# Patient Record
Sex: FEMALE | Race: WHITE | NOT HISPANIC OR LATINO | Employment: OTHER | URBAN - METROPOLITAN AREA
[De-identification: names, ages, dates, MRNs, and addresses within clinical notes are randomized per-mention and may not be internally consistent; named-entity substitution may affect disease eponyms.]

---

## 2017-02-28 ENCOUNTER — OFFICE VISIT (OUTPATIENT)
Dept: URGENT CARE | Facility: PHYSICIAN GROUP | Age: 72
End: 2017-02-28
Payer: COMMERCIAL

## 2017-02-28 VITALS
DIASTOLIC BLOOD PRESSURE: 52 MMHG | OXYGEN SATURATION: 58 % | BODY MASS INDEX: 39.25 KG/M2 | HEIGHT: 69 IN | HEART RATE: 78 BPM | SYSTOLIC BLOOD PRESSURE: 122 MMHG | TEMPERATURE: 98.1 F | WEIGHT: 265 LBS | RESPIRATION RATE: 20 BRPM

## 2017-02-28 DIAGNOSIS — S39.012A LUMBAR STRAIN, INITIAL ENCOUNTER: ICD-10-CM

## 2017-02-28 DIAGNOSIS — R25.2 SPASM: ICD-10-CM

## 2017-02-28 PROCEDURE — 4040F PNEUMOC VAC/ADMIN/RCVD: CPT | Mod: 8P | Performed by: PHYSICIAN ASSISTANT

## 2017-02-28 PROCEDURE — 3014F SCREEN MAMMO DOC REV: CPT | Mod: 8P | Performed by: PHYSICIAN ASSISTANT

## 2017-02-28 PROCEDURE — 3017F COLORECTAL CA SCREEN DOC REV: CPT | Mod: 8P | Performed by: PHYSICIAN ASSISTANT

## 2017-02-28 PROCEDURE — G8419 CALC BMI OUT NRM PARAM NOF/U: HCPCS | Performed by: PHYSICIAN ASSISTANT

## 2017-02-28 PROCEDURE — 1036F TOBACCO NON-USER: CPT | Performed by: PHYSICIAN ASSISTANT

## 2017-02-28 PROCEDURE — G8432 DEP SCR NOT DOC, RNG: HCPCS | Performed by: PHYSICIAN ASSISTANT

## 2017-02-28 PROCEDURE — 99204 OFFICE O/P NEW MOD 45 MIN: CPT | Performed by: PHYSICIAN ASSISTANT

## 2017-02-28 PROCEDURE — 1101F PT FALLS ASSESS-DOCD LE1/YR: CPT | Mod: 8P | Performed by: PHYSICIAN ASSISTANT

## 2017-02-28 PROCEDURE — G8484 FLU IMMUNIZE NO ADMIN: HCPCS | Performed by: PHYSICIAN ASSISTANT

## 2017-02-28 RX ORDER — INSULIN GLARGINE 100 [IU]/ML
INJECTION, SOLUTION SUBCUTANEOUS NIGHTLY
COMMUNITY
End: 2018-03-21

## 2017-02-28 RX ORDER — METHOCARBAMOL 500 MG/1
1000 TABLET, FILM COATED ORAL 3 TIMES DAILY
Qty: 60 TAB | Refills: 0 | Status: SHIPPED | OUTPATIENT
Start: 2017-02-28 | End: 2018-03-21

## 2017-02-28 RX ORDER — EZETIMIBE AND SIMVASTATIN 10; 40 MG/1; MG/1
1 TABLET ORAL NIGHTLY
COMMUNITY
End: 2018-03-21

## 2017-02-28 RX ORDER — SUCRALFATE 1 G/1
1 TABLET ORAL
COMMUNITY
End: 2018-03-21

## 2017-02-28 RX ORDER — TEMAZEPAM 30 MG/1
30 CAPSULE ORAL NIGHTLY PRN
COMMUNITY
End: 2018-03-21

## 2017-02-28 ASSESSMENT — ENCOUNTER SYMPTOMS
BOWEL INCONTINENCE: 0
TINGLING: 0
COUGH: 0
PALPITATIONS: 0
FEVER: 0
HEADACHES: 0
SHORTNESS OF BREATH: 0
FALLS: 0
FLANK PAIN: 0
WHEEZING: 0
EYE DISCHARGE: 0
CHILLS: 0
BACK PAIN: 1
SENSORY CHANGE: 0
PARESIS: 0
DIARRHEA: 0
EYE REDNESS: 0
NAUSEA: 0
ABDOMINAL PAIN: 0
PERIANAL NUMBNESS: 0
VOMITING: 0
NUMBNESS: 0

## 2017-02-28 ASSESSMENT — PAIN SCALES - GENERAL: PAINLEVEL: 9=SEVERE PAIN

## 2017-02-28 NOTE — PROGRESS NOTES
Subjective:      Siri Solis is a 71 y.o. female who presents with Low Back Pain            Back Pain  This is a new problem. The current episode started in the past 7 days. The problem occurs constantly. The problem is unchanged. The pain is present in the lumbar spine. The pain does not radiate. Pertinent negatives include no abdominal pain, bladder incontinence, bowel incontinence, chest pain, dysuria, fever, headaches, numbness, paresis, perianal numbness or tingling. Treatments tried: Narcotics. The treatment provided mild relief.   Exacerbation of her chronic low back pain over the last week. She states she was cleaning and she overdid it. Now she is having bilateral lumbar tenderness including midline tenderness.      PMH:  has a past medical history of Hyperlipidemia; Ulcer; Diabetes; Hypertension; Arthritis; Indigestion; Hiatus hernia syndrome; Snoring; CATARACT; Pain; Anemia; Other specified disorder of intestines; EMPHYSEMA; Paralysis of diaphragm; and Breath shortness.  MEDS:   Current outpatient prescriptions:   •  ezetimibe-simvastatin (VYTORIN) 10-40 MG per tablet, Take 1 Tab by mouth every evening., Disp: , Rfl:   •  temazepam (RESTORIL) 30 MG capsule, Take 30 mg by mouth at bedtime as needed for Sleep., Disp: , Rfl:   •  insulin glargine (LANTUS) 100 UNIT/ML Solution, Inject  as instructed every evening., Disp: , Rfl:   •  sucralfate (CARAFATE) 1 GM Tab, Take 1 g by mouth 4 Times a Day,Before Meals and at Bedtime., Disp: , Rfl:   •  Polyethylene Glycol 3350 (MIRALAX PO), Take  by mouth., Disp: , Rfl:   •  hydrocodone-acetaminophen (NORCO) 7.5-325 MG per tablet, Take 1 Tab by mouth every four hours as needed. Severe pain, Disp: , Rfl:   •  mometasone (NASONEX) 50 MCG/ACT nasal spray, Spray 2 Sprays in nose every evening. Each nostril, Disp: , Rfl:   •  lisinopril (PRINIVIL) 10 MG TABS, Take 10 mg by mouth every evening., Disp: , Rfl:   •  Calcium Carbonate (CALCIUM 500 PO), Take  by mouth 2  Times a Day., Disp: , Rfl:   •  atenolol (TENORMIN) 100 MG TABS, Take 1 Tab by mouth every day., Disp: 30 Tab, Rfl: 0  •  tiotropium (SPIRIVA) 18 MCG CAPS, Inhale 18 mcg by mouth every evening., Disp: , Rfl:   •  pioglitazone-metformin (ACTOPLUS MET)  MG per tablet, Take 1 Tab by mouth 2 times a day, with meals., Disp: , Rfl:   •  OMEPRAZOLE 20 MG TBEC, Take 20 mg by mouth 2 Times a Day. bid, Disp: , Rfl:   •  ZOLPIDEM 10 MG TABS, Take 10 mg by mouth at bedtime as needed. daily, Disp: , Rfl:   •  non-formulary med, 25 Each. Iron 25mg with B12, Disp: , Rfl:   •  DiphenhydrAMINE HCl (BENADRYL ALLERGY PO), Take  by mouth as needed., Disp: , Rfl:   •  Loperamide HCl (IMODIUM PO), Take  by mouth as needed., Disp: , Rfl:   •  Non Formulary Request, Unknown pain pill, Disp: , Rfl:   •  spironolactone (ALDACTONE) 50 MG TABS, Take 1 Tab by mouth every day., Disp: 30 Tab, Rfl: 0  •  ezetimibe (ZETIA) 10 MG TABS, Take 10 mg by mouth every day.  , Disp: , Rfl:   •  aspirin (ASA) 325 MG TABS, Take 650 mg by mouth as needed., Disp: , Rfl:   ALLERGIES:   Allergies   Allergen Reactions   • Zoloft Diarrhea   • Metformin Hives, Rash and Itching   • Amaryl [Amaryl]    • Amaryl [Glimepiride] Shortness of Breath and Rash   • Amoxicillin Rash   • Byetta Rash   • Epinephrine Shortness of Breath     Panic attack, Can't breath   • Hctz      Stopped urinary output   • Iodine Anaphylaxis     contrast   • Lasix [Furosemide]      Leg cramps, stopped urinary output   • Latex    • Lexapro      Leg cramping   • Penicillins Rash   • Spironolactone      SURGHX:   Past Surgical History   Procedure Laterality Date   • Other orthopedic surgery       left knee replacement   • Gyn surgery       hysterectomy   • Other       cataract surgery    • Other       carpal tunnel    • Recovery  11/6/2013     Performed by [facility] at SURGERY SAME DAY [facility]     SOCHX:  reports that she quit smoking about 10 years ago. Her smoking use included  "Cigarettes. She has a 40 pack-year smoking history. She does not have any smokeless tobacco history on file. She reports that she does not drink alcohol or use illicit drugs.  FH: family history is not on file.      Review of Systems   Constitutional: Negative for fever and chills.   Eyes: Negative for discharge and redness.   Respiratory: Negative for cough, shortness of breath and wheezing.    Cardiovascular: Negative for chest pain, palpitations and leg swelling.   Gastrointestinal: Negative for nausea, vomiting, abdominal pain, diarrhea and bowel incontinence.   Genitourinary: Negative for bladder incontinence, dysuria, urgency, hematuria and flank pain.   Musculoskeletal: Positive for back pain. Negative for falls.   Neurological: Negative for tingling, sensory change, numbness and headaches.       Medications, Allergies, and current problem list reviewed today in Epic     Objective:     /52 mmHg  Pulse 78  Temp(Src) 36.7 °C (98.1 °F)  Resp 20  Ht 1.753 m (5' 9\")  Wt 120.203 kg (265 lb)  BMI 39.12 kg/m2  SpO2 58%  Breastfeeding? No     Physical Exam   Constitutional: She is oriented to person, place, and time. She appears well-developed and well-nourished. No distress.   Patient obese, wheelchair-bound, oxygen dependent   HENT:   Head: Normocephalic and atraumatic.   Right Ear: External ear normal.   Left Ear: External ear normal.   Mouth/Throat: Oropharynx is clear and moist. No oropharyngeal exudate.   Eyes: Conjunctivae are normal. Right eye exhibits no discharge. Left eye exhibits no discharge.   Neck: Normal range of motion. Neck supple.   Cardiovascular: Normal rate, regular rhythm and normal heart sounds.    Pulmonary/Chest: Effort normal and breath sounds normal. No respiratory distress. She has no wheezes.   Musculoskeletal:        Lumbar back: She exhibits decreased range of motion, tenderness, bony tenderness, pain and spasm. She exhibits no swelling.        Back:    Lymphadenopathy:    "  She has no cervical adenopathy.   Neurological: She is alert and oriented to person, place, and time.   Skin: Skin is warm and dry. She is not diaphoretic.   Psychiatric: She has a normal mood and affect. Her behavior is normal. Judgment and thought content normal.   Nursing note and vitals reviewed.              Assessment/Plan:     1. Lumbar strain, initial encounter  methocarbamol (ROBAXIN) 500 MG Tab    REFERRAL TO SPINE SURGERY   2. Spasm  methocarbamol (ROBAXIN) 500 MG Tab     Acute exacerbation of her chronic lumbar pain. She has leftover narcotics from previous injury. She will continue to take those as needed  Robaxin, do not drive, increase fluids  Referral to a spine specialist per patient's request.  OTC meds and conservative measures as discussed  Return to clinic or go to ED if symptoms worsen or persist. Indications for ED discussed at length. Patient voices understanding. Follow-up with your primary care provider in 3-5 days. Red flags discussed.    Please note that this dictation was created using voice recognition software. I have made every reasonable attempt to correct obvious errors, but I expect that there are errors of grammar and possibly content that I did not discover before finalizing the note.

## 2017-03-30 ENCOUNTER — HOSPITAL ENCOUNTER (OUTPATIENT)
Dept: RADIOLOGY | Facility: MEDICAL CENTER | Age: 72
End: 2017-03-30
Attending: NEUROLOGICAL SURGERY
Payer: COMMERCIAL

## 2017-03-30 DIAGNOSIS — M54.5 LOW BACK PAIN, UNSPECIFIED BACK PAIN LATERALITY, UNSPECIFIED CHRONICITY, WITH SCIATICA PRESENCE UNSPECIFIED: ICD-10-CM

## 2017-03-30 PROCEDURE — 72148 MRI LUMBAR SPINE W/O DYE: CPT

## 2017-03-30 PROCEDURE — 72110 X-RAY EXAM L-2 SPINE 4/>VWS: CPT

## 2017-06-20 ENCOUNTER — HOSPITAL ENCOUNTER (OUTPATIENT)
Dept: LAB | Facility: MEDICAL CENTER | Age: 72
End: 2017-06-20
Attending: FAMILY MEDICINE
Payer: COMMERCIAL

## 2017-06-20 LAB
ANION GAP SERPL CALC-SCNC: 8 MMOL/L (ref 0–11.9)
BUN SERPL-MCNC: 21 MG/DL (ref 8–22)
CALCIUM SERPL-MCNC: 9.8 MG/DL (ref 8.5–10.5)
CHLORIDE SERPL-SCNC: 101 MMOL/L (ref 96–112)
CO2 SERPL-SCNC: 33 MMOL/L (ref 20–33)
CREAT SERPL-MCNC: 0.96 MG/DL (ref 0.5–1.4)
EST. AVERAGE GLUCOSE BLD GHB EST-MCNC: 154 MG/DL
GFR SERPL CREATININE-BSD FRML MDRD: 57 ML/MIN/1.73 M 2
GLUCOSE SERPL-MCNC: 148 MG/DL (ref 65–99)
HBA1C MFR BLD: 7 % (ref 0–5.6)
POTASSIUM SERPL-SCNC: 5.4 MMOL/L (ref 3.6–5.5)
SODIUM SERPL-SCNC: 142 MMOL/L (ref 135–145)

## 2017-06-20 PROCEDURE — 80048 BASIC METABOLIC PNL TOTAL CA: CPT

## 2017-06-20 PROCEDURE — 36415 COLL VENOUS BLD VENIPUNCTURE: CPT

## 2017-06-20 PROCEDURE — 83036 HEMOGLOBIN GLYCOSYLATED A1C: CPT

## 2017-11-10 ENCOUNTER — HOSPITAL ENCOUNTER (OUTPATIENT)
Dept: LAB | Facility: MEDICAL CENTER | Age: 72
End: 2017-11-10
Attending: FAMILY MEDICINE
Payer: COMMERCIAL

## 2017-11-10 LAB
ANISOCYTOSIS BLD QL SMEAR: ABNORMAL
BASOPHILS # BLD AUTO: 0.9 % (ref 0–1.8)
BASOPHILS # BLD: 0.08 K/UL (ref 0–0.12)
BURR CELLS BLD QL SMEAR: NORMAL
EOSINOPHIL # BLD AUTO: 0.51 K/UL (ref 0–0.51)
EOSINOPHIL NFR BLD: 6.1 % (ref 0–6.9)
ERYTHROCYTE [DISTWIDTH] IN BLOOD BY AUTOMATED COUNT: 54.4 FL (ref 35.9–50)
FERRITIN SERPL-MCNC: 20 NG/ML (ref 10–291)
HCT VFR BLD AUTO: 41.4 % (ref 37–47)
HGB BLD-MCNC: 11.9 G/DL (ref 12–16)
IRON SATN MFR SERPL: 7 % (ref 15–55)
IRON SERPL-MCNC: 36 UG/DL (ref 40–170)
LYMPHOCYTES # BLD AUTO: 1.11 K/UL (ref 1–4.8)
LYMPHOCYTES NFR BLD: 13.2 % (ref 22–41)
MAGNESIUM SERPL-MCNC: 1.3 MG/DL (ref 1.5–2.5)
MANUAL DIFF BLD: NORMAL
MCH RBC QN AUTO: 25.9 PG (ref 27–33)
MCHC RBC AUTO-ENTMCNC: 28.7 G/DL (ref 33.6–35)
MCV RBC AUTO: 90.2 FL (ref 81.4–97.8)
MONOCYTES # BLD AUTO: 0.51 K/UL (ref 0–0.85)
MONOCYTES NFR BLD AUTO: 6.1 % (ref 0–13.4)
MORPHOLOGY BLD-IMP: NORMAL
NEUTROPHILS # BLD AUTO: 6.19 K/UL (ref 2–7.15)
NEUTROPHILS NFR BLD: 73.7 % (ref 44–72)
NRBC # BLD AUTO: 0.02 K/UL
NRBC BLD AUTO-RTO: 0.2 /100 WBC
OVALOCYTES BLD QL SMEAR: NORMAL
PLATELET # BLD AUTO: 251 K/UL (ref 164–446)
PLATELET BLD QL SMEAR: NORMAL
PMV BLD AUTO: 11.5 FL (ref 9–12.9)
POIKILOCYTOSIS BLD QL SMEAR: NORMAL
POLYCHROMASIA BLD QL SMEAR: NORMAL
RBC # BLD AUTO: 4.59 M/UL (ref 4.2–5.4)
RBC BLD AUTO: PRESENT
SCHISTOCYTES BLD QL SMEAR: NORMAL
T4 FREE SERPL-MCNC: 1.36 NG/DL (ref 0.53–1.43)
TIBC SERPL-MCNC: 543 UG/DL (ref 250–450)
TSH SERPL DL<=0.005 MIU/L-ACNC: 4.37 UIU/ML (ref 0.3–3.7)
WBC # BLD AUTO: 8.4 K/UL (ref 4.8–10.8)

## 2017-11-10 PROCEDURE — 83550 IRON BINDING TEST: CPT

## 2017-11-10 PROCEDURE — 84439 ASSAY OF FREE THYROXINE: CPT

## 2017-11-10 PROCEDURE — 82728 ASSAY OF FERRITIN: CPT

## 2017-11-10 PROCEDURE — 36415 COLL VENOUS BLD VENIPUNCTURE: CPT

## 2017-11-10 PROCEDURE — 83540 ASSAY OF IRON: CPT

## 2017-11-10 PROCEDURE — 85027 COMPLETE CBC AUTOMATED: CPT

## 2017-11-10 PROCEDURE — 85007 BL SMEAR W/DIFF WBC COUNT: CPT

## 2017-11-10 PROCEDURE — 84443 ASSAY THYROID STIM HORMONE: CPT

## 2017-11-10 PROCEDURE — 83735 ASSAY OF MAGNESIUM: CPT

## 2018-03-21 ENCOUNTER — HOSPITAL ENCOUNTER (INPATIENT)
Facility: MEDICAL CENTER | Age: 73
LOS: 8 days | DRG: 291 | End: 2018-03-29
Attending: EMERGENCY MEDICINE | Admitting: FAMILY MEDICINE
Payer: COMMERCIAL

## 2018-03-21 ENCOUNTER — APPOINTMENT (OUTPATIENT)
Dept: RADIOLOGY | Facility: MEDICAL CENTER | Age: 73
DRG: 291 | End: 2018-03-21
Attending: EMERGENCY MEDICINE
Payer: COMMERCIAL

## 2018-03-21 DIAGNOSIS — I27.20 PULMONARY HTN (HCC): ICD-10-CM

## 2018-03-21 DIAGNOSIS — R53.83 FATIGUE, UNSPECIFIED TYPE: ICD-10-CM

## 2018-03-21 DIAGNOSIS — J44.9 CHRONIC OBSTRUCTIVE PULMONARY DISEASE, UNSPECIFIED COPD TYPE (HCC): ICD-10-CM

## 2018-03-21 DIAGNOSIS — N30.00 ACUTE CYSTITIS WITHOUT HEMATURIA: ICD-10-CM

## 2018-03-21 DIAGNOSIS — I50.9 ACUTE ON CHRONIC CONGESTIVE HEART FAILURE, UNSPECIFIED CONGESTIVE HEART FAILURE TYPE: ICD-10-CM

## 2018-03-21 DIAGNOSIS — I50.33 ACUTE ON CHRONIC DIASTOLIC CONGESTIVE HEART FAILURE, NYHA CLASS 4 (HCC): ICD-10-CM

## 2018-03-21 LAB
ALBUMIN SERPL BCP-MCNC: 4.2 G/DL (ref 3.2–4.9)
ALBUMIN/GLOB SERPL: 1.6 G/DL
ALP SERPL-CCNC: 160 U/L (ref 30–99)
ALT SERPL-CCNC: 7 U/L (ref 2–50)
ANION GAP SERPL CALC-SCNC: 11 MMOL/L (ref 0–11.9)
ANION GAP SERPL CALC-SCNC: 9 MMOL/L (ref 0–11.9)
ANISOCYTOSIS BLD QL SMEAR: ABNORMAL
APPEARANCE UR: ABNORMAL
AST SERPL-CCNC: 17 U/L (ref 12–45)
BACTERIA #/AREA URNS HPF: ABNORMAL /HPF
BASOPHILS # BLD AUTO: 0 % (ref 0–1.8)
BASOPHILS # BLD: 0 K/UL (ref 0–0.12)
BILIRUB SERPL-MCNC: 1.3 MG/DL (ref 0.1–1.5)
BILIRUB UR QL STRIP.AUTO: ABNORMAL
BNP SERPL-MCNC: 958 PG/ML (ref 0–100)
BUN SERPL-MCNC: 76 MG/DL (ref 8–22)
BUN SERPL-MCNC: 76 MG/DL (ref 8–22)
CALCIUM SERPL-MCNC: 9.5 MG/DL (ref 8.5–10.5)
CALCIUM SERPL-MCNC: 9.9 MG/DL (ref 8.5–10.5)
CHLORIDE SERPL-SCNC: 101 MMOL/L (ref 96–112)
CHLORIDE SERPL-SCNC: 102 MMOL/L (ref 96–112)
CO2 SERPL-SCNC: 21 MMOL/L (ref 20–33)
CO2 SERPL-SCNC: 26 MMOL/L (ref 20–33)
COLOR UR: ABNORMAL
CREAT SERPL-MCNC: 2.3 MG/DL (ref 0.5–1.4)
CREAT SERPL-MCNC: 2.32 MG/DL (ref 0.5–1.4)
CULTURE IF INDICATED INDCX: YES UA CULTURE
EKG IMPRESSION: NORMAL
EOSINOPHIL # BLD AUTO: 0.14 K/UL (ref 0–0.51)
EOSINOPHIL NFR BLD: 1.7 % (ref 0–6.9)
EPI CELLS #/AREA URNS HPF: ABNORMAL /HPF
ERYTHROCYTE [DISTWIDTH] IN BLOOD BY AUTOMATED COUNT: 78.3 FL (ref 35.9–50)
ERYTHROCYTE [DISTWIDTH] IN BLOOD BY AUTOMATED COUNT: 80.9 FL (ref 35.9–50)
GLOBULIN SER CALC-MCNC: 2.6 G/DL (ref 1.9–3.5)
GLUCOSE BLD-MCNC: 123 MG/DL (ref 65–99)
GLUCOSE BLD-MCNC: 133 MG/DL (ref 65–99)
GLUCOSE BLD-MCNC: 81 MG/DL (ref 65–99)
GLUCOSE SERPL-MCNC: 114 MG/DL (ref 65–99)
GLUCOSE SERPL-MCNC: 123 MG/DL (ref 65–99)
GLUCOSE UR STRIP.AUTO-MCNC: NEGATIVE MG/DL
HCT VFR BLD AUTO: 43.2 % (ref 37–47)
HCT VFR BLD AUTO: 45.6 % (ref 37–47)
HGB BLD-MCNC: 13 G/DL (ref 12–16)
HGB BLD-MCNC: 13.1 G/DL (ref 12–16)
HYALINE CASTS #/AREA URNS LPF: ABNORMAL /LPF
KETONES UR STRIP.AUTO-MCNC: ABNORMAL MG/DL
LEUKOCYTE ESTERASE UR QL STRIP.AUTO: ABNORMAL
LG PLATELETS BLD QL SMEAR: NORMAL
LYMPHOCYTES # BLD AUTO: 0.73 K/UL (ref 1–4.8)
LYMPHOCYTES NFR BLD: 8.8 % (ref 22–41)
MACROCYTES BLD QL SMEAR: ABNORMAL
MANUAL DIFF BLD: NORMAL
MCH RBC QN AUTO: 28.4 PG (ref 27–33)
MCH RBC QN AUTO: 29.3 PG (ref 27–33)
MCHC RBC AUTO-ENTMCNC: 28.7 G/DL (ref 33.6–35)
MCHC RBC AUTO-ENTMCNC: 30.1 G/DL (ref 33.6–35)
MCV RBC AUTO: 97.3 FL (ref 81.4–97.8)
MCV RBC AUTO: 98.9 FL (ref 81.4–97.8)
MICRO URNS: ABNORMAL
MONOCYTES # BLD AUTO: 0.73 K/UL (ref 0–0.85)
MONOCYTES NFR BLD AUTO: 8.8 % (ref 0–13.4)
MORPHOLOGY BLD-IMP: NORMAL
NEUTROPHILS # BLD AUTO: 6.7 K/UL (ref 2–7.15)
NEUTROPHILS NFR BLD: 80.7 % (ref 44–72)
NITRITE UR QL STRIP.AUTO: NEGATIVE
NRBC # BLD AUTO: 0 K/UL
NRBC BLD-RTO: 0 /100 WBC
OVALOCYTES BLD QL SMEAR: NORMAL
PH UR STRIP.AUTO: 5 [PH]
PLATELET # BLD AUTO: 189 K/UL (ref 164–446)
PLATELET # BLD AUTO: 219 K/UL (ref 164–446)
PLATELET BLD QL SMEAR: NORMAL
PMV BLD AUTO: 10.8 FL (ref 9–12.9)
PMV BLD AUTO: 11.2 FL (ref 9–12.9)
POIKILOCYTOSIS BLD QL SMEAR: NORMAL
POLYCHROMASIA BLD QL SMEAR: NORMAL
POTASSIUM SERPL-SCNC: 7.2 MMOL/L (ref 3.6–5.5)
POTASSIUM SERPL-SCNC: 7.6 MMOL/L (ref 3.6–5.5)
PROT SERPL-MCNC: 6.8 G/DL (ref 6–8.2)
PROT UR QL STRIP: >=1000 MG/DL
RBC # BLD AUTO: 4.44 M/UL (ref 4.2–5.4)
RBC # BLD AUTO: 4.61 M/UL (ref 4.2–5.4)
RBC # URNS HPF: ABNORMAL /HPF
RBC BLD AUTO: PRESENT
RBC UR QL AUTO: ABNORMAL
SODIUM SERPL-SCNC: 134 MMOL/L (ref 135–145)
SODIUM SERPL-SCNC: 136 MMOL/L (ref 135–145)
SP GR UR STRIP.AUTO: 1.03
UROBILINOGEN UR STRIP.AUTO-MCNC: 1 MG/DL
WBC # BLD AUTO: 8 K/UL (ref 4.8–10.8)
WBC # BLD AUTO: 8.3 K/UL (ref 4.8–10.8)
WBC #/AREA URNS HPF: ABNORMAL /HPF

## 2018-03-21 PROCEDURE — 87077 CULTURE AEROBIC IDENTIFY: CPT

## 2018-03-21 PROCEDURE — 700111 HCHG RX REV CODE 636 W/ 250 OVERRIDE (IP): Performed by: FAMILY MEDICINE

## 2018-03-21 PROCEDURE — 80053 COMPREHEN METABOLIC PANEL: CPT

## 2018-03-21 PROCEDURE — A9270 NON-COVERED ITEM OR SERVICE: HCPCS | Performed by: EMERGENCY MEDICINE

## 2018-03-21 PROCEDURE — 700102 HCHG RX REV CODE 250 W/ 637 OVERRIDE(OP): Performed by: EMERGENCY MEDICINE

## 2018-03-21 PROCEDURE — 80048 BASIC METABOLIC PNL TOTAL CA: CPT

## 2018-03-21 PROCEDURE — 93010 ELECTROCARDIOGRAM REPORT: CPT | Performed by: INTERNAL MEDICINE

## 2018-03-21 PROCEDURE — 87086 URINE CULTURE/COLONY COUNT: CPT

## 2018-03-21 PROCEDURE — 700111 HCHG RX REV CODE 636 W/ 250 OVERRIDE (IP): Performed by: EMERGENCY MEDICINE

## 2018-03-21 PROCEDURE — 700101 HCHG RX REV CODE 250: Performed by: FAMILY MEDICINE

## 2018-03-21 PROCEDURE — 770020 HCHG ROOM/CARE - TELE (206)

## 2018-03-21 PROCEDURE — 36415 COLL VENOUS BLD VENIPUNCTURE: CPT

## 2018-03-21 PROCEDURE — 85007 BL SMEAR W/DIFF WBC COUNT: CPT

## 2018-03-21 PROCEDURE — 82962 GLUCOSE BLOOD TEST: CPT | Mod: 91

## 2018-03-21 PROCEDURE — 85027 COMPLETE CBC AUTOMATED: CPT | Mod: 91

## 2018-03-21 PROCEDURE — 93926 LOWER EXTREMITY STUDY: CPT

## 2018-03-21 PROCEDURE — 83880 ASSAY OF NATRIURETIC PEPTIDE: CPT

## 2018-03-21 PROCEDURE — 71045 X-RAY EXAM CHEST 1 VIEW: CPT

## 2018-03-21 PROCEDURE — 87040 BLOOD CULTURE FOR BACTERIA: CPT | Mod: 91

## 2018-03-21 PROCEDURE — 94760 N-INVAS EAR/PLS OXIMETRY 1: CPT

## 2018-03-21 PROCEDURE — 96374 THER/PROPH/DIAG INJ IV PUSH: CPT

## 2018-03-21 PROCEDURE — 700105 HCHG RX REV CODE 258: Performed by: FAMILY MEDICINE

## 2018-03-21 PROCEDURE — 99285 EMERGENCY DEPT VISIT HI MDM: CPT

## 2018-03-21 PROCEDURE — 81001 URINALYSIS AUTO W/SCOPE: CPT

## 2018-03-21 PROCEDURE — 700102 HCHG RX REV CODE 250 W/ 637 OVERRIDE(OP): Performed by: FAMILY MEDICINE

## 2018-03-21 PROCEDURE — 93005 ELECTROCARDIOGRAM TRACING: CPT | Performed by: FAMILY MEDICINE

## 2018-03-21 PROCEDURE — 93005 ELECTROCARDIOGRAM TRACING: CPT | Performed by: EMERGENCY MEDICINE

## 2018-03-21 PROCEDURE — 87186 SC STD MICRODIL/AGAR DIL: CPT

## 2018-03-21 RX ORDER — DEXTROSE MONOHYDRATE 25 G/50ML
25 INJECTION, SOLUTION INTRAVENOUS ONCE
Status: COMPLETED | OUTPATIENT
Start: 2018-03-21 | End: 2018-03-21

## 2018-03-21 RX ORDER — EZETIMIBE AND SIMVASTATIN 10; 40 MG/1; MG/1
1 TABLET ORAL NIGHTLY
COMMUNITY
End: 2021-10-27 | Stop reason: SDUPTHER

## 2018-03-21 RX ORDER — INSULIN GLARGINE 100 [IU]/ML
15 INJECTION, SOLUTION SUBCUTANEOUS NIGHTLY
Status: DISCONTINUED | OUTPATIENT
Start: 2018-03-21 | End: 2018-03-29 | Stop reason: HOSPADM

## 2018-03-21 RX ORDER — AMOXICILLIN 250 MG
2 CAPSULE ORAL 2 TIMES DAILY
Status: DISCONTINUED | OUTPATIENT
Start: 2018-03-21 | End: 2018-03-29 | Stop reason: HOSPADM

## 2018-03-21 RX ORDER — DEXTROSE MONOHYDRATE 25 G/50ML
25 INJECTION, SOLUTION INTRAVENOUS
Status: DISCONTINUED | OUTPATIENT
Start: 2018-03-21 | End: 2018-03-29 | Stop reason: HOSPADM

## 2018-03-21 RX ORDER — EZETIMIBE AND SIMVASTATIN 10; 40 MG/1; MG/1
1 TABLET ORAL NIGHTLY
Status: DISCONTINUED | OUTPATIENT
Start: 2018-03-21 | End: 2018-03-21

## 2018-03-21 RX ORDER — AZITHROMYCIN 500 MG/1
500 INJECTION, POWDER, LYOPHILIZED, FOR SOLUTION INTRAVENOUS ONCE
Status: DISCONTINUED | OUTPATIENT
Start: 2018-03-21 | End: 2018-03-21

## 2018-03-21 RX ORDER — OMEPRAZOLE 20 MG/1
20 CAPSULE, DELAYED RELEASE ORAL EVERY EVENING
COMMUNITY
End: 2021-11-08 | Stop reason: SDUPTHER

## 2018-03-21 RX ORDER — CEFTRIAXONE 2 G/1
2 INJECTION, POWDER, FOR SOLUTION INTRAMUSCULAR; INTRAVENOUS ONCE
Status: COMPLETED | OUTPATIENT
Start: 2018-03-21 | End: 2018-03-21

## 2018-03-21 RX ORDER — LISINOPRIL 10 MG/1
10 TABLET ORAL DAILY
COMMUNITY
End: 2021-11-08 | Stop reason: SDUPTHER

## 2018-03-21 RX ORDER — LISINOPRIL 10 MG/1
10 TABLET ORAL EVERY EVENING
Status: DISCONTINUED | OUTPATIENT
Start: 2018-03-22 | End: 2018-03-21

## 2018-03-21 RX ORDER — PIOGLITAZONE HCL AND METFORMIN HCL 850; 15 MG/1; MG/1
1 TABLET ORAL 2 TIMES DAILY WITH MEALS
Status: ON HOLD | COMMUNITY
End: 2018-03-29

## 2018-03-21 RX ORDER — HYDROCODONE BITARTRATE AND ACETAMINOPHEN 7.5; 325 MG/1; MG/1
1 TABLET ORAL EVERY 4 HOURS PRN
COMMUNITY
End: 2021-10-14

## 2018-03-21 RX ORDER — TEMAZEPAM 30 MG/1
30 CAPSULE ORAL NIGHTLY PRN
COMMUNITY
End: 2021-10-14 | Stop reason: SDUPTHER

## 2018-03-21 RX ORDER — DIPHENHYDRAMINE HCL 25 MG
25 TABLET ORAL NIGHTLY PRN
Status: DISCONTINUED | OUTPATIENT
Start: 2018-03-21 | End: 2018-03-29 | Stop reason: HOSPADM

## 2018-03-21 RX ORDER — CEFTRIAXONE 1 G/1
1000 INJECTION, POWDER, FOR SOLUTION INTRAMUSCULAR; INTRAVENOUS EVERY 12 HOURS
Status: DISCONTINUED | OUTPATIENT
Start: 2018-03-21 | End: 2018-03-21

## 2018-03-21 RX ORDER — EZETIMIBE 10 MG/1
10 TABLET ORAL
Status: DISCONTINUED | OUTPATIENT
Start: 2018-03-21 | End: 2018-03-29 | Stop reason: HOSPADM

## 2018-03-21 RX ORDER — TIOTROPIUM BROMIDE 18 UG/1
1 CAPSULE ORAL; RESPIRATORY (INHALATION) EVERY EVENING
Status: DISCONTINUED | OUTPATIENT
Start: 2018-03-21 | End: 2018-03-29 | Stop reason: HOSPADM

## 2018-03-21 RX ORDER — BISACODYL 10 MG
10 SUPPOSITORY, RECTAL RECTAL
Status: DISCONTINUED | OUTPATIENT
Start: 2018-03-21 | End: 2018-03-29 | Stop reason: HOSPADM

## 2018-03-21 RX ORDER — HYDROCODONE BITARTRATE AND ACETAMINOPHEN 7.5; 325 MG/1; MG/1
1 TABLET ORAL EVERY 4 HOURS PRN
Status: DISCONTINUED | OUTPATIENT
Start: 2018-03-21 | End: 2018-03-29 | Stop reason: HOSPADM

## 2018-03-21 RX ORDER — SPIRONOLACTONE 25 MG/1
50 TABLET ORAL DAILY
Status: DISCONTINUED | OUTPATIENT
Start: 2018-03-21 | End: 2018-03-21

## 2018-03-21 RX ORDER — OMEPRAZOLE 20 MG/1
20 CAPSULE, DELAYED RELEASE ORAL 2 TIMES DAILY
Status: DISCONTINUED | OUTPATIENT
Start: 2018-03-21 | End: 2018-03-29 | Stop reason: HOSPADM

## 2018-03-21 RX ORDER — SIMVASTATIN 40 MG
40 TABLET ORAL EVERY EVENING
Status: DISCONTINUED | OUTPATIENT
Start: 2018-03-21 | End: 2018-03-29 | Stop reason: HOSPADM

## 2018-03-21 RX ORDER — INSULIN GLARGINE 100 [IU]/ML
32 INJECTION, SOLUTION SUBCUTANEOUS NIGHTLY
Status: ON HOLD | COMMUNITY
End: 2018-03-29

## 2018-03-21 RX ORDER — TIOTROPIUM BROMIDE 18 UG/1
18 CAPSULE ORAL; RESPIRATORY (INHALATION) EVERY EVENING
COMMUNITY
End: 2022-10-31 | Stop reason: SDUPTHER

## 2018-03-21 RX ORDER — MOMETASONE FUROATE 50 UG/1
2 SPRAY, METERED NASAL EVERY EVENING
Status: DISCONTINUED | OUTPATIENT
Start: 2018-03-21 | End: 2018-03-21

## 2018-03-21 RX ORDER — ATENOLOL 100 MG/1
100 TABLET ORAL EVERY MORNING
Status: ON HOLD | COMMUNITY
End: 2018-03-29

## 2018-03-21 RX ORDER — AZITHROMYCIN 250 MG/1
500 TABLET, FILM COATED ORAL ONCE
Status: COMPLETED | OUTPATIENT
Start: 2018-03-21 | End: 2018-03-21

## 2018-03-21 RX ORDER — MAGNESIUM OXIDE 400 MG/1
400 TABLET ORAL DAILY
COMMUNITY
End: 2021-11-11

## 2018-03-21 RX ORDER — FLUTICASONE PROPIONATE 50 MCG
2 SPRAY, SUSPENSION (ML) NASAL DAILY
Status: DISCONTINUED | OUTPATIENT
Start: 2018-03-21 | End: 2018-03-29 | Stop reason: HOSPADM

## 2018-03-21 RX ORDER — SUCRALFATE 1 G/1
1 TABLET ORAL
Status: DISCONTINUED | OUTPATIENT
Start: 2018-03-21 | End: 2018-03-29 | Stop reason: HOSPADM

## 2018-03-21 RX ORDER — IBUPROFEN 200 MG
500 CAPSULE ORAL 2 TIMES DAILY
Status: DISCONTINUED | OUTPATIENT
Start: 2018-03-21 | End: 2018-03-29 | Stop reason: HOSPADM

## 2018-03-21 RX ORDER — POLYETHYLENE GLYCOL 3350 17 G/17G
1 POWDER, FOR SOLUTION ORAL
Status: DISCONTINUED | OUTPATIENT
Start: 2018-03-21 | End: 2018-03-29 | Stop reason: HOSPADM

## 2018-03-21 RX ADMIN — INSULIN HUMAN 10 UNITS: 100 INJECTION, SOLUTION PARENTERAL at 22:00

## 2018-03-21 RX ADMIN — CALCIUM GLUCONATE 1000 MG: 94 INJECTION, SOLUTION INTRAVENOUS at 22:18

## 2018-03-21 RX ADMIN — DIPHENHYDRAMINE HCL 25 MG: 25 TABLET ORAL at 20:28

## 2018-03-21 RX ADMIN — TIOTROPIUM BROMIDE 1 CAPSULE: 18 CAPSULE ORAL; RESPIRATORY (INHALATION) at 20:27

## 2018-03-21 RX ADMIN — SIMVASTATIN 40 MG: 40 TABLET, FILM COATED ORAL at 20:28

## 2018-03-21 RX ADMIN — DEXTROSE MONOHYDRATE 50 ML: 25 INJECTION, SOLUTION INTRAVENOUS at 21:59

## 2018-03-21 RX ADMIN — ENOXAPARIN SODIUM 40 MG: 100 INJECTION SUBCUTANEOUS at 20:29

## 2018-03-21 RX ADMIN — AZITHROMYCIN 500 MG: 250 TABLET, FILM COATED ORAL at 13:46

## 2018-03-21 RX ADMIN — OMEPRAZOLE 20 MG: 20 CAPSULE, DELAYED RELEASE ORAL at 20:28

## 2018-03-21 RX ADMIN — SUCRALFATE 1 G: 1 TABLET ORAL at 20:29

## 2018-03-21 RX ADMIN — EZETIMIBE 10 MG: 10 TABLET ORAL at 20:28

## 2018-03-21 RX ADMIN — CEFTRIAXONE SODIUM 2 G: 2 INJECTION, POWDER, FOR SOLUTION INTRAMUSCULAR; INTRAVENOUS at 13:39

## 2018-03-21 ASSESSMENT — PATIENT HEALTH QUESTIONNAIRE - PHQ9
2. FEELING DOWN, DEPRESSED, IRRITABLE, OR HOPELESS: NOT AT ALL
1. LITTLE INTEREST OR PLEASURE IN DOING THINGS: NOT AT ALL
SUM OF ALL RESPONSES TO PHQ9 QUESTIONS 1 AND 2: 0

## 2018-03-21 ASSESSMENT — LIFESTYLE VARIABLES
EVER_SMOKED: YES
EVER_SMOKED: YES
DO YOU DRINK ALCOHOL: NO

## 2018-03-21 ASSESSMENT — PAIN SCALES - GENERAL: PAINLEVEL_OUTOF10: 5

## 2018-03-21 ASSESSMENT — COPD QUESTIONNAIRES
COPD SCREENING SCORE: 6
DO YOU EVER COUGH UP ANY MUCUS OR PHLEGM?: NO/ONLY WITH OCCASIONAL COLDS OR INFECTIONS
HAVE YOU SMOKED AT LEAST 100 CIGARETTES IN YOUR ENTIRE LIFE: YES
DURING THE PAST 4 WEEKS HOW MUCH DID YOU FEEL SHORT OF BREATH: SOME OF THE TIME

## 2018-03-21 NOTE — ED NOTES
Pt helped onto bedpan. Unable to urinate. Minicath performed and urine sent to lab.   Pt medicated per orders. Pt and family aware of POC. Awaiting results. Call light within reach.

## 2018-03-21 NOTE — ED PROVIDER NOTES
ED Provider Note    Scribed for Kisha Cobb M.D. by Lilliam Sam. 3/21/2018, 1:01 PM.    Primary care provider: Lake Ochoa M.D.  Means of arrival: Ambulance  History obtained from: Patient  History limited by: None    CHIEF COMPLAINT  Chief Complaint   Patient presents with   • Shortness of Breath       HPI  Siri Solis is a 72 y.o. Female with a history of diabetes and COPD who presents to the Emergency Department by ambulance for shortness of breath with an onset of 2 days. Per , the patient has chronic mild weakness/fatigue. However, she has acutely worsened and has not been able to get out of bed for the past week. She reports worsening shortness of breath at rest over the past two days along with a cough productive of green sputum. She uses 4L oxygen at home at baseline but has had minimal improvement in shortness of breath. She has also had some confusion and disorientation. The patient notes an episode of urinary incontinence 2 days ago but denies dysuria or hematuria. She has chronic leg swelling bilaterally. She denies any fever, chills, headaches or chest pain.       REVIEW OF SYSTEMS  Pertinent positives include weakness, fatigue, productive cough, green sputum production, shortness of breath, confusion, disorientation, chronic leg swelling and urinary incontinence. Pertinent negatives include no fever, headaches, chest pain, dysuria or hematuria.  All other systems reviewed and negative. See HPI for further details. C.      PAST MEDICAL HISTORY  Patient has a past medical history of Anemia; Arthritis; Breath shortness; CATARACT; Diabetes; EMPHYSEMA; Hiatus hernia syndrome; Hyperlipidemia; Hypertension; Indigestion; Other specified disorder of intestines; Pain; Paralysis of diaphragm; Snoring; and Ulcer (CMS-HCC).      SURGICAL HISTORY  Patient has a past surgical history that includes other orthopedic surgery; gyn surgery; other; other; and recovery (11/6/2013).      SOCIAL  "HISTORY  Social History   Substance Use Topics   • Smoking status: Former Smoker     Packs/day: 2.00     Years: 20.00     Types: Cigarettes     Quit date: 1/1/2007   • Alcohol use No      History   Drug Use No     FAMILY HISTORY  History reviewed. No pertinent family history.     CURRENT MEDICATIONS  Home Medications    **Home medications have not yet been reviewed for this encounter**       ALLERGIES  Allergies   Allergen Reactions   • Zoloft Diarrhea   • Metformin Hives, Rash and Itching   • Amaryl [Amaryl]    • Amaryl [Glimepiride] Shortness of Breath and Rash   • Amoxicillin Rash   • Byetta Rash   • Epinephrine Shortness of Breath     Panic attack, Can't breath   • Hctz      Stopped urinary output   • Iodine Anaphylaxis     contrast   • Lasix [Furosemide]      Leg cramps, stopped urinary output   • Latex    • Lexapro      Leg cramping   • Penicillins Rash   • Spironolactone      PHYSICAL EXAM  VITAL SIGNS: /60   Pulse 62   Temp 36.3 °C (97.3 °F)   Resp (!) 22   Ht 1.753 m (5' 9\")   Wt (!) 167.6 kg (369 lb 7.9 oz)   SpO2 89%   BMI 54.56 kg/m²     Constitutional: Alert in mild distress.  HENT: No signs of trauma, Bilateral external ears normal, Nose normal.   Eyes: Pupils are equal and reactive, Conjunctiva normal, Non-icteric.   Neck: Normal range of motion, No tenderness, Supple, No stridor.   Cardiovascular: Regular rate and rhythm, no murmurs.   Thorax & Lungs: Tachypnic, Decreased breath sounds with crackles bilaterally, No wheezing, No chest tenderness.   Abdomen: Tenderness to palpation to the RLQ, no rebound or guarding. Bowel sounds normal, Soft, No masses, No peritoneal signs.  Skin: Warm, Dry, No erythema, No rash.   Back: No bony tenderness, No CVA tenderness.   Musculoskeletal:  3+ Edema to bilateral lower extremities up to the mid thigh. No major deformities noted. Good range of motion in all major joints.  Neurologic: Alert, moving all extremities without difficulty, no focal " "deficits.      LABS  Labs Reviewed   BTYPE NATRIURETIC PEPTIDE - Abnormal; Notable for the following:        Result Value    B Natriuretic Peptide 958 (*)     All other components within normal limits   CBC WITH DIFFERENTIAL - Abnormal; Notable for the following:     MCV 98.9 (*)     MCHC 28.7 (*)     RDW 80.9 (*)     Neutrophils-Polys 80.70 (*)     Lymphocytes 8.80 (*)     Lymphs (Absolute) 0.73 (*)     All other components within normal limits   URINALYSIS,CULTURE IF INDICATED - Abnormal; Notable for the following:     Character Turbid (*)     Ketones Trace (*)     Protein >=1000 (*)     Bilirubin Moderate (*)     Leukocyte Esterase Moderate (*)     Occult Blood Moderate (*)     All other components within normal limits   URINE MICROSCOPIC (W/UA) - Abnormal; Notable for the following:     RBC 2-5 (*)     Bacteria Many (*)     Hyaline Cast 11-20 (*)     All other components within normal limits   BLOOD CULTURE    Narrative:     Per Hospital Policy: Only change Specimen Src: to \"Line\" if  specified by physician order.   BLOOD CULTURE    Narrative:     Per Hospital Policy: Only change Specimen Src: to \"Line\" if  specified by physician order.   DIFFERENTIAL MANUAL   PERIPHERAL SMEAR REVIEW   PLATELET ESTIMATE   MORPHOLOGY   URINE CULTURE(NEW)   COMP METABOLIC PANEL   COMP METABOLIC PANEL   All labs reviewed by me.      RADIOLOGY  DX-CHEST-LIMITED (1 VIEW)   Final Result      1.  Bibasilar atelectasis/consolidation.      2.  Cardiomegaly.      3.  Chronic elevation of the right hemidiaphragm.      ECHOCARDIOGRAM COMP W/O CONT    (Results Pending)   The radiologist's interpretation of all radiological studies have been reviewed by me.      EKG  12 Lead EKG interpreted by me to show:  Normal sinus rhythm  Rate 62  Axis: Normal  Intervals: Normal  Flipped T waves in V2 and V3  No ST elevation  My impression of this EKG: Does not indicate ischemia or arrythmia at this time.  Normal EKG compared to prior dated " 12/2012      COURSE & MEDICAL DECISION MAKING  Pertinent Labs & Imaging studies reviewed. (See chart for details)    Differential Diagnoses include but are not limited to: COPD exacerbation, pneumonia, UTI.     12:50 PM Obtained and reviewed patient's electronic medical record which indicates a history of COPD and diabetes.    1:00 PM Patient seen and examined at bedside. Patient presents for shortness of breath.  Exam indicates crackles to her lungs suggestive of a possible consolidation, and RLQ abdominal tenderness without peritoneal signs.      Initial orders in the Emergency Department included XR chest and laboratory testing: blood cultures, UA, microscopic urine, urine culture, estimated GFR, differential manual, peripheral smear review, platelet estimate, morphology, BNP, CBC with differential and CMP.  Initial treatment in the Emergency Department included 500 mg of Azithromycin IV and 2 g of Rocephin IV.  Patient verbalized their understanding and agreement to this plan.    1:55 PM On repeat evaluation, patient is still complaining of shortness of breath and fatigue. ED testing reveals BNP>900, normal WBC, and a chest x-ray showing bilateral basilar consolidations vs atelectasis.     2:02 PM Paged [facility].    2:10 PM Spoke with [facility] regarding the patient's presenting symptoms and diagnostic results. Patient will be admitted to the hospital for failure to thrive and possible CHF vs pneumonia.       Decision Making:  This is a 72 y.o. year old female who presents with new onset of weakness/fatigue along with shortness of breath and productive cough. On presentation to the ER she has bilateral pitting edema, crackles on lung exam, and her lab studies show BNP>900 and an EKG with poor R wave progression. She is afebrile with a normal WBC, altough her chest x-ray shows bilateral consolidation/atelectasis. This presentation is concerning for acute exacerbation of CHF, and pneumonia or  COPD exacerbation are less likely. Blood cultures were obtained she was started on antibiotics for this possibility. The patient also has bacteruria and pyuria on urinalysis. Patient does have slightly elevated BNP she may have some element of CHF as well. She has been covered with antibiotics. She is hemodynamically stable at this time on her baseline oxygen. [facility] has agreed to admit the patient for further management.       DISPOSITION  Patient will be admitted to [facility] in guarded condition.      DIAGNOSIS  1. Fatigue, unspecified type    2. Acute on chronic congestive heart failure, unspecified congestive heart failure type (CMS-HCC)           The note accurately reflects work and decisions made by me.  Kisha Cobb  3/21/2018  4:26 PM     ILilliam (Scribe), am scribing for, and in the presence of, Kisha Cobb M.D.    Electronically signed by: Lilliam Sam (Scribe), 3/21/2018    IKisha M.D. personally performed the services described in this documentation, as scribed by Lilliam Sam in my presence, and it is both accurate and complete.      This dictation has been created using voice recognition software and/or scribes. The accuracy of the dictation is limited by the abilities of the software and the expertise of the scribes. I expect there may be some errors of grammar and possibly content. I made every attempt to manually correct the errors within my dictation. However, errors related to voice recognition software and/or scribes may still exist and should be interpreted within the appropriate context.

## 2018-03-21 NOTE — NON-PROVIDER
HISTORY AND PHYSICAL   [facility]     PATIENT ID:  NAME:  Siri Solis  MRN:               [id number]  YOB: 1945    Date of Admission: 3/21/2018     Attending: Dr. Zamorano  Senior Resident: Dr. Deon Darden  Alphonse Resident: Armando Yap M.D.    Primary Care Physician:  Dr. Sousa    CC:  Progressive fatigue and shortness of breath    HPI: Siri Solis is a 72 y.o. female with  History of COPD (on 4L O2 at baseline), insulin-dependent T2DM, HTN, and chronic low back pain presenting with a one month history of progressive fatigue/weakness, and shortness of breath. History was taken from the patient and her , who was at bedside. Over the last month, Mrs. Solis has been having progressive fatigue, weakness, and has been mostly bed-bound. Her  has been helping her to the bathroom, but was unable to get her out of bed this morning because of her profound weakness, so he called an ambulance. Over the last month, she has had three presyncopal episodes in which she got out of bed and collapsed because she felt physically weak and dizzy. She denies any chest pain, palpitations, or head trauma during those episodes.  The patient reports worsening peripheral edema, progressive orthopnea, PND, and shortness of breath at rest over the last few weeks. She has not increased her home O2. She complains of increased cough/sputum production, urinary incontinence/retention, loss of appetite/poor PO intake in last month. She denies fevers, chills, chest pain, diarrhea, constipation, hematuria, melena, and hematemesis. She also complains of pain in her left lower extremity when walking around her house. She says rest and hanging the leg over the side of the bed sometimes helps her pain. Her left leg is persistently colder than the right.       ERCourse:  Patient received one dose of rocephin and azithromycin. Blood cultures drawn x2. Urine cultures  "drawn.     REVIEW OF SYSTEMS:   Ten systems reviewed and were negative except as noted in the HPI.                PAST MEDICAL HISTORY:  Past Medical History:   Diagnosis Date   • Anemia    • Arthritis    • Breath shortness     O2 24/7 for paralysis of diaphram right   • CATARACT    • Diabetes    • EMPHYSEMA     mild   • Hiatus hernia syndrome    • Hyperlipidemia    • Hypertension    • Indigestion    • Other specified disorder of intestines     occas diarrhea   • Pain    • Paralysis of diaphragm     right side   • Snoring    • Ulcer (CMS-HCC)     \"bleeding stomach ulcer\"       PAST SURGICAL HISTORY:  Past Surgical History:   Procedure Laterality Date   • RECOVERY  2013    Performed by [facility] at SURGERY SAME DAY [facility] ORS   • GYN SURGERY      hysterectomy   • OTHER      cataract surgery    • OTHER      carpal tunnel    • OTHER ORTHOPEDIC SURGERY      left knee replacement       FAMILY HISTORY:  No family history on file.   Mother  of breast cancer.   Father also  of malignancy.       SOCIAL HISTORY:   Pt lives in [place] with her .   Smoking 20 pack year smoking history, quit 10 years ago.   Etoh use: denies.  Drug use: denies.    DIET:   No orders of the defined types were placed in this encounter.  Diabetic Diet    ALLERGIES:  Allergies   Allergen Reactions   • Zoloft Diarrhea   • Metformin Hives, Rash and Itching   • Amaryl [Amaryl]    • Amaryl [Glimepiride] Shortness of Breath and Rash   • Amoxicillin Rash   • Byetta Rash   • Epinephrine Shortness of Breath     Panic attack, Can't breath   • Hctz      Stopped urinary output   • Iodine Anaphylaxis     contrast   • Lasix [Furosemide]      Leg cramps, stopped urinary output   • Latex    • Lexapro      Leg cramping   • Penicillins Rash   • Spironolactone        OUTPATIENT MEDICATIONS:  No current facility-administered medications for this encounter.     Current Outpatient Prescriptions:   •  ezetimibe-simvastatin (VYTORIN) 10-40 MG " per tablet, Take 1 Tab by mouth every evening., Disp: , Rfl:   •  temazepam (RESTORIL) 30 MG capsule, Take 30 mg by mouth at bedtime as needed for Sleep., Disp: , Rfl:   •  insulin glargine (LANTUS) 100 UNIT/ML Solution, Inject  as instructed every evening., Disp: , Rfl:   •  sucralfate (CARAFATE) 1 GM Tab, Take 1 g by mouth 4 Times a Day,Before Meals and at Bedtime., Disp: , Rfl:   •  Polyethylene Glycol 3350 (MIRALAX PO), Take  by mouth., Disp: , Rfl:   •  methocarbamol (ROBAXIN) 500 MG Tab, Take 2 Tabs by mouth 3 times a day., Disp: 60 Tab, Rfl: 0  •  hydrocodone-acetaminophen (NORCO) 7.5-325 MG per tablet, Take 1 Tab by mouth every four hours as needed. Severe pain, Disp: , Rfl:   •  non-formulary med, 25 Each. Iron 25mg with B12, Disp: , Rfl:   •  mometasone (NASONEX) 50 MCG/ACT nasal spray, Spray 2 Sprays in nose every evening. Each nostril, Disp: , Rfl:   •  lisinopril (PRINIVIL) 10 MG TABS, Take 10 mg by mouth every evening., Disp: , Rfl:   •  DiphenhydrAMINE HCl (BENADRYL ALLERGY PO), Take  by mouth as needed., Disp: , Rfl:   •  Loperamide HCl (IMODIUM PO), Take  by mouth as needed., Disp: , Rfl:   •  Calcium Carbonate (CALCIUM 500 PO), Take  by mouth 2 Times a Day., Disp: , Rfl:   •  Non Formulary Request, Unknown pain pill, Disp: , Rfl:   •  spironolactone (ALDACTONE) 50 MG TABS, Take 1 Tab by mouth every day., Disp: 30 Tab, Rfl: 0  •  atenolol (TENORMIN) 100 MG TABS, Take 1 Tab by mouth every day., Disp: 30 Tab, Rfl: 0  •  ezetimibe (ZETIA) 10 MG TABS, Take 10 mg by mouth every day.  , Disp: , Rfl:   •  tiotropium (SPIRIVA) 18 MCG CAPS, Inhale 18 mcg by mouth every evening., Disp: , Rfl:   •  aspirin (ASA) 325 MG TABS, Take 650 mg by mouth as needed., Disp: , Rfl:   •  pioglitazone-metformin (ACTOPLUS MET)  MG per tablet, Take 1 Tab by mouth 2 times a day, with meals., Disp: , Rfl:   •  OMEPRAZOLE 20 MG TBEC, Take 20 mg by mouth 2 Times a Day. bid, Disp: , Rfl:   •  ZOLPIDEM 10 MG TABS, Take 10 mg  by mouth at bedtime as needed. daily, Disp: , Rfl:     PHYSICAL EXAM:  Vitals:    18 1200 18 1230 18 1300 18 1331   BP:       Pulse: (!) 59 62 61 65   Resp: (!) 22      Temp:       SpO2: 93% 89% 93% 90%   Weight:       Height:       , Temp (24hrs), Av.3 °C (97.3 °F), Min:36.3 °C (97.3 °F), Max:36.3 °C (97.3 °F)  , Pulse Oximetry: 90 %, O2 (LPM): 4, O2 Delivery: None (Room Air)    General: Pt resting in NAD, cooperative, visibly short of breath with conversation.   Skin:  Pink, dry. Cold extremities.  No rashes, Actinic keratoses on chest.   HEENT: NC/AT. PERRL. EOMI. No nasal discharge. Oropharynx nonerythematous without exudate/plaques  Neck:  Supple without lymphadenopathy or rigidity. No JVD. No carotid bruits.  Lungs:  Symmetrical.  Poor air movement, worse on right side. Fine crackles at bases.   Cardiovascular:  S1/S2, no S3. Bradycardic with crescendo 2/6 systolic murmur heard best on left sternal border.   Abdomen:  Abdomen is soft NT/ND. +BS. No masses noted.  Genitourinary:  Deferred.     Extremities:  Strength 4/5 in bilateral lower extremities. 5/5 in all upper extremity muscle groups. Full range of motion. No gross deformities noted. 2+ pulses in right dorsalis pedis and L/R radial arteries. Nonpalpable pulse in left dorsalis pedis.    Spine:  Straight without vertebral anomalies.  CNS: Cranial nerves II-XII grossly intact.        LAB TESTS:   Recent Labs      18   1210   WBC  8.3   RBC  4.61   HEMOGLOBIN  13.1   HEMATOCRIT  45.6   MCV  98.9*   MCH  28.4   RDW  80.9*   PLATELETCT  219   MPV  10.8   NEUTSPOLYS  80.70*   LYMPHOCYTES  8.80*   MONOCYTES  8.80   EOSINOPHILS  1.70   BASOPHILS  0.00   RBCMORPHOLO  Present     Recent Labs      18   1210   BNPBTYPENAT  958*     No results for input(s): SODIUM, POTASSIUM, CHLORIDE, CO2, BUN, CREATININE, CALCIUM, MAGNESIUM, PHOSPHORUS, ALBUMIN in the last 72 hours.    CULTURES:   Results     Procedure Component Value Units  "Date/Time    URINE CULTURE(NEW) [[id number]] Collected:  03/21/18 1237    Order Status:  Completed Updated:  03/21/18 1416    URINALYSIS,CULTURE IF INDICATED [[id number]]  (Abnormal) Collected:  03/21/18 1337    Order Status:  Completed Specimen:  Urine Updated:  03/21/18 1356     Color DK Yellow     Character Turbid (A)     Specific Gravity 1.029     Ph 5.0     Glucose Negative mg/dL      Ketones Trace (A) mg/dL      Protein >=1000 (A) mg/dL      Bilirubin Moderate (A)     Urobilinogen, Urine 1.0     Nitrite Negative     Leukocyte Esterase Moderate (A)     Occult Blood Moderate (A)     Micro Urine Req Microscopic     Culture Indicated Yes UA Culture     BLOOD CULTURE [[id number]] Collected:  03/21/18 1335    Order Status:  Completed Specimen:  Blood from Peripheral Updated:  03/21/18 1344    Narrative:       Per Hospital Policy: Only change Specimen Src: to \"Line\" if  specified by physician order.    BLOOD CULTURE [[id number]] Collected:  03/21/18 1246    Order Status:  Completed Specimen:  Blood from Peripheral Updated:  03/21/18 1328    Narrative:       Per Hospital Policy: Only change Specimen Src: to \"Line\" if  specified by physician order.    BLOOD CULTURE x2 [[id number]] Collected:  03/21/18 1314    Order Status:  Canceled Specimen:  Other from Peripheral     BLOOD CULTURE x2 [[id number]] Collected:  03/21/18 1309    Order Status:  Canceled Specimen:  Other from Peripheral           IMAGES:  Chest X ray- 1 view.   3/21/2018 12:23 PM    HISTORY/REASON FOR EXAM:  Weakness and cough.    TECHNIQUE/EXAM DESCRIPTION AND NUMBER OF VIEWS:  Single AP view of the chest.    COMPARISON: 11/6/2013    FINDINGS:  There is chronic elevation the right hemidiaphragm. There is bibasilar atelectasis/consolidation.  The heart is enlarged.  There is no pleural effusion.       Impression       1.  Bibasilar atelectasis/consolidation.    2.  Cardiomegaly.    3.  Chronic elevation of the right hemidiaphragm.         CONSULTS: " none      ASSESSMENT/PLAN: 72 y.o. Female with history of COPD, HTN, insulin dependent DM, chronic lower back pain presenting with one month of progressive shortness of breath, fatigue concerning for acute heart failure exacerbation, and found to have a UTI.     1. Acute decompensated heart failure:   - Possible causes include ischemic heart disease, pioglitazone use. Hypertension is unlikely cause as her blood pressure is well controlled. Hypothyroidism is another possible cause, though subclinical by labs on 11/10/17.   -   - physical exam findings suggestive of fluid overload: peripheral edema, B/L lung crackles.   - Denies chest pain, palpitations  - EKG: sinus rhythm, bradycardia 59-63 bpm, right axis deviation, poor R wave progression, first degree AV block, no ST elevations, no signs of past MI.    - CXR shows cardiomegaly.  - Order echo.   - Start ethacrynic acid 50 mg BID (ptient has a rash with lasix)  - hold atenolol, continue home lisinopril and atenolol  - Admit to tele    2. Urinary Tract Infection:  - complains of urinary retention/incontinence/urgency  - UA positive for LE, RBCs, bacteria, bilirubin, and protein.   - S/p one dose rocephin in the ED; will continue rocephin for two more days.     3. Left lower extremity claudication:   - no pulse in left dorsalis pedis.   - Will order ABIs.     3. Fatigue: likely due to acutely decompensated heart failure vs. Hypothyroid vs. Symptomatic bradycardia  - TSH 4.3 on 11/10/17, no other symptoms of constipation, skin changes, etc.     4. COPD:   - Sputum and cough are not significantly changed from baseline   - 4 L oxygen at baseline  - CXR positive for bilateral atelectasis vs. Consolidation. No pleural effusion. Chronic elevation of right diaphragm and cardiomegaly are evident.   - continue Spiriva  - s/p rocephin and azithromycin in ED     5. Insulin-dependent Type II Diabetes Mellitus:  - on 30 units lantus, pioglitazone, and metformin at home.    - proteinuria; no evidence of peripheral neuropathy.   - start 15 units lantus QHS with 1-6 units insulin regular four times per day before meals/nightly.     6. Hypertension:   - blood pressure well controlled on admission.   - continue home spironolactone and lisinopril.     7. Chronic low back pain  - continue home Norco          #FEN/GI  - diabetic diet    #Dispo  - will be admitted inpatient    #Core Measures   VTE PPx: lovenox  Abx: Rocephin start 3/21, one dose azithromycin 3/31  GI PPx: none  Diet: diabetic diet  Code Status: DNR

## 2018-03-21 NOTE — ED NOTES
Med Rec complete per PT at bedside and Medication list (returned)  Allergies Reviewed  No ABX in the last 30 days

## 2018-03-21 NOTE — ED TRIAGE NOTES
"Chief Complaint   Patient presents with   • Malaise     x 1 week. This AM unable to get out of bed.   • Flu Like Symptoms     c/o cough with thick green mucus, fever and chills.      /60   Pulse 62   Temp 36.3 °C (97.3 °F)   Resp (!) 24   Ht 1.753 m (5' 9\")   Wt (!) 167.6 kg (369 lb 7.9 oz)   SpO2 98%   BMI 54.56 kg/m²   In gown, on monitor, chart up for ERP.   "

## 2018-03-22 LAB
ANION GAP SERPL CALC-SCNC: 10 MMOL/L (ref 0–11.9)
ANION GAP SERPL CALC-SCNC: 11 MMOL/L (ref 0–11.9)
ANION GAP SERPL CALC-SCNC: 9 MMOL/L (ref 0–11.9)
BASE EXCESS BLDA CALC-SCNC: -2 MMOL/L (ref -4–3)
BODY TEMPERATURE: ABNORMAL CENTIGRADE
BUN SERPL-MCNC: 73 MG/DL (ref 8–22)
BUN SERPL-MCNC: 75 MG/DL (ref 8–22)
BUN SERPL-MCNC: 76 MG/DL (ref 8–22)
CALCIUM SERPL-MCNC: 9.6 MG/DL (ref 8.5–10.5)
CALCIUM SERPL-MCNC: 9.8 MG/DL (ref 8.5–10.5)
CALCIUM SERPL-MCNC: 9.9 MG/DL (ref 8.5–10.5)
CHLORIDE SERPL-SCNC: 101 MMOL/L (ref 96–112)
CHLORIDE SERPL-SCNC: 101 MMOL/L (ref 96–112)
CHLORIDE SERPL-SCNC: 102 MMOL/L (ref 96–112)
CO2 SERPL-SCNC: 24 MMOL/L (ref 20–33)
CO2 SERPL-SCNC: 24 MMOL/L (ref 20–33)
CO2 SERPL-SCNC: 25 MMOL/L (ref 20–33)
CREAT SERPL-MCNC: 2.18 MG/DL (ref 0.5–1.4)
CREAT SERPL-MCNC: 2.22 MG/DL (ref 0.5–1.4)
CREAT SERPL-MCNC: 2.45 MG/DL (ref 0.5–1.4)
CREAT UR-MCNC: 35.5 MG/DL
EKG IMPRESSION: NORMAL
GLUCOSE BLD-MCNC: 102 MG/DL (ref 65–99)
GLUCOSE BLD-MCNC: 106 MG/DL (ref 65–99)
GLUCOSE BLD-MCNC: 114 MG/DL (ref 65–99)
GLUCOSE BLD-MCNC: 125 MG/DL (ref 65–99)
GLUCOSE BLD-MCNC: 148 MG/DL (ref 65–99)
GLUCOSE BLD-MCNC: 84 MG/DL (ref 65–99)
GLUCOSE SERPL-MCNC: 121 MG/DL (ref 65–99)
GLUCOSE SERPL-MCNC: 135 MG/DL (ref 65–99)
GLUCOSE SERPL-MCNC: 136 MG/DL (ref 65–99)
HCO3 BLDA-SCNC: 24 MMOL/L (ref 17–25)
LV EJECT FRACT  99904: 60
LV EJECT FRACT MOD 2C 99903: 61.95
LV EJECT FRACT MOD 4C 99902: 63.98
LV EJECT FRACT MOD BP 99901: 64.6
MICROALBUMIN UR-MCNC: 6.1 MG/DL
MICROALBUMIN/CREAT UR: 172 MG/G (ref 0–30)
PCO2 BLDA: 45.7 MMHG (ref 26–37)
PH BLDA: 7.34 [PH] (ref 7.4–7.5)
PO2 BLDA: 74.9 MMHG (ref 64–87)
POTASSIUM SERPL-SCNC: 6.3 MMOL/L (ref 3.6–5.5)
POTASSIUM SERPL-SCNC: 6.3 MMOL/L (ref 3.6–5.5)
POTASSIUM SERPL-SCNC: 6.8 MMOL/L (ref 3.6–5.5)
POTASSIUM SERPL-SCNC: 6.8 MMOL/L (ref 3.6–5.5)
POTASSIUM SERPL-SCNC: 7.1 MMOL/L (ref 3.6–5.5)
SAO2 % BLDA: 93.6 % (ref 93–99)
SODIUM SERPL-SCNC: 135 MMOL/L (ref 135–145)
SODIUM SERPL-SCNC: 136 MMOL/L (ref 135–145)
SODIUM SERPL-SCNC: 136 MMOL/L (ref 135–145)

## 2018-03-22 PROCEDURE — 93306 TTE W/DOPPLER COMPLETE: CPT

## 2018-03-22 PROCEDURE — 82803 BLOOD GASES ANY COMBINATION: CPT

## 2018-03-22 PROCEDURE — A9270 NON-COVERED ITEM OR SERVICE: HCPCS | Performed by: INTERNAL MEDICINE

## 2018-03-22 PROCEDURE — 700111 HCHG RX REV CODE 636 W/ 250 OVERRIDE (IP): Performed by: INTERNAL MEDICINE

## 2018-03-22 PROCEDURE — 82962 GLUCOSE BLOOD TEST: CPT | Mod: 91

## 2018-03-22 PROCEDURE — 82043 UR ALBUMIN QUANTITATIVE: CPT

## 2018-03-22 PROCEDURE — 700102 HCHG RX REV CODE 250 W/ 637 OVERRIDE(OP): Performed by: FAMILY MEDICINE

## 2018-03-22 PROCEDURE — 700102 HCHG RX REV CODE 250 W/ 637 OVERRIDE(OP): Performed by: EMERGENCY MEDICINE

## 2018-03-22 PROCEDURE — 700102 HCHG RX REV CODE 250 W/ 637 OVERRIDE(OP): Performed by: INTERNAL MEDICINE

## 2018-03-22 PROCEDURE — A6250 SKIN SEAL PROTECT MOISTURIZR: HCPCS | Performed by: INTERNAL MEDICINE

## 2018-03-22 PROCEDURE — 93306 TTE W/DOPPLER COMPLETE: CPT | Mod: 26 | Performed by: INTERNAL MEDICINE

## 2018-03-22 PROCEDURE — 80048 BASIC METABOLIC PNL TOTAL CA: CPT

## 2018-03-22 PROCEDURE — 51798 US URINE CAPACITY MEASURE: CPT

## 2018-03-22 PROCEDURE — 700111 HCHG RX REV CODE 636 W/ 250 OVERRIDE (IP): Performed by: FAMILY MEDICINE

## 2018-03-22 PROCEDURE — 700101 HCHG RX REV CODE 250: Performed by: FAMILY MEDICINE

## 2018-03-22 PROCEDURE — 84132 ASSAY OF SERUM POTASSIUM: CPT

## 2018-03-22 PROCEDURE — 82570 ASSAY OF URINE CREATININE: CPT

## 2018-03-22 PROCEDURE — 770022 HCHG ROOM/CARE - ICU (200)

## 2018-03-22 PROCEDURE — A9270 NON-COVERED ITEM OR SERVICE: HCPCS | Performed by: EMERGENCY MEDICINE

## 2018-03-22 PROCEDURE — 700111 HCHG RX REV CODE 636 W/ 250 OVERRIDE (IP): Performed by: EMERGENCY MEDICINE

## 2018-03-22 PROCEDURE — 700101 HCHG RX REV CODE 250: Performed by: EMERGENCY MEDICINE

## 2018-03-22 RX ORDER — SODIUM POLYSTYRENE SULFONATE 15 G/60ML
15 SUSPENSION ORAL; RECTAL PRN
Status: DISCONTINUED | OUTPATIENT
Start: 2018-03-22 | End: 2018-03-22

## 2018-03-22 RX ORDER — CALCIUM CHLORIDE 100 MG/ML
1 INJECTION INTRAVENOUS; INTRAVENTRICULAR ONCE
Status: COMPLETED | OUTPATIENT
Start: 2018-03-22 | End: 2018-03-22

## 2018-03-22 RX ORDER — SODIUM POLYSTYRENE SULFONATE 15 G/60ML
15 SUSPENSION ORAL; RECTAL ONCE
Status: DISPENSED | OUTPATIENT
Start: 2018-03-22 | End: 2018-03-23

## 2018-03-22 RX ORDER — DEXTROSE MONOHYDRATE 25 G/50ML
25 INJECTION, SOLUTION INTRAVENOUS ONCE
Status: COMPLETED | OUTPATIENT
Start: 2018-03-22 | End: 2018-03-22

## 2018-03-22 RX ORDER — FUROSEMIDE 10 MG/ML
40 INJECTION INTRAMUSCULAR; INTRAVENOUS ONCE
Status: COMPLETED | OUTPATIENT
Start: 2018-03-22 | End: 2018-03-22

## 2018-03-22 RX ORDER — ETHACRYNATE SODIUM 50 MG/50ML
50 INJECTION, POWDER, FOR SOLUTION INTRAVENOUS ONCE
Status: DISCONTINUED | OUTPATIENT
Start: 2018-03-22 | End: 2018-03-22

## 2018-03-22 RX ORDER — SODIUM POLYSTYRENE SULFONATE 15 G/60ML
15 SUSPENSION ORAL; RECTAL ONCE
Status: COMPLETED | OUTPATIENT
Start: 2018-03-22 | End: 2018-03-22

## 2018-03-22 RX ORDER — SODIUM POLYSTYRENE SULFONATE 15 G/60ML
15 SUSPENSION ORAL; RECTAL EVERY 6 HOURS PRN
Status: DISCONTINUED | OUTPATIENT
Start: 2018-03-22 | End: 2018-03-24

## 2018-03-22 RX ORDER — NYSTATIN 100000 [USP'U]/G
POWDER TOPICAL 3 TIMES DAILY
Status: DISCONTINUED | OUTPATIENT
Start: 2018-03-22 | End: 2018-03-29 | Stop reason: HOSPADM

## 2018-03-22 RX ORDER — SODIUM POLYSTYRENE SULFONATE 15 G/60ML
15 SUSPENSION ORAL; RECTAL EVERY 6 HOURS
Status: DISCONTINUED | OUTPATIENT
Start: 2018-03-22 | End: 2018-03-22

## 2018-03-22 RX ADMIN — CEFTRIAXONE SODIUM 1 G: 10 INJECTION, POWDER, FOR SOLUTION INTRAVENOUS at 08:05

## 2018-03-22 RX ADMIN — FUROSEMIDE 40 MG: 10 INJECTION, SOLUTION INTRAMUSCULAR; INTRAVENOUS at 17:41

## 2018-03-22 RX ADMIN — Medication 500 MG: at 08:08

## 2018-03-22 RX ADMIN — NYSTATIN: 100000 POWDER TOPICAL at 17:40

## 2018-03-22 RX ADMIN — FUROSEMIDE 40 MG: 10 INJECTION, SOLUTION INTRAMUSCULAR; INTRAVENOUS at 12:01

## 2018-03-22 RX ADMIN — FUROSEMIDE 40 MG: 10 INJECTION, SOLUTION INTRAMUSCULAR; INTRAVENOUS at 04:58

## 2018-03-22 RX ADMIN — ENOXAPARIN SODIUM 40 MG: 100 INJECTION SUBCUTANEOUS at 08:04

## 2018-03-22 RX ADMIN — SUCRALFATE 1 G: 1 TABLET ORAL at 06:06

## 2018-03-22 RX ADMIN — INSULIN HUMAN 10 UNITS: 100 INJECTION, SOLUTION PARENTERAL at 01:40

## 2018-03-22 RX ADMIN — HYDROCODONE BITARTRATE AND ACETAMINOPHEN 1 TABLET: 7.5; 325 TABLET ORAL at 23:52

## 2018-03-22 RX ADMIN — NYSTATIN: 100000 POWDER TOPICAL at 21:46

## 2018-03-22 RX ADMIN — SODIUM POLYSTYRENE SULFONATE 15 G: 15 SUSPENSION ORAL; RECTAL at 12:56

## 2018-03-22 RX ADMIN — HYDROCODONE BITARTRATE AND ACETAMINOPHEN 1 TABLET: 7.5; 325 TABLET ORAL at 08:05

## 2018-03-22 RX ADMIN — CALCIUM CHLORIDE 1 G: 100 INJECTION INTRAVENOUS; INTRAVENTRICULAR at 12:04

## 2018-03-22 RX ADMIN — INSULIN HUMAN 10 UNITS: 100 INJECTION, SOLUTION PARENTERAL at 12:08

## 2018-03-22 RX ADMIN — SODIUM POLYSTYRENE SULFONATE 15 G: 15 SUSPENSION ORAL; RECTAL at 06:07

## 2018-03-22 RX ADMIN — SIMVASTATIN 40 MG: 40 TABLET, FILM COATED ORAL at 21:42

## 2018-03-22 RX ADMIN — SUCRALFATE 1 G: 1 TABLET ORAL at 21:50

## 2018-03-22 RX ADMIN — DEXTROSE MONOHYDRATE 50 ML: 25 INJECTION, SOLUTION INTRAVENOUS at 01:42

## 2018-03-22 RX ADMIN — TIOTROPIUM BROMIDE 1 CAPSULE: 18 CAPSULE ORAL; RESPIRATORY (INHALATION) at 21:42

## 2018-03-22 RX ADMIN — OMEPRAZOLE 20 MG: 20 CAPSULE, DELAYED RELEASE ORAL at 21:42

## 2018-03-22 RX ADMIN — EZETIMIBE 10 MG: 10 TABLET ORAL at 21:43

## 2018-03-22 RX ADMIN — SUCRALFATE 1 G: 1 TABLET ORAL at 17:41

## 2018-03-22 RX ADMIN — INSULIN GLARGINE 15 UNITS: 100 INJECTION, SOLUTION SUBCUTANEOUS at 21:43

## 2018-03-22 RX ADMIN — SUCRALFATE 1 G: 1 TABLET ORAL at 11:41

## 2018-03-22 RX ADMIN — DEXTROSE MONOHYDRATE 50 ML: 25 INJECTION, SOLUTION INTRAVENOUS at 12:11

## 2018-03-22 RX ADMIN — Medication 500 MG: at 21:42

## 2018-03-22 RX ADMIN — OMEPRAZOLE 20 MG: 20 CAPSULE, DELAYED RELEASE ORAL at 08:04

## 2018-03-22 ASSESSMENT — PAIN SCALES - GENERAL
PAINLEVEL_OUTOF10: 7
PAINLEVEL_OUTOF10: 4
PAINLEVEL_OUTOF10: 0
PAINLEVEL_OUTOF10: 5
PAINLEVEL_OUTOF10: 4

## 2018-03-22 NOTE — CARE PLAN
Problem: Pain Management  Goal: Pain level will decrease to patient's comfort goal  Outcome: PROGRESSING AS EXPECTED  Pt has C/O pain of 7/10 in lower back. Pt medicated per MAR. Pt offered heat packs, frequent turning, and distraction.     Problem: Skin Integrity  Goal: Risk for impaired skin integrity will decrease  Outcome: PROGRESSING AS EXPECTED  Pt assessed for risk factors for impaired skin integrity. Pt repositioned. Q2 turns in place. Educated and encouraged pt to try and turn self.

## 2018-03-22 NOTE — PROGRESS NOTES
Lab called with critical result of potassium 7.6 and BUN 76 at  2100. Critical lab result read back to lab.   Dr. Park and Brijesh notified of critical lab result at 2125.  Critical lab result read back by Dr. Gustafson.

## 2018-03-22 NOTE — PROGRESS NOTES
Patient transferred from T7- to S125 via wheelchair accompanied by ACLS RN.  Patient alert and oriented.  On 10L mask, sats 96%.  RN notified Dr. Vazquez of patient's arrival to ICU.  Patient transported with 1 duffle bag of personal belongings.

## 2018-03-22 NOTE — PROGRESS NOTES
[facility] FAMILY MEDICINE PROGRESS NOTE     Attending: Radha Meza    Resident: Chalino Darden MD    PATIENT: Siri Solis; [id number]; 1945    ID: 72 y.o. female admitted for new onset CHF, generalized debility, found to have severe hyperkalemia.    SUBJECTIVE: Lab results from ED available overnight indicating severe hyperkalemia with level of 7.6.  Calcium, insulin ordered.  Pharmacy advising against ethacrynic acid due to cost.  Advised that it was unlikely that patient had true allergy.  Investigated by provider and patient indicated that she did not have rash or swelling but that it made her 'unable to pee.'  This is contrary to my history yesterday.  Given lasix without apparent allergic reaction.  Apparent issue giving kayexalate with ceftriaxone per pharmacy.  Discussed case with Dr. Napier who advised giving kayexalate, following up potassium once passing stool. Patient with increasing dyspnea on interview, requiring progressively more oxygen and on 10L at time of interview.  Complaining of lower abdominal pain consistent with prior tenderness.  No angioedema.      OBJECTIVE:     Vitals:    03/22/18 1430 03/22/18 1500 03/22/18 1530 03/22/18 1600   BP:       Pulse: 73 77 81 60   Resp: 20 (!) 25 (!) 24 (!) 24   Temp:    37.1 °C (98.8 °F)   SpO2: 93% 93% 95% 92%   Weight:       Height:           Intake/Output Summary (Last 24 hours) at 03/22/18 1626  Last data filed at 03/21/18 2041   Gross per 24 hour   Intake              300 ml   Output                0 ml   Net              300 ml       PE:  General: No acute distress, afebrile, resting comfortably, conversational dyspnea with desats to mid 80s while talking  HEENT: NC/AT. EOMI. MMM, neck supple without adenopathy or mass  Cardiovascular: RRR, NMGR, cap refill brisk.  Respiratory: CTAB, very poor inspiratory effort, tachypneic when convering, no retractions, on 10L  Abdomen: soft, obese, ND, suprapubic TTP, no masses  EXT:  REY, 2+ bilateral symmetric  edema stable on interval, no erythema/lesion   Neuro: Alert and oriented, CN III-XI grossly intact, REY, nonfocal    LABS:  Recent Labs      03/21/18   1210  03/21/18   2156   WBC  8.3  8.0   RBC  4.61  4.44   HEMOGLOBIN  13.1  13.0   HEMATOCRIT  45.6  43.2   MCV  98.9*  97.3   MCH  28.4  29.3   RDW  80.9*  78.3*   PLATELETCT  219  189   MPV  10.8  11.2   NEUTSPOLYS  80.70*   --    LYMPHOCYTES  8.80*   --    MONOCYTES  8.80   --    EOSINOPHILS  1.70   --    BASOPHILS  0.00   --    RBCMORPHOLO  Present   --      Recent Labs      03/21/18 1954 03/22/18   0022  03/22/18   0549  03/22/18   1124  03/22/18   1550   SODIUM  136   < >  136  135  136   --    POTASSIUM  7.6*   < >  7.1*  6.8*  6.8*  6.3*   CHLORIDE  101   < >  102  101  101   --    CO2  26   < >  24  25  24   --    BUN  76*   < >  76*  75*  73*   --    CREATININE  2.32*   < >  2.22*  2.45*  2.18*   --    CALCIUM  9.9   < >  9.6  9.8  9.9   --    ALBUMIN  4.2   --    --    --    --    --     < > = values in this interval not displayed.     Estimated GFR/CRCL = Estimated Creatinine Clearance: 32.5 mL/min (by C-G formula based on SCr of 2.18 mg/dL (H)).  Recent Labs      03/22/18   0022   03/22/18   0257  03/22/18   0549  03/22/18   0559  03/22/18   1124  03/22/18   1145   GLUCOSE  121*   --    --   136*   --   135*   --    POCGLUCOSE   --    < >  148*   --   125*   --   114*    < > = values in this interval not displayed.     Recent Labs      03/21/18 1954   ASTSGOT  17   ALTSGPT  7   TBILIRUBIN  1.3   ALKPHOSPHAT  160*   GLOBULIN  2.6     Recent Labs      03/21/18   1210   BNPBTYPENAT  958*     Recent Labs      03/22/18   1201   OHKKE25H  7.34*   [id number]  45.7*   VSFKW088C  74.9   [id number]  93.6   ARTHCO3  24   ARTBE  -2     No results for input(s): INR, APTT, FIBRINOGEN in the last 72 hours.    Invalid input(s): DIMER    MICROBIOLOGY:   [facility]    IMAGING:   Dx-chest-limited (1 View)    Result Date: 3/21/2018  3/21/2018 12:23 PM 1Megan Staley  "atelectasis/consolidation. 2.  Cardiomegaly. 3.  Chronic elevation of the right hemidiaphragm.    Echocardiogram Comp W/o Cont    Result Date: 3/22/2018  LV EF:  60    % FINDINGS Left Ventricle Normal left ventricular chamber size. Normal left ventricular wall thickness. Grossly normal left ventricular systolic function. Left ventricular ejection fraction is visually estimated to be 60%. Flattened septum in diastole (\"D\"-shaped left ventricle) consistent with RV volume overload. A reliable estimation of diastolic function cannot be made due to conflicting data. Right Ventricle Severely dilated right ventricle. Normal right ventricular systolic function. Right Atrium Severely enlarged right atrium. Left Atrium Mildly dilated left atrium. Left atrial volume index is 40  mL/sq m. Mitral Valve Mitral annular calcification. No mitral stenosis. Trace mitral regurgitation. Aortic Valve Aortic sclerosis without stenosis.  No aortic insufficiency. Tricuspid Valve Structurally normal tricuspid valve. Severe tricuspid regurgitation. Right ventricular systolic pressure is estimated to be 85 mmHg. Pulmonic Valve The pulmonic valve is not well visualized. No pulmonic stenosis. Mild pulmonic insufficiency. Pulmonic artery diastolic pressure is 19  mmHg. Pericardium Normal pericardium without effusion. Aorta Normal aortic root for body surface area. Lyle Ghosh M.D. (Electronically Signed) Final Date:     22 March 2018                 12:52    Le Art Duplx/imag (specify In Comments Left, Right Or Bilateral)    Result Date: 3/21/2018    FINDINGS  Left.  Triphasic flow throughout the common femoral, profunda femoral, superficial  femoral, and popliteal arteries with no evidence of significant stenosis.  Three vessel runoff to the ankle with biphasic flow.  The proximal peroneal artery is not well visualized.  Alexander Ernst  (Electronically Signed)  Final Date:      21 March 2018                   20:33      MEDS:  Current " Facility-Administered Medications   Medication Last Dose   • sodium polystyrene (KAYEXALATE) 15 GM/60ML suspension 15 g Stopped at 03/22/18 0630   • nystatin (MYCOSTATIN) powder     • furosemide (LASIX) injection 40 mg     • senna-docusate (PERICOLACE or SENOKOT S) 8.6-50 MG per tablet 2 Tab Stopped at 03/22/18 0900    And   • polyethylene glycol/lytes (MIRALAX) PACKET 1 Packet      And   • magnesium hydroxide (MILK OF MAGNESIA) suspension 30 mL      And   • bisacodyl (DULCOLAX) suppository 10 mg     • Respiratory Care per Protocol     • enoxaparin (LOVENOX) inj 40 mg 40 mg at 03/22/18 0804   • insulin regular (HUMULIN R) injection 1-6 Units Stopped at 03/21/18 2100    And   • glucose 4 g chewable tablet 16 g      And   • dextrose 50% (D50W) injection 25 mL Stopped at 03/22/18 0134   • calcium carbonate (OS-ROSE MARY 500) tablet 500 mg 500 mg at 03/22/18 0808   • insulin glargine (LANTUS) injection 15 Units     • omeprazole (PRILOSEC) capsule 20 mg 20 mg at 03/22/18 0804   • sucralfate (CARAFATE) tablet 1 g 1 g at 03/22/18 1141   • HYDROcodone-acetaminophen (NORCO) 7.5-325 MG per tablet 1 Tab 1 Tab at 03/22/18 0805   • tiotropium (SPIRIVA) 18 MCG inhalation capsule 1 Cap 1 Cap at 03/21/18 2027   • fluticasone (FLONASE) nasal spray 100 mcg Stopped at 03/22/18 0900   • cefTRIAXone (ROCEPHIN) syringe 1 g 1 g at 03/22/18 0805   • ezetimibe (ZETIA) tablet 10 mg 10 mg at 03/21/18 2028    And   • simvastatin (ZOCOR) tablet 40 mg 40 mg at 03/21/18 2028   • diphenhydrAMINE (BENADRYL) tablet/capsule 25 mg 25 mg at 03/21/18 2028       ASSESSMENT/PLAN:  73 yo female with a history of COPD (on 4L), DM II, HTN, opioid dependent chronic pain, benzo dependent insomnia presenting with new onset CHF,  generalized debility, UTI, decreased LLE pulses, and severe hyperkalemia.     # Hyperkalemia- Discovered overnight.  Dr. Napier consulted this am and is managing.  Patient responding appropriately to kayexalate and lasix.  -Continue kayexalate  and lasix per ICU/nephro    # FLAVIA- Baseline cr less than 1.  Currently 2.1.  Dr. Napier consulting.    -Diuresis per nephro and intensive care  -Continue to monitor     # CHF-  BNP of 958 with consistent clinical findings on admission.  Not initially with oxygen demand beyond baseline. Patient on pioglitazone.   Patient with some lower BPs and pulse, on chronic BB therapy with atenolol.  This did not improve with holding atenolol.  Echo indicating preserved ef, severely dilated right hear with pulmonary pressure in 80s.  -Will continue diuresis with lasix per intensivist  -Hold home atenolol  -Continue home lisinopril     # UTI- Afebrile, no cva tenderness.  Uncomplicated.  Based on exam/sx and ua.  Cx currently with GNRs.  -Ceftriaxone  -Fu culture     # Leg pain- US findings not compatible with claudication.  Pain likely associated with edema.    # Debility- Likely 2/2 CHF, UTI.  TSH with 4.3 11/17.  H/H wnl.  -PT/OT    # Acute on chronic lung disease- Hx of COPD but also appears to have significant restrictive disease given patient's minimal inspiratory effort.  Decompensating throughout the day requiring progressively more oxygen to 10 L.    -Transfer to ICU given minimal respiratory reserve.  Intensivist does not yet want to start Bipap.  -Continue diuresis  -Continue home inhalers  -O2 as needed  -S/p rocephin/azithro in ED     # DM II- Apparently well controlled, A1c 7.0 6/17.  Adequate control without lows on current regimen.  -[id number] glargine HS which is half home dose and ISS  -Holding metformin for FLAVIA  -Hold pioglitazone     # HTN-  BP down to 90s systolic.  -Holding home meds  -Dialyzing    # Chronic pain  -Continue norco     # Insomnia  -Will hold benzos at this time  -Benadryl prn    #FENppx  - diabetic diet   - Hyperkalemia  - po fluids  - lovenox     Dispo: ICU  Code: DNR

## 2018-03-22 NOTE — PROGRESS NOTES
Hong from Lab called with critical result of potassium 7.1 and BUN 76 at 0115. Critical lab result read back to Hong.   Dr. Coley notified of critical lab result.  Critical lab result read back by Dr. Coley.  New order for 10 units of insulin IV and 50 ml of D50 . Will continue to monitor.

## 2018-03-22 NOTE — PROGRESS NOTES
Bedside report with XAVIER Willis . Awaiting report from ED RN   Patient is A+OX4 , 5 L NC O2 sat 87-93 %, SR-ST on tele. Plan of care discussed. Patient on IV abx, and awaiting lab draws for possible diuresis. Questions answered, needs met. Call bell and personal belongings within reach. Bed alarm in place. Will continue to monitor.

## 2018-03-22 NOTE — PROGRESS NOTES
RN called down to ED ([facility], room Red 10) to obtain report. Per ED, ED RN is busy and will call RN back for report.

## 2018-03-22 NOTE — HEART FAILURE PROGRAM
Cardiovascular Nurse Navigator () Progress Note:      Chief Complaint: Weakness    Please note that patient has a possible diagnosis of HF. Education should be deferred until this diagnosis is confirmed and an MD has discussed it with the patient.    BNP: 958   Current echo: pending completion  Prior echo in 2012: no EF given, RVSP was 53-73  Diuresis: one dose IV furosemide  Prior cardiology encounter: not since 2012  Prior HF diagnosis: no    Because HF is a new diagnosis for this patient a cardiology consult is indicated.    Thank you, please call with questions.

## 2018-03-22 NOTE — DISCHARGE PLANNING
Transitional Care Navigator:    Chart reviewed for post acute needs. Pt with LACE score of 64, multiple co-morbidities, and limited mobility (5 feet x2).  Pt is an appropriate candidate for home health support.  Please consider an order for HH referral.

## 2018-03-22 NOTE — PROGRESS NOTES
Patient received 40 mg of lasix IV x1 as ordered per Dr. Coley. No obvious reaction to lasix. Blood pressure stable 115/60. Patient still has o2 sats in 87-90s. Dr Coley aware. No new orders. Will continue to monitor.

## 2018-03-22 NOTE — PROGRESS NOTES
Bedside report completed, assumed pt care w/ RN Azra. Pt lying in bed comfortably. A/O x 4, pain 7/10, medicated per MAR. Safety precautions in place. Call light and personal belongings within reach. Educated patient on calling for assistance when needed. All pt needs are met at this time.

## 2018-03-22 NOTE — PROGRESS NOTES
Critical potasium of 7.6 and BUN of 76. Dr. Park and Dr. Coley informed. Dr. Coley ordered STAT EKG and 10 units of IV humulin and 50 ml of dextrose 50% and 1000 mg of calcium gluconate. Insulin and dextrose administered and calcium gluconate infusing. EKG showed SR . BMP ordered in 4 hours per Dr. Coley . Will recheck blood glucose shortly. Will continue to monitor.

## 2018-03-22 NOTE — DIETARY
"Nutrition services:   Day 1 of admit.  Siri Solis is a 72 y.o. female with admitting DX of congestive heart failure (CMS-HCC) and urinary tract infection (UTI). Poor PO per admit screen.    Pt was unable to answer questions this morning during bedside visit. No weight changes per family member report. Pt tolerates small meals better and hasn't been hungry for a week or two according to family member report. Added snacks per family member's suggestion.     Assessment:  Height: 175.3 cm (5' 9\")  Weight: 124.5 kg (274 lb 7.6 oz)  Body mass index is 40.53 kg/m².  Weight loss counseling not appropriate in acute care setting.  Diet/Intake: Diabetic - No PO documented yet in chart    Evaluation:   1. Hx of DM II; glucose levels of 136 on 3/22/18; pt on humulin, dextrose 50% and lantus.  2. Potassium levels of 6.8 on 3/22/18; pt on sodium polystyrene.   3. Pt on rocephin and zithromax.   4. Last BM on 3/21/18; pt on miralax, milk of magnesia and dulcolax PRN.     Recommendations/Plan:  1. Encourage intake of meals and snacks.   2. Document intake of all meals and snacks  as % taken in ADL's to provide interdisciplinary communication across all shifts.   3. Monitor weight.  4. Recommend prebiotic/probiotic therapy after antibiotic is discontinued.    5. Nutrition rep will continue to see patient for ongoing meal and snack preferences.   6. Referral to outpatient nutrition services for weight management after D/C    RD will monitor.           "

## 2018-03-22 NOTE — PROGRESS NOTES
Updated Dr. Napier on patient's serum KCL 6.3 (down from 6.8).  Plan to trend serum K q 6 hours.  Administer Kayexalate PRN K greater than 6.3.

## 2018-03-22 NOTE — PROGRESS NOTES
MD in room with pt now. MD recommending BIPAP with transfer to ICU related to increasing respiratory distress.

## 2018-03-22 NOTE — PROGRESS NOTES
2 RN skin check with XAVIER Soria   Patient has generalized swelling and scattered ecchymosis. Red abdominal fold, will obtain order for nystatin. Red blanchable bottom. Redness and swelling on lower extremities and cracked and dry heels. No open areas or other skin issues noted.

## 2018-03-22 NOTE — PROGRESS NOTES
Patient appears to be having a hard time breathing. Patient now on 6-7 L NC and O2 sats 86-90. Slightly tachypnic, resp 20-24. Per Dr. Coley give 1x dose of 50 mg IV ethacrynic acid. Also patient has only urinated only once. Dr. Coley notified. Per Dr. Coley bladder scan and straight cath if >350 ml. Will continue to monitor.

## 2018-03-22 NOTE — CARE PLAN
Problem: Nutritional:  Goal: Achieve adequate nutritional intake  Patient will consume 50% of meals   Outcome: NOT MET  PO < 25%

## 2018-03-22 NOTE — PROGRESS NOTES
10 units of humulin and 50 ml of d50 given per dr. Coley's order. Order to recheck BMP in place. Patient resting comfortably. O2 sat 93-95%. SR on tele no ectopy. Will continue to monitor.

## 2018-03-22 NOTE — PROGRESS NOTES
Patient stood, transferred to commode.  +void, +BM.  Dr. Napier at bedside.  Plan to redraw potassium and notify MD of result.

## 2018-03-22 NOTE — PROGRESS NOTES
Hong from lab called with a critical potassium of 7.2. Dr. Coley aware. Previous potassium 7.6. D50 and insulin given at 2200 and calcium carbonate infusing . Will continue to monitor.

## 2018-03-22 NOTE — PROGRESS NOTES
Lab called with critical potassium of 6.8. Previous potassium 7.1. Dr. Darden aware. Kayexalate ordered.

## 2018-03-22 NOTE — PROGRESS NOTES
2 RN skin check.  Red, yeasty, moist panus.  Photo taken, nystatin and inter dry ordered.  Bruising to L. Posterior shoulder.

## 2018-03-22 NOTE — H&P
"[facility] MEDICINE HISTORY AND PHYSICAL     PATIENT ID:  NAME:  Siri Solis  MRN:               [id number]  YOB: 1945    Date of Admission: 3/21/2018     Attending: Tayler Isabel MD    Resident: Chalino Darden MD    Primary Care Physician:  Lake Ochoa MD    CC:  Weakness    HPI:   73 yo female with a history of COPD (on 4L), DM II, HTN, opioid dependent chronic pain, benzo dependent insomnia who presents with a 1 month history of progressive weakness and shortness of breath.  Her ADLs have become progressively more difficult, and today the patient was unable to arise from bed.  She endorses orthopnea, PND, and increasing symmetric LE edema over this timeframe.  She has not increased her oxygen supplementation at home. She admits to near syncope and dizziness, but denies any focal weakness/numbness, syncope.  She does have approximately 2 weeks of progressive cough and yellow sputum production.  She denies any associated fevers.  She denies chest pain, but does admit to some thoracic back pain worse with activity.  There are no night sweats or weight loss.      She is complaining of urinary retention today.  She denies any abdominal pain, urinary frequency, or dysuria.  Again, she denies fevers.    She endorses pain in her LLE particularly in her calf when she ambulates.  This has been present for approximately 1 month.  The pain is mild, dull in nature and improves with rest and \"hanging it over the side of the bed.\"    ROS otherwise postive for 2 weeks of watery brown diarrhea over the past 2 weeks.  Patient denies any antibiotic exposure, recent travel, or sick contacts.      REVIEW OF SYSTEMS:   Ten systems reviewed and were negative except as noted in the HPI.                PAST MEDICAL HISTORY:  Past Medical History:   Diagnosis Date   • Anemia    • Arthritis    • Breath shortness     O2 24/7 for paralysis of diaphram right   • CATARACT    • Diabetes    • EMPHYSEMA     mild " "  • Hiatus hernia syndrome    • Hyperlipidemia    • Hypertension    • Indigestion    • Other specified disorder of intestines     occas diarrhea   • Pain    • Paralysis of diaphragm     right side   • Snoring    • Ulcer (CMS-HCC)     \"bleeding stomach ulcer\"       PAST SURGICAL HISTORY:  Past Surgical History:   Procedure Laterality Date   • RECOVERY  11/6/2013    Performed by [facility] at SURGERY SAME DAY [facility] ORS   • GYN SURGERY      hysterectomy   • OTHER      cataract surgery    • OTHER      carpal tunnel    • OTHER ORTHOPEDIC SURGERY      left knee replacement       FAMILY HISTORY:  Mother- Breast cancer  Father- Unspecified malignancy    SOCIAL HISTORY:   Pt lives in [place] with husbnd.  Smoking 40 p/y, quit 10 years ago  Etoh use denies  Drug use denies    DIET:   Orders Placed This Encounter   Procedures   • Diet Order     Standing Status:   Standing     Number of Occurrences:   1     Order Specific Question:   Diet:     Answer:   Diabetic [3]       ALLERGIES:  Allergies   Allergen Reactions   • Zoloft Diarrhea   • Metformin Hives, Rash and Itching   • Amaryl [Amaryl]    • Amaryl [Glimepiride] Shortness of Breath and Rash   • Amoxicillin Rash   • Byetta Rash   • Epinephrine Shortness of Breath     Panic attack, Can't breath   • Hctz      Stopped urinary output   • Iodine Anaphylaxis     contrast   • Lasix [Furosemide]      Leg cramps, stopped urinary output   • Latex    • Lexapro      Leg cramping   • Penicillins Rash   • Spironolactone        OUTPATIENT MEDICATIONS:    Current Facility-Administered Medications:   •  senna-docusate (PERICOLACE or SENOKOT S) 8.6-50 MG per tablet 2 Tab, 2 Tab, Oral, BID **AND** polyethylene glycol/lytes (MIRALAX) PACKET 1 Packet, 1 Packet, Oral, QDAY PRN **AND** magnesium hydroxide (MILK OF MAGNESIA) suspension 30 mL, 30 mL, Oral, QDAY PRN **AND** bisacodyl (DULCOLAX) suppository 10 mg, 10 mg, Rectal, QDAY PRN, Chalino Darden M.D.  •  Respiratory Care per " Protocol, , Nebulization, Continuous RT, Chalino Darden M.D.  •  enoxaparin (LOVENOX) inj 40 mg, 40 mg, Subcutaneous, DAILY, Chalino Darden M.D.  •  insulin regular (HUMULIN R) injection 1-6 Units, 1-6 Units, Subcutaneous, 4X/DAY ACHS **AND** Accu-Chek ACHS, , , Q AC AND BEDTIME(S) **AND** NOTIFY MD and PharmD, , , Once **AND** glucose 4 g chewable tablet 16 g, 16 g, Oral, Q15 MIN PRN **AND** dextrose 50% (D50W) injection 25 mL, 25 mL, Intravenous, Q15 MIN PRN, Chalino Darden M.D.  •  calcium carbonate (OS-ROSE MARY 500) tablet 500 mg, 500 mg, Oral, BID, Chalino Darden M.D.  •  insulin glargine (LANTUS) injection 15 Units, 15 Units, Subcutaneous, Nightly, Chalino Darden M.D.  •  [START ON 3/22/2018] lisinopril (PRINIVIL) 10 MG tablet 10 mg, 10 mg, Oral, Q EVENING, Chalino Darden M.D.  •  omeprazole (PRILOSEC) capsule 20 mg, 20 mg, Oral, BID, Chalino Darden M.D.  •  sucralfate (CARAFATE) tablet 1 g, 1 g, Oral, 4X/DAY ACHS, Chalino Darden M.D.  •  HYDROcodone-acetaminophen (NORCO) 7.5-325 MG per tablet 1 Tab, 1 Tab, Oral, Q4HRS PRN, Chalino Darden M.D.  •  tiotropium (SPIRIVA) 18 MCG inhalation capsule 1 Cap, 1 Cap, Inhalation, Q EVENING, Chalino Darden M.D.  •  fluticasone (FLONASE) nasal spray 100 mcg, 2 Spray, Nasal, DAILY, Kisha Cobb M.D.  •  [START ON 3/22/2018] cefTRIAXone (ROCEPHIN) syringe 1 g, 1 g, Intravenous, Q24HRS, Chalino Darden M.D.  •  ezetimibe (ZETIA) tablet 10 mg, 10 mg, Oral, QHS **AND** simvastatin (ZOCOR) tablet 40 mg, 40 mg, Oral, Q EVENING, Chalino Darden M.D.    Current Outpatient Prescriptions:   •  pioglitazone-metformin (ACTOPLUS MET)  MG per tablet, Take 1 Tab by mouth 2 times a day, with meals., Disp: , Rfl:   •  ezetimibe-simvastatin (VYTORIN) 10-40 MG per tablet, Take 1 Tab by mouth every evening., Disp: , Rfl:   •  atenolol (TENORMIN) 100 MG Tab, Take 100 mg by mouth every morning., Disp: , Rfl:   •  omeprazole (PRILOSEC) 20 MG delayed-release capsule, Take  20 mg by mouth every evening., Disp: , Rfl:   •  temazepam (RESTORIL) 30 MG capsule, Take 30 mg by mouth at bedtime as needed for Sleep., Disp: , Rfl:   •  tiotropium (SPIRIVA HANDIHALER) 18 MCG Cap, Inhale 18 mcg by mouth every evening., Disp: , Rfl:   •  lisinopril (PRINIVIL) 10 MG Tab, Take 10 mg by mouth every day., Disp: , Rfl:   •  HYDROcodone-acetaminophen (NORCO) 7.5-325 MG per tablet, Take 1 Tab by mouth every four hours as needed for Severe Pain., Disp: , Rfl:   •  insulin glargine (LANTUS) 100 UNIT/ML Solution, Inject 32 Units as instructed every evening., Disp: , Rfl:   •  magnesium oxide (MAG-OX) 400 MG Tab, Take 400 mg by mouth every day., Disp: , Rfl:   •  Calcium Carb-Cholecalciferol (CALCIUM 1000 + D) 1000-800 MG-UNIT Tab, Take 1 Tab by mouth every evening., Disp: , Rfl:     PHYSICAL EXAM:  Vitals:    18 1532 18 1601 18 1700 18 1706   BP:       Pulse: 76 (!) 58 60 62   Resp:       Temp:       SpO2: 91% 89% (!) 78% (!) 82%   Weight:       Height:       , Temp (24hrs), Av.3 °C (97.3 °F), Min:36.3 °C (97.3 °F), Max:36.3 °C (97.3 °F)  , Pulse Oximetry: (!) 82 %, O2 (LPM): 4, O2 Delivery: Nasal Cannula    General: Pt resting in NAD, cooperative, conversational dyspnea but able to talk in full sentences   Skin:  Pink, dry. Cool LEs L worse then R.  Venous stasis changes.  No rashes, Actinic keratoses on chest.   HEENT: NC/AT. PERRL. EOMI. No nasal discharge. Oropharynx nonerythematous without exudate/plaques   Neck:  Supple without lymphadenopathy or rigidity. No JVD. No carotid bruits.   Lungs:  Symmetrical.  Poor air movement, worse on right side. Fine crackles at bases.   Cardiovascular:  RRR, 2/6 systolic murmur heard best at L sternal border.  Abdomen:  Abdomen is soft ND. No masses noted. Tenderness to deep palpation most prominent suprapubic, no guarding or rebound. No CVA tenderness.  Extremities:  Strength 4/5 in bilateral lower extremities. 5/5 in all upper extremity  "muscle groups. Full range of motion. No gross deformities noted. 2+ pulses in right dorsalis pedis and L/R radial arteries. Nonpalpable pulse in left dorsalis pedis which is obscured by edema.  Cap refill brisk bilateral LEs.   2+ bilateral pitting LE edema.  CNS: Cranial nerves III-XII grossly intact.   REY, nonfocal.         LAB TESTS:   Recent Labs      03/21/18   1210   WBC  8.3   RBC  4.61   HEMOGLOBIN  13.1   HEMATOCRIT  45.6   MCV  98.9*   MCH  28.4   RDW  80.9*   PLATELETCT  219   MPV  10.8   NEUTSPOLYS  80.70*   LYMPHOCYTES  8.80*   MONOCYTES  8.80   EOSINOPHILS  1.70   BASOPHILS  0.00   RBCMORPHOLO  Present     Recent Labs      03/21/18   1210   BNPBTYPENAT  958*     No results for input(s): SODIUM, POTASSIUM, CHLORIDE, CO2, BUN, CREATININE, CALCIUM, MAGNESIUM, PHOSPHORUS, ALBUMIN in the last 72 hours.    CULTURES:   Results     Procedure Component Value Units Date/Time    URINE CULTURE(NEW) [[id number]] Collected:  03/21/18 1237    Order Status:  Completed Updated:  03/21/18 1416    URINALYSIS,CULTURE IF INDICATED [[id number]]  (Abnormal) Collected:  03/21/18 1337    Order Status:  Completed Specimen:  Urine Updated:  03/21/18 1356     Color DK Yellow     Character Turbid (A)     Specific Gravity 1.029     Ph 5.0     Glucose Negative mg/dL      Ketones Trace (A) mg/dL      Protein >=1000 (A) mg/dL      Bilirubin Moderate (A)     Urobilinogen, Urine 1.0     Nitrite Negative     Leukocyte Esterase Moderate (A)     Occult Blood Moderate (A)     Micro Urine Req Microscopic     Culture Indicated Yes UA Culture     BLOOD CULTURE [[id number]] Collected:  03/21/18 1335    Order Status:  Completed Specimen:  Blood from Peripheral Updated:  03/21/18 1344    Narrative:       Per Hospital Policy: Only change Specimen Src: to \"Line\" if  specified by physician order.    BLOOD CULTURE [[id number]] Collected:  03/21/18 1246    Order Status:  Completed Specimen:  Blood from Peripheral Updated:  03/21/18 1328    Narrative:    " "   Per Hospital Policy: Only change Specimen Src: to \"Line\" if  specified by physician order.    BLOOD CULTURE x2 [[id number]] Collected:  03/21/18 1314    Order Status:  Canceled Specimen:  Other from Peripheral     BLOOD CULTURE x2 [[id number]] Collected:  03/21/18 1309    Order Status:  Canceled Specimen:  Other from Peripheral           IMAGES:  Dx-chest-limited (1 View)    Result Date: 3/21/2018  3/21/2018 12:23 PM    1.  Bibasilar atelectasis/consolidation. 2.  Cardiomegaly. 3.  Chronic elevation of the right hemidiaphragm.    Le Art Duplx/imag (specify In Comments Left, Right Or Bilateral)    Result Date: 3/21/2018  Lower Extremity  Arterial Duplex Report  Vascular Laboratory  CONCLUSIONS   [place]  FINDINGS  Left.  Triphasic flow throughout the common femoral, profunda femoral, superficial  femoral, and popliteal arteries with no evidence of significant stenosis.  Three vessel runoff to the ankle with biphasic flow.  The proximal peroneal artery is not well visualized.      CONSULTS:   None    ASSESSMENT/PLAN: 71 yo female with a history of COPD (on 4L), DM II, HTN, opioid dependent chronic pain, benzo dependent insomnia presenting with new onset CHF, generalized debility, UTI, and decreased LLE pulses.      # CHF- Based on clinical findings and BNP of 958.  Mild exacerbation, not requiring oxygen beyond baseline.  Patient with some lower BPs and pulse, on chronic BB therapy with atenolol.  Patient on pioglitazone.  Suspicious for ischemic etiology given sx of claudication.  -CMP ordered but not drawn.  Awaiting prior to diuresis.  -Will plan to start ethacrynic acid with initial dose of 50mg due to apparent allergy to lasix  -Echocardiogram  -Hold home atenolol  -Continue home lisinopril    # UTI- Afebrile, no cva tenderness.  Uncomplicated.  Based on exam/sx and ua.  -Ceftriaxone  -Fu culture    # Claudication- Initial study as above does not indicate significant disease.  Likely that pulses were obscured by " edema.    # Debility- Likely 2/2 CHF, UTI.  TSH with 4.3 11/17.  H/H wnl.  -PT/OT    # COPD- Despite increased sputum, unlikely to be significant contributor to patient's sx as O2 stable.  CXR of poor quality.  Patient with persistent elevation of L hemidiaphragm which may represent phrenic nerve paralysis.  This is chronic.  Given minimal inspiration on cxr there is likely a significant component of restrictive disease.  -continue home inhalers  -O2 as needed  -S/p rocephin/azithro in ED    # DM II- Apparently well controlled, A1c 7.0 6/17.  Glucose 148 in ED.  -Will start on 15U glargine HS which is half home dose and ISS  -Holding metformin until BMP results  -Hold pioglitazone    # HTN-  BP down to 90s systolic.  -Will hold atenol, continue lisinopril as above    # Chronic pain  -Continue norco    # Insomnia  -Will hold benzos at this time  -Benadryl prn    #FENppx  - diabetic diet   - lytes pending  - po fluids  - lovenox    Dispo: Inpatient tele  Code: DNR

## 2018-03-22 NOTE — PROGRESS NOTES
Received pt from ED via [facility]. Slid board was used to transfer pt to bed. Telemetry monitor placed on pt. NSR. Pt AAOx4, denies pain at this time. Pt very SOB with any exertion. POC discussed with pt and verbalizes no questions. Pt denies any additional needs at this time. Bed in lowest position, pt educated on fall risk and verbalized understanding, call light within reach, will continue to monitor.

## 2018-03-23 ENCOUNTER — APPOINTMENT (OUTPATIENT)
Dept: RADIOLOGY | Facility: MEDICAL CENTER | Age: 73
DRG: 291 | End: 2018-03-23
Attending: EMERGENCY MEDICINE
Payer: COMMERCIAL

## 2018-03-23 ENCOUNTER — APPOINTMENT (OUTPATIENT)
Dept: RADIOLOGY | Facility: MEDICAL CENTER | Age: 73
DRG: 291 | End: 2018-03-23
Attending: INTERNAL MEDICINE
Payer: COMMERCIAL

## 2018-03-23 PROBLEM — E87.5 HYPERKALEMIA: Status: ACTIVE | Noted: 2018-03-23

## 2018-03-23 PROBLEM — N28.9 RENAL INSUFFICIENCY: Status: ACTIVE | Noted: 2018-03-23

## 2018-03-23 LAB
ANION GAP SERPL CALC-SCNC: 5 MMOL/L (ref 0–11.9)
BACTERIA UR CULT: ABNORMAL
BACTERIA UR CULT: ABNORMAL
BASOPHILS # BLD AUTO: 0.3 % (ref 0–1.8)
BASOPHILS # BLD: 0.02 K/UL (ref 0–0.12)
BUN SERPL-MCNC: 72 MG/DL (ref 8–22)
CALCIUM SERPL-MCNC: 9.8 MG/DL (ref 8.5–10.5)
CHLORIDE SERPL-SCNC: 103 MMOL/L (ref 96–112)
CK SERPL-CCNC: 33 U/L (ref 0–154)
CO2 SERPL-SCNC: 29 MMOL/L (ref 20–33)
CREAT SERPL-MCNC: 2.32 MG/DL (ref 0.5–1.4)
EKG IMPRESSION: NORMAL
EOSINOPHIL # BLD AUTO: 0.15 K/UL (ref 0–0.51)
EOSINOPHIL NFR BLD: 2.2 % (ref 0–6.9)
ERYTHROCYTE [DISTWIDTH] IN BLOOD BY AUTOMATED COUNT: 76.7 FL (ref 35.9–50)
GLUCOSE BLD-MCNC: 109 MG/DL (ref 65–99)
GLUCOSE BLD-MCNC: 113 MG/DL (ref 65–99)
GLUCOSE BLD-MCNC: 126 MG/DL (ref 65–99)
GLUCOSE BLD-MCNC: 130 MG/DL (ref 65–99)
GLUCOSE SERPL-MCNC: 113 MG/DL (ref 65–99)
HCT VFR BLD AUTO: 39.8 % (ref 37–47)
HGB BLD-MCNC: 11.9 G/DL (ref 12–16)
IMM GRANULOCYTES # BLD AUTO: 0.04 K/UL (ref 0–0.11)
IMM GRANULOCYTES NFR BLD AUTO: 0.6 % (ref 0–0.9)
LYMPHOCYTES # BLD AUTO: 0.57 K/UL (ref 1–4.8)
LYMPHOCYTES NFR BLD: 8.5 % (ref 22–41)
MCH RBC QN AUTO: 28.3 PG (ref 27–33)
MCHC RBC AUTO-ENTMCNC: 29.9 G/DL (ref 33.6–35)
MCV RBC AUTO: 94.8 FL (ref 81.4–97.8)
MONOCYTES # BLD AUTO: 0.99 K/UL (ref 0–0.85)
MONOCYTES NFR BLD AUTO: 14.8 % (ref 0–13.4)
MORPHOLOGY BLD-IMP: NORMAL
NEUTROPHILS # BLD AUTO: 4.91 K/UL (ref 2–7.15)
NEUTROPHILS NFR BLD: 73.6 % (ref 44–72)
NRBC # BLD AUTO: 0 K/UL
NRBC BLD-RTO: 0 /100 WBC
PLATELET # BLD AUTO: 153 K/UL (ref 164–446)
PMV BLD AUTO: 9.9 FL (ref 9–12.9)
POTASSIUM SERPL-SCNC: 4.8 MMOL/L (ref 3.6–5.5)
POTASSIUM SERPL-SCNC: 5.2 MMOL/L (ref 3.6–5.5)
POTASSIUM SERPL-SCNC: 5.4 MMOL/L (ref 3.6–5.5)
POTASSIUM SERPL-SCNC: 6 MMOL/L (ref 3.6–5.5)
POTASSIUM SERPL-SCNC: 6.6 MMOL/L (ref 3.6–5.5)
RBC # BLD AUTO: 4.2 M/UL (ref 4.2–5.4)
SIGNIFICANT IND 70042: ABNORMAL
SITE SITE: ABNORMAL
SODIUM SERPL-SCNC: 137 MMOL/L (ref 135–145)
SOURCE SOURCE: ABNORMAL
TROPONIN I SERPL-MCNC: 0.27 NG/ML (ref 0–0.04)
TROPONIN I SERPL-MCNC: 0.35 NG/ML (ref 0–0.04)
WBC # BLD AUTO: 6.7 K/UL (ref 4.8–10.8)

## 2018-03-23 PROCEDURE — G8978 MOBILITY CURRENT STATUS: HCPCS | Mod: CK

## 2018-03-23 PROCEDURE — 700102 HCHG RX REV CODE 250 W/ 637 OVERRIDE(OP): Performed by: EMERGENCY MEDICINE

## 2018-03-23 PROCEDURE — 82962 GLUCOSE BLOOD TEST: CPT

## 2018-03-23 PROCEDURE — 93970 EXTREMITY STUDY: CPT | Mod: 26 | Performed by: SURGERY

## 2018-03-23 PROCEDURE — G8979 MOBILITY GOAL STATUS: HCPCS | Mod: CI

## 2018-03-23 PROCEDURE — 93005 ELECTROCARDIOGRAM TRACING: CPT | Performed by: FAMILY MEDICINE

## 2018-03-23 PROCEDURE — 94667 MNPJ CHEST WALL 1ST: CPT

## 2018-03-23 PROCEDURE — 93970 EXTREMITY STUDY: CPT

## 2018-03-23 PROCEDURE — G8987 SELF CARE CURRENT STATUS: HCPCS | Mod: CL

## 2018-03-23 PROCEDURE — 85025 COMPLETE CBC W/AUTO DIFF WBC: CPT

## 2018-03-23 PROCEDURE — 700111 HCHG RX REV CODE 636 W/ 250 OVERRIDE (IP): Performed by: EMERGENCY MEDICINE

## 2018-03-23 PROCEDURE — 700102 HCHG RX REV CODE 250 W/ 637 OVERRIDE(OP): Performed by: INTERNAL MEDICINE

## 2018-03-23 PROCEDURE — A9270 NON-COVERED ITEM OR SERVICE: HCPCS | Performed by: EMERGENCY MEDICINE

## 2018-03-23 PROCEDURE — 97162 PT EVAL MOD COMPLEX 30 MIN: CPT

## 2018-03-23 PROCEDURE — 94640 AIRWAY INHALATION TREATMENT: CPT

## 2018-03-23 PROCEDURE — A9270 NON-COVERED ITEM OR SERVICE: HCPCS | Performed by: INTERNAL MEDICINE

## 2018-03-23 PROCEDURE — 84484 ASSAY OF TROPONIN QUANT: CPT | Mod: 91

## 2018-03-23 PROCEDURE — 700111 HCHG RX REV CODE 636 W/ 250 OVERRIDE (IP): Performed by: INTERNAL MEDICINE

## 2018-03-23 PROCEDURE — 94668 MNPJ CHEST WALL SBSQ: CPT

## 2018-03-23 PROCEDURE — 71045 X-RAY EXAM CHEST 1 VIEW: CPT

## 2018-03-23 PROCEDURE — 84132 ASSAY OF SERUM POTASSIUM: CPT | Mod: 91

## 2018-03-23 PROCEDURE — 82550 ASSAY OF CK (CPK): CPT

## 2018-03-23 PROCEDURE — 770022 HCHG ROOM/CARE - ICU (200)

## 2018-03-23 PROCEDURE — 80048 BASIC METABOLIC PNL TOTAL CA: CPT

## 2018-03-23 PROCEDURE — G8988 SELF CARE GOAL STATUS: HCPCS | Mod: CJ

## 2018-03-23 PROCEDURE — 97165 OT EVAL LOW COMPLEX 30 MIN: CPT

## 2018-03-23 PROCEDURE — 93010 ELECTROCARDIOGRAM REPORT: CPT | Performed by: INTERNAL MEDICINE

## 2018-03-23 RX ORDER — FUROSEMIDE 10 MG/ML
40 INJECTION INTRAMUSCULAR; INTRAVENOUS 3 TIMES DAILY
Status: COMPLETED | OUTPATIENT
Start: 2018-03-23 | End: 2018-03-24

## 2018-03-23 RX ORDER — FUROSEMIDE 10 MG/ML
40 INJECTION INTRAMUSCULAR; INTRAVENOUS ONCE
Status: COMPLETED | OUTPATIENT
Start: 2018-03-23 | End: 2018-03-23

## 2018-03-23 RX ADMIN — FLUTICASONE PROPIONATE 100 MCG: 50 SPRAY, METERED NASAL at 08:32

## 2018-03-23 RX ADMIN — CEFTRIAXONE SODIUM 1 G: 10 INJECTION, POWDER, FOR SOLUTION INTRAVENOUS at 09:56

## 2018-03-23 RX ADMIN — OMEPRAZOLE 20 MG: 20 CAPSULE, DELAYED RELEASE ORAL at 21:47

## 2018-03-23 RX ADMIN — SUCRALFATE 1 G: 1 TABLET ORAL at 11:09

## 2018-03-23 RX ADMIN — BUMETANIDE 0.5 MG: 0.25 INJECTION, SOLUTION INTRAMUSCULAR; INTRAVENOUS at 15:52

## 2018-03-23 RX ADMIN — TIOTROPIUM BROMIDE 1 CAPSULE: 18 CAPSULE ORAL; RESPIRATORY (INHALATION) at 21:49

## 2018-03-23 RX ADMIN — HYDROCODONE BITARTRATE AND ACETAMINOPHEN 1 TABLET: 7.5; 325 TABLET ORAL at 04:11

## 2018-03-23 RX ADMIN — SUCRALFATE 1 G: 1 TABLET ORAL at 06:22

## 2018-03-23 RX ADMIN — SODIUM POLYSTYRENE SULFONATE 15 G: 15 SUSPENSION ORAL; RECTAL at 11:06

## 2018-03-23 RX ADMIN — OMEPRAZOLE 20 MG: 20 CAPSULE, DELAYED RELEASE ORAL at 08:32

## 2018-03-23 RX ADMIN — FUROSEMIDE 40 MG: 10 INJECTION, SOLUTION INTRAMUSCULAR; INTRAVENOUS at 21:55

## 2018-03-23 RX ADMIN — Medication 500 MG: at 21:47

## 2018-03-23 RX ADMIN — NYSTATIN: 100000 POWDER TOPICAL at 14:21

## 2018-03-23 RX ADMIN — FUROSEMIDE 40 MG: 10 INJECTION, SOLUTION INTRAMUSCULAR; INTRAVENOUS at 14:21

## 2018-03-23 RX ADMIN — EZETIMIBE 10 MG: 10 TABLET ORAL at 21:47

## 2018-03-23 RX ADMIN — INSULIN GLARGINE 15 UNITS: 100 INJECTION, SOLUTION SUBCUTANEOUS at 21:52

## 2018-03-23 RX ADMIN — HYDROCODONE BITARTRATE AND ACETAMINOPHEN 1 TABLET: 7.5; 325 TABLET ORAL at 14:24

## 2018-03-23 RX ADMIN — ENOXAPARIN SODIUM 40 MG: 100 INJECTION SUBCUTANEOUS at 08:32

## 2018-03-23 RX ADMIN — Medication 500 MG: at 08:32

## 2018-03-23 RX ADMIN — NYSTATIN: 100000 POWDER TOPICAL at 21:48

## 2018-03-23 RX ADMIN — SUCRALFATE 1 G: 1 TABLET ORAL at 17:50

## 2018-03-23 RX ADMIN — FUROSEMIDE 40 MG: 10 INJECTION, SOLUTION INTRAMUSCULAR; INTRAVENOUS at 08:32

## 2018-03-23 RX ADMIN — NYSTATIN: 100000 POWDER TOPICAL at 08:32

## 2018-03-23 RX ADMIN — SIMVASTATIN 40 MG: 40 TABLET, FILM COATED ORAL at 21:47

## 2018-03-23 RX ADMIN — SUCRALFATE 1 G: 1 TABLET ORAL at 21:48

## 2018-03-23 ASSESSMENT — COGNITIVE AND FUNCTIONAL STATUS - GENERAL
MOVING TO AND FROM BED TO CHAIR: UNABLE
SUGGESTED CMS G CODE MODIFIER MOBILITY: CL
WALKING IN HOSPITAL ROOM: A LOT
TOILETING: A LOT
DAILY ACTIVITIY SCORE: 17
TURNING FROM BACK TO SIDE WHILE IN FLAT BAD: UNABLE
MOBILITY SCORE: 10
SUGGESTED CMS G CODE MODIFIER MOBILITY: CL
DRESSING REGULAR UPPER BODY CLOTHING: A LITTLE
HELP NEEDED FOR BATHING: A LOT
CLIMB 3 TO 5 STEPS WITH RAILING: A LOT
WALKING IN HOSPITAL ROOM: A LOT
TURNING FROM BACK TO SIDE WHILE IN FLAT BAD: UNABLE
MOVING FROM LYING ON BACK TO SITTING ON SIDE OF FLAT BED: UNABLE
CLIMB 3 TO 5 STEPS WITH RAILING: A LOT
MOVING TO AND FROM BED TO CHAIR: UNABLE
STANDING UP FROM CHAIR USING ARMS: A LITTLE
SUGGESTED CMS G CODE MODIFIER DAILY ACTIVITY: CK
TURNING FROM BACK TO SIDE WHILE IN FLAT BAD: UNABLE
MOVING FROM LYING ON BACK TO SITTING ON SIDE OF FLAT BED: UNABLE
MOVING FROM LYING ON BACK TO SITTING ON SIDE OF FLAT BED: UNABLE
SUGGESTED CMS G CODE MODIFIER MOBILITY: CL
STANDING UP FROM CHAIR USING ARMS: A LITTLE
MOBILITY SCORE: 10
MOBILITY SCORE: 10
WALKING IN HOSPITAL ROOM: A LOT
STANDING UP FROM CHAIR USING ARMS: A LITTLE
CLIMB 3 TO 5 STEPS WITH RAILING: A LOT
MOVING TO AND FROM BED TO CHAIR: UNABLE
DRESSING REGULAR LOWER BODY CLOTHING: A LOT

## 2018-03-23 ASSESSMENT — ENCOUNTER SYMPTOMS
VOMITING: 0
COUGH: 1
FEVER: 0
SHORTNESS OF BREATH: 1
ORTHOPNEA: 1
ABDOMINAL PAIN: 0
WHEEZING: 0
BACK PAIN: 1
FLANK PAIN: 0
MYALGIAS: 0
PALPITATIONS: 0
HEMOPTYSIS: 0
CHILLS: 0
NAUSEA: 0
EYES NEGATIVE: 1

## 2018-03-23 ASSESSMENT — ACTIVITIES OF DAILY LIVING (ADL): TOILETING: INDEPENDENT

## 2018-03-23 ASSESSMENT — GAIT ASSESSMENTS
DISTANCE (FEET): 3
DEVIATION: SHUFFLED GAIT;INCREASED BASE OF SUPPORT
ASSISTIVE DEVICE: HAND HELD ASSIST
GAIT LEVEL OF ASSIST: MINIMAL ASSIST

## 2018-03-23 ASSESSMENT — PAIN SCALES - GENERAL
PAINLEVEL_OUTOF10: 0
PAINLEVEL_OUTOF10: 0
PAINLEVEL_OUTOF10: 7
PAINLEVEL_OUTOF10: 10
PAINLEVEL_OUTOF10: 0
PAINLEVEL_OUTOF10: 9
PAINLEVEL_OUTOF10: 0

## 2018-03-23 NOTE — PROGRESS NOTES
Hospital Medicine Progress Note, Adult, Complex               Author: Lorraine Quyen Date & Time created: 3/23/2018  3:03 PM     Interval History:  71 y/o female with CKD III admitted with SOB, edema, found to be in FLAVIA, hyperkalemic  Doing better, less SOB  C/o fatigue  Creat at 2.3  Hyperkalemia corrected to 5.2  Review of Systems:  Review of Systems   Constitutional: Positive for malaise/fatigue. Negative for chills and fever.   HENT: Negative.    Eyes: Negative.    Respiratory: Positive for cough and shortness of breath. Negative for hemoptysis and wheezing.    Cardiovascular: Positive for orthopnea and leg swelling. Negative for chest pain and palpitations.   Gastrointestinal: Negative for abdominal pain, nausea and vomiting.   Genitourinary: Negative for dysuria, flank pain, frequency, hematuria and urgency.   Musculoskeletal: Positive for back pain. Negative for joint pain and myalgias.   Skin: Negative.    All other systems reviewed and are negative.      Physical Exam:  Physical Exam   Constitutional: She is oriented to person, place, and time. She appears well-developed and well-nourished. No distress.   HENT:   Head: Normocephalic and atraumatic.   Nose: Nose normal.   Mouth/Throat: Oropharynx is clear and moist.   Eyes: Conjunctivae and EOM are normal. Pupils are equal, round, and reactive to light.   Neck: Normal range of motion. Neck supple. No thyromegaly present.   Cardiovascular: Normal rate and regular rhythm.  Exam reveals no gallop and no friction rub.    Pulmonary/Chest: Effort normal. No respiratory distress. She has no wheezes. She has rales.   Abdominal: Soft. Bowel sounds are normal. She exhibits no distension. There is no tenderness.   Musculoskeletal: She exhibits edema.   Lymphadenopathy:     She has no cervical adenopathy.   Neurological: She is alert and oriented to person, place, and time. No cranial nerve deficit.   Skin: Skin is warm. No rash noted. No erythema.   Nursing note and  vitals reviewed.      Labs:  Recent Labs      18   1201   [id number]  7.34*   [id number]  45.7*   [id number]  74.9   UVDB2VWM  93.6   ARTHCO3  24   ARTBE  -2     Recent Labs      18   1210  18   1230   CPKTOTAL   --   33   TROPONINI   --   0.27*   BNPBTYPENAT  958*   --      Recent Labs      18   0549  18   1124   18   0340  18   1010  18   1230   SODIUM  135  136   --   137   --    --    POTASSIUM  6.8*  6.8*   < >  6.0*  6.6*  5.2   CHLORIDE  101  101   --   103   --    --    CO2  25  24   --   29   --    --    BUN  75*  73*   --   72*   --    --    CREATININE  2.45*  2.18*   --   2.32*   --    --    CALCIUM  9.8  9.9   --   9.8   --    --     < > = values in this interval not displayed.     Recent Labs      18   0549  184  18   0340   ALTSGPT  7   --    --    --    --    ASTSGOT  17   --    --    --    --    ALKPHOSPHAT  160*   --    --    --    --    TBILIRUBIN  1.3   --    --    --    --    GLUCOSE  123*   < >  136*  135*  113*    < > = values in this interval not displayed.     Recent Labs      18   0340   RBC  4.61  4.44  4.20   HEMOGLOBIN  13.1  13.0  11.9*   HEMATOCRIT  45.6  43.2  39.8   PLATELETCT  219  189  153*     Recent Labs      18   0340   WBC  8.3   --   8.0  6.7   NEUTSPOLYS  80.70*   --    --   73.60*   LYMPHOCYTES  8.80*   --    --   8.50*   MONOCYTES  8.80   --    --   14.80*   EOSINOPHILS  1.70   --    --   2.20   BASOPHILS  0.00   --    --   0.30   ASTSGOT   --   17   --    --    ALTSGPT   --   7   --    --    ALKPHOSPHAT   --   160*   --    --    TBILIRUBIN   --   1.3   --    --            Hemodynamics:  Temp (24hrs), Av.1 °C (98.8 °F), Min:37.1 °C (98.8 °F), Max:37.1 °C (98.8 °F)  Temperature: 37.1 °C (98.8 °F), Monitored Temp: 37.2 °C (99 °F)  Pulse  Av.9  Min: 58  Max: 94Heart Rate (Monitored):  90  NIBP: 127/61     Respiratory:    Respiration: (!) 28, Pulse Oximetry: 92 %, O2 Daily Delivery Respiratory : Highflow Nasal Cannula     PEP/CPT Method: Positive Airway Pressure Device, Work Of Breathing / Effort: Mild  RUL Breath Sounds: Diminished, RML Breath Sounds: Diminished, RLL Breath Sounds: Diminished, ALEA Breath Sounds: Diminished, LLL Breath Sounds: Diminished  Fluids:    Intake/Output Summary (Last 24 hours) at 03/23/18 1503  Last data filed at 03/23/18 1200   Gross per 24 hour   Intake             1150 ml   Output             3300 ml   Net            -2150 ml       GI/Nutrition:  Orders Placed This Encounter   Procedures   • Diet Order     Standing Status:   Standing     Number of Occurrences:   1     Order Specific Question:   Diet:     Answer:   Diabetic [3]     Order Specific Question:   Electrolyte modifications:     Answer:   Low Potassium [3]     Medical Decision Making, by Problem:  Active Hospital Problems    Diagnosis   • COPD (chronic obstructive pulmonary disease) (CMS-[facility]) [J44.9]   • Hyperkalemia [E87.5]   • Renal insufficiency [N28.9]   • Pulmonary HTN [I27.20]       Quality-Core Measures   Reviewed items::  Labs reviewed, Medications reviewed and Radiology images reviewed    Assessment and plan  1.FLAVIA/CKD III -to monitor  2.HTN: BP well controlled  3.Electrolytes: hyperkalemia corrected  4.Anemia: Hb WNL  5.Volume:lasix prn    Recs; daily BMP, low k diet             No need for emergent dialysis

## 2018-03-23 NOTE — CONSULTS
Cardiology Consult Note:    Keron To  Date & Time note created:    3/23/2018   2:52 PM     Referring MD:  Dr. Darden    Patient ID:   Name:             Siri Solis   YOB: 1945  Age:                 72 y.o.  female   MRN:               [id number]                                                             Chief Complaint / Reason for consult:  Elevated troponin, Pulmonary HTN.    History of Present Illness:    This is a 72 years old woman with prior history of advanced COPD, pulmonary hypertension, presented to the hospital because of not feeling well. Patient has been treated for hypoxemia. She also has chronic pain issue for which she is dependent on opioids. Cardiology's consult is for further management of her pulmonary hypertension. She is currently in the ICU setting for respiratory monitoring and support.    She also has been having intermittent chest pain. She is unable to recall the nature of her chest pain. Localizes anterior chest. No radiation. Lasting for seconds to minutes at the time. No specific precipitating or worsening factors.    I have reviewed patient's ECG, which shows normal sinus rhythm, normal TX, QT intervals. No evidence of acute coronary syndrome.    Renal function is also diminished with creatinine at 2.3.    Review of Systems:      Constitutional: Denies fevers, Denies weight changes  Eyes: Denies changes in vision, no eye pain  Ears/Nose/Throat/Mouth: Denies nasal congestion or sore throat   Cardiovascular: no chest pain, no palpitations   Respiratory: yes shortness of breath , Denies cough  Gastrointestinal/Hepatic: Denies abdominal pain, nausea, vomiting, diarrhea, constipation or GI bleeding   Genitourinary: Denies dysuria or frequency  Musculoskeletal/Rheum: Denies  joint pain and swelling   Skin: Denies rash  Neurological: some memory loss.  Psychiatric: denies mood disorder   Endocrine: Azra thyroid problems  Heme/Oncology/Lymph Nodes: Denies  "enlarged lymph nodes, denies brusing or known bleeding disorder  All other systems were reviewed and are negative (AMA/CMS criteria)                Past Medical History:   Past Medical History:   Diagnosis Date   • Anemia    • Arthritis    • Back pain    • Breath shortness     O2 24/7 for paralysis of diaphram right   • CATARACT    • Congestive heart failure (CMS-HCC)    • COPD (chronic obstructive pulmonary disease) (CMS-HCC)    • Diabetes    • EMPHYSEMA     mild   • Fall    • Hiatus hernia syndrome    • Hyperkalemia 3/23/2018   • Hyperlipidemia    • Hypertension    • Indigestion    • Other specified disorder of intestines     occas diarrhea   • Pain    • Paralysis of diaphragm     right side   • Renal insufficiency 3/23/2018   • Snoring    • Ulcer (CMS-HCC)     \"bleeding stomach ulcer\"     Active Hospital Problems    Diagnosis   • COPD (chronic obstructive pulmonary disease) (CMS-HCC) [J44.9]     Priority: High   • Hyperkalemia [E87.5]   • Renal insufficiency [N28.9]   • Pulmonary HTN [I27.20]       Past Surgical History:  Past Surgical History:   Procedure Laterality Date   • RECOVERY  11/6/2013    Performed by [facility] at SURGERY SAME DAY [facility] ORS   • GYN SURGERY      hysterectomy   • OTHER      cataract surgery    • OTHER      carpal tunnel    • OTHER ORTHOPEDIC SURGERY      left knee replacement       Hospital Medications:    Current Facility-Administered Medications:   •  Respiratory Care per Protocol, , Nebulization, Continuous RT, Mary Brewer M.D.  •  bumetanide (BUMEX) injection 0.5 mg, 0.5 mg, Intravenous, Once, Keron Nunez M.D.  •  furosemide (LASIX) injection 40 mg, 40 mg, Intravenous, TID, Keron Nunez M.D., 40 mg at 03/23/18 1421  •  nystatin (MYCOSTATIN) powder, , Topical, TID, Mary Brewer M.D.  •  sodium polystyrene (KAYEXALATE) 15 GM/60ML suspension 15 g, 15 g, Oral, Q6HRS PRN, Lorraine Napier M.D., 15 g at 03/23/18 1106  •  senna-docusate (PERICOLACE or SENOKOT " S) 8.6-50 MG per tablet 2 Tab, 2 Tab, Oral, BID, Stopped at 03/22/18 0900 **AND** polyethylene glycol/lytes (MIRALAX) PACKET 1 Packet, 1 Packet, Oral, QDAY PRN **AND** magnesium hydroxide (MILK OF MAGNESIA) suspension 30 mL, 30 mL, Oral, QDAY PRN **AND** bisacodyl (DULCOLAX) suppository 10 mg, 10 mg, Rectal, QDAY PRN, Chalino Darden M.D.  •  enoxaparin (LOVENOX) inj 40 mg, 40 mg, Subcutaneous, DAILY, Chalino Darden M.D., 40 mg at 03/23/18 0832  •  insulin regular (HUMULIN R) injection 1-6 Units, 1-6 Units, Subcutaneous, 4X/DAY ACHS, Stopped at 03/21/18 2100 **AND** Accu-Chek ACHS, , , Q AC AND BEDTIME(S) **AND** NOTIFY MD and PharmD, , , Once **AND** glucose 4 g chewable tablet 16 g, 16 g, Oral, Q15 MIN PRN **AND** dextrose 50% (D50W) injection 25 mL, 25 mL, Intravenous, Q15 MIN PRN, Chalino Darden M.D., Stopped at 03/22/18 0134  •  calcium carbonate (OS-ROSE MARY 500) tablet 500 mg, 500 mg, Oral, BID, Chalino Darden M.D., 500 mg at 03/23/18 0832  •  insulin glargine (LANTUS) injection 15 Units, 15 Units, Subcutaneous, Nightly, Chalino Darden M.D., 15 Units at 03/22/18 2143  •  omeprazole (PRILOSEC) capsule 20 mg, 20 mg, Oral, BID, Chalino Darden M.D., 20 mg at 03/23/18 0832  •  sucralfate (CARAFATE) tablet 1 g, 1 g, Oral, 4X/DAY ACHS, Chalino Darden M.D., 1 g at 03/23/18 1109  •  HYDROcodone-acetaminophen (NORCO) 7.5-325 MG per tablet 1 Tab, 1 Tab, Oral, Q4HRS PRN, Chalino Darden M.D., 1 Tab at 03/23/18 1424  •  tiotropium (SPIRIVA) 18 MCG inhalation capsule 1 Cap, 1 Cap, Inhalation, Q EVENING, Chalino Darden M.D., 1 Cap at 03/22/18 2142  •  fluticasone (FLONASE) nasal spray 100 mcg, 2 Spray, Nasal, DAILY, Kisha Cobb M.D., 100 mcg at 03/23/18 0832  •  cefTRIAXone (ROCEPHIN) syringe 1 g, 1 g, Intravenous, Q24HRS, Chalino Darden M.D., 1 g at 03/23/18 0956  •  ezetimibe (ZETIA) tablet 10 mg, 10 mg, Oral, QHS, 10 mg at 03/22/18 2143 **AND** simvastatin (ZOCOR) tablet 40 mg, 40 mg, Oral, Q EVENING,  Chalino Darden M.D., 40 mg at 03/22/18 2142  •  diphenhydrAMINE (BENADRYL) tablet/capsule 25 mg, 25 mg, Oral, HS PRN, Chalino Darden M.D., 25 mg at 03/21/18 2028    Current Outpatient Medications:  Prescriptions Prior to Admission   Medication Sig Dispense Refill Last Dose   • pioglitazone-metformin (ACTOPLUS MET)  MG per tablet Take 1 Tab by mouth 2 times a day, with meals.   3/20/2018 at 1800   • ezetimibe-simvastatin (VYTORIN) 10-40 MG per tablet Take 1 Tab by mouth every evening.   3/20/2018 at 1800   • atenolol (TENORMIN) 100 MG Tab Take 100 mg by mouth every morning.   3/20/2018 at 0900   • omeprazole (PRILOSEC) 20 MG delayed-release capsule Take 20 mg by mouth every evening.   3/20/2018 at 1800   • temazepam (RESTORIL) 30 MG capsule Take 30 mg by mouth at bedtime as needed for Sleep.   3/20/2018 at 2200   • tiotropium (SPIRIVA HANDIHALER) 18 MCG Cap Inhale 18 mcg by mouth every evening.   3/20/2018 at 2200   • lisinopril (PRINIVIL) 10 MG Tab Take 10 mg by mouth every day.   3/20/2018 at 1800   • HYDROcodone-acetaminophen (NORCO) 7.5-325 MG per tablet Take 1 Tab by mouth every four hours as needed for Severe Pain.   3/20/2018 at 2200   • insulin glargine (LANTUS) 100 UNIT/ML Solution Inject 32 Units as instructed every evening.   3/20/2018 at 2200   • magnesium oxide (MAG-OX) 400 MG Tab Take 400 mg by mouth every day.   3/20/2018 at 1800   • Calcium Carb-Cholecalciferol (CALCIUM 1000 + D) 1000-800 MG-UNIT Tab Take 1 Tab by mouth every evening.   3/20/2018 at 1800       Medication Allergy:  Allergies   Allergen Reactions   • Zoloft Diarrhea   • Metformin Hives, Rash and Itching   • Amaryl [Amaryl]    • Amaryl [Glimepiride] Shortness of Breath and Rash   • Amoxicillin Rash   • Byetta Rash   • Epinephrine Shortness of Breath     Panic attack, Can't breath   • Hctz      Stopped urinary output   • Iodine Anaphylaxis     contrast   • Lasix [Furosemide]      Leg cramps, stopped urinary output   • Latex    •  "Lexapro      Leg cramping   • Penicillins Rash   • Spironolactone        Family History:  No family history on file.    Social History:  Social History     Social History   • Marital status:      Spouse name: N/A   • Number of children: N/A   • Years of education: N/A     Occupational History   • Not on file.     Social History Main Topics   • Smoking status: Former Smoker     Packs/day: 2.00     Years: 20.00     Types: Cigarettes     Quit date: 1/1/2007   • Smokeless tobacco: Not on file   • Alcohol use No   • Drug use: No   • Sexual activity: Not on file     Other Topics Concern   • Not on file     Social History Narrative   • No narrative on file         Physical Exam:  Vitals/ General Appearance:   Weight/BMI: Body mass index is 41.59 kg/m².  Blood pressure 143/69, pulse 89, temperature 37.1 °C (98.8 °F), resp. rate (!) 28, height 1.753 m (5' 9.02\"), weight (!) 127.8 kg (281 lb 12 oz), SpO2 92 %, not currently breastfeeding.  Vitals:    03/23/18 1100 03/23/18 1200 03/23/18 1300 03/23/18 1400   BP:       Pulse: 85 87 92 89   Resp: 16 (!) 37 (!) 37 (!) 28   Temp:       SpO2: 91% 95% 92%    Weight:       Height:         Oxygen Therapy:  Pulse Oximetry: 92 %, O2 (LPM): 30, O2 Delivery: Mask    Constitutional:   + acute distress, pink puffer.  HENMT:  Normocephalic, Atraumatic, Oropharynx moist mucous membranes, No oral exudates, Nose normal.  No thyromegaly.  Eyes:  EOMI, Conjunctiva normal, No discharge.  Neck:  Normal range of motion, No cervical tenderness,  no JVD.  Cardiovascular:  Normal heart rate, Normal rhythm, No murmurs, No rubs, No gallops.   Extremitites with edema.  Lungs:  Distant breath sounds due to copd  Abdomen: Bowel sounds normal, Soft, No tenderness, No guarding, No rebound, No masses, No hepatosplenomegaly.  Skin: Warm, Dry, No erythema, No rash, no induration.  Neurologic: awake and communicative.  Psychiatric: awake and communicative.      MDM (Data Review):     Records reviewed and " summarized in current documentation    Lab Data Review:  Recent Results (from the past 24 hour(s))   POTASSIUM SERUM (K)    Collection Time: 03/22/18  3:50 PM   Result Value Ref Range    Potassium 6.3 (H) 3.6 - 5.5 mmol/L   ACCU-CHEK GLUCOSE    Collection Time: 03/22/18  5:40 PM   Result Value Ref Range    Glucose - Accu-Ck 84 65 - 99 mg/dL   MICROALBUMIN CREAT RATIO URINE    Collection Time: 03/22/18  5:50 PM   Result Value Ref Range    Creatinine, Urine 35.50 mg/dL    Microalbumin, Urine Random 6.1 mg/dL    Micro Alb Creat Ratio 172 (H) 0 - 30 mg/g   ACCU-CHEK GLUCOSE    Collection Time: 03/22/18  8:37 PM   Result Value Ref Range    Glucose - Accu-Ck 106 (H) 65 - 99 mg/dL   POTASSIUM SERUM (K)    Collection Time: 03/22/18 10:05 PM   Result Value Ref Range    Potassium 6.3 (H) 3.6 - 5.5 mmol/L   BASIC METABOLIC PANEL    Collection Time: 03/23/18  3:40 AM   Result Value Ref Range    Sodium 137 135 - 145 mmol/L    Potassium 6.0 (H) 3.6 - 5.5 mmol/L    Chloride 103 96 - 112 mmol/L    Co2 29 20 - 33 mmol/L    Glucose 113 (H) 65 - 99 mg/dL    Bun 72 (HH) 8 - 22 mg/dL    Creatinine 2.32 (H) 0.50 - 1.40 mg/dL    Calcium 9.8 8.5 - 10.5 mg/dL    Anion Gap 5.0 0.0 - 11.9   CBC WITH DIFFERENTIAL    Collection Time: 03/23/18  3:40 AM   Result Value Ref Range    WBC 6.7 4.8 - 10.8 K/uL    RBC 4.20 4.20 - 5.40 M/uL    Hemoglobin 11.9 (L) 12.0 - 16.0 g/dL    Hematocrit 39.8 37.0 - 47.0 %    MCV 94.8 81.4 - 97.8 fL    MCH 28.3 27.0 - 33.0 pg    MCHC 29.9 (L) 33.6 - 35.0 g/dL    RDW 76.7 (H) 35.9 - 50.0 fL    Platelet Count 153 (L) 164 - 446 K/uL    MPV 9.9 9.0 - 12.9 fL    Neutrophils-Polys 73.60 (H) 44.00 - 72.00 %    Lymphocytes 8.50 (L) 22.00 - 41.00 %    Monocytes 14.80 (H) 0.00 - 13.40 %    Eosinophils 2.20 0.00 - 6.90 %    Basophils 0.30 0.00 - 1.80 %    Immature Granulocytes 0.60 0.00 - 0.90 %    Nucleated RBC 0.00 /100 WBC    Lymphs (Absolute) 0.57 (L) 1.00 - 4.80 K/uL    Monos (Absolute) 0.99 (H) 0.00 - 0.85 K/uL    Eos  (Absolute) 0.15 0.00 - 0.51 K/uL    Baso (Absolute) 0.02 0.00 - 0.12 K/uL    Immature Granulocytes (abs) 0.04 0.00 - 0.11 K/uL    NRBC (Absolute) 0.00 K/uL    Neutrophils (Absolute) 4.91 2.00 - 7.15 K/uL   PERIPHERAL SMEAR REVIEW    Collection Time: 18  3:40 AM   Result Value Ref Range    Peripheral Smear Review see below    ESTIMATED GFR    Collection Time: 18  3:40 AM   Result Value Ref Range    GFR If African American 25 (A) >60 mL/min/1.73 m 2    GFR If Non African American 21 (A) >60 mL/min/1.73 m 2   ACCU-CHEK GLUCOSE    Collection Time: 18  6:10 AM   Result Value Ref Range    Glucose - Accu-Ck 109 (H) 65 - 99 mg/dL   POTASSIUM SERUM (K)    Collection Time: 18 10:10 AM   Result Value Ref Range    Potassium 6.6 (HH) 3.6 - 5.5 mmol/L   EKG    Collection Time: 18 10:29 AM   Result Value Ref Range    Report       [facility]    Test Date:  2018  Pt Name:    PEG XIONG                Department: 19  MRN:        [id number]                      Room:       S125  Gender:     Female                       Technician: [facility]  :        1945                   Requested By:SHALA HOWARD  Order #:    [id number]                    Reading MD:    Measurements  Intervals                                Axis  Rate:       85                           P:          65  IL:         196                          QRS:        116  QRSD:       90                           T:          47  QT:         360  QTc:        428    Interpretive Statements  SINUS RHYTHM  PROBABLE RIGHT VENTRICULAR HYPERTROPHY  Compared to ECG 2018 21:26:22  No significant changes     POTASSIUM SERUM (K)    Collection Time: 18 12:30 PM   Result Value Ref Range    Potassium 5.2 3.6 - 5.5 mmol/L   CREATINE KINASE    Collection Time: 18 12:30 PM   Result Value Ref Range    CPK Total 33 0 - 154 U/L   TROPONIN    Collection Time: 18 12:30 PM   Result Value Ref Range    Troponin I 0.27 (H) 0.00 -  0.04 ng/mL   ACCU-CHEK GLUCOSE    Collection Time: 03/23/18 12:30 PM   Result Value Ref Range    Glucose - Accu-Ck 113 (H) 65 - 99 mg/dL       Imaging/Procedures Review:    Chest Xray:  Reviewed    EKG:   As in HPI.     MDM (Assessment and Plan):     Active Hospital Problems    Diagnosis   • COPD (chronic obstructive pulmonary disease) (CMS-HCC) [J44.9]     Priority: High   • Hyperkalemia [E87.5]   • Renal insufficiency [N28.9]   • Pulmonary HTN [I27.20]       At this time, I suspect that her pulmonary hypertension is likely secondary to advanced COPD.  Patient's clinical condition is likely to be advanced and end-stage.  She has multi-organs injuries at this point.  Overall, her prognosis is poor in one year mortality rate is relatively high.  At this time, we will aggressively diurese.  I understand that her renal function is not great. We will continue to monitor. She might be headed towards dialysis.  We will initiate trial of Lasix IV 40 mg 3 times a day for 3 doses.  In the meantime, optimize pulmonary support and COPD treatment. Likely CO2 retainer.  I am not sure that she understands the gravity of her condition.  At some point, end-of-life issue should be discussed with patient.  She would be a great candidate for hospice care.    I spent 35 minutes of critical care time during the consultation and treatment of this patient's complex and critically ill medical issues without overlapping with other physician's care.    Thank you for referring this patient to our cardiology service.  We will follow patient with you.    Keron Nunez MD.  [facility].

## 2018-03-23 NOTE — PROGRESS NOTES
Osmin from Lab called with critical result of K 6.6 at 10:50. Critical lab result read back to Osmin.   Dr. Napier notified of critical lab result at 1105.  Critical lab result read back by Dr. Napier. Orders received to get CPK and continue to give PRN Kayexalate per MAR.

## 2018-03-23 NOTE — CONSULTS
DATE OF SERVICE:  03/22/2018    REQUESTING PHYSICIAN:  Radha Meza MD    REASON FOR CONSULTATION:  To evaluate patient with hyperkalemia and worsening   creatinine level.    HISTORY OF PRESENT ILLNESS:  The patient is a 72-year-old female with chronic   kidney disease stage III, baseline creatinine level of 1.1 to 1.5, also   history of diabetes mellitus type 2 and hypertension, who presented to the   emergency room with complaints of worsening shortness of breath.  Upon   evaluation, she was found to have worsening serum creatinine level up to 2.3   as well as hyperkalemia of 7.6.  Outpatient medications were ACE inhibitor,lasix,potassium and  spironolactone.  Currently, patient is complaining of shortness of breath,   fatigue, not feeling well, denies any headache or dizziness.  No fever or chills.                                                                   Positive for cough.  No hemoptysis.   No chest pain.  Positive for pedal edema, no difficulties to urinate.  No   dysuria, no hematuria, no flank pain, no increased frequency or urgency to   urinate.  With hyperkalemia treatment, already improved her potassium to 6.3.    Brain natriuretic peptide revealed result of 958.  Echocardiogram showed   normal ejection fraction of 60%, also revealed severely dilated right   ventricle and large right atrium.    REVIEW OF SYSTEMS:  GENERAL:  Positive for fatigue.  No weight loss.  No fever or chills.  HEENT:  No headache, no vision changes, no muscle congestion, no sore throat.    No difficulties to swallow.  NECK:  No pain, no stiffness.  RESPIRATORY:  Positive for shortness of breath or cough.  No hemoptysis.  CARDIOVASCULAR:  Positive for dyspnea on exertion and pedal edema.  No chest   pain, no palpitations.  GASTROINTESTINAL:  No abdominal pain, nausea or vomiting, diarrhea or   constipation.  GENITOURINARY:  No difficulties to urinate.  No hematuria, no dysuria, no   increased frequency or urgency.  No  hesitancy, no flank pain.  MUSCULOSKELETAL:  Positive for chronic joint pain, back pain.  SKIN:  No myalgias.  NEUROLOGICAL:  No unsteady gait.  No paresthesias.  No focal weakness.  No   headaches.    PAST MEDICAL HISTORY:  Chronic obstructive pulmonary disease, emphysema,   hyperlipidemia, hypertension, under nutrition, diabetes mellitus type 2,   gastric ulcer.    PAST SURGICAL HISTORY:  Orthopedic surgery and GYN surgery.    SOCIAL HISTORY:  The patient used to smoke tobacco 2 packs per day, quit in   2007.  No alcohol or drug use.    FAMILY HISTORY:  No history of kidney disease.    ALLERGIES:  ALLERGIC TO ZOLOFT, METFORMIN, AMARYL, AMOXICILLIN, BYETTA,   EPINEPHRINE, LASIX, LEXAPRO, PENICILLIN.    OUTPATIENT MEDICATIONS:  Reviewed in the chart.    PHYSICAL EXAMINATION:  VITAL SIGNS:  Blood pressure 117/59, heart rate is 64, temperature 37.1   Celsius.  GENERAL APPEARANCE:  Well-developed, well-nourished female in no acute   distress.  HEENT:  Normocephalic, atraumatic.  Pupils equal, round, reactive to light.    Extraocular movements intact.  Nares patent.  Oropharynx intact without   erythema or exudate.  NECK:  Supple, no lymphadenopathy, no thyromegaly appreciated.  LUNGS:  Decreased breath sounds at bases with basilar crackles, no wheezes.  HEART:  Regular rate.  No rub or gallop.  ABDOMEN:  Soft.  Mild tenderness to palpation in mid abdomen.  No rebound   tenderness or guarding.  Bowel sounds normal.  No palpable masses.  EXTREMITIES:  A 2+ pedal edema bilaterally.  No cyanosis.  NEUROLOGIC:  Alert, oriented x3, no focal deficits.  Cranial nerves II-XII   grossly intact.    LABORATORY RESULTS:  Hemoglobin level 13.0.  Sodium 136, potassium 6.3, BUN 73   and creatinine 2.18.    Urinalysis, large protein, moderate leukocyte esterase, packed white blood   count, erythrocytes up to 5.    Renal ultrasound is pending.    ASSESSMENT AND PLAN:  The patient is a 72-year-old female with acute kidney   injury on  chronic kidney disease stage III, severe hyperkalemia, urinary tract   infection, chronic obstructive pulmonary disease exacerbation.  1.  Acute kidney injury.  Oral creatinine level already improving.  Continue   to monitor closely.  Avoid nephrotoxic agents.  2.  Electrolytes, hyperkalemia, improving to treatment.  Continue to monitor   potassium levels every 6 hours.  Administer Kayexalate if her potassium is   worsening.  3.  If volume is overloaded, continue Lasix.  4.  Hypertension.  Blood pressure remains well controlled.  5.  Urinary tract infection.  To culture urine.  Follow up with antibiotics   according to antibiogram.    RECOMMENDATIONS:  1.  Continue current treatment.  Close monitoring of potassium, to provide   low-potassium diet, no need for emergent dialysis; however, if there is no   improvement, may consider renal replacement therapy to correct potassium.  2.  Low potassium diet.  3.  Daily monitoring of basic metabolic panel.  4.  To complete microalbumin-creatinine ratio.  5.  Complete renal ultrasound.  6.  We will follow up patient closely.    Thank you for the consult.       ____________________________________     MD DEISI VILLAGRAN / LUCIANA    DD:  03/22/2018 17:25:04  DT:  03/22/2018 20:43:27    D#:  [id number]  Job#:  [id number]

## 2018-03-23 NOTE — CONSULTS
DATE OF SERVICE:  03/22/2018    PULMONARY AND CRITICAL CARE CONSULTATION NOTE    REFERRING PHYSICIAN:  Chalino Darden MD    REASON FOR CONSULTATION:  Acute hypoxic respiratory failure requiring higher   oxygen needs and ICU transfer and critical care therapies.    HISTORY OF PRESENT ILLNESS:  The patient is a 72-year-old female with a past   medical history significant for COPD on 4 liters of oxygen chronically, type 2   diabetes, hypertension as well as chronic pain syndrome, who presented to the   emergency department on the March 21st with progressive weakness and   worsening shortness of breath.  According to her caregiver and significant   other, the patient has essentially been bedbound due to her continued   worsening generalized weakness along with dyspnea with any exertion.  The   patient describes both difficulty with breathing both at rest and with any   activity such as sitting up in bed and using the bathroom.  She notes that she   has had worsening bilateral lower extremity swelling with both pain and   redness.  She also describes paroxysmal nocturnal dyspnea along with   orthopnea.  The patient is now essentially sitting up in bed to sleep.  She   continues on her home supplementation of oxygen at 4 liters per minute via   nasal cannula.  According to her significant other, the patient has been using   4 liters of oxygen for multiple years, and he has noted over the last 6-8   months that her oxygen levels at rest and with any exertion have been in the   low 70s to mid 80s.  He has not really addressed this with any of her   physicians.  Other symptoms that brought her in is feeling faint and dizzy,   but denies any focal weakness, numbness, or tingling to any of her   extremities.  She has noticed progressive cough with yellow sputum.  She has   no fevers or chills.  She also describes some chest discomfort with deep   inspiration.  She has had some thoracic back pain that worsens with any    activity.  She denies any recent weight loss or night sweats; however, she   does endorse that she has been gaining weight.    REVIEW OF SYSTEMS:  Patient also complains of 2 weeks of watery brown diarrhea   with no recent sick contact exposures.  She has had some urinary retention,   but denies any urinary frequency or dysuria.  No hematuria as well.    Otherwise, review of systems is negative per CMS and AMA criteria.    PAST MEDICAL HISTORY:  Significant for:  1.  Iron-deficiency anemia.  2.  Congestive heart failure.  3.  COPD and emphysema.  4.  Right paralysis of the diaphragm.  5.  Hiatal hernia syndrome.  6.  Dyslipidemia.  7.  Hypertension.  8.  Moderate-to-severe pulmonary hypertension.    The patient specifically denies history of DVT or PE as well as cancer.    PAST SURGICAL HISTORY:  She has undergone a left knee replacement, total   hysterectomy, cataract surgery, carpal tunnel surgery.    FAMILY HISTORY:  Denies any early-onset coronary artery disease or diabetes.    SOCIAL HISTORY:  The patient is a former smoker.  She states she quit smoking   in January 2007.  She smokes approximately 2 packs a day for about 20 years.     Denies any alcohol, IV drug use, or marijuana use.  She is living in [place]   with her significant other.    OUTPATIENT MEDICATIONS:  Atenolol 100 mg daily, calcium carbonate with   cholecalciferol 1000/800 mg and units 1 tablet daily, Vytorin 10/40 mg daily,   Norco 7.5/325 mg 1 tablet every 4 hours, Lantus 32 units in the evening,   lisinopril 10 mg daily, magnesium oxide 400 mg daily, Prilosec 20 mg daily,   pioglitazone/metformin 15/850 mg 1 tablet twice daily, Restoril 30 mg at   bedtime for sleep, and Spiriva 1 puff daily.    ALLERGIES:  ZOLOFT, METFORMIN, AMARYL, GLIMEPIRIDE, AMOXICILLIN, BYETTA,   EPINEPHRINE, HYDROCHLOROTHIAZIDE, IODINE, LASIX, LATEX, LEXAPRO, PENICILLIN,   AND SPIRONOLACTONE.    PHYSICAL EXAMINATION:  VITAL SIGNS:  Blood pressures range from 110s to 130s  over 50s to 60s, heart   rate in the 60s-80s, T-max of 98.8, respirations of 20-25, oxygenation of   93%-95% on 15 L OxyMask.  GENERAL:  The patient looks chronically ill, however, does not appear to be   toxic nor in any respiratory distress during this interview.  She is awake,   alert, answering all appropriately in full sentences.  She does appear older   than stated age and she is morbidly obese.  HEENT:  She is PERRL, EOMI.  Conjunctivae are pink.  Sclerae are anicteric.    Oropharynx has clear posterior pharynx with poor dentition and moist mucous   membranes.  NECK:  Supple, no adenopathy or thyromegaly.  No obvious JVD to be   appreciated.  CARDIOVASCULAR:  Regular rate and rhythm without murmur, rub, or gallop.  LUNGS:  Have crackles to the bases, slightly blunted to the right base,   otherwise clear without wheezes.  ABDOMEN:  Obese.  She has active bowel sounds.  Soft, nontender, nondistended.    No masses.  EXTREMITIES:  Moving her upper and lower extremities with full range of   motion.  Pulses are 2+ dorsal pedal as well as 2+ radial that are equal   bilaterally.  She has erythema and increased warmth to bilateral lower   extremities with a scaling skin.  She has 1-2+ pitting pedal edema.  Good   capillary refill.  SKIN:  Warm and dry as noted along her bilateral lower extremities.  She has   increased erythema and warmth and scaling of the skin.  She does not have any   obvious rashes or mottling.  NEUROLOGIC:  She is awake, alert, and oriented x4.  She has clear speech.  She   is answering all questions appropriately.  She is calm and cooperative.    Cranial nerves II-XII are grossly intact.  Motor and sensory is grossly   intact.  Proprioception is intact.  DTRs are grossly intact.    LABORATORY DATA:  White count of 8.0, hemoglobin of 13.0, hematocrit of 43.2,   platelets of 189.  Sodium of 136, potassium of 6.8, chloride of 101, serum   bicarb of 24, glucose of 135, BUN of 73, creatinine of 2.18,  calcium of 9.9.    ABG on 10 L OxyMask shows a pH of 7.34, pCO2 of 45.7, and a pO2 of 74.9.    Her chest x-ray shows volume overload consistent with acute pulmonary edema   with a right hemidiaphragm that continues to be elevated.    Urinalysis from 21st shows moderate amount of leukocyte esterase as well as   occult blood as well as bilirubin, protein, and ketones.  Culture is still in   process at this time.    Echocardiogram reveals left ventricular ejection fraction estimated at 60%.    Flattened septum in diastole consistent with RV volume overload.  Severely   dilated right ventricle.  Severely enlarged right atrium.  Mildly dilated left   atrium.  Right ventricular systolic pressure is estimated to be 85 mmHg.    Severe tricuspid regurgitation.  Calcified aortic valve leaflets.  Aortic   sclerosis without stenosis.  Trace mitral regurgitation.    IMPRESSION:  1.  Acute hypoxic respiratory failure.  2.  Severe pulmonary hypertension with signs and symptoms of cor pulmonale.  3.  Hyperkalemia.  4.  Acute kidney injury.  5.  Acute pulmonary edema.  6.  Mild hyperglycemia.  7.  History of oxygen-dependent chronic obstructive pulmonary disease.  8.  History of type 2 diabetes.  9.  History of hypertension.  10.  History of dyslipidemia.    PLAN:  This patient is critically ill at this time with hypoxic respiratory   failure, hyperkalemia, and severe pulmonary hypertension.  The patient has   some risk factors for pulmonary embolus given the fact of her sedentary   lifestyle over the last month, which should be evaluated with ultrasound of   the lower extremities to determine if there is DVT.  It would be nice to get a   CT angiogram to rule out pulmonary embolus; however, unfortunately due to the   patient's acute kidney injury, this cannot be done.  We will continue to   diurese the patient in the meantime as well as provide oxygen therapy.  I do   not feel that she needs BiPAP therapy at this time based on her  current   arterial blood gas.  Nephrology has been consulted due to her refractory   hyperkalemia and has been receiving Kayexalate with good results.  We will   continue to diurese with Lasix after placing a Jovel catheter and  monitor I's   and O's.  For now, we will continue to follow the patient very closely.  She   is a DNR/DNI, and we will continue all critical care management at this point.    The patient's case was discussed with the family medicine resident as well   as the respiratory therapist, pharmacist, and ICU nursing staff.    Thank you for allowing the pulmonary critical care team to participate in this   patient's care.  We will continue to follow along.    Critical care time is approximately 47 minutes in direct critical care as well   as extensive data review.  There was no overlap with other physicians, and   this excludes any procedure.       ____________________________________     MD HOLDEN DAVALOS / LUCIANA    DD:  03/22/2018 16:34:50  DT:  03/22/2018 20:10:29    D#:  [id number]  Job#:  [id number]

## 2018-03-23 NOTE — PROGRESS NOTES
Pulmonary Critical Care Progress Note        Date of Service:  3/23/2018  Chief Complaint: worsening generalized weakness    History of Present Illness: 72 y.o. female with the past medical history of O2 dependent COPD, type-II DM, HTN and chronic pain syndrome was admitted for acute on chronic hypoxic respiratory failure after having worsening generalized fatigue/weakness and shortness of breath for the past month.  She also had associated leg swelling with redness and productive cough.  Her O2 requirements continued to worsen after admission and was transferred to the ICU for this and now on HFO2 therapy for comfort reasons.  Her ECHO showed acute on chronic severe pulmonary hypertension.    ROS:  Respiratory: positive cough, negative hemoptysis, positive shortness of breath, positive sputum production and negative wheezing, Cardiac: positive chest pain, negative palpitations, positive orthopnea, negative claudication, positive leg swelling and positive PND, GI: negative nausea, negative vomiting, negative abdominal pain, negative diarrhea, negative constipation and negative blood in stool.  All other systems negative.    Interval Events:  24 hour interval history reviewed    - no issues overnight   - sheets placed   - K+ trending to 6.0 from 6.8   - SR 60-70s   - SBP 90-120s   - no fluids   - afebrile   - UOP of 1500cc with sheets   - O2 15 lpm oxymask    PFSH:  No change.    Respiratory:     Pulse Oximetry: 93 %    Exam: breathing comfortably on 15 lpm oxymask, crackles to the bases, diminished to the right base, clear otherwise  ImagingAvailable data reviewed   Recent Labs      03/22/18   1201   JKDZU87H  7.34*   KHZCHW510O  45.7*   [id number]  74.9   CJST8CWB  93.6   ARTHCO3  24   ARTBE  -2       HemoDynamics:  Pulse: 68, Heart Rate (Monitored): 64  Blood Pressure : 143/69, NIBP: (!) 94/54       Exam: RRR, no murmur, good cap refill, 2+ dpp=, 1-2 pedal edema  Imaging: Available data reviewed   ECHO  "3/22:  CONCLUSIONS  Left ventricular ejection fraction is visually estimated to be 60%.  Flattened septum in diastole (\"D\"-shaped left ventricle) consistent   with RV volume overload.  Severely dilated right ventricle.  Severely enlarged right atrium.  Mildly dilated left atrium.  Right ventricular systolic pressure is estimated to be 85 mmHg.  Severe tricuspid regurgitation.  Calcified aortic valve leaflets.  Aortic sclerosis without stenosis.  Trace mitral regurgitation.  Recent Labs      03/21/18   1210   BNPBTYPENAT  958*       Neuro:  GCS         Exam: no focal deficits noted mental status intact oriented for age x3 Motor and sensory exam grossly intact  Imaging: Available data reviewed    Fluids:  Intake/Output       03/21/18 0700 - 03/22/18 0659 03/22/18 0700 - 03/23/18 0659 03/23/18 0700 - 03/24/18 0659      [id number] 6330-6186 Total 7778-0565 5171-6629 Total 4628-7648 5133-3254 Total       Intake    P.O.  --  300 300  290  -- 290  --  -- --    P.O. -- 300 300 290 -- 290 -- -- --    Total Intake -- 300 300 290 -- 290 -- -- --       Output    Urine  --  -- --  850  1500 2350  --  -- --    Number of Times Incontinent of Urine -- 1 x 1 x 1 x -- 1 x -- -- --    Indwelling Cathether -- -- -- 350 1500 1850 -- -- --    Void (ml) -- -- -- 500 -- 500 -- -- --    Stool/Urine  --  -- --  --  100 100  --  -- --    Measurable Stool (ml) -- -- -- -- 100 100 -- -- --    Stool  --  -- --  --  -- --  --  -- --    Number of Times Stooled -- 2 x 2 x -- 1 x 1 x -- -- --    Total Output -- -- -- 850 1600 2450 -- -- --       Net I/O     -- 300 300 -560 -1600 -2160 -- -- --        Weight: (!) 127.8 kg (281 lb 12 oz)  Recent Labs      03/22/18   0549  03/22/18   1124  03/22/18   1550  03/22/18   2205  03/23/18   0340   SODIUM  135  136   --    --   137   POTASSIUM  6.8*  6.8*  6.3*  6.3*  6.0*   CHLORIDE  101  101   --    --   103   CO2  25  24   --    --   29   BUN  75*  73*   --    --   72*   CREATININE  2.45*  2.18*   --    --   " 2.32*   CALCIUM  9.8  9.9   --    --   9.8       GI/Nutrition:  Exam: abdomen is soft and non-tender, normal active bowel sounds, without masses or organomegaly  Imaging: Available data reviewed  taking PO  Liver Function  Recent Labs      18   0549  18   1124  18   0340   ALTSGPT  7   --    --    --    --    ASTSGOT  17   --    --    --    --    ALKPHOSPHAT  160*   --    --    --    --    TBILIRUBIN  1.3   --    --    --    --    GLUCOSE  123*   < >  136*  135*  113*    < > = values in this interval not displayed.       Heme:  Recent Labs      18   0340   RBC  4.61  4.44  4.20   HEMOGLOBIN  13.1  13.0  11.9*   HEMATOCRIT  45.6  43.2  39.8   PLATELETCT  219  189  153*       Infectious Disease:  Monitored Temp  Av °C (98.6 °F)  Min: 36.6 °C (97.88 °F)  Max: 37.2 °C (98.96 °F)  Temp  Av.9 °C (98.5 °F)  Min: 36.7 °C (98 °F)  Max: 37.1 °C (98.8 °F)  Micro: antibiotics reviewed and cultures reviewed  Recent Labs      18   0340   WBC  8.3   --   8.0  6.7   NEUTSPOLYS  80.70*   --    --   73.60*   LYMPHOCYTES  8.80*   --    --   8.50*   MONOCYTES  8.80   --    --   14.80*   EOSINOPHILS  1.70   --    --   2.20   BASOPHILS  0.00   --    --   0.30   ASTSGOT   --   17   --    --    ALTSGPT   --   7   --    --    ALKPHOSPHAT   --   160*   --    --    TBILIRUBIN   --   1.3   --    --      Current Facility-Administered Medications   Medication Dose Frequency Provider Last Rate Last Dose   • nystatin (MYCOSTATIN) powder   TID Mary Brewer M.D.       • sodium polystyrene (KAYEXALATE) 15 GM/60ML suspension 15 g  15 g Q6HRS PRN Lorraine Napier M.D.       • senna-docusate (PERICOLACE or SENOKOT S) 8.6-50 MG per tablet 2 Tab  2 Tab BID Chalino Darden M.D.   Stopped at 18 0900    And   • polyethylene glycol/lytes (MIRALAX) PACKET 1 Packet  1 Packet QDAY PRN Chalino Darden M.D.         And   • magnesium hydroxide (MILK OF MAGNESIA) suspension 30 mL  30 mL QDAY PRN Chalino Darden M.D.        And   • bisacodyl (DULCOLAX) suppository 10 mg  10 mg QDAY PRN Chalino Darden M.D.       • Respiratory Care per Protocol   Continuous RT Chalino Darden M.D.       • enoxaparin (LOVENOX) inj 40 mg  40 mg DAILY Chalino Darden M.D.   40 mg at 03/22/18 0804   • insulin regular (HUMULIN R) injection 1-6 Units  1-6 Units 4X/DAY PHILOMENA Darden M.D.   Stopped at 03/21/18 2100    And   • glucose 4 g chewable tablet 16 g  16 g Q15 MIN PRN Chalino Darden M.D.        And   • dextrose 50% (D50W) injection 25 mL  25 mL Q15 MIN PRFRED Darden M.D.   Stopped at 03/22/18 0134   • calcium carbonate (OS-ROSE MARY 500) tablet 500 mg  500 mg BID Chalino Darden M.D.   500 mg at 03/22/18 2142   • insulin glargine (LANTUS) injection 15 Units  15 Units Nightly Chalino Darden M.D.   15 Units at 03/22/18 2143   • omeprazole (PRILOSEC) capsule 20 mg  20 mg BID Chalino Darden M.D.   20 mg at 03/22/18 2142   • sucralfate (CARAFATE) tablet 1 g  1 g 4X/DAY PHILOMENA Darden M.D.   1 g at 03/23/18 0622   • HYDROcodone-acetaminophen (NORCO) 7.5-325 MG per tablet 1 Tab  1 Tab Q4HRS PRN Chalino Darden M.D.   1 Tab at 03/23/18 0411   • tiotropium (SPIRIVA) 18 MCG inhalation capsule 1 Cap  1 Cap Q EVENING Chalino Darden M.D.   1 Cap at 03/22/18 2142   • fluticasone (FLONASE) nasal spray 100 mcg  2 Spray DAILY Kisha Cobb M.D.   Stopped at 03/22/18 0900   • cefTRIAXone (ROCEPHIN) syringe 1 g  1 g Q24HRS Chalino Darden M.D.   1 g at 03/22/18 0805   • ezetimibe (ZETIA) tablet 10 mg  10 mg QHS Chalino Darden M.D.   10 mg at 03/22/18 2143    And   • simvastatin (ZOCOR) tablet 40 mg  40 mg Q EVENING Chalino Darden M.D.   40 mg at 03/22/18 2142   • diphenhydrAMINE (BENADRYL) tablet/capsule 25 mg  25 mg HS PRN Chalino Darden M.D.   25 mg at 03/21/18 2028     Last reviewed on 3/21/2018  8:39 PM by Shira Emery,  R.N.    Quality  Measures:  EKG reviewed, Medications reviewed, Labs reviewed and Radiology images reviewed  Jovel catheter: Critically Ill - Requiring Accurate Measurement of Urinary Output      DVT Prophylaxis: Enoxaparin (Lovenox)  DVT prophylaxis - mechanical: SCDs  Ulcer prophylaxis: Yes  Antibiotics: Treating active infection/contamination beyond 24 hours perioperative coverage        Problems/Plan:    Acute on chronic hypoxic respiratory failure.   - 15 lpm oxymask-->HFO2 therapy    - RT/O2 protocols   - start IS/PEP therapy   - cont forced diuresis  Acute on Chronic Severe pulmonary hypertension     - noted ECHO from 2012 with elevated RVSP at 57-73   - new ECHO with RVSP of 85   - cont O2 therapy   - needs work up:  HIV, PFTs, sleep study   - suspect due to primary lung disease and chronic hypoxia   - cont diuresis and O2 therapy   - may need right heart cath, but not likely   - DVT study negative and doubt PE (no hx of dvt/pe, still CTEPH could always be entertained)  Hyperkalemia.   - improving with medical therapies  Acute kidney injury.   - worsened with diuresis   - nephrology following  Acute pulmonary edema.   - improving with force diuresis   - monitor   - s/p one dose bumex  Mild hyperglycemia.   - ISS/lantus  Acute tracheobronchitis   - no obvious infiltrate   - cont C3  History of oxygen-dependent chronic obstructive pulmonary disease.   - spiriva  History of type 2 diabetes.   - ISS/lantus  History of hypertension.   - holding home meds  History of dyslipidemia.   - cont zetia/zocor  History of GERD   - ppi/carafate  Prophylaxis   - lovenox, scds    Pt continues to have unstable pulmonary status due to severe pulmonary hypertension and requiring HFO2 for support.  She remains at high risk of clinical deterioration requiring BiPAP therapy, worsening vital organ dysfunction, and death without the above critical care interventions.    Discussed patient condition and risk of morbidity and/or  mortality with Family, RN, RT, Therapies, Pharmacy, Dietary, , Charge nurse / hot rounds, Patient and cardiology, nephrology and [facility].    The patient remains critically ill.  Critical care time = 31 minutes in directly providing and coordinating critical care and extensive data review.  No time overlap and excludes procedures.

## 2018-03-23 NOTE — PROGRESS NOTES
10:15: Pt complaining of  heavy CP, non-radioing after getting back from commode. RN placed STAT EKG and notified MD Darden.     10:20: EKG arrived. EKG appears unchanged from previous pt EKG.     1045: Pt states CP has resolved.    11:30: MD Darden stated he would place orders for Troponins and communicate with MD Brewer.     Continuing with plan of care.

## 2018-03-23 NOTE — CARE PLAN
Problem: Hyperinflation:  Goal: Prevent or improve atelectasis  Outcome: PROGRESSING AS EXPECTED  Respiratory Therapy Update    Interdisciplinary Plan of Care-Goals (Indications)  Obstructive Ventilatory Defect or Pulmonary Disease without Obvious Obstruction: History / Diagnosis (03/21/18 2223)       #PEP/CPT (Manual) Initial: Initial (03/23/18 1049)     Heated Hi Flow Nasal Cannula (HHFNC): Yes (03/23/18 1645)  FiO2 (HHFNC): 50 (03/23/18 1645)  Flowrate (HHFNC): 40 (03/23/18 1645)    IS: 1000    O2 (FiO2): 50 (03/23/18 1645)  O2 (LPM): 40 (03/23/18 1645)  O2 Daily Delivery Respiratory : Highflow Nasal Cannula (03/23/18 1645)    Breath Sounds  RUL Breath Sounds: Diminished (03/23/18 1645)  RML Breath Sounds: Diminished (03/23/18 1645)  RLL Breath Sounds: Diminished (03/23/18 1645)  ALEA Breath Sounds: Diminished (03/23/18 1645)  LLL Breath Sounds: Diminished (03/23/18 1645)    Events/Summary/Plan: Patient placed on HHFNC and PEP started per Dr Brewer (03/23/18 1049)

## 2018-03-23 NOTE — CARE PLAN
Problem: Respiratory:  Goal: Respiratory status will improve    Intervention: Administer and titrate oxygen therapy  Oxy mask 10-15 Liters.  Shortness of breath noted with exertion.       Problem: Hemodynamic Status  Goal: Vital Signs and Fluid Balance Management    Intervention: Monitor I & O, Manage IV fluids and IV infusions  K trending down, currently 6.3.  Kayexelate as needed for K>6.3.  Nephrology following.  Lasix administered.  Jovel placed for accurate I&Os

## 2018-03-23 NOTE — THERAPY
"Physical Therapy Evaluation completed.   Bed Mobility:  Supine to Sit: Moderate Assist (x2)  Transfers: Sit to Stand: Minimal Assist  Gait: Level Of Assist: Minimal Assist with HHA       Plan of Care: Will benefit from Physical Therapy 3 times per week  Discharge Recommendations: Equipment: Will Continue to Assess for Equipment Needs.     Pt presents with ease of fatigue and is rather self-limiting. She was able to transfer to [facility] with min A with a shuffled gait pattern and wide base of support. Pt would not attempt pericare on herself despite maximal encouragement for independence. She reports she is able to perform at home but when given hygiene cloths, pt would not attempt to clean herself. Upon return to supine from sitting, pt requested assist for B LE onto bed. She was able to initiate task, but once PT provided manual cues, pt ceased initiation leaving PT with full weight of B LE. She requires maximal encouragement and reinforcement for independence. Sp02 remained >96% throughout eval. While here, she will benefit from continued acute skilled PT services to progress mobility pending her participation. DC plan when be based on her progression, although suspect pt can perform more mobility with less assistance than she demonstrated today.     See \"Rehab Therapy-Acute\" Patient Summary Report for complete documentation.     "

## 2018-03-23 NOTE — CARE PLAN
Problem: Communication  Goal: The ability to communicate needs accurately and effectively will improve  Outcome: PROGRESSING AS EXPECTED  Patient demonstrates how to call out appropriately. Call light within reach.     Problem: Safety  Goal: Will remain free from falls  Outcome: PROGRESSING AS EXPECTED  Patient calls out appropriately, call light within reach.

## 2018-03-23 NOTE — PROGRESS NOTES
[facility] [place]     Attending: Radha Meza    Resident: Chalino Darden MD    PATIENT: Siri Solis; [id number]; 1945    ID: 72 y.o. female admitted for new onset CHF, generalized debility, found to have severe hyperkalemia.    SUBJECTIVE: No acute events overnight.  Patient on increased oxygen requirement of 15L by mask.  SOB subjectively worse.  Cough minimal and not worsening.  No chest pain.  UOP reportedly adequate in response to diuresis.  I/o as below.  No vomiting, ab pain.  Loose stools as expected following kayexalate.       OBJECTIVE:     Vitals:    03/23/18 0700 03/23/18 0800 03/23/18 0900 03/23/18 1005   BP:       Pulse: 65 82 70 87   Resp: (!) 21 (!) 43 (!) 37 (!) 49   Temp:       SpO2: 95% 94% 92% (!) 86%   Weight:       Height:             Intake/Output Summary (Last 24 hours) at 03/23/18 1014  Last data filed at 03/23/18 0600   Gross per 24 hour   Intake              290 ml   Output             2450 ml   Net            -2160 ml         PE:  General: No acute distress, afebrile, resting comfortably, conversational dyspnea without desaturations on 15L by mask  HEENT: NC/AT. EOMI. MMM, neck supple without adenopathy or mass  Cardiovascular: RRR, NMGR, cap refill brisk.  Respiratory: CTAB, inspiratory effort greatly improved, no tachypnea or retractions, on 15L  Abdomen: soft, obese, ND, suprapubic TTP, no masses  EXT:  REY, 2+ bilateral symmetric edema stable on interval, no erythema/lesion   Neuro: Alert and oriented, CN III-XI grossly intact, REY, nonfocal    LABS:  Recent Labs      03/21/18   1210  03/21/18   2156  03/23/18   0340   WBC  8.3  8.0  6.7   RBC  4.61  4.44  4.20   HEMOGLOBIN  13.1  13.0  11.9*   HEMATOCRIT  45.6  43.2  39.8   MCV  98.9*  97.3  94.8   MCH  28.4  29.3  28.3   RDW  80.9*  78.3*  76.7*   PLATELETCT  219  189  153*   MPV  10.8  11.2  9.9   NEUTSPOLYS  80.70*   --   73.60*   LYMPHOCYTES  8.80*   --   8.50*   MONOCYTES  8.80   --   14.80*    EOSINOPHILS  1.70   --   2.20   BASOPHILS  0.00   --   0.30   RBCMORPHOLO  Present   --    --      Recent Labs      03/21/18   1954   03/22/18   0549  03/22/18   1124  03/22/18   1550  03/22/18   2205  03/23/18   0340   SODIUM  136   < >  135  136   --    --   137   POTASSIUM  7.6*   < >  6.8*  6.8*  6.3*  6.3*  6.0*   CHLORIDE  101   < >  101  101   --    --   103   CO2  26   < >  25  24   --    --   29   BUN  76*   < >  75*  73*   --    --   72*   CREATININE  2.32*   < >  2.45*  2.18*   --    --   2.32*   CALCIUM  9.9   < >  9.8  9.9   --    --   9.8   ALBUMIN  4.2   --    --    --    --    --    --     < > = values in this interval not displayed.     Estimated GFR/CRCL = Estimated Creatinine Clearance: 30.5 mL/min (by C-G formula based on SCr of 2.32 mg/dL (H)).  Recent Labs      03/22/18   0549   03/22/18   1124   03/22/18   1740  03/22/18   2037  03/23/18   0340  03/23/18   0610   GLUCOSE  136*   --   135*   --    --    --   113*   --    POCGLUCOSE   --    < >   --    < >  84  106*   --   109*    < > = values in this interval not displayed.     Recent Labs      03/21/18 1954   ASTSGOT  17   ALTSGPT  7   TBILIRUBIN  1.3   ALKPHOSPHAT  160*   GLOBULIN  2.6     Recent Labs      03/21/18   1210   BNPBTYPENAT  958*     Recent Labs      03/22/18   1201   ZGIVW24G  7.34*   [id number]  45.7*   CGXBS360D  74.9   FILZ5RNO  93.6   ARTHCO3  24   ARTBE  -2     No results for input(s): INR, APTT, FIBRINOGEN in the last 72 hours.    Invalid input(s): DIMER    MICROBIOLOGY:   Ucx- GNRs    IMAGING:   Dx-chest-limited (1 View)    Result Date: 3/21/2018  3/21/2018 12:23 PM 1.  Bibasilar atelectasis/consolidation. 2.  Cardiomegaly. 3.  Chronic elevation of the right hemidiaphragm.    Echocardiogram Comp W/o Cont    Result Date: 3/22/2018  LV EF:  60    % FINDINGS Left Ventricle Normal left ventricular chamber size. Normal left ventricular wall thickness. Grossly normal left ventricular systolic function. Left ventricular  "ejection fraction is visually estimated to be 60%. Flattened septum in diastole (\"D\"-shaped left ventricle) consistent with RV volume overload. A reliable estimation of diastolic function cannot be made due to conflicting data. Right Ventricle Severely dilated right ventricle. Normal right ventricular systolic function. Right Atrium Severely enlarged right atrium. Left Atrium Mildly dilated left atrium. Left atrial volume index is 40  mL/sq m. Mitral Valve Mitral annular calcification. No mitral stenosis. Trace mitral regurgitation. Aortic Valve Aortic sclerosis without stenosis.  No aortic insufficiency. Tricuspid Valve Structurally normal tricuspid valve. Severe tricuspid regurgitation. Right ventricular systolic pressure is estimated to be 85 mmHg. Pulmonic Valve The pulmonic valve is not well visualized. No pulmonic stenosis. Mild pulmonic insufficiency. Pulmonic artery diastolic pressure is 19  mmHg. Pericardium Normal pericardium without effusion. Aorta Normal aortic root for body surface area. Lyle Ghosh M.D. (Electronically Signed) Final Date:     22 March 2018                 12:52    Le Art Duplx/imag (specify In Comments Left, Right Or Bilateral)    Result Date: 3/21/2018    FINDINGS  Left.  Triphasic flow throughout the common femoral, profunda femoral, superficial  femoral, and popliteal arteries with no evidence of significant stenosis.  Three vessel runoff to the ankle with biphasic flow.  The proximal peroneal artery is not well visualized.  Alexander Ernst  (Electronically Signed)  Final Date:      21 March 2018                   20:33      MEDS:  Current Facility-Administered Medications   Medication Last Dose   • Respiratory Care per Protocol     • nystatin (MYCOSTATIN) powder     • sodium polystyrene (KAYEXALATE) 15 GM/60ML suspension 15 g     • senna-docusate (PERICOLACE or SENOKOT S) 8.6-50 MG per tablet 2 Tab Stopped at 03/22/18 0900    And   • polyethylene glycol/lytes (MIRALAX) PACKET 1 " Packet      And   • magnesium hydroxide (MILK OF MAGNESIA) suspension 30 mL      And   • bisacodyl (DULCOLAX) suppository 10 mg     • enoxaparin (LOVENOX) inj 40 mg 40 mg at 03/23/18 0832   • insulin regular (HUMULIN R) injection 1-6 Units Stopped at 03/21/18 2100    And   • glucose 4 g chewable tablet 16 g      And   • dextrose 50% (D50W) injection 25 mL Stopped at 03/22/18 0134   • calcium carbonate (OS-ROSE MARY 500) tablet 500 mg 500 mg at 03/23/18 0832   • insulin glargine (LANTUS) injection 15 Units 15 Units at 03/22/18 2143   • omeprazole (PRILOSEC) capsule 20 mg 20 mg at 03/23/18 0832   • sucralfate (CARAFATE) tablet 1 g 1 g at 03/23/18 0622   • HYDROcodone-acetaminophen (NORCO) 7.5-325 MG per tablet 1 Tab 1 Tab at 03/23/18 0411   • tiotropium (SPIRIVA) 18 MCG inhalation capsule 1 Cap 1 Cap at 03/22/18 2142   • fluticasone (FLONASE) nasal spray 100 mcg 100 mcg at 03/23/18 0832   • cefTRIAXone (ROCEPHIN) syringe 1 g 1 g at 03/23/18 0956   • ezetimibe (ZETIA) tablet 10 mg 10 mg at 03/22/18 2143    And   • simvastatin (ZOCOR) tablet 40 mg 40 mg at 03/22/18 2142   • diphenhydrAMINE (BENADRYL) tablet/capsule 25 mg 25 mg at 03/21/18 2028       ASSESSMENT/PLAN:  71 yo female with a history of COPD (on 4L), DM II, HTN, opioid dependent chronic pain, benzo dependent insomnia presenting with new onset CHF,  generalized debility, UTI, decreased LLE pulses, and severe hyperkalemia.    Neuro/psych:    Alert and oriented.    # Insomnia  -Will hold benzos at this time  -Benadryl prn    Cardiovascular:    # CHF-  BNP of 958 with consistent clinical findings on admission.  Not initially with oxygen demand beyond baseline. Patient on pioglitazone.   Patient with some lower BPs and pulse, on chronic BB therapy with atenolol.  This did not improve with holding atenolol.  Echo indicating preserved ef, severely dilated right hear with pulmonary pressure in 80s.  -Will continue diuresis with lasix per intensivist  -Hold home  atenolol  -Holding home lisinopril as hyperkalemic  -Risk stratifying for PE with leg duplex: results indicate that the study was insufficient in its ability to visualize veins.    # 1st degree av block- Resolved with tx of hyperkalemia.    # HTN-  BP down to 90s systolic.  -Holding home meds  -Dialyzing      Pulm:    # Acute on chronic lung disease- CHF as above.  Hx of COPD.  Inspiratory effort greatly improved this am.  Continues with progressively increasing oxygen demand, currently on 15L.   -Continue diuresis  -Continue home inhalers  -O2 as needed  -S/p rocephin/azithro in ED    GI:    # Loose stools- As expected with kayexalate.      Renal:     # Hyperkalemia-  Patient responding appropriately to kayexalate and lasix.  6 on most recent lab.  Kayexalate scheduled.  -Continue kayexalate and lasix per ICU/nephro    # FLAVIA- Baseline cr less than 1.  Currently 2.32, stable on interval.  Dr. Napier consulting.    -Diuresis per nephro and intensive care  -Continue to monitor      ID:    Received C3 and azithro in ED.  No abx since.  SIRS 2/4 for breathing and HR.  Afebrile.  SIRS likely not inflammatory given comorbidities, but UTI is present.    # UTI- Afebrile, no cva tenderness.  Uncomplicated.  Based on exam/sx and ua.  Cx currently with e coli pending sensitivity.  -Ceftriaxone day 3  -Fu culture    Heme: No concerns      Endocrine:    # DM II- Apparently well controlled, A1c 7.0 6/17.  Adequate control without lows on current regimen.  -Continue 15U glargine HS which is half home dose and ISS  -Holding metformin for FLAVIA  -Hold pioglitazone      MS:     # Leg pain- Chronic.  US findings not compatible with claudication.  Pain likely associated with edema.    # Debility- Likely 2/2 CHF, UTI.  TSH with 4.3 11/17.  H/H wnl.  -PT/OT    # Chronic pain  -Continue norco      FENppx:  - diabetic diet   - Hyperkalemia as above  - po fluids  - lovenox     Dispo: ICU  Code: DNR

## 2018-03-23 NOTE — CARE PLAN
Problem: Mobility  Goal: Risk for activity intolerance will decrease    Intervention: Assess and monitor signs of activity intolerance  Rn encouraging pt to perform ambulation BID. Pt only able to tolerate getting up to commode and back to bed, moderate dyspnea with excursion.        Problem: Respiratory:  Goal: Respiratory status will improve    Intervention: Assess and monitor pulmonary status  Pt currently on high flow O2, RT increased to 40 L at 50 %. IS at bedside, education provided for IS use, coughing and deep breathing excerises. IS at 1000, RN encouraging pt to use 10/hr.

## 2018-03-23 NOTE — THERAPY
"Occupational Therapy Evaluation completed.   Functional Status:  Mod A with ADLs and txfs  Plan of Care: Will benefit from Occupational Therapy 3 times per week  Discharge Recommendations:  Equipment: Will Continue to Assess for Equipment Needs. Post-acute therapy Discharge to a transitional care facility for continued therapy services.    See \"Rehab Therapy-Acute\" Patient Summary Report for complete documentation.    "

## 2018-03-24 ENCOUNTER — APPOINTMENT (OUTPATIENT)
Dept: RADIOLOGY | Facility: MEDICAL CENTER | Age: 73
DRG: 291 | End: 2018-03-24
Attending: INTERNAL MEDICINE
Payer: COMMERCIAL

## 2018-03-24 ENCOUNTER — APPOINTMENT (OUTPATIENT)
Dept: RADIOLOGY | Facility: MEDICAL CENTER | Age: 73
DRG: 291 | End: 2018-03-24
Attending: EMERGENCY MEDICINE
Payer: COMMERCIAL

## 2018-03-24 LAB
ALBUMIN SERPL BCP-MCNC: 3.8 G/DL (ref 3.2–4.9)
ALBUMIN/GLOB SERPL: 1.7 G/DL
ALP SERPL-CCNC: 117 U/L (ref 30–99)
ALT SERPL-CCNC: 6 U/L (ref 2–50)
ANION GAP SERPL CALC-SCNC: 8 MMOL/L (ref 0–11.9)
AST SERPL-CCNC: 13 U/L (ref 12–45)
BASOPHILS # BLD AUTO: 0.2 % (ref 0–1.8)
BASOPHILS # BLD: 0.01 K/UL (ref 0–0.12)
BILIRUB SERPL-MCNC: 1.2 MG/DL (ref 0.1–1.5)
BUN SERPL-MCNC: 58 MG/DL (ref 8–22)
CALCIUM SERPL-MCNC: 9.5 MG/DL (ref 8.5–10.5)
CHLORIDE SERPL-SCNC: 101 MMOL/L (ref 96–112)
CO2 SERPL-SCNC: 30 MMOL/L (ref 20–33)
CREAT SERPL-MCNC: 1.89 MG/DL (ref 0.5–1.4)
EOSINOPHIL # BLD AUTO: 0.22 K/UL (ref 0–0.51)
EOSINOPHIL NFR BLD: 3.4 % (ref 0–6.9)
ERYTHROCYTE [DISTWIDTH] IN BLOOD BY AUTOMATED COUNT: 73.9 FL (ref 35.9–50)
GLOBULIN SER CALC-MCNC: 2.2 G/DL (ref 1.9–3.5)
GLUCOSE BLD-MCNC: 128 MG/DL (ref 65–99)
GLUCOSE BLD-MCNC: 148 MG/DL (ref 65–99)
GLUCOSE BLD-MCNC: 165 MG/DL (ref 65–99)
GLUCOSE BLD-MCNC: 97 MG/DL (ref 65–99)
GLUCOSE SERPL-MCNC: 129 MG/DL (ref 65–99)
HCT VFR BLD AUTO: 38.2 % (ref 37–47)
HGB BLD-MCNC: 11.8 G/DL (ref 12–16)
IMM GRANULOCYTES # BLD AUTO: 0.02 K/UL (ref 0–0.11)
IMM GRANULOCYTES NFR BLD AUTO: 0.3 % (ref 0–0.9)
LYMPHOCYTES # BLD AUTO: 0.49 K/UL (ref 1–4.8)
LYMPHOCYTES NFR BLD: 7.6 % (ref 22–41)
MCH RBC QN AUTO: 28.9 PG (ref 27–33)
MCHC RBC AUTO-ENTMCNC: 30.9 G/DL (ref 33.6–35)
MCV RBC AUTO: 93.6 FL (ref 81.4–97.8)
MONOCYTES # BLD AUTO: 0.89 K/UL (ref 0–0.85)
MONOCYTES NFR BLD AUTO: 13.8 % (ref 0–13.4)
MORPHOLOGY BLD-IMP: NORMAL
NEUTROPHILS # BLD AUTO: 4.84 K/UL (ref 2–7.15)
NEUTROPHILS NFR BLD: 74.7 % (ref 44–72)
NRBC # BLD AUTO: 0 K/UL
NRBC BLD-RTO: 0 /100 WBC
PLATELET # BLD AUTO: 142 K/UL (ref 164–446)
PMV BLD AUTO: 10.7 FL (ref 9–12.9)
POTASSIUM SERPL-SCNC: 4.5 MMOL/L (ref 3.6–5.5)
PROT SERPL-MCNC: 6 G/DL (ref 6–8.2)
RBC # BLD AUTO: 4.08 M/UL (ref 4.2–5.4)
SODIUM SERPL-SCNC: 139 MMOL/L (ref 135–145)
WBC # BLD AUTO: 6.5 K/UL (ref 4.8–10.8)

## 2018-03-24 PROCEDURE — 71045 X-RAY EXAM CHEST 1 VIEW: CPT

## 2018-03-24 PROCEDURE — 80053 COMPREHEN METABOLIC PANEL: CPT

## 2018-03-24 PROCEDURE — 76775 US EXAM ABDO BACK WALL LIM: CPT

## 2018-03-24 PROCEDURE — 94668 MNPJ CHEST WALL SBSQ: CPT

## 2018-03-24 PROCEDURE — 85025 COMPLETE CBC W/AUTO DIFF WBC: CPT

## 2018-03-24 PROCEDURE — 82962 GLUCOSE BLOOD TEST: CPT | Mod: 91

## 2018-03-24 PROCEDURE — 94002 VENT MGMT INPAT INIT DAY: CPT

## 2018-03-24 PROCEDURE — 700111 HCHG RX REV CODE 636 W/ 250 OVERRIDE (IP): Performed by: INTERNAL MEDICINE

## 2018-03-24 PROCEDURE — A9270 NON-COVERED ITEM OR SERVICE: HCPCS | Performed by: EMERGENCY MEDICINE

## 2018-03-24 PROCEDURE — 700102 HCHG RX REV CODE 250 W/ 637 OVERRIDE(OP): Performed by: EMERGENCY MEDICINE

## 2018-03-24 PROCEDURE — 770022 HCHG ROOM/CARE - ICU (200)

## 2018-03-24 PROCEDURE — 94640 AIRWAY INHALATION TREATMENT: CPT

## 2018-03-24 PROCEDURE — 700111 HCHG RX REV CODE 636 W/ 250 OVERRIDE (IP): Performed by: EMERGENCY MEDICINE

## 2018-03-24 RX ORDER — FUROSEMIDE 10 MG/ML
40 INJECTION INTRAMUSCULAR; INTRAVENOUS 3 TIMES DAILY
Status: COMPLETED | OUTPATIENT
Start: 2018-03-24 | End: 2018-03-25

## 2018-03-24 RX ADMIN — SUCRALFATE 1 G: 1 TABLET ORAL at 05:45

## 2018-03-24 RX ADMIN — SIMVASTATIN 40 MG: 40 TABLET, FILM COATED ORAL at 21:27

## 2018-03-24 RX ADMIN — HYDROCODONE BITARTRATE AND ACETAMINOPHEN 1 TABLET: 7.5; 325 TABLET ORAL at 21:43

## 2018-03-24 RX ADMIN — FUROSEMIDE 40 MG: 10 INJECTION, SOLUTION INTRAMUSCULAR; INTRAVENOUS at 21:27

## 2018-03-24 RX ADMIN — NYSTATIN: 100000 POWDER TOPICAL at 08:31

## 2018-03-24 RX ADMIN — SUCRALFATE 1 G: 1 TABLET ORAL at 21:27

## 2018-03-24 RX ADMIN — Medication 500 MG: at 21:27

## 2018-03-24 RX ADMIN — ENOXAPARIN SODIUM 40 MG: 100 INJECTION SUBCUTANEOUS at 08:31

## 2018-03-24 RX ADMIN — INSULIN GLARGINE 15 UNITS: 100 INJECTION, SOLUTION SUBCUTANEOUS at 21:27

## 2018-03-24 RX ADMIN — CEFTRIAXONE SODIUM 1 G: 10 INJECTION, POWDER, FOR SOLUTION INTRAVENOUS at 08:31

## 2018-03-24 RX ADMIN — TIOTROPIUM BROMIDE 1 CAPSULE: 18 CAPSULE ORAL; RESPIRATORY (INHALATION) at 21:29

## 2018-03-24 RX ADMIN — EZETIMIBE 10 MG: 10 TABLET ORAL at 21:27

## 2018-03-24 RX ADMIN — NYSTATIN: 100000 POWDER TOPICAL at 14:20

## 2018-03-24 RX ADMIN — SUCRALFATE 1 G: 1 TABLET ORAL at 12:11

## 2018-03-24 RX ADMIN — SUCRALFATE 1 G: 1 TABLET ORAL at 17:48

## 2018-03-24 RX ADMIN — FUROSEMIDE 40 MG: 10 INJECTION, SOLUTION INTRAMUSCULAR; INTRAVENOUS at 14:20

## 2018-03-24 RX ADMIN — NYSTATIN: 100000 POWDER TOPICAL at 21:27

## 2018-03-24 RX ADMIN — OMEPRAZOLE 20 MG: 20 CAPSULE, DELAYED RELEASE ORAL at 08:32

## 2018-03-24 RX ADMIN — FUROSEMIDE 40 MG: 10 INJECTION, SOLUTION INTRAMUSCULAR; INTRAVENOUS at 08:32

## 2018-03-24 RX ADMIN — INSULIN HUMAN 1 UNITS: 100 INJECTION, SOLUTION PARENTERAL at 12:12

## 2018-03-24 RX ADMIN — OMEPRAZOLE 20 MG: 20 CAPSULE, DELAYED RELEASE ORAL at 21:27

## 2018-03-24 RX ADMIN — Medication 500 MG: at 08:31

## 2018-03-24 ASSESSMENT — PAIN SCALES - GENERAL
PAINLEVEL_OUTOF10: 0
PAINLEVEL_OUTOF10: 4
PAINLEVEL_OUTOF10: 0
PAINLEVEL_OUTOF10: 6
PAINLEVEL_OUTOF10: 0

## 2018-03-24 ASSESSMENT — ENCOUNTER SYMPTOMS
MYALGIAS: 0
HEMOPTYSIS: 0
FALLS: 0
PALPITATIONS: 0
MEMORY LOSS: 0
COUGH: 1
EYES NEGATIVE: 1
ORTHOPNEA: 1
DIZZINESS: 0
HEADACHES: 0
WHEEZING: 0
DOUBLE VISION: 0
SHORTNESS OF BREATH: 1
SENSORY CHANGE: 0
BACK PAIN: 1
BRUISES/BLEEDS EASILY: 0
DEPRESSION: 0
CHILLS: 0
COUGH: 0
NAUSEA: 0
FLANK PAIN: 0
BLURRED VISION: 0
FEVER: 0
VOMITING: 0
DIAPHORESIS: 0
ABDOMINAL PAIN: 0

## 2018-03-24 ASSESSMENT — PATIENT HEALTH QUESTIONNAIRE - PHQ9
1. LITTLE INTEREST OR PLEASURE IN DOING THINGS: NOT AT ALL
2. FEELING DOWN, DEPRESSED, IRRITABLE, OR HOPELESS: NOT AT ALL
SUM OF ALL RESPONSES TO PHQ9 QUESTIONS 1 AND 2: 0
1. LITTLE INTEREST OR PLEASURE IN DOING THINGS: NOT AT ALL
2. FEELING DOWN, DEPRESSED, IRRITABLE, OR HOPELESS: NOT AT ALL
SUM OF ALL RESPONSES TO PHQ9 QUESTIONS 1 AND 2: 0

## 2018-03-24 ASSESSMENT — LIFESTYLE VARIABLES: DO YOU DRINK ALCOHOL: NO

## 2018-03-24 ASSESSMENT — PULMONARY FUNCTION TESTS: EPAP_CMH2O: 5

## 2018-03-24 NOTE — PROGRESS NOTES
Hospital Medicine Progress Note, Adult, Complex               Author: Lorraine Quyen Date & Time created: 3/24/2018  1:16 PM     Interval History:  73 y/o female with CKD III admitted with SOB, edema, found to be in FLAVIA, hyperkalemic  Doing better, less SOB  C/o fatigue  Creat at 2.3  Hyperkalemia corrected to 4.5  Review of Systems:  Review of Systems   Constitutional: Positive for malaise/fatigue. Negative for chills and fever.   HENT: Negative.    Eyes: Negative.    Respiratory: Positive for cough and shortness of breath. Negative for hemoptysis and wheezing.    Cardiovascular: Positive for orthopnea and leg swelling. Negative for chest pain and palpitations.   Gastrointestinal: Negative for abdominal pain, nausea and vomiting.   Genitourinary: Negative for dysuria, flank pain, frequency, hematuria and urgency.   Musculoskeletal: Positive for back pain. Negative for joint pain and myalgias.   Skin: Negative.    All other systems reviewed and are negative.      Physical Exam:  Physical Exam   Constitutional: She is oriented to person, place, and time. She appears well-developed and well-nourished. No distress.   HENT:   Head: Normocephalic and atraumatic.   Nose: Nose normal.   Mouth/Throat: Oropharynx is clear and moist.   Eyes: Conjunctivae and EOM are normal. Pupils are equal, round, and reactive to light.   Neck: Normal range of motion. Neck supple. No thyromegaly present.   Cardiovascular: Normal rate and regular rhythm.  Exam reveals no gallop and no friction rub.    Pulmonary/Chest: Effort normal. No respiratory distress. She has no wheezes. She has rales.   Abdominal: Soft. Bowel sounds are normal. She exhibits no distension. There is no tenderness.   Musculoskeletal: She exhibits edema.   Lymphadenopathy:     She has no cervical adenopathy.   Neurological: She is alert and oriented to person, place, and time. No cranial nerve deficit.   Skin: Skin is warm. No rash noted. No erythema.   Nursing note and  vitals reviewed.      Labs:  Recent Labs      18   1201   [id number]  7.34*   [id number]  45.7*   [id number]  74.9   [id number]  93.6   ARTHCO3  24   ARTBE  -2     Recent Labs      18   1230  18   1605   CPKTOTAL  33   --    TROPONINI  0.27*  0.35*     Recent Labs      18   1124   18   03418   16018   0403   SODIUM  136   --   137   --    --    --   139   POTASSIUM  6.8*   < >  6.0*   < >  5.4  4.8  4.5   CHLORIDE  101   --   103   --    --    --   101   CO2  24   --   29   --    --    --   30   BUN  73*   --   72*   --    --    --   58*   CREATININE  2.18*   --   2.32*   --    --    --   1.89*   CALCIUM  9.9   --   9.8   --    --    --   9.5    < > = values in this interval not displayed.     Recent Labs      18   0403   ALTSGPT  7   --    --    --   6   ASTSGOT  17   --    --    --   13   ALKPHOSPHAT  160*   --    --    --   117*   TBILIRUBIN  1.3   --    --    --   1.2   GLUCOSE  123*   < >  135*  113*  129*    < > = values in this interval not displayed.     Recent Labs      18   0403   RBC  4.44  4.20  4.08*   HEMOGLOBIN  13.0  11.9*  11.8*   HEMATOCRIT  43.2  39.8  38.2   PLATELETCT  189  153*  142*     Recent Labs      18   0403   WBC   --   8.0  6.7  6.5   NEUTSPOLYS   --    --   73.60*  74.70*   LYMPHOCYTES   --    --   8.50*  7.60*   MONOCYTES   --    --   14.80*  13.80*   EOSINOPHILS   --    --   2.20  3.40   BASOPHILS   --    --   0.30  0.20   ASTSGOT  17   --    --   13   ALTSGPT  7   --    --   6   ALKPHOSPHAT  160*   --    --   117*   TBILIRUBIN  1.3   --    --   1.2           Hemodynamics:  No data recorded.  Monitored Temp: 37.3 °C (99.1 °F)  Pulse  Av.1  Min: 58  Max: 100Heart Rate (Monitored): 100  NIBP: 108/74     Respiratory:    Respiration: (!) 78, Pulse Oximetry: 93 %, O2  Daily Delivery Respiratory : Highflow Nasal Cannula     PEP/CPT Method: Positive Airway Pressure Device, Work Of Breathing / Effort: Moderate  RUL Breath Sounds: Clear, RML Breath Sounds: Clear, RLL Breath Sounds: Diminished, ALEA Breath Sounds: Clear, LLL Breath Sounds: Diminished  Fluids:    Intake/Output Summary (Last 24 hours) at 03/24/18 1316  Last data filed at 03/24/18 1200   Gross per 24 hour   Intake             1860 ml   Output             5015 ml   Net            -3155 ml       GI/Nutrition:  Orders Placed This Encounter   Procedures   • Diet Order     Standing Status:   Standing     Number of Occurrences:   1     Order Specific Question:   Diet:     Answer:   Diabetic [3]     Order Specific Question:   Electrolyte modifications:     Answer:   Low Potassium [3]     Medical Decision Making, by Problem:  Active Hospital Problems    Diagnosis   • COPD (chronic obstructive pulmonary disease) (CMS-HCC) [J44.9]   • Hyperkalemia [E87.5]   • Renal insufficiency [N28.9]   • Pulmonary HTN [I27.20]       Quality-Core Measures   Reviewed items::  Labs reviewed, Medications reviewed and Radiology images reviewed    Assessment and plan  1.FLAVIA/CKD III -improving  -to monitor  2.HTN: BP well controlled  3.Electrolytes: hyperkalemia corrected  4.Anemia: Hb WNL  5.Volume:on lasix -good UOP    Recs; daily BMP,              Continue current treatment

## 2018-03-24 NOTE — PROGRESS NOTES
Cardiology Progress Note               Author: Keron To Date & Time created: 3/24/2018  1:48 PM     Interval History:  No acute events overnight.  No telemetry events.    Chief Complaint:  Pulmonary HTN.  Dyspnea.    Negative 2.9 liters over the last 24 hours.  Negative 5.2 liters during hospital stay.      Review of Systems   Constitutional: Negative for diaphoresis and fever.   HENT: Negative for nosebleeds.    Eyes: Negative for blurred vision and double vision.   Respiratory: Positive for shortness of breath. Negative for cough.    Cardiovascular: Negative for chest pain and palpitations.   Gastrointestinal: Negative for abdominal pain.   Genitourinary: Negative for dysuria and frequency.   Musculoskeletal: Negative for falls and myalgias.   Skin: Negative for rash.   Neurological: Negative for dizziness, sensory change and headaches.   Endo/Heme/Allergies: Does not bruise/bleed easily.   Psychiatric/Behavioral: Negative for depression and memory loss.       Physical Exam   Constitutional: She is oriented to person, place, and time. No distress.   HENT:   Head: Normocephalic and atraumatic.   Eyes: EOM are normal.   Neck: No JVD present.   Cardiovascular: Normal rate, regular rhythm, normal heart sounds and intact distal pulses.  Exam reveals no gallop and no friction rub.    No murmur heard.  Pulmonary/Chest: No respiratory distress.   Distant breath sounds due to copd     Abdominal: She exhibits no distension. There is no tenderness. There is no rebound and no guarding.   Musculoskeletal: She exhibits edema. She exhibits no tenderness.   Lymphadenopathy:     She has no cervical adenopathy.   Neurological: She is alert and oriented to person, place, and time.   Skin: Skin is dry.   Psychiatric: She has a normal mood and affect.   Nursing note and vitals reviewed.      Hemodynamics:  No data recorded.  Monitored Temp: 37.3 °C (99.1 °F)  Pulse  Av.1  Min: 58  Max: 100Heart Rate (Monitored):  100  NIBP: 108/74     Respiratory:    Respiration: (!) 78, Pulse Oximetry: 93 %, O2 Daily Delivery Respiratory : Highflow Nasal Cannula     PEP/CPT Method: Positive Airway Pressure Device, Work Of Breathing / Effort: Moderate  RUL Breath Sounds: Clear, RML Breath Sounds: Clear, RLL Breath Sounds: Diminished, ALEA Breath Sounds: Clear, LLL Breath Sounds: Diminished  Fluids:  Date 03/24/18 0700 - 03/25/18 0659   Shift 7592-8758 4718-7830 2607-5603 24 Hour Total   I  N  T  A  K  E   P.O. 600   600    Shift Total 600   600   O  U  T  P  U  T   Urine 1115   1115    Shift Total 1115   1115   Weight (kg) 127.8 127.8 127.8 127.8         GI/Nutrition:  Orders Placed This Encounter   Procedures   • Diet Order     Standing Status:   Standing     Number of Occurrences:   1     Order Specific Question:   Diet:     Answer:   Diabetic [3]     Order Specific Question:   Electrolyte modifications:     Answer:   Low Potassium [3]     Lab Results:  Recent Labs      03/21/18   2156  03/23/18   0340  03/24/18   0403   WBC  8.0  6.7  6.5   RBC  4.44  4.20  4.08*   HEMOGLOBIN  13.0  11.9*  11.8*   HEMATOCRIT  43.2  39.8  38.2   MCV  97.3  94.8  93.6   MCH  29.3  28.3  28.9   MCHC  30.1*  29.9*  30.9*   RDW  78.3*  76.7*  73.9*   PLATELETCT  189  153*  142*   MPV  11.2  9.9  10.7     Recent Labs      03/22/18   1124   03/23/18   0340   03/23/18   1605  03/23/18   2238  03/24/18   0403   SODIUM  136   --   137   --    --    --   139   POTASSIUM  6.8*   < >  6.0*   < >  5.4  4.8  4.5   CHLORIDE  101   --   103   --    --    --   101   CO2  24   --   29   --    --    --   30   GLUCOSE  135*   --   113*   --    --    --   129*   BUN  73*   --   72*   --    --    --   58*   CREATININE  2.18*   --   2.32*   --    --    --   1.89*   CALCIUM  9.9   --   9.8   --    --    --   9.5    < > = values in this interval not displayed.             Recent Labs      03/23/18   1230  03/23/18   1605   CPKTOTAL  33   --    TROPONINI  0.27*  0.35*              Medical Decision Making, by Problem:  Active Hospital Problems    Diagnosis   • COPD (chronic obstructive pulmonary disease) (CMS-HCC) [J44.9]   • Hyperkalemia [E87.5]   • Renal insufficiency [N28.9]   • Pulmonary HTN [I27.20]       Plan:    Continue Diuresis 40 mg IV TID.  Creatinine improved.  Optimize electrolytes to keep Mg above 2.5 and Potassium above 4.5    Thank you for referring this patient to our cardiology service.  We will follow patient with you.    Keron Nunez MD.  [facility].      Quality-Core Measures

## 2018-03-24 NOTE — CARE PLAN
Problem: Hyperinflation:  Goal: Prevent or improve atelectasis    Intervention: Instruct incentive spirometry usage  Pep and IS  HHFNC

## 2018-03-24 NOTE — CARE PLAN
Problem: Safety  Goal: Will remain free from injury  Outcome: PROGRESSING AS EXPECTED      Problem: Infection  Goal: Will remain free from infection  Outcome: PROGRESSING AS EXPECTED      Problem: Bowel/Gastric:  Goal: Normal bowel function is maintained or improved  Outcome: PROGRESSING AS EXPECTED

## 2018-03-24 NOTE — PROGRESS NOTES
Pulmonary Critical Care Progress Note        Date of Service:  3/24/2018  Chief Complaint: worsening generalized weakness    History of Present Illness: 72 y.o. female with the past medical history of O2 dependent COPD, type-II DM, HTN and chronic pain syndrome was admitted for acute on chronic hypoxic respiratory failure after having worsening generalized fatigue/weakness and shortness of breath for the past month.  She also had associated leg swelling with redness and productive cough.  Her O2 requirements continued to worsen after admission and was transferred to the ICU for this and now on HFO2 therapy for comfort reasons.  Her ECHO showed acute on chronic severe pulmonary hypertension.    ROS:  Respiratory: positive cough, negative hemoptysis, positive shortness of breath, positive sputum production and negative wheezing, Cardiac: positive chest pain, negative palpitations, positive orthopnea, negative claudication, positive leg swelling and positive PND, GI: negative nausea, negative vomiting, negative abdominal pain, negative diarrhea, negative constipation and negative blood in stool.  No change to ROS today    Interval Events:  24 hour interval history reviewed    - no issues overnight   - a/ox4   - lethargic this morning   - SR 70-80s   - -130s   - Tmax 100   - UOP of 2500cc overnight with sheets   - edge of bed   - K at 4.6 this morning   - HFO2 at 50 lpm and 50%   - CXR(reviewed): right elevated hemidiaphragm, increased opacities to left base   - BUN/creat improving   - day #4 of ceftriaxone       Yesterday's Events:   - no issues overnight   - sheets placed   - K+ trending to 6.0 from 6.8   - SR 60-70s   - SBP 90-120s   - no fluids   - afebrile   - UOP of 1500cc with sheets   - O2 15 lpm oxymask    PFSH:  No change.  Gen: seems depressed with flat affect, clear speech, no respiratory distress, older than stated age, chronically ill in appearance  Skin: warm/dry, erythema to pannus    Respiratory:    "  Pulse Oximetry: 92 %    Exam: diminished right base, faint crackles left base, breathing comfortably on high flow  ImagingAvailable data reviewed   Recent Labs      03/22/18   1201   [id number]  7.34*   [id number]  45.7*   [id number]  74.9   [id number]  93.6   ARTHCO3  24   ARTBE  -2       HemoDynamics:  Pulse: 82, Heart Rate (Monitored): 78  NIBP: 122/58       Exam: RRR, no murmur, good cap refill, 2+ dpp=, 1+ pedal edema, decreased warmth/erythema to lower extremities  Imaging: Available data reviewed   ECHO 3/22:  CONCLUSIONS  Left ventricular ejection fraction is visually estimated to be 60%.  Flattened septum in diastole (\"D\"-shaped left ventricle) consistent   with RV volume overload.  Severely dilated right ventricle.  Severely enlarged right atrium.  Mildly dilated left atrium.  Right ventricular systolic pressure is estimated to be 85 mmHg.  Severe tricuspid regurgitation.  Calcified aortic valve leaflets.  Aortic sclerosis without stenosis.  Trace mitral regurgitation.  Recent Labs      03/21/18   1210  03/23/18   1230  03/23/18   1605   CPKTOTAL   --   33   --    TROPONINI   --   0.27*  0.35*   BNPBTYPENAT  958*   --    --        Neuro:  GCS Total Aleida Coma Score: 15       Exam: flat affect, clear speech, answering appropriately, symmetrical facial expressions, no focal deficits, weak motor strength, sensation intact  Imaging: Available data reviewed    Fluids:  Intake/Output       03/22/18 0700 - 03/23/18 0659 03/23/18 0700 - 03/24/18 0659 03/24/18 0700 - 03/25/18 0659      2921-4819 7594-7922 Total 8354-5715 3714-3338 Total 3938-3509 2285-0832 Total       Intake    P.O.  290  -- 290  1780  100 1880  --  -- --    P.O. 290 -- 290 1763 049 3148 -- -- --    Total Intake 290 -- 290 7330 371 9605 -- -- --       Output    Urine  850  1500 2350  2500  600 3100  --  -- --    Number of Times Incontinent of Urine 1 x -- 1 x -- -- -- -- -- --    Indwelling Cathether 350 1500 1850 2500 600 3100 -- -- --    Void (ml) " 500 -- 500 -- -- -- -- -- --    Stool/Urine  --  100 100  --  -- --  --  -- --    Measurable Stool (ml) -- 100 100 -- -- -- -- -- --    Stool  --  -- --  --  -- --  --  -- --    Number of Times Stooled -- 1 x 1 x -- 2 x 2 x -- -- --    Total Output 850 1600 2450 2500 600 3100 -- -- --       Net I/O     -560 -1600 -2160 -720 -500 -1220 -- -- --          Recent Labs      18   1124   18   1605  188  18   0403   SODIUM  136   --   137   --    --    --   139   POTASSIUM  6.8*   < >  6.0*   < >  5.4  4.8  4.5   CHLORIDE  101   --   103   --    --    --   101   CO2  24   --   29   --    --    --   30   BUN  73*   --   72*   --    --    --   58*   CREATININE  2.18*   --   2.32*   --    --    --   1.89*   CALCIUM  9.9   --   9.8   --    --    --   9.5    < > = values in this interval not displayed.       GI/Nutrition:  Exam: (+)bs, soft, obese, NT/ND, no masses  Imaging: Available data reviewed  taking PO  Liver Function  Recent Labs      18   0403   ALTSGPT  7   --    --    --   6   ASTSGOT  17   --    --    --   13   ALKPHOSPHAT  160*   --    --    --   117*   TBILIRUBIN  1.3   --    --    --   1.2   GLUCOSE  123*   < >  135*  113*  129*    < > = values in this interval not displayed.       Heme:  Recent Labs      18   0403   RBC  4.44  4.20  4.08*   HEMOGLOBIN  13.0  11.9*  11.8*   HEMATOCRIT  43.2  39.8  38.2   PLATELETCT  189  153*  142*       Infectious Disease:  Monitored Temp  Av.1 °C (98.8 °F)  Min: 36.5 °C (97.7 °F)  Max: 37.3 °C (99.1 °F)  Micro: antibiotics reviewed and cultures reviewed  Recent Labs      18   1210  18   1954  186  18   0340  18   0403   WBC  8.3   --   8.0  6.7  6.5   NEUTSPOLYS  80.70*   --    --   73.60*  74.70*   LYMPHOCYTES  8.80*   --    --   8.50*  7.60*   MONOCYTES  8.80   --    --   14.80*  13.80*    EOSINOPHILS  1.70   --    --   2.20  3.40   BASOPHILS  0.00   --    --   0.30  0.20   ASTSGOT   --   17   --    --   13   ALTSGPT   --   7   --    --   6   ALKPHOSPHAT   --   160*   --    --   117*   TBILIRUBIN   --   1.3   --    --   1.2     Current Facility-Administered Medications   Medication Dose Frequency Provider Last Rate Last Dose   • Respiratory Care per Protocol   Continuous RT Mary Brewer M.D.       • furosemide (LASIX) injection 40 mg  40 mg TID Keron Nunez M.D.   40 mg at 03/23/18 2155   • nystatin (MYCOSTATIN) powder   TID Mary Brewer M.D.       • sodium polystyrene (KAYEXALATE) 15 GM/60ML suspension 15 g  15 g Q6HRS PRN Lorraine Napier M.D.   15 g at 03/23/18 1106   • senna-docusate (PERICOLACE or SENOKOT S) 8.6-50 MG per tablet 2 Tab  2 Tab BID Chalino Darden M.D.   Stopped at 03/22/18 0900    And   • polyethylene glycol/lytes (MIRALAX) PACKET 1 Packet  1 Packet QDAY PRN Chalino Darden M.D.        And   • magnesium hydroxide (MILK OF MAGNESIA) suspension 30 mL  30 mL QDAY PRN Chalino Darden M.D.        And   • bisacodyl (DULCOLAX) suppository 10 mg  10 mg QDAY PRN Chalino Darden M.D.       • enoxaparin (LOVENOX) inj 40 mg  40 mg DAILY Chalino Darden M.D.   40 mg at 03/23/18 0832   • insulin regular (HUMULIN R) injection 1-6 Units  1-6 Units 4X/DAY ACHS Chalino Darden M.D.   Stopped at 03/21/18 2100    And   • glucose 4 g chewable tablet 16 g  16 g Q15 MIN PRN Chalino Darden M.D.        And   • dextrose 50% (D50W) injection 25 mL  25 mL Q15 MIN PRN Chalino Darden M.D.   Stopped at 03/22/18 0134   • calcium carbonate (OS-ROSE MARY 500) tablet 500 mg  500 mg BID Chalino Darden M.D.   500 mg at 03/23/18 2147   • insulin glargine (LANTUS) injection 15 Units  15 Units Nightly Chalino Darden M.D.   15 Units at 03/23/18 2152   • omeprazole (PRILOSEC) capsule 20 mg  20 mg BID Chalino Darden M.D.   20 mg at 03/23/18 2147   • sucralfate (CARAFATE) tablet 1 g  1 g 4X/DAY [facility]  Chalino Darden M.D.   1 g at 03/23/18 2148   • HYDROcodone-acetaminophen (NORCO) 7.5-325 MG per tablet 1 Tab  1 Tab Q4HRS PRN Chalino Darden M.D.   1 Tab at 03/23/18 1424   • tiotropium (SPIRIVA) 18 MCG inhalation capsule 1 Cap  1 Cap Q EVENING Chalino Darden M.D.   1 Cap at 03/23/18 2149   • fluticasone (FLONASE) nasal spray 100 mcg  2 Spray DAILY Kisha Cobb M.D.   100 mcg at 03/23/18 0832   • cefTRIAXone (ROCEPHIN) syringe 1 g  1 g Q24HRS Chalino Darden M.D.   1 g at 03/23/18 0956   • ezetimibe (ZETIA) tablet 10 mg  10 mg QHS Chalino Darden M.D.   10 mg at 03/23/18 2147    And   • simvastatin (ZOCOR) tablet 40 mg  40 mg Q EVENING Chalino Darden M.D.   40 mg at 03/23/18 2147   • diphenhydrAMINE (BENADRYL) tablet/capsule 25 mg  25 mg HS PRN Chalino Darden M.D.   25 mg at 03/21/18 2028     Last reviewed on 3/21/2018  8:39 PM by Shira Emery R.N.    Quality  Measures:  EKG reviewed, Medications reviewed, Labs reviewed and Radiology images reviewed  Jovel catheter: Critically Ill - Requiring Accurate Measurement of Urinary Output      DVT Prophylaxis: Enoxaparin (Lovenox)  DVT prophylaxis - mechanical: SCDs  Ulcer prophylaxis: Yes  Antibiotics: Treating active infection/contamination beyond 24 hours perioperative coverage        Problems/Plan:    Acute on chronic hypoxic respiratory failure.   - now on HFNC at 50 lpm at 50% despite aggressive diuresis   - may benefit from BiPAP if pt amenable   - RT/O2 protocols   - start IS/PEP therapy   - cont forced diuresis  Acute on Chronic Severe pulmonary hypertension     - noted ECHO from 2012 with elevated RVSP at 57-73   - new ECHO with RVSP of 85   - cont O2 therapy   - needs work up:  HIV, PFTs, sleep study   - suspect due to primary lung disease and chronic hypoxia   - cont aggressive diuresis and O2 therapy   - may need right heart cath, but not likely   - DVT study negative and doubt PE (no hx of dvt/pe, still CTEPH could always be  entertained)  Hyperkalemia.   - improving with medical therapies  Acute kidney injury.   - improving todya   - nephrology following  Acute pulmonary edema.   - improving with force diuresis   - monitor   - s/p one dose bumex  Mild hyperglycemia.   - ISS/lantus  Acute tracheobronchitis   - no obvious infiltrate   - cont C3  History of oxygen-dependent chronic obstructive pulmonary disease.   - spiriva  History of type 2 diabetes.   - ISS/lantus  History of hypertension.   - holding home meds  History of dyslipidemia.   - cont zetia/zocor  History of GERD   - ppi/carafate  Prophylaxis   - lovenox, scds    Pt continues to have unstable pulmonary status despite aggressive diuresis and now on higher HF settings of FiO2.  Will attempt BiPAP for ventilation, but may not tolerate well.  At this point, will continue full therapy; however, very few options to treat and will discuss this with patient and family.  Would get Palliative involved to help with goal of care.  The patient remains at high risk of clinical deterioration, worsening vital organ dysfunction, and death without the above critical care interventions.    Discussed patient condition and risk of morbidity and/or mortality with Family, RN, RT, Therapies, Pharmacy, Dietary, , Charge nurse / hot rounds, Patient and cardiology, nephrology and [facility].    Critical care time = 32 minutes

## 2018-03-24 NOTE — CARE PLAN
Problem: Safety  Goal: Will remain free from injury  Outcome: PROGRESSING AS EXPECTED  Pt remains free of falls and injuries. Universal fall precautions in place. Will continue to monitor.

## 2018-03-24 NOTE — PROGRESS NOTES
[facility] [place] [facility] MEDICINE PROGRESS NOTE     Attending: Radha Meza M.D.  Senior Resident: Deon Darden M.D.  Alphonse Resident: Armando Yap M.D.  PATIENT: Siri Solis; [id number]; 1945    ID: 72 y.o. female admitted for new diagnosis of R sided heart failure, generalized debility, found to have severe hyperkalemia.    SUBJECTIVE: Patient was transferred to ICU ~36 hours ago for increasing O2 demand and hyperkalemia. Patient more drowsy this AM but awakes appropriate and has no questions now, no family at bedside today. Cardiology met with patient yesterday.  OBJECTIVE:     Vitals:    03/24/18 0200 03/24/18 0358 03/24/18 0400 03/24/18 0600   BP:       Pulse: 84 75 82 76   Resp: (!) 23 20 18 (!) 31   Temp:       SpO2: 91% 94% 92% 92%   Weight:       Height:           Intake/Output Summary (Last 24 hours) at 03/24/18 0601  Last data filed at 03/24/18 0600   Gross per 24 hour   Intake             2120 ml   Output             5050 ml   Net            -2930 ml       PE:  General: No acute distress, afebrile, resting comfortably, conversational dyspnea without desaturations on 50L high flow NC FIO2 of 50%  HEENT: NC/AT. EOMI. MMM, neck supple without adenopathy or mass  Cardiovascular: RRR, NMGR, cap refill brisk.  Respiratory: CTAB, inspiratory effort greatly improved, no tachypnea or retractions, on 50L High flow NC FIO2 50%  Abdomen: soft, obese, ND, suprapubic TTP, no masses  EXT:  REY, 2+ bilateral symmetric edema stable on interval, no erythema/lesion   Neuro: Alert and oriented, CN III-XI grossly intact, REY, nonfocal    LABS:  Recent Labs      03/21/18   1210  03/21/18   2156  03/23/18   0340  03/24/18   0403   WBC  8.3  8.0  6.7  6.5   RBC  4.61  4.44  4.20  4.08*   HEMOGLOBIN  13.1  13.0  11.9*  11.8*   HEMATOCRIT  45.6  43.2  39.8  38.2   MCV  98.9*  97.3  94.8  93.6   MCH  28.4  29.3  28.3  28.9   RDW  80.9*  78.3*  76.7*  73.9*   PLATELETCT  219  189  153*  142*   MPV  10.8  11.2  9.9  10.7    NEUTSPOLYS  80.70*   --   73.60*  74.70*   LYMPHOCYTES  8.80*   --   8.50*  7.60*   MONOCYTES  8.80   --   14.80*  13.80*   EOSINOPHILS  1.70   --   2.20  3.40   BASOPHILS  0.00   --   0.30  0.20   RBCMORPHOLO  Present   --    --    --      Recent Labs      03/21/18 1954 03/22/18   1124 03/23/18   0340   03/23/18   1605  03/23/18   2238  03/24/18   0403   SODIUM  136   < >  136   --   137   --    --    --   139   POTASSIUM  7.6*   < >  6.8*   < >  6.0*   < >  5.4  4.8  4.5   CHLORIDE  101   < >  101   --   103   --    --    --   101   CO2  26   < >  24   --   29   --    --    --   30   BUN  76*   < >  73*   --   72*   --    --    --   58*   CREATININE  2.32*   < >  2.18*   --   2.32*   --    --    --   1.89*   CALCIUM  9.9   < >  9.9   --   9.8   --    --    --   9.5   ALBUMIN  4.2   --    --    --    --    --    --    --   3.8    < > = values in this interval not displayed.     Estimated GFR/CRCL = Estimated Creatinine Clearance: 37.4 mL/min (by C-G formula based on SCr of 1.89 mg/dL (H)).  Recent Labs      03/22/18 1124 03/23/18   0340 03/23/18   1230  03/23/18   1759  03/23/18   2144  03/24/18   0403   GLUCOSE  135*   --   113*   --    --    --    --   129*   POCGLUCOSE   --    < >   --    < >  113*  130*  126*   --     < > = values in this interval not displayed.     Recent Labs      03/21/18 1954 03/24/18   0403   ASTSGOT  17  13   ALTSGPT  7  6   TBILIRUBIN  1.3  1.2   ALKPHOSPHAT  160*  117*   GLOBULIN  2.6  2.2     Recent Labs      03/21/18   1210  03/23/18   1230  03/23/18   1605   CPKTOTAL   --   33   --    TROPONINI   --   0.27*  0.35*   BNPBTYPENAT  958*   --    --      Recent Labs      03/22/18   1201   [id number]  7.34*   [id number]  45.7*   [id number]  74.9   RASD6BVX  93.6   ARTHCO3  24   ARTBE  -2     No results for input(s): INR, APTT, FIBRINOGEN in the last 72 hours.    Invalid input(s): DIMER    MICROBIOLOGY:   Results for PEG XIONG (MRN [id number]) as of 3/24/2018 08:23    Ref. Range 3/21/2018 12:37 3/21/2018 12:46 3/21/2018 13:35   Significant Indicator Unknown POS (POS) NEG NEG   Site Unknown  PERIPHERAL PERIPHERAL   Source Unknown UR BLD BLD       IMAGING:   CXR: 3/24  FINDINGS:   The heart is enlarged. Combined interstitial and airspace opacities persist in the perihilar and basilar lung fields bilaterally, suggesting pulmonary edema, and there is right basilar atelectasis.   Impression       Stable chest appearance compared with 3/23.         MEDS:  Current Facility-Administered Medications   Medication Last Dose   • Respiratory Care per Protocol     • furosemide (LASIX) injection 40 mg 40 mg at 03/23/18 2155   • nystatin (MYCOSTATIN) powder     • sodium polystyrene (KAYEXALATE) 15 GM/60ML suspension 15 g 15 g at 03/23/18 1106   • senna-docusate (PERICOLACE or SENOKOT S) 8.6-50 MG per tablet 2 Tab Stopped at 03/22/18 0900    And   • polyethylene glycol/lytes (MIRALAX) PACKET 1 Packet      And   • magnesium hydroxide (MILK OF MAGNESIA) suspension 30 mL      And   • bisacodyl (DULCOLAX) suppository 10 mg     • enoxaparin (LOVENOX) inj 40 mg 40 mg at 03/23/18 0832   • insulin regular (HUMULIN R) injection 1-6 Units Stopped at 03/21/18 2100    And   • glucose 4 g chewable tablet 16 g      And   • dextrose 50% (D50W) injection 25 mL Stopped at 03/22/18 0134   • calcium carbonate (OS-ROSE MARY 500) tablet 500 mg 500 mg at 03/23/18 2147   • insulin glargine (LANTUS) injection 15 Units 15 Units at 03/23/18 2152   • omeprazole (PRILOSEC) capsule 20 mg 20 mg at 03/23/18 2147   • sucralfate (CARAFATE) tablet 1 g 1 g at 03/24/18 0545   • HYDROcodone-acetaminophen (NORCO) 7.5-325 MG per tablet 1 Tab 1 Tab at 03/23/18 1424   • tiotropium (SPIRIVA) 18 MCG inhalation capsule 1 Cap 1 Cap at 03/23/18 2149   • fluticasone (FLONASE) nasal spray 100 mcg 100 mcg at 03/23/18 0832   • cefTRIAXone (ROCEPHIN) syringe 1 g 1 g at 03/23/18 0956   • ezetimibe (ZETIA) tablet 10 mg 10 mg at 03/23/18 2147    And   •  simvastatin (ZOCOR) tablet 40 mg 40 mg at 03/23/18 2147   • diphenhydrAMINE (BENADRYL) tablet/capsule 25 mg 25 mg at 03/21/18 2028       PROBLEM LIST:  No problems updated.    ASSESSMENT/PLAN: 73 yo female with a history of COPD (on 4L at home sating 70's-80s), DM II, HTN, opioid dependent chronic pain, benzo dependent insomnia presenting with newly diagnosed Right sided Heart failure,  generalized debility, UTI, decreased LLE pulses, and severe hyperkalemia.     Neuro/psych:  Alert and oriented.   # Insomnia  -Continue holding benzos at this time  -Benadryl prn     Cardiovascular:  # CHF-  BNP of 958 with consistent clinical findings on admission.  Not initially with oxygen demand beyond baseline. Patient on pioglitazone.   Patient with some lower BPs and pulse, on chronic BB therapy with atenolol.  This did not improve with holding atenolol.  Echo indicating preserved ef, severely dilated right with RVSP in the  80s.  -Trop yesterday 0.27-->0.35 likely 2/2 heart failure   -Will continue diuresis with lasix per intensivist/cardiologist (Lasix 40 TID)  -Hold home atenolol  -Holding home lisinopril as hyperkalemic  -Per cardio and intensivist, patient's right sided HF is likely long standing and 2/2 to combination of chronic lung disease and chronic hypoxia. The patient is likely end stage, and based on her current clinical picture palliative care/hospice would be appropriate given limited treatment options. Patient currently DNR/DNI, will discuss palliative care/hospice consult with patient and family today.   # 1st degree av block- Resolved with tx of hyperkalemia.  # HTN-  BP down to 90s systolic.  -Holding home meds     Pulm:     # Acute on chronic lung disease- Heart failure as above.  Hx of COPD.   Currently on high flow NC at 50L/min FIO2 of 50%    -Continue diuresis  -Continue home inhalers  -O2 as needed, currently on high flow nasal canula, consider BIPAP   -Continue Rocephin  4/7     GI:  #Stable        Renal:     # Hyperkalemia-  Patient responding appropriately to kayexalate and lasix. 4.5 on 3/24.  -Kayexalate dc per ICU  # FLAVIA- Baseline cr less than 1.  Currently 1.89<--2.32 (3/23)  -Diuresis per nephro and intensive care  -Continue to monitor  -Change K+(labs) to QD       ID:     Received C3 and azithro in ED.    SIRS 2/4 for breathing and HR.  Afebrile.  SIRS likely not inflammatory given comorbidities, but UTI is present.  # UTI- Afebrile, no cva tenderness.  Uncomplicated.  Based on exam/sx and ua.  Cx currently with e coli pending sensitivity.  -Ceftriaxone day 4/7  -Fu culture     Heme: Stable, low clinical suspicion for PE given negative DVT study      Endocrine:  # DM II- Apparently well controlled, A1c 7.0 6/17.  Adequate control without lows on current regimen.  -Continue 15U glargine HS which is half home dose and ISS  -Holding metformin for FLAVIA  -Hold pioglitazone     MS:  # Leg pain- Chronic.  US findings not compatible with claudication.  Pain likely associated with edema.  # Debility- Likely 2/2 CHF, UTI.  TSH with 4.3 11/17.  H/H wnl.  -PT/OT   # Chronic pain  -Continue norco      FENppx:  - diabetic diet   - po fluids  - lovenox     Dispo: ICU  Code: DNR/DNI       Armando Yap M.D.   PGY-1   [facility]   [phone]

## 2018-03-24 NOTE — FLOWSHEET NOTE
This note also relates to the following rows which could not be included:  Respiration - Cannot attach notes to unvalidated device data  Pulse - Cannot attach notes to unvalidated device data  Heart Rate (Monitored) - Cannot attach notes to unvalidated device data  Pulse Oximetry - Cannot attach notes to unvalidated device data       03/24/18 0645   Events/Summary/Plan   Events/Summary/Plan HHFNC/IS   Education   Education Yes - Pt. / Family has been Instructed in use of Respiratory Equipment   PEP/CPT Group   PEP/CPT/Airway Clearance Therapy Yes   #PEP/CPT (Manual) Subsequent Subsequent   PEP/CPT Method Positive Airway Pressure Device   Pressure 15   Incentive Spirometry Group   Incentive Spirometry Instruction Yes   Breathing Exercises Yes   Incentive Spirometer Volume 1250 mL   Heated Hi Flow Nasal Cannula   Heated Hi Flow Nasal Cannula (HHFNC) Yes   FiO2 (HHFNC) 50   Flowrate (HHFNC) 50  (was at 80% when I went in)   Humidifier Temperature (FNC) 32   Date of Last Circuit Change 03/23/18   Circuit Change Next Due Date (Q 7 Days) 03/30/18   Respiratory WDL   Respiratory (WDL) X   Chest Exam   Work Of Breathing / Effort Moderate   Breath Sounds   Pre/Post Intervention Pre Intervention Assessment   RUL Breath Sounds Clear   RML Breath Sounds Clear   RLL Breath Sounds Diminished   ALEA Breath Sounds Clear   LLL Breath Sounds Diminished   Secretions   Sputum Amount Unable to Evaluate   Oximetry   Continuous Oximetry Yes   Oxygen   O2 (LPM) 50   O2 (FiO2) 50   O2 Daily Delivery Respiratory  Highflow Nasal Cannula

## 2018-03-24 NOTE — CARE PLAN
Problem: Venous Thromboembolism (VTW)/Deep Vein Thrombosis (DVT) Prevention:  Goal: Patient will participate in Venous Thrombosis (VTE)/Deep Vein Thrombosis (DVT)Prevention Measures  Outcome: PROGRESSING AS EXPECTED   03/23/18 2000   Mechanical/VTE Prophylaxis   Mechanical Prophylaxis  SCDs, Sequential Compression Device   SCDs, Sequential Compression Device On   OTHER   Risk Assessment Score 5   VTE RISK Very High   Pharmacologic Prophylaxis Used LMWH: Enoxaparin(Lovenox)

## 2018-03-25 ENCOUNTER — APPOINTMENT (OUTPATIENT)
Dept: RADIOLOGY | Facility: MEDICAL CENTER | Age: 73
DRG: 291 | End: 2018-03-25
Attending: INTERNAL MEDICINE
Payer: COMMERCIAL

## 2018-03-25 LAB
ANION GAP SERPL CALC-SCNC: 10 MMOL/L (ref 0–11.9)
BASOPHILS # BLD AUTO: 0.2 % (ref 0–1.8)
BASOPHILS # BLD: 0.01 K/UL (ref 0–0.12)
BUN SERPL-MCNC: 44 MG/DL (ref 8–22)
CALCIUM SERPL-MCNC: 8.8 MG/DL (ref 8.5–10.5)
CHLORIDE SERPL-SCNC: 95 MMOL/L (ref 96–112)
CO2 SERPL-SCNC: 35 MMOL/L (ref 20–33)
CREAT SERPL-MCNC: 1.3 MG/DL (ref 0.5–1.4)
EOSINOPHIL # BLD AUTO: 0.15 K/UL (ref 0–0.51)
EOSINOPHIL NFR BLD: 2.3 % (ref 0–6.9)
ERYTHROCYTE [DISTWIDTH] IN BLOOD BY AUTOMATED COUNT: 69.1 FL (ref 35.9–50)
GLUCOSE BLD-MCNC: 106 MG/DL (ref 65–99)
GLUCOSE BLD-MCNC: 113 MG/DL (ref 65–99)
GLUCOSE BLD-MCNC: 119 MG/DL (ref 65–99)
GLUCOSE BLD-MCNC: 98 MG/DL (ref 65–99)
GLUCOSE SERPL-MCNC: 133 MG/DL (ref 65–99)
HCT VFR BLD AUTO: 37.5 % (ref 37–47)
HGB BLD-MCNC: 12 G/DL (ref 12–16)
IMM GRANULOCYTES # BLD AUTO: 0.03 K/UL (ref 0–0.11)
IMM GRANULOCYTES NFR BLD AUTO: 0.5 % (ref 0–0.9)
LYMPHOCYTES # BLD AUTO: 0.5 K/UL (ref 1–4.8)
LYMPHOCYTES NFR BLD: 7.6 % (ref 22–41)
MCH RBC QN AUTO: 29.3 PG (ref 27–33)
MCHC RBC AUTO-ENTMCNC: 32 G/DL (ref 33.6–35)
MCV RBC AUTO: 91.7 FL (ref 81.4–97.8)
MONOCYTES # BLD AUTO: 0.8 K/UL (ref 0–0.85)
MONOCYTES NFR BLD AUTO: 12.2 % (ref 0–13.4)
MORPHOLOGY BLD-IMP: NORMAL
NEUTROPHILS # BLD AUTO: 5.08 K/UL (ref 2–7.15)
NEUTROPHILS NFR BLD: 77.2 % (ref 44–72)
NRBC # BLD AUTO: 0 K/UL
NRBC BLD-RTO: 0 /100 WBC
PLATELET # BLD AUTO: 135 K/UL (ref 164–446)
PMV BLD AUTO: 11.1 FL (ref 9–12.9)
POTASSIUM SERPL-SCNC: 3.6 MMOL/L (ref 3.6–5.5)
RBC # BLD AUTO: 4.09 M/UL (ref 4.2–5.4)
SODIUM SERPL-SCNC: 140 MMOL/L (ref 135–145)
WBC # BLD AUTO: 6.6 K/UL (ref 4.8–10.8)

## 2018-03-25 PROCEDURE — 700111 HCHG RX REV CODE 636 W/ 250 OVERRIDE (IP): Performed by: INTERNAL MEDICINE

## 2018-03-25 PROCEDURE — 51798 US URINE CAPACITY MEASURE: CPT

## 2018-03-25 PROCEDURE — 770020 HCHG ROOM/CARE - TELE (206)

## 2018-03-25 PROCEDURE — 94640 AIRWAY INHALATION TREATMENT: CPT

## 2018-03-25 PROCEDURE — A9270 NON-COVERED ITEM OR SERVICE: HCPCS | Performed by: STUDENT IN AN ORGANIZED HEALTH CARE EDUCATION/TRAINING PROGRAM

## 2018-03-25 PROCEDURE — 94668 MNPJ CHEST WALL SBSQ: CPT

## 2018-03-25 PROCEDURE — 90670 PCV13 VACCINE IM: CPT | Performed by: INTERNAL MEDICINE

## 2018-03-25 PROCEDURE — A6250 SKIN SEAL PROTECT MOISTURIZR: HCPCS | Performed by: FAMILY MEDICINE

## 2018-03-25 PROCEDURE — 85025 COMPLETE CBC W/AUTO DIFF WBC: CPT

## 2018-03-25 PROCEDURE — 90471 IMMUNIZATION ADMIN: CPT

## 2018-03-25 PROCEDURE — 700102 HCHG RX REV CODE 250 W/ 637 OVERRIDE(OP): Performed by: STUDENT IN AN ORGANIZED HEALTH CARE EDUCATION/TRAINING PROGRAM

## 2018-03-25 PROCEDURE — 71045 X-RAY EXAM CHEST 1 VIEW: CPT

## 2018-03-25 PROCEDURE — 82962 GLUCOSE BLOOD TEST: CPT | Mod: 91

## 2018-03-25 PROCEDURE — A9270 NON-COVERED ITEM OR SERVICE: HCPCS | Performed by: EMERGENCY MEDICINE

## 2018-03-25 PROCEDURE — 700102 HCHG RX REV CODE 250 W/ 637 OVERRIDE(OP): Performed by: EMERGENCY MEDICINE

## 2018-03-25 PROCEDURE — 700111 HCHG RX REV CODE 636 W/ 250 OVERRIDE (IP): Performed by: EMERGENCY MEDICINE

## 2018-03-25 PROCEDURE — 80048 BASIC METABOLIC PNL TOTAL CA: CPT

## 2018-03-25 RX ORDER — FUROSEMIDE 10 MG/ML
40 INJECTION INTRAMUSCULAR; INTRAVENOUS 3 TIMES DAILY
Status: COMPLETED | OUTPATIENT
Start: 2018-03-25 | End: 2018-03-25

## 2018-03-25 RX ORDER — ACETAMINOPHEN 500 MG
500 TABLET ORAL EVERY 6 HOURS PRN
Status: DISCONTINUED | OUTPATIENT
Start: 2018-03-25 | End: 2018-03-29 | Stop reason: HOSPADM

## 2018-03-25 RX ORDER — FUROSEMIDE 10 MG/ML
40 INJECTION INTRAMUSCULAR; INTRAVENOUS 3 TIMES DAILY
Status: DISCONTINUED | OUTPATIENT
Start: 2018-03-25 | End: 2018-03-25

## 2018-03-25 RX ORDER — POTASSIUM CHLORIDE 20 MEQ/1
40 TABLET, EXTENDED RELEASE ORAL ONCE
Status: COMPLETED | OUTPATIENT
Start: 2018-03-25 | End: 2018-03-25

## 2018-03-25 RX ADMIN — SUCRALFATE 1 G: 1 TABLET ORAL at 05:51

## 2018-03-25 RX ADMIN — OMEPRAZOLE 20 MG: 20 CAPSULE, DELAYED RELEASE ORAL at 21:43

## 2018-03-25 RX ADMIN — NYSTATIN: 100000 POWDER TOPICAL at 15:08

## 2018-03-25 RX ADMIN — ACETAMINOPHEN 500 MG: 500 TABLET ORAL at 15:07

## 2018-03-25 RX ADMIN — SUCRALFATE 1 G: 1 TABLET ORAL at 17:36

## 2018-03-25 RX ADMIN — CEFTRIAXONE SODIUM 1 G: 10 INJECTION, POWDER, FOR SOLUTION INTRAVENOUS at 08:36

## 2018-03-25 RX ADMIN — ENOXAPARIN SODIUM 40 MG: 100 INJECTION SUBCUTANEOUS at 08:37

## 2018-03-25 RX ADMIN — HYDROCODONE BITARTRATE AND ACETAMINOPHEN 1 TABLET: 7.5; 325 TABLET ORAL at 03:30

## 2018-03-25 RX ADMIN — INSULIN GLARGINE 15 UNITS: 100 INJECTION, SOLUTION SUBCUTANEOUS at 21:47

## 2018-03-25 RX ADMIN — Medication 500 MG: at 08:36

## 2018-03-25 RX ADMIN — PNEUMOCOCCAL 13-VALENT CONJUGATE VACCINE 0.5 ML: 2.2; 2.2; 2.2; 2.2; 2.2; 4.4; 2.2; 2.2; 2.2; 2.2; 2.2; 2.2; 2.2 INJECTION, SUSPENSION INTRAMUSCULAR at 05:50

## 2018-03-25 RX ADMIN — OMEPRAZOLE 20 MG: 20 CAPSULE, DELAYED RELEASE ORAL at 08:37

## 2018-03-25 RX ADMIN — EZETIMIBE 10 MG: 10 TABLET ORAL at 21:43

## 2018-03-25 RX ADMIN — SUCRALFATE 1 G: 1 TABLET ORAL at 21:43

## 2018-03-25 RX ADMIN — POTASSIUM CHLORIDE 40 MEQ: 1500 TABLET, EXTENDED RELEASE ORAL at 08:36

## 2018-03-25 RX ADMIN — TIOTROPIUM BROMIDE 1 CAPSULE: 18 CAPSULE ORAL; RESPIRATORY (INHALATION) at 21:44

## 2018-03-25 RX ADMIN — FUROSEMIDE 40 MG: 10 INJECTION, SOLUTION INTRAMUSCULAR; INTRAVENOUS at 08:37

## 2018-03-25 RX ADMIN — SUCRALFATE 1 G: 1 TABLET ORAL at 12:16

## 2018-03-25 RX ADMIN — SIMVASTATIN 40 MG: 40 TABLET, FILM COATED ORAL at 21:43

## 2018-03-25 RX ADMIN — NYSTATIN: 100000 POWDER TOPICAL at 08:36

## 2018-03-25 RX ADMIN — NYSTATIN: 100000 POWDER TOPICAL at 21:43

## 2018-03-25 RX ADMIN — FUROSEMIDE 40 MG: 10 INJECTION, SOLUTION INTRAMUSCULAR; INTRAVENOUS at 17:36

## 2018-03-25 RX ADMIN — FUROSEMIDE 40 MG: 10 INJECTION, SOLUTION INTRAMUSCULAR; INTRAVENOUS at 12:17

## 2018-03-25 RX ADMIN — Medication 500 MG: at 21:49

## 2018-03-25 ASSESSMENT — COGNITIVE AND FUNCTIONAL STATUS - GENERAL
DAILY ACTIVITIY SCORE: 17
MOVING TO AND FROM BED TO CHAIR: UNABLE
SUGGESTED CMS G CODE MODIFIER DAILY ACTIVITY: CK
DRESSING REGULAR LOWER BODY CLOTHING: A LOT
TURNING FROM BACK TO SIDE WHILE IN FLAT BAD: UNABLE
HELP NEEDED FOR BATHING: A LOT
TOILETING: A LOT
WALKING IN HOSPITAL ROOM: A LOT
STANDING UP FROM CHAIR USING ARMS: A LITTLE
SUGGESTED CMS G CODE MODIFIER MOBILITY: CL
MOVING FROM LYING ON BACK TO SITTING ON SIDE OF FLAT BED: UNABLE
DRESSING REGULAR UPPER BODY CLOTHING: A LITTLE
MOBILITY SCORE: 10
CLIMB 3 TO 5 STEPS WITH RAILING: A LOT

## 2018-03-25 ASSESSMENT — ENCOUNTER SYMPTOMS
DIZZINESS: 0
BLURRED VISION: 0
FLANK PAIN: 0
BACK PAIN: 0
DIAPHORESIS: 0
WHEEZING: 0
ORTHOPNEA: 1
NAUSEA: 0
ABDOMINAL PAIN: 0
SENSORY CHANGE: 0
MYALGIAS: 0
EYES NEGATIVE: 1
SHORTNESS OF BREATH: 1
COUGH: 0
HEADACHES: 0
VOMITING: 0
PALPITATIONS: 0
DEPRESSION: 0
MEMORY LOSS: 0
BRUISES/BLEEDS EASILY: 0
FEVER: 0
CHILLS: 0
HEMOPTYSIS: 0
DOUBLE VISION: 0
FALLS: 0

## 2018-03-25 ASSESSMENT — PAIN SCALES - GENERAL
PAINLEVEL_OUTOF10: 4
PAINLEVEL_OUTOF10: 0
PAINLEVEL_OUTOF10: 2
PAINLEVEL_OUTOF10: 0
PAINLEVEL_OUTOF10: 0
PAINLEVEL_OUTOF10: 8
PAINLEVEL_OUTOF10: 0
PAINLEVEL_OUTOF10: 3
PAINLEVEL_OUTOF10: 2
PAINLEVEL_OUTOF10: 0
PAINLEVEL_OUTOF10: 6

## 2018-03-25 ASSESSMENT — PATIENT HEALTH QUESTIONNAIRE - PHQ9
SUM OF ALL RESPONSES TO PHQ9 QUESTIONS 1 AND 2: 0
2. FEELING DOWN, DEPRESSED, IRRITABLE, OR HOPELESS: NOT AT ALL
1. LITTLE INTEREST OR PLEASURE IN DOING THINGS: NOT AT ALL

## 2018-03-25 ASSESSMENT — LIFESTYLE VARIABLES: DO YOU DRINK ALCOHOL: NO

## 2018-03-25 NOTE — PROGRESS NOTES
Pulmonary Critical Care Progress Note        Date of Service:  3/25/2018  Chief Complaint: worsening generalized weakness    History of Present Illness: 72 y.o. female with the past medical history of O2 dependent COPD, type-II DM, HTN and chronic pain syndrome was admitted for acute on chronic hypoxic respiratory failure after having worsening generalized fatigue/weakness and shortness of breath for the past month.  She also had associated leg swelling with redness and productive cough.  Her O2 requirements continued to worsen after admission and was transferred to the ICU for this and now on HFO2 therapy for comfort reasons.  Her ECHO showed acute on chronic severe pulmonary hypertension.    ROS:  Feeling better today with less dyspnea and fatigue.  She is feeling less swollen to her legs.  No leg pain.  No nausea, vomiting, or diarrhea.  No chest pain or abdominal pain.  Still having dyspnea with exertion, but feels it is better.    Interval Events:  24 hour interval history reviewed    - upset because it was noisy and didn't sleepy   - no issues overnight   - SR 80-100s   - -130s   - Tmax 99.5   - BCx1 (+) for GPR   - a/ox4 and brighter   - UOP of 2700cc overnight and > 3300 yesterday's shift with sheets   - Potassium 3.6   - getting up to commode and edge of bed   - HF 50 lpm at 40%   - IS 1400cc   - CXR is still pending this morning    Yesterday's Events:   - no issues overnight   - a/ox4   - lethargic this morning   - SR 70-80s   - -130s   - Tmax 100   - UOP of 2500cc overnight with sheets   - edge of bed   - K at 4.6 this morning   - HFO2 at 50 lpm and 50%   - CXR(reviewed): right elevated hemidiaphragm, increased opacities to left base   - BUN/creat improving   - day #4 of ceftriaxone     PFSH:  No change.  Gen: bright/cheery, alert, pleasant/cooperative, speaking in full sentences, no distress, does not appear acutely ill  Skin: warm/dry, erythema to pannus  Ext: FROM to rue, rle, lue, lle, no  "joint effusions/erythema, improved bilateral lower extremity erythema/edema, no popliteal adenopathy  Neck: supple with FROM, no adenopathy    Respiratory:     Pulse Oximetry: 94 %    Exam: diminished at the bases, no crackles, clear throughout  ImagingAvailable data reviewed   Recent Labs      03/22/18   1201   DGVLF18J  7.34*   [id number]  45.7*   IZDXG150H  74.9   UOFU4CKY  93.6   ARTHCO3  24   ARTBE  -2       HemoDynamics:  Pulse: 83, Heart Rate (Monitored): 83  NIBP: 131/59       Exam: RRR, no murmur, trace pedal edema, 2+ dpp=, decreased erythema noted, good cap refill  Imaging: Available data reviewed   ECHO 3/22:  CONCLUSIONS  Left ventricular ejection fraction is visually estimated to be 60%.  Flattened septum in diastole (\"D\"-shaped left ventricle) consistent   with RV volume overload.  Severely dilated right ventricle.  Severely enlarged right atrium.  Mildly dilated left atrium.  Right ventricular systolic pressure is estimated to be 85 mmHg.  Severe tricuspid regurgitation.  Calcified aortic valve leaflets.  Aortic sclerosis without stenosis.  Trace mitral regurgitation.  Recent Labs      03/23/18   1230  03/23/18   1605   CPKTOTAL  33   --    TROPONINI  0.27*  0.35*       Neuro:  GCS Total Aleida Coma Score: 15       Exam:clear speech, answering all appropriately, a/ox4, symmetrical facial expressions, motor/sensory intact, DTR's intact  Imaging: Available data reviewed    Fluids:  Intake/Output       03/23/18 0700 - 03/24/18 0659 03/24/18 0700 - 03/25/18 0659 03/25/18 0700 - 03/26/18 0659      3492-6505 5099-4965 Total 6619-2405 8069-5162 Total 0082-3887 0634-0481 Total       Intake    P.O.  1780  340 2120  1080  360 1440  --  -- --    P.O. 9566 672 5135 8330 196 2946 -- -- --    Total Intake 1477 698 5955 8339 781 0766 -- -- --       Output    Urine  2500  2550 5050  3515  2525 6040  --  -- --    Indwelling Cathether 2500 2550 5050 3515 2525 6040 -- -- --    Stool  --  -- --  --  -- --  --  -- --    " Number of Times Stooled -- 2 x 2 x 4 x 1 x 5 x -- -- --    Total Output 2500 6980 8907 0524 0564 1876 -- -- --       Net I/O     -512 -0230 -0393 -2338 -9053 -4408 -- -- --        Weight: 115.7 kg (255 lb 1.2 oz)  Recent Labs      18   SODIUM  137   --    --   139  140   POTASSIUM  6.0*   < >  4.8  4.5  3.6   CHLORIDE  103   --    --   101  95*   CO2  29   --    --   30  35*   BUN  72*   --    --   58*  44*   CREATININE  2.32*   --    --   1.89*  1.30   CALCIUM  9.8   --    --   9.5  8.8    < > = values in this interval not displayed.       GI/Nutrition:  Exam: (+)bs, soft, obese, NT/ND, no masses (no changes)  Imaging: Available data reviewed  taking PO  Liver Function  Recent Labs      18   ALTSGPT   --   6   --    ASTSGOT   --   13   --    ALKPHOSPHAT   --   117*   --    TBILIRUBIN   --   1.2   --    GLUCOSE  113*  129*  133*       Heme:  Recent Labs      18   RBC  4.20  4.08*  4.09*   HEMOGLOBIN  11.9*  11.8*  12.0   HEMATOCRIT  39.8  38.2  37.5   PLATELETCT  153*  142*  135*       Infectious Disease:  Monitored Temp  Av.1 °C (98.8 °F)  Min: 36.1 °C (97 °F)  Max: 37.5 °C (99.5 °F)  Micro: antibiotics reviewed and cultures reviewed  Recent Labs      18   WBC  6.7  6.5  6.6   NEUTSPOLYS  73.60*  74.70*  77.20*   LYMPHOCYTES  8.50*  7.60*  7.60*   MONOCYTES  14.80*  13.80*  12.20   EOSINOPHILS  2.20  3.40  2.30   BASOPHILS  0.30  0.20  0.20   ASTSGOT   --   13   --    ALTSGPT   --   6   --    ALKPHOSPHAT   --   117*   --    TBILIRUBIN   --   1.2   --      Current Facility-Administered Medications   Medication Dose Frequency Provider Last Rate Last Dose   • furosemide (LASIX) injection 40 mg  40 mg TID Keron Nunez M.D.   40 mg at 18   • pneumococcal 13-Amairani Conj Vacc (PREVNAR 13) syringe 0.5  mL  0.5 mL Once PRN Mary Brewer M.D.       • Respiratory Care per Protocol   Continuous RT Mary Brewer M.D.       • nystatin (MYCOSTATIN) powder   TID Mary Brewer M.D.       • senna-docusate (PERICOLACE or SENOKOT S) 8.6-50 MG per tablet 2 Tab  2 Tab BID Chalino Darden M.D.   Stopped at 03/22/18 0900    And   • polyethylene glycol/lytes (MIRALAX) PACKET 1 Packet  1 Packet QDAY PRN Chalino Darden M.D.        And   • magnesium hydroxide (MILK OF MAGNESIA) suspension 30 mL  30 mL QDAY PRN Chalino Dardne M.D.        And   • bisacodyl (DULCOLAX) suppository 10 mg  10 mg QDAY PRN Chalino Darden M.D.       • enoxaparin (LOVENOX) inj 40 mg  40 mg DAILY Chalino Darden M.D.   40 mg at 03/24/18 0831   • insulin regular (HUMULIN R) injection 1-6 Units  1-6 Units 4X/DAY PHILOMENA Darden M.D.   Stopped at 03/24/18 1700    And   • glucose 4 g chewable tablet 16 g  16 g Q15 MIN PRN Chalino Darden M.D.        And   • dextrose 50% (D50W) injection 25 mL  25 mL Q15 MIN PRN Chalino Darden M.D.   Stopped at 03/22/18 0134   • calcium carbonate (OS-ROSE MARY 500) tablet 500 mg  500 mg BID Chalino Darden M.D.   500 mg at 03/24/18 2127   • insulin glargine (LANTUS) injection 15 Units  15 Units Nightly Chalino Darden M.D.   15 Units at 03/24/18 2127   • omeprazole (PRILOSEC) capsule 20 mg  20 mg BID Chalino Darden M.D.   20 mg at 03/24/18 2127   • sucralfate (CARAFATE) tablet 1 g  1 g 4X/DAY PHILOMENA Darden M.D.   1 g at 03/24/18 2127   • HYDROcodone-acetaminophen (NORCO) 7.5-325 MG per tablet 1 Tab  1 Tab Q4HRS PRN Chalino Darden M.D.   1 Tab at 03/25/18 0330   • tiotropium (SPIRIVA) 18 MCG inhalation capsule 1 Cap  1 Cap Q EVENING Chalino Darden M.D.   1 Cap at 03/24/18 2129   • fluticasone (FLONASE) nasal spray 100 mcg  2 Spray DAILY Kisha Cobb M.D.   100 mcg at 03/23/18 0832   • cefTRIAXone (ROCEPHIN) syringe 1 g  1 g Q24HRS Mary Brewer M.D.   1 g at 03/24/18 0831   • ezetimibe  (ZETIA) tablet 10 mg  10 mg QHS Chalino Darden M.D.   10 mg at 03/24/18 2127    And   • simvastatin (ZOCOR) tablet 40 mg  40 mg Q EVENING Chalino Darden M.D.   40 mg at 03/24/18 2127   • diphenhydrAMINE (BENADRYL) tablet/capsule 25 mg  25 mg HS PRN Chalino Darden M.D.   25 mg at 03/21/18 2028     Last reviewed on 3/21/2018  8:39 PM by Shira Emery R.N.    Quality  Measures:  EKG reviewed, Medications reviewed, Labs reviewed and Radiology images reviewed  Jovel catheter: Critically Ill - Requiring Accurate Measurement of Urinary Output      DVT Prophylaxis: Enoxaparin (Lovenox)  DVT prophylaxis - mechanical: SCDs  Ulcer prophylaxis: Yes  Antibiotics: Treating active infection/contamination beyond 24 hours perioperative coverage        Problems/Plan:    Acute on chronic hypoxic respiratory failure.   - now on HFNC at 50 lpm at 40% despite aggressive diuresis   - refusing BiPAP   - RT/O2 protocols   - cont IS/PEP therapy   - cont forced diuresis  Acute on Chronic Severe pulmonary hypertension     - noted ECHO from 2012 with elevated RVSP at 57-73   - new ECHO with RVSP of 85   - cont O2 therapy   - needs work up:  HIV, PFTs, sleep study   - suspect due to primary lung disease and chronic hypoxia   - cont aggressive diuresis and O2 therapy   - may need right heart cath, but not likely   - DVT study negative and doubt PE (no hx of dvt/pe, still CTEPH could always be entertained)  Hyperkalemia.   - improving with medical therapies  Acute kidney injury.   - cont to improve   - nephrology following  Acute pulmonary edema.   - improving with force diuresis   - monitor   - s/p one dose bumex  Mild hyperglycemia.   - ISS/lantus  Acute tracheobronchitis   - no obvious infiltrate   - cont C3  History of oxygen-dependent chronic obstructive pulmonary disease.   - spiriva   - cont O2 therapy  History of type 2 diabetes.   - ISS/lantus  History of hypertension.   - holding home meds  History of dyslipidemia.   - cont  zetia/zocor  History of GERD   - ppi/carafate  Prophylaxis   - lovenox, scds    Pt diuresing well and trending O2 down finally.  She is stable from an ICU perspective and can transfer to tele.  The pulmonary team will continue to follow.  Discussed patient condition and risk of morbidity and/or mortality with Family, RN, RT, Therapies, Pharmacy, Dietary, , Charge nurse / hot rounds, Patient and cardiology, nephrology and [facility].    [id number]

## 2018-03-25 NOTE — CARE PLAN
Problem: Infection  Goal: Will remain free from infection  Standard precautions in place with proper hand hygiene. CAUTI prevention in place. Monitoring trends in MEWS, labs and VS.     Problem: Bowel/Gastric:  Goal: Normal bowel function is maintained or improved  Patient tolerating current diet per RD and MD order. Monitoring strict I&O and AC HS blood sugars.     Problem: Fluid Volume:  Goal: Will maintain balanced intake and output  Monitoring strict I&O and daily weights. Assessing patient's edema status. Lasix given per MAR.     Problem: Respiratory:  Goal: Respiratory status will improve  Collaboration in place with RT. Patient tolerating high flow nasal cannula at 50% and 55 liters. Continuous pulse ox in place. Education provided regarding patient's breathing treatments.

## 2018-03-25 NOTE — PROGRESS NOTES
[facility] margaritae returned call. Pt up to bedside commode and voided successfully, bladder scan cancelled.

## 2018-03-25 NOTE — CARE PLAN
Problem: Infection  Goal: Will remain free from infection  Outcome: PROGRESSING AS EXPECTED      Problem: Bowel/Gastric:  Goal: Normal bowel function is maintained or improved  Outcome: PROGRESSING AS EXPECTED      Problem: Pain Management  Goal: Pain level will decrease to patient's comfort goal  Outcome: PROGRESSING AS EXPECTED

## 2018-03-25 NOTE — PROGRESS NOTES
Pt bladder scan post immediate void was 225. [facility] updated. Stated to order and place sheets catheter. Also updated [facility] family medicine that pt had gone into A Fib for about 2 minutes and converted herself out of it. No additional orders.

## 2018-03-25 NOTE — CARE PLAN
Problem: Communication  Goal: The ability to communicate needs accurately and effectively will improve  Outcome: PROGRESSING AS EXPECTED  Pt educated to voice concerns and ask questions. Pt verbalizes understanding. Pt uses call light appropriately. Pt has no further questions at this time.     Problem: Safety  Goal: Will remain free from falls  Outcome: PROGRESSING AS EXPECTED  Pt up x1 to commode. Pt calls for assistance appropriately. Bed alarm in place. Call light within reach. Pt remains free from falls at this time.

## 2018-03-25 NOTE — PROGRESS NOTES
Pt up to floor at 1000 from SICU. Jovel catheter was removed prior to transfer. Pt is stating she needs to void. Put pt on bedpan and pt was unable to void. Pt c/o severe urgency and pain. Called [facility] to update. Ordered bladder scan per active order.

## 2018-03-25 NOTE — PROGRESS NOTES
Chris from Lab called with critical result of a positive blood culture, gram + rods growing on an anaerobic bottle at 0400. Critical lab result read back to Chris.   Verified coverage of antibiotics with Pharmacist, per Pharmacist patient is not covered by this new result.   Dr. Randall notified of critical lab result at 0440.  Critical lab result read back by Dr. Randall.  No new telephone orders received at this time. MD to place orders.

## 2018-03-25 NOTE — PROGRESS NOTES
Assumed care at 1000, report received from SICU RN. Pt is AOx4 with no signs of distress. Pt is SR 90 on the monitor. Initial assessment completed, orders reviewed, call light within reach, bed alarm in use, and hourly rounding in place. POC addressed with patient, no additional questions at this time.

## 2018-03-25 NOTE — PROGRESS NOTES
Cardiology Progress Note               Author: Keron To Date & Time created: 3/25/2018  10:56 AM     Interval History:  No acute events overnight.  No telemetry events.    Chief Complaint:  Pulmonary HTN.  Dyspnea.    Negative 4.6 liters over the last 24 hours.  Negative 10.2 liters during hospital stay.      Review of Systems   Constitutional: Negative for diaphoresis and fever.   HENT: Negative for nosebleeds.    Eyes: Negative for blurred vision and double vision.   Respiratory: Positive for shortness of breath. Negative for cough.    Cardiovascular: Negative for chest pain and palpitations.   Gastrointestinal: Negative for abdominal pain.   Genitourinary: Negative for dysuria and frequency.   Musculoskeletal: Negative for falls and myalgias.   Skin: Negative for rash.   Neurological: Negative for dizziness, sensory change and headaches.   Endo/Heme/Allergies: Does not bruise/bleed easily.   Psychiatric/Behavioral: Negative for depression and memory loss.       Physical Exam   Constitutional: She is oriented to person, place, and time. No distress.   HENT:   Head: Normocephalic and atraumatic.   Eyes: EOM are normal.   Neck: No JVD present.   Cardiovascular: Normal rate, regular rhythm, normal heart sounds and intact distal pulses.  Exam reveals no gallop and no friction rub.    No murmur heard.  Pulmonary/Chest: No respiratory distress.   Distant breath sounds due to copd     Abdominal: She exhibits no distension. There is no tenderness. There is no rebound and no guarding.   Musculoskeletal: She exhibits edema. She exhibits no tenderness.   Lymphadenopathy:     She has no cervical adenopathy.   Neurological: She is alert and oriented to person, place, and time.   Skin: Skin is dry.   Psychiatric: She has a normal mood and affect.   Nursing note and vitals reviewed.      Hemodynamics:  No data recorded.  Monitored Temp: 37.1 °C (98.8 °F)  Pulse  Av.7  Min: 58  Max: 100Heart Rate (Monitored):  74  NIBP: 130/64     Respiratory:    Respiration: (!) 29, Pulse Oximetry: 98 %, O2 Daily Delivery Respiratory : Highflow Nasal Cannula     PEP/CPT Method: Positive Airway Pressure Device, Work Of Breathing / Effort: Moderate  RUL Breath Sounds: Clear, RML Breath Sounds: Diminished, RLL Breath Sounds: Diminished, ALEA Breath Sounds: Clear, LLL Breath Sounds: Diminished  Fluids:  Date 03/24/18 0700 - 03/25/18 0659   Shift 0458-6226 0646-5418 3909-2517 24 Hour Total   I  N  T  A  K  E   P.O. 600   600    Shift Total 600   600   O  U  T  P  U  T   Urine 1115   1115    Shift Total 1115   1115   Weight (kg) 127.8 127.8 127.8 127.8       Weight: 115.7 kg (255 lb 1.2 oz)  GI/Nutrition:  Orders Placed This Encounter   Procedures   • Diet Order     Standing Status:   Standing     Number of Occurrences:   1     Order Specific Question:   Diet:     Answer:   Diabetic [3]     Lab Results:  Recent Labs      03/23/18   0340 03/24/18   0403  03/25/18   0345   WBC  6.7  6.5  6.6   RBC  4.20  4.08*  4.09*   HEMOGLOBIN  11.9*  11.8*  12.0   HEMATOCRIT  39.8  38.2  37.5   MCV  94.8  93.6  91.7   MCH  28.3  28.9  29.3   MCHC  29.9*  30.9*  32.0*   RDW  76.7*  73.9*  69.1*   PLATELETCT  153*  142*  135*   MPV  9.9  10.7  11.1     Recent Labs      03/23/18   0340   03/23/18 2238 03/24/18   0403  03/25/18   0345   SODIUM  137   --    --   139  140   POTASSIUM  6.0*   < >  4.8  4.5  3.6   CHLORIDE  103   --    --   101  95*   CO2  29   --    --   30  35*   GLUCOSE  113*   --    --   129*  133*   BUN  72*   --    --   58*  44*   CREATININE  2.32*   --    --   1.89*  1.30   CALCIUM  9.8   --    --   9.5  8.8    < > = values in this interval not displayed.             Recent Labs      03/23/18   1230  03/23/18   1605   CPKTOTAL  33   --    TROPONINI  0.27*  0.35*             Medical Decision Making, by Problem:  Active Hospital Problems    Diagnosis   • COPD (chronic obstructive pulmonary disease) (CMS-[facility]) [J44.9]   • Hyperkalemia [E87.5]    • Renal insufficiency [N28.9]   • Pulmonary HTN [I27.20]       Plan:    Continue Diuresis 40 mg IV TID for one more day. Would consider converting to Torsemide 40 mg po bid thereafter.  Creatinine improved and almost normalized.  Optimize electrolytes to keep Mg above 2.5 and Potassium above 4.5    Will sign off at this time, please call us with further questions.  Patient will be followed in the outpatient cardiology clinic for further cardiac care.    Thank you for referring this patient to our cardiology service.    Keron Nunez MD.  [facility].        Quality-Core Measures

## 2018-03-25 NOTE — CARE PLAN
Problem: Hyperinflation:  Goal: Prevent or improve atelectasis  Outcome: PROGRESSING AS EXPECTED    Intervention: Instruct incentive spirometry usage  60% of pred is 1410, best IS value this shift was 1400.  Intervention: Perform hyperinflation therapy as indicated by assessment    PEP QID      HHFNC 55lpm/ 50%

## 2018-03-25 NOTE — PROGRESS NOTES
[facility]     Attending: Radha Meza M.D.  Senior Resident: Deon Darden M.D.  Alphonse Resident: Armando Yap M.D.  PATIENT: Siri Solis; [id number]; 1945    ID: 72 y.o. female admitted for new diagnosis of R sided heart failure likely 2/2 cor pulmonale in the setting of COPD, generalized debility.    SUBJECTIVE: No acute events overnight, patient states she slept very poorly as it is loud in the ICU and the high flow NC makes it difficult to sleep as well. She is very tired this AM but is otherwise feeling better.     OBJECTIVE:     Vitals:    03/25/18 0324 03/25/18 0330 03/25/18 0400 03/25/18 0500   BP:       Pulse: 80 77 74 83   Resp: 17 (!) 22 (!) 24 (!) 30   Temp:       SpO2: 95% 96% 94% 94%   Weight:   115.7 kg (255 lb 1.2 oz)    Height:           Intake/Output Summary (Last 24 hours) at 03/25/18 0611  Last data filed at 03/25/18 0600   Gross per 24 hour   Intake             1560 ml   Output             6215 ml   Net            -4655 ml       PE:  General: No acute distress, resting comfortably in bed. Interval improvement in overall appearance.   HEENT: NC/AT. PERRLA. EOMI. MMM  Cardiovascular: RRR with no M/R/G.  Respiratory: Symmetrical chest. Reduced crackles on exam today slight at BL bases  Abdomen: soft, NT/ND, no masses, +BS   EXT:  REY, 5/5 strength, No LE edema   Neuro: non focal with no numbness, tingling or changes in sensation    LABS:  Recent Labs      03/23/18   0340  03/24/18   0403  03/25/18   0345   WBC  6.7  6.5  6.6   RBC  4.20  4.08*  4.09*   HEMOGLOBIN  11.9*  11.8*  12.0   HEMATOCRIT  39.8  38.2  37.5   MCV  94.8  93.6  91.7   MCH  28.3  28.9  29.3   RDW  76.7*  73.9*  69.1*   PLATELETCT  153*  142*  135*   MPV  9.9  10.7  11.1   NEUTSPOLYS  73.60*  74.70*  77.20*   LYMPHOCYTES  8.50*  7.60*  7.60*   MONOCYTES  14.80*  13.80*  12.20   EOSINOPHILS  2.20  3.40  2.30   BASOPHILS  0.30  0.20  0.20     Recent Labs      03/23/18 0340   03/23/18   2239   03/24/18   0403  03/25/18   0345   SODIUM  137   --    --   139  140   POTASSIUM  6.0*   < >  4.8  4.5  3.6   CHLORIDE  103   --    --   101  95*   CO2  29   --    --   30  35*   BUN  72*   --    --   58*  44*   CREATININE  2.32*   --    --   1.89*  1.30   CALCIUM  9.8   --    --   9.5  8.8   ALBUMIN   --    --    --   3.8   --     < > = values in this interval not displayed.     Estimated GFR/CRCL = Estimated Creatinine Clearance: 51.5 mL/min (by C-G formula based on SCr of 1.3 mg/dL).  Recent Labs      03/23/18   0340   03/24/18   0403   03/24/18   1208  03/24/18   1706  03/24/18 2123 03/25/18   0345   GLUCOSE  113*   --   129*   --    --    --    --   133*   POCGLUCOSE   --    < >   --    < >  165*  128*  148*   --     < > = values in this interval not displayed.     Recent Labs      03/24/18   0403   ASTSGOT  13   ALTSGPT  6   TBILIRUBIN  1.2   ALKPHOSPHAT  117*   GLOBULIN  2.2     Recent Labs      03/23/18   1230  03/23/18   1605   CPKTOTAL  33   --    TROPONINI  0.27*  0.35*     Recent Labs      03/22/18   1201   NQFQH19K  7.34*   [id number]  45.7*   [id number]  74.9   [id number]  93.6   ARTHCO3  24   ARTBE  -2     No results for input(s): INR, APTT, FIBRINOGEN in the last 72 hours.    Invalid input(s): DIMER    MICROBIOLOGY: 3/21 1/2 Blood culture: Aneorobic GN bacilli (likely containment)     IMAGING:   No new CXR    MEDS:  Current Facility-Administered Medications   Medication Last Dose   • furosemide (LASIX) injection 40 mg 40 mg at 03/24/18 2127   • Respiratory Care per Protocol     • nystatin (MYCOSTATIN) powder     • senna-docusate (PERICOLACE or SENOKOT S) 8.6-50 MG per tablet 2 Tab Stopped at 03/22/18 0900    And   • polyethylene glycol/lytes (MIRALAX) PACKET 1 Packet      And   • magnesium hydroxide (MILK OF MAGNESIA) suspension 30 mL      And   • bisacodyl (DULCOLAX) suppository 10 mg     • enoxaparin (LOVENOX) inj 40 mg 40 mg at 03/24/18 0831   • insulin regular (HUMULIN R) injection 1-6 Units  Stopped at 03/24/18 1700    And   • glucose 4 g chewable tablet 16 g      And   • dextrose 50% (D50W) injection 25 mL Stopped at 03/22/18 0134   • calcium carbonate (OS-ROSE MARY 500) tablet 500 mg 500 mg at 03/24/18 2127   • insulin glargine (LANTUS) injection 15 Units 15 Units at 03/24/18 2127   • omeprazole (PRILOSEC) capsule 20 mg 20 mg at 03/24/18 2127   • sucralfate (CARAFATE) tablet 1 g 1 g at 03/25/18 0551   • HYDROcodone-acetaminophen (NORCO) 7.5-325 MG per tablet 1 Tab 1 Tab at 03/25/18 0330   • tiotropium (SPIRIVA) 18 MCG inhalation capsule 1 Cap 1 Cap at 03/24/18 2129   • fluticasone (FLONASE) nasal spray 100 mcg 100 mcg at 03/23/18 0832   • cefTRIAXone (ROCEPHIN) syringe 1 g 1 g at 03/24/18 0831   • ezetimibe (ZETIA) tablet 10 mg 10 mg at 03/24/18 2127    And   • simvastatin (ZOCOR) tablet 40 mg 40 mg at 03/24/18 2127   • diphenhydrAMINE (BENADRYL) tablet/capsule 25 mg 25 mg at 03/21/18 2028       PROBLEM LIST:  No problems updated.    ASSESSMENT/PLAN: 71 yo female with a history of COPD (on 4L at home sating 70's-80s), DM II, HTN, opioid dependent chronic pain, benzo dependent insomnia presenting with newly diagnosed Right sided Heart failure,  generalized debility, UTI, decreased LLE pulses, and severe hyperkalemia. Patient transferred to ICU for increasing O2 demand and severe hyperkalemia. Now improved dramatically with reducing O2 demand.      Neuro/psych:  Patient may be approaching end stage lung disease complicated by Cor pulmonale. While the patient was aware of her disease process she was not aware of her prognosis. After discussion with ICU team and reviewing recs from cardiology family medicine team began discussion regarding patients prognosis 3/24. Will continue discussion today and consult palliative care/hospice when/if appropriate.     # Insomnia  -Continue holding benzos at this time  -Benadryl prn  interval:   -Patient reports she is tired and is not sleeping well 2/2 high flow and being in  ICU  -Will add Tylenol for HA  -Will try different O2 delivery system to help with sleep per RT as high flow was disruptive    Cardiovascular:  #Fluid Overload (improved)  # Acute on CHF   #Cor Pulmonale likely 2/2 advanced COPD   -Elevated BNP at admission with s/s consistent with HF. Echo showing elevated RVSP (80's)  -Baseline O2 at presentation then increasing O2 demand after admission likely 2/2 to above   -Patient on pioglitazone at home however stopped at admission.     -Trop 3/23 0.27-->0.35 likely 2/2 heart failure   -Patient with some lower BPs and pulse at admission, was on chronic BB therapy with atenolol held at admisison.   -Cardiology consulted: Pulmonary HTN likely 2/2 to advanced COPD. Condition likely advanced and end stage. Poor prognosis, high 1 year mortality. Optimize COPD management and continue diuresis.    # HTN-  BP down to 90s systolic after admission.  -Holding home meds  # 1st degree av block- Resolved with tx of hyperkalemia.  Interval:  -Began goals of care discussion as above. Will continue to manage the patient's fluid status and will diuresis as indicated, apreciate pulm/cardio/nephro recs  -Patient has had net ~4L last 24hrs and is now close to euvolemic   -Lasix 40 TID (last dose this AM) --> will continue to evaluate fluid status   -Continue to hold home atenolol  -Consider restarting lisinopril as patient K WNL      Pulm:  # Acute on chronic lung disease- Heart failure as above.  Hx of COPD.   Currently on high flow NC at 50L/min FIO2 of 40%    -Continue home inhalers  -O2 as needed, currently on high flow nasal canula, Patient did not tolerate BIPAP  -Continue Rocephin 5/7  -RT to try different O2 delivery system      GI:  #Stable     Renal:  # Hyperkalemia(resolved)-  Patient responding appropriately to kayexalate and lasix. 4.5 on 3/24.  -Kayexalate dc per ICU  -Will repleat K+ this AM PO per cardio to keep K= >4.5  # FLAVIA- Baseline cr less than 1.  Currently 1.3<--2.32  (3/23)  -Continue to monitor     ID:  Received C3 and azithro in ED.    SIRS 2/4 for breathing and HR.  Afebrile.  SIRS likely not inflammatory given comorbidities, but UTI is present.  # UTI- Afebrile, no cva tenderness.  Uncomplicated.  Based on exam/sx and ua.  Cx currently with e coli pending sensitivity.  -Ceftriaxone day 5/7  -Blood Cx 1/2 for G+ Rods, likely a contaminant, will not change coverage as patient is afebrile and clinically improving      Heme: Stable, low clinical suspicion for PE given negative DVT study      Endocrine:  # DM II- Apparently well controlled, A1c 7.0 6/17.  Adequate control without lows on current regimen.  -Continue 15U glargine HS which is half home dose and ISS  -Holding metformin for FLAVIA  -Hold pioglitazone     MS:  # Leg pain- Chronic.  US findings not compatible with claudication.  Pain likely associated with edema.  # Debility- Likely 2/2 CHF, UTI.  TSH with 4.3 11/17.  H/H wnl.  -PT/OT   # Chronic pain  -Continue norco    #Dispo  -Downgrade to telemetry     #Core Measures   VTE PPx:Lovenox   Abx:Rocephin 5/7  Lines/Tubes:PIV/Sheets (will dc sheets prior to downgrade)  Fluids:None   Diet: Diabetic   Code Status: DNR/DNI    Armando Yap M.D.   PGY-1   [facility]   [phone]

## 2018-03-25 NOTE — PROGRESS NOTES
SOCIAL ROUNDS:  This is my private patient.  Looks like she is feeling better overall and in decent spirits.  Was told may have only a year to live by Cardiology and she is accepting of that.  Spouse has been by twice daily to visit, and daughter from [place], [place] has bee up to see her.  Will be available to follow her for outpatient needs on discharge.   ---JOHN Ochoa MD

## 2018-03-25 NOTE — PROGRESS NOTES
2 RN Skin check done with XAVIER Mcintyre    Patient right ear very red and a non-blanching spot noticed. Left ear also red and slow to rex. Ear pads placed on high flow bilaterally. Wound consult placed. Patient breasts and Panus moist, interdry and nystatin powder ordered. Bruising to trunk. Patient feet laura, dry, flaky, and trace edema noted. Heels red but blanching. Scattered scabs and scratches on bilateral lower extremities. Left fifth digit nail is falling off. Secured with bandaid. Bruising to back of thighs. Scratches and blanchable redness to sacrum.

## 2018-03-25 NOTE — PROGRESS NOTES
Hospital Medicine Progress Note, Adult, Complex               Author: Lorraine Quyen Date & Time created: 3/25/2018  1:22 PM     Interval History:  73 y/o female with CKD III admitted with SOB, edema, found to be in FLAVIA, hyperkalemic  Doing better, less SOB  C/o fatigue  Creat improved to 1.3  Hyperkalemia corrected   Transferred to [facility] from ICU  Review of Systems:  Review of Systems   Constitutional: Positive for malaise/fatigue. Negative for chills and fever.   HENT: Negative.    Eyes: Negative.    Respiratory: Positive for shortness of breath. Negative for cough, hemoptysis and wheezing.    Cardiovascular: Positive for orthopnea and leg swelling. Negative for chest pain and palpitations.   Gastrointestinal: Negative for abdominal pain, nausea and vomiting.   Genitourinary: Negative for dysuria, flank pain, frequency, hematuria and urgency.   Musculoskeletal: Negative for back pain, joint pain and myalgias.   Skin: Negative.    All other systems reviewed and are negative.      Physical Exam:  Physical Exam   Constitutional: She is oriented to person, place, and time. She appears well-developed and well-nourished. No distress.   HENT:   Head: Normocephalic and atraumatic.   Nose: Nose normal.   Mouth/Throat: Oropharynx is clear and moist.   Eyes: Conjunctivae and EOM are normal. Pupils are equal, round, and reactive to light.   Neck: Normal range of motion. Neck supple. No thyromegaly present.   Cardiovascular: Normal rate and regular rhythm.  Exam reveals no gallop and no friction rub.    Pulmonary/Chest: Effort normal. No respiratory distress. She has no wheezes. She has rales.   Abdominal: Soft. Bowel sounds are normal. She exhibits no distension. There is no tenderness.   Musculoskeletal: She exhibits edema.   Lymphadenopathy:     She has no cervical adenopathy.   Neurological: She is alert and oriented to person, place, and time. No cranial nerve deficit.   Skin: Skin is warm. No rash noted. No erythema.    Nursing note and vitals reviewed.      Labs:        Invalid input(s): [id number]  Recent Labs      18   1230  18   1605   CPKTOTAL  33   --    TROPONINI  0.27*  0.35*     Recent Labs      18   03418   2238  18   0403  03/25/18   034   SODIUM  137   --    --   139  140   POTASSIUM  6.0*   < >  4.8  4.5  3.6   CHLORIDE  103   --    --   101  95*   CO2  29   --    --   30  35*   BUN  72*   --    --   58*  44*   CREATININE  2.32*   --    --   1.89*  1.30   CALCIUM  9.8   --    --   9.5  8.8    < > = values in this interval not displayed.     Recent Labs      18   0403  03/25/18   034   ALTSGPT   --   6   --    ASTSGOT   --   13   --    ALKPHOSPHAT   --   117*   --    TBILIRUBIN   --   1.2   --    GLUCOSE  113*  129*  133*     Recent Labs      18   0403  03/25/18   034   RBC  4.20  4.08*  4.09*   HEMOGLOBIN  11.9*  11.8*  12.0   HEMATOCRIT  39.8  38.2  37.5   PLATELETCT  153*  142*  135*     Recent Labs      18   0403  03/25/18   034   WBC  6.7  6.5  6.6   NEUTSPOLYS  73.60*  74.70*  77.20*   LYMPHOCYTES  8.50*  7.60*  7.60*   MONOCYTES  14.80*  13.80*  12.20   EOSINOPHILS  2.20  3.40  2.30   BASOPHILS  0.30  0.20  0.20   ASTSGOT   --   13   --    ALTSGPT   --   6   --    ALKPHOSPHAT   --   117*   --    TBILIRUBIN   --   1.2   --            Hemodynamics:  Temp (24hrs), Av.7 °C (98.1 °F), Min:36.7 °C (98.1 °F), Max:36.7 °C (98.1 °F)  Temperature: 36.7 °C (98.1 °F), Monitored Temp: 37.2 °C (99 °F)  Pulse  Av.2  Min: 58  Max: 100Heart Rate (Monitored): 84  Blood Pressure : 159/102, NIBP: 134/67     Respiratory:    Respiration: 18, Pulse Oximetry: 93 %, O2 Daily Delivery Respiratory : Highflow Nasal Cannula     PEP/CPT Method: Positive Airway Pressure Device (20), Work Of Breathing / Effort: Moderate  RUL Breath Sounds: Clear, RML Breath Sounds: Diminished, RLL Breath Sounds: Diminished, ALEA Breath  Sounds: Clear, LLL Breath Sounds: Diminished  Fluids:    Intake/Output Summary (Last 24 hours) at 03/25/18 1322  Last data filed at 03/25/18 0945   Gross per 24 hour   Intake             1080 ml   Output             5450 ml   Net            -4370 ml     Weight: 115.7 kg (255 lb 1.2 oz)  GI/Nutrition:  Orders Placed This Encounter   Procedures   • Diet Order     Standing Status:   Standing     Number of Occurrences:   1     Order Specific Question:   Diet:     Answer:   Diabetic [3]     Medical Decision Making, by Problem:  Active Hospital Problems    Diagnosis   • COPD (chronic obstructive pulmonary disease) (CMS-[facility]) [J44.9]   • Hyperkalemia [E87.5]   • Renal insufficiency [N28.9]   • Pulmonary HTN [I27.20]       Quality-Core Measures   Reviewed items::  Labs reviewed, Medications reviewed and Radiology images reviewed    Assessment and plan  1.FLAVIA/CKD III -improving  -to monitor  2.HTN: BP well controlled  3.Electrolytes: hyperkalemia corrected  4.Anemia: Hb WNL  5.Volume:on lasix -good UOP    Recs; daily BMP,              Continue current treatment

## 2018-03-26 ENCOUNTER — APPOINTMENT (OUTPATIENT)
Dept: RADIOLOGY | Facility: MEDICAL CENTER | Age: 73
DRG: 291 | End: 2018-03-26
Attending: INTERNAL MEDICINE
Payer: COMMERCIAL

## 2018-03-26 LAB
ANION GAP SERPL CALC-SCNC: 11 MMOL/L (ref 0–11.9)
BACTERIA BLD CULT: ABNORMAL
BACTERIA BLD CULT: ABNORMAL
BACTERIA BLD CULT: NORMAL
BUN SERPL-MCNC: 28 MG/DL (ref 8–22)
CALCIUM SERPL-MCNC: 9 MG/DL (ref 8.5–10.5)
CHLORIDE SERPL-SCNC: 91 MMOL/L (ref 96–112)
CO2 SERPL-SCNC: 36 MMOL/L (ref 20–33)
CREAT SERPL-MCNC: 0.87 MG/DL (ref 0.5–1.4)
GLUCOSE BLD-MCNC: 106 MG/DL (ref 65–99)
GLUCOSE BLD-MCNC: 114 MG/DL (ref 65–99)
GLUCOSE BLD-MCNC: 117 MG/DL (ref 65–99)
GLUCOSE BLD-MCNC: 95 MG/DL (ref 65–99)
GLUCOSE SERPL-MCNC: 101 MG/DL (ref 65–99)
MAGNESIUM SERPL-MCNC: 1.2 MG/DL (ref 1.5–2.5)
PHOSPHATE SERPL-MCNC: 2 MG/DL (ref 2.5–4.5)
POTASSIUM SERPL-SCNC: 3.4 MMOL/L (ref 3.6–5.5)
SIGNIFICANT IND 70042: ABNORMAL
SIGNIFICANT IND 70042: NORMAL
SITE SITE: ABNORMAL
SITE SITE: NORMAL
SODIUM SERPL-SCNC: 138 MMOL/L (ref 135–145)
SOURCE SOURCE: ABNORMAL
SOURCE SOURCE: NORMAL

## 2018-03-26 PROCEDURE — 94760 N-INVAS EAR/PLS OXIMETRY 1: CPT

## 2018-03-26 PROCEDURE — 770020 HCHG ROOM/CARE - TELE (206)

## 2018-03-26 PROCEDURE — 71045 X-RAY EXAM CHEST 1 VIEW: CPT

## 2018-03-26 PROCEDURE — 700102 HCHG RX REV CODE 250 W/ 637 OVERRIDE(OP): Performed by: EMERGENCY MEDICINE

## 2018-03-26 PROCEDURE — 84100 ASSAY OF PHOSPHORUS: CPT

## 2018-03-26 PROCEDURE — 700111 HCHG RX REV CODE 636 W/ 250 OVERRIDE (IP): Performed by: EMERGENCY MEDICINE

## 2018-03-26 PROCEDURE — 80048 BASIC METABOLIC PNL TOTAL CA: CPT

## 2018-03-26 PROCEDURE — A9270 NON-COVERED ITEM OR SERVICE: HCPCS | Performed by: STUDENT IN AN ORGANIZED HEALTH CARE EDUCATION/TRAINING PROGRAM

## 2018-03-26 PROCEDURE — 94668 MNPJ CHEST WALL SBSQ: CPT

## 2018-03-26 PROCEDURE — 700111 HCHG RX REV CODE 636 W/ 250 OVERRIDE (IP): Performed by: STUDENT IN AN ORGANIZED HEALTH CARE EDUCATION/TRAINING PROGRAM

## 2018-03-26 PROCEDURE — 83735 ASSAY OF MAGNESIUM: CPT

## 2018-03-26 PROCEDURE — 700111 HCHG RX REV CODE 636 W/ 250 OVERRIDE (IP): Performed by: INTERNAL MEDICINE

## 2018-03-26 PROCEDURE — 82962 GLUCOSE BLOOD TEST: CPT

## 2018-03-26 PROCEDURE — 700102 HCHG RX REV CODE 250 W/ 637 OVERRIDE(OP): Performed by: STUDENT IN AN ORGANIZED HEALTH CARE EDUCATION/TRAINING PROGRAM

## 2018-03-26 PROCEDURE — 36415 COLL VENOUS BLD VENIPUNCTURE: CPT

## 2018-03-26 PROCEDURE — A9270 NON-COVERED ITEM OR SERVICE: HCPCS | Performed by: EMERGENCY MEDICINE

## 2018-03-26 RX ORDER — FUROSEMIDE 10 MG/ML
40 INJECTION INTRAMUSCULAR; INTRAVENOUS
Status: DISCONTINUED | OUTPATIENT
Start: 2018-03-26 | End: 2018-03-29 | Stop reason: HOSPADM

## 2018-03-26 RX ORDER — MAGNESIUM SULFATE HEPTAHYDRATE 40 MG/ML
4 INJECTION, SOLUTION INTRAVENOUS ONCE
Status: COMPLETED | OUTPATIENT
Start: 2018-03-26 | End: 2018-03-26

## 2018-03-26 RX ORDER — POTASSIUM CHLORIDE 20 MEQ/1
40 TABLET, EXTENDED RELEASE ORAL ONCE
Status: COMPLETED | OUTPATIENT
Start: 2018-03-26 | End: 2018-03-26

## 2018-03-26 RX ORDER — POTASSIUM CHLORIDE 20 MEQ/1
40 TABLET, EXTENDED RELEASE ORAL DAILY
Status: DISCONTINUED | OUTPATIENT
Start: 2018-03-26 | End: 2018-03-29 | Stop reason: HOSPADM

## 2018-03-26 RX ADMIN — MAGNESIUM SULFATE IN WATER 4 G: 40 INJECTION, SOLUTION INTRAVENOUS at 07:47

## 2018-03-26 RX ADMIN — SUCRALFATE 1 G: 1 TABLET ORAL at 05:10

## 2018-03-26 RX ADMIN — SUCRALFATE 1 G: 1 TABLET ORAL at 20:45

## 2018-03-26 RX ADMIN — HYDROCODONE BITARTRATE AND ACETAMINOPHEN 1 TABLET: 7.5; 325 TABLET ORAL at 02:45

## 2018-03-26 RX ADMIN — EZETIMIBE 10 MG: 10 TABLET ORAL at 20:45

## 2018-03-26 RX ADMIN — SUCRALFATE 1 G: 1 TABLET ORAL at 10:58

## 2018-03-26 RX ADMIN — NYSTATIN: 100000 POWDER TOPICAL at 20:45

## 2018-03-26 RX ADMIN — NYSTATIN: 100000 POWDER TOPICAL at 07:53

## 2018-03-26 RX ADMIN — OMEPRAZOLE 20 MG: 20 CAPSULE, DELAYED RELEASE ORAL at 20:45

## 2018-03-26 RX ADMIN — ENOXAPARIN SODIUM 40 MG: 100 INJECTION SUBCUTANEOUS at 07:48

## 2018-03-26 RX ADMIN — INSULIN GLARGINE 15 UNITS: 100 INJECTION, SOLUTION SUBCUTANEOUS at 20:53

## 2018-03-26 RX ADMIN — ACETAMINOPHEN 500 MG: 500 TABLET ORAL at 11:05

## 2018-03-26 RX ADMIN — Medication 500 MG: at 07:52

## 2018-03-26 RX ADMIN — FUROSEMIDE 40 MG: 10 INJECTION, SOLUTION INTRAMUSCULAR; INTRAVENOUS at 15:16

## 2018-03-26 RX ADMIN — STANDARDIZED SENNA CONCENTRATE AND DOCUSATE SODIUM 2 TABLET: 8.6; 5 TABLET, FILM COATED ORAL at 07:47

## 2018-03-26 RX ADMIN — OMEPRAZOLE 20 MG: 20 CAPSULE, DELAYED RELEASE ORAL at 07:46

## 2018-03-26 RX ADMIN — ACETAMINOPHEN 500 MG: 500 TABLET ORAL at 17:21

## 2018-03-26 RX ADMIN — POTASSIUM CHLORIDE 40 MEQ: 1500 TABLET, EXTENDED RELEASE ORAL at 15:02

## 2018-03-26 RX ADMIN — POTASSIUM CHLORIDE 40 MEQ: 1500 TABLET, EXTENDED RELEASE ORAL at 07:44

## 2018-03-26 RX ADMIN — Medication 500 MG: at 20:51

## 2018-03-26 RX ADMIN — SUCRALFATE 1 G: 1 TABLET ORAL at 17:20

## 2018-03-26 RX ADMIN — CEFTRIAXONE SODIUM 1 G: 10 INJECTION, POWDER, FOR SOLUTION INTRAVENOUS at 07:53

## 2018-03-26 RX ADMIN — TIOTROPIUM BROMIDE 1 CAPSULE: 18 CAPSULE ORAL; RESPIRATORY (INHALATION) at 20:45

## 2018-03-26 RX ADMIN — NYSTATIN: 100000 POWDER TOPICAL at 15:05

## 2018-03-26 RX ADMIN — SIMVASTATIN 40 MG: 40 TABLET, FILM COATED ORAL at 20:45

## 2018-03-26 ASSESSMENT — PAIN SCALES - GENERAL
PAINLEVEL_OUTOF10: 7
PAINLEVEL_OUTOF10: 7
PAINLEVEL_OUTOF10: 0
PAINLEVEL_OUTOF10: 0
PAINLEVEL_OUTOF10: 9
PAINLEVEL_OUTOF10: 8
PAINLEVEL_OUTOF10: 0

## 2018-03-26 ASSESSMENT — ENCOUNTER SYMPTOMS
VOMITING: 0
BACK PAIN: 0
HEMOPTYSIS: 0
COUGH: 0
ORTHOPNEA: 0
NAUSEA: 0
ABDOMINAL PAIN: 0
SHORTNESS OF BREATH: 1

## 2018-03-26 NOTE — PROGRESS NOTES
Monitor Summary:     SR 81-99 with rare PAC, rare PVC, 2 bursts of A-Fib up to 120 lasting 2-3 mins    0.18/0.08/0.40

## 2018-03-26 NOTE — PROGRESS NOTES
Assumed care at 1900, bedside report received from day shift RN. Pt. Is SR on the monitor. Initial assessment completed, orders reviewed, call light within reach, and hourly rounding in place. POC addressed with patient, no additional questions at this time.

## 2018-03-26 NOTE — PROGRESS NOTES
Hospital Medicine Progress Note, Adult, Complex               Author: Fadi Najjar Date & Time created: 3/26/2018  2:06 PM     Interval History:  73 y/o female with CKD III admitted with SOB, edema, found to be in FLAVIA, hyperkalemic  Doing better, less SOB  C/o fatigue  Creat improved  Feels better overall  Review of Systems:  Review of Systems   Constitutional: Negative for malaise/fatigue.   Respiratory: Positive for shortness of breath. Negative for cough and hemoptysis.    Cardiovascular: Positive for leg swelling. Negative for chest pain and orthopnea.   Gastrointestinal: Negative for abdominal pain, nausea and vomiting.   Genitourinary: Negative for dysuria, frequency and urgency.   Musculoskeletal: Negative for back pain.       Physical Exam:  Physical Exam   Constitutional: She is oriented to person, place, and time. She appears well-developed and well-nourished. No distress. Face mask in place.   HENT:   Nose: Nose normal.   Eyes: Conjunctivae are normal.   Cardiovascular: Normal rate and regular rhythm.  Exam reveals no gallop and no friction rub.    Pulmonary/Chest: Effort normal. No respiratory distress. She has no wheezes.   Abdominal: Soft. Bowel sounds are normal. She exhibits no distension. There is no tenderness.   Musculoskeletal: She exhibits edema.   Neurological: She is alert and oriented to person, place, and time. No cranial nerve deficit.   Nursing note and vitals reviewed.      Labs:        Invalid input(s): [id number]  Recent Labs      03/23/18   1605   TROPONINI  0.35*     Recent Labs      03/24/18   0403  03/25/18   0345  03/26/18   0304   SODIUM  139  140  138   POTASSIUM  4.5  3.6  3.4*   CHLORIDE  101  95*  91*   CO2  30  35*  36*   BUN  58*  44*  28*   CREATININE  1.89*  1.30  0.87   MAGNESIUM   --    --   1.2*   PHOSPHORUS   --    --   2.0*   CALCIUM  9.5  8.8  9.0     Recent Labs      03/24/18   0403  03/25/18   0345  03/26/18   0304   ALTSGPT  6   --    --    ASTSGOT  13   --     --    ALKPHOSPHAT  117*   --    --    TBILIRUBIN  1.2   --    --    GLUCOSE  129*  133*  101*     Recent Labs      18   0403  18   0345   RBC  4.08*  4.09*   HEMOGLOBIN  11.8*  12.0   HEMATOCRIT  38.2  37.5   PLATELETCT  142*  135*     Recent Labs      18   0403  18   0345   WBC  6.5  6.6   NEUTSPOLYS  74.70*  77.20*   LYMPHOCYTES  7.60*  7.60*   MONOCYTES  13.80*  12.20   EOSINOPHILS  3.40  2.30   BASOPHILS  0.20  0.20   ASTSGOT  13   --    ALTSGPT  6   --    ALKPHOSPHAT  117*   --    TBILIRUBIN  1.2   --            Hemodynamics:  Temp (24hrs), Av.2 °C (98.9 °F), Min:36.5 °C (97.7 °F), Max:38.1 °C (100.5 °F)  Temperature: 37.7 °C (99.9 °F) (Nurse notified.)  Pulse  Av.8  Min: 58  Max: 101   Blood Pressure : 133/69     Respiratory:    Respiration: 17, Pulse Oximetry: 93 %, O2 Daily Delivery Respiratory : OxyMask     PEP/CPT Method: Positive Airway Pressure Device, Work Of Breathing / Effort: Mild  RUL Breath Sounds: Diminished, RML Breath Sounds: Diminished, RLL Breath Sounds: Diminished, ALEA Breath Sounds: Diminished, LLL Breath Sounds: Diminished  Fluids:    Intake/Output Summary (Last 24 hours) at 18 1406  Last data filed at 18 1300   Gross per 24 hour   Intake              540 ml   Output             3800 ml   Net            -3260 ml     Weight: 119.7 kg (263 lb 14.3 oz)  GI/Nutrition:  Orders Placed This Encounter   Procedures   • Diet Order     Standing Status:   Standing     Number of Occurrences:   1     Order Specific Question:   Diet:     Answer:   Diabetic [3]     Medical Decision Making, by Problem:  Active Hospital Problems    Diagnosis   • COPD (chronic obstructive pulmonary disease) (CMS-HCC) [J44.9]   • Hyperkalemia [E87.5]   • Renal insufficiency [N28.9]   • Pulmonary HTN [I27.20]       Quality-Core Measures   Reviewed items::  Labs reviewed    Assessment and plan  1.FLAVIA/CKD III :improved  2.HTN: BP well controlled  3.Electrolytes: hyperkalemia  corrected  4.Anemia: Hb WNL  5.Volume:on lasix -good UOP    Recs:  daily BMP  Continue Lasix  no need for HD  Renal dose all meds  Avoid nephrotoxins  We will sign off the case, please call if needed

## 2018-03-26 NOTE — PROGRESS NOTES
Assumed care at 0700, bedside report received from night shift RN. Pt is AOx4 with no signs of distress. Pt is SR 89 on the monitor. Initial assessment completed, orders reviewed, call light within reach, and hourly rounding in place. POC addressed with patient, no additional questions at this time.

## 2018-03-26 NOTE — PROGRESS NOTES
[facility] PROGRESS NOTE     Attending: Radha Meza M.D.  Senior Resident: Deon Darden M.D.  Alphonse Resident: Armando Yap M.D.  PATIENT: Siri Solis; [id number]; 1945    ID: 72 y.o. female admitted for new diagnosis of R sided heart failure likely 2/2 cor pulmonale in the setting of COPD, generalized debility.    SUBJECTIVE: No acute events overnight, patient states she slept well feeling much better this AM.     OBJECTIVE:  Vitals:    03/26/18 0115 03/26/18 0325 03/26/18 0352 03/26/18 0801   BP:   123/58    Pulse: 100 88 95 89   Resp: 18 18 17 18   Temp:   37.4 °C (99.3 °F)    SpO2: 97% 94% 96% 96%   Weight:       Height:         Intake/Output Summary (Last 24 hours) at 03/26/18 0814  Last data filed at 03/26/18 0500   Gross per 24 hour   Intake              580 ml   Output             4825 ml   Net            -4245 ml       PE:  General: No acute distress, resting comfortably in bed. Interval improvement in overall appearance.   HEENT: NC/AT. PERRLA. EOMI. MMM  Cardiovascular: RRR with no M/R/G.  Respiratory: Symmetrical chest. Lungs CTABL  Abdomen: soft, NT/ND, no masses, +BS   EXT:  REY, 5/5 strength, Trace LE edema   Neuro: non focal with no numbness, tingling or changes in sensation    LABS:  Recent Labs      03/24/18   0403  03/25/18   0345   WBC  6.5  6.6   RBC  4.08*  4.09*   HEMOGLOBIN  11.8*  12.0   HEMATOCRIT  38.2  37.5   MCV  93.6  91.7   MCH  28.9  29.3   RDW  73.9*  69.1*   PLATELETCT  142*  135*   MPV  10.7  11.1   NEUTSPOLYS  74.70*  77.20*   LYMPHOCYTES  7.60*  7.60*   MONOCYTES  13.80*  12.20   EOSINOPHILS  3.40  2.30   BASOPHILS  0.20  0.20     Recent Labs      03/24/18   0403  03/25/18   0345  03/26/18   0304   SODIUM  139  140  138   POTASSIUM  4.5  3.6  3.4*   CHLORIDE  101  95*  91*   CO2  30  35*  36*   BUN  58*  44*  28*   CREATININE  1.89*  1.30  0.87   CALCIUM  9.5  8.8  9.0   MAGNESIUM   --    --   1.2*   PHOSPHORUS   --    --   2.0*   ALBUMIN  3.8   --    --       Estimated GFR/CRCL = Estimated Creatinine Clearance: 78.5 mL/min (by C-G formula based on SCr of 0.87 mg/dL).  Recent Labs      03/24/18   0403   03/25/18   0345   03/25/18   1708  03/25/18   2138  03/26/18   0304  03/26/18   0513   GLUCOSE  129*   --   133*   --    --    --   101*   --    POCGLUCOSE   --    < >   --    < >  106*  119*   --   95    < > = values in this interval not displayed.     Recent Labs      03/24/18   0403   ASTSGOT  13   ALTSGPT  6   TBILIRUBIN  1.2   ALKPHOSPHAT  117*   GLOBULIN  2.2     Recent Labs      03/23/18   1230  03/23/18   1605   CPKTOTAL  33   --    TROPONINI  0.27*  0.35*           Invalid input(s): [id number]  No results for input(s): INR, APTT, FIBRINOGEN in the last 72 hours.    Invalid input(s): DIMER    MICROBIOLOGY: 3/21 1/2 Blood culture: Aneorobic GN bacilli (likely containment)     IMAGING:   No new CXR    MEDS:  Current Facility-Administered Medications   Medication Last Dose   • magnesium sulfate IVPB premix 4 g 4 g at 03/26/18 0747   • acetaminophen (TYLENOL) tablet 500 mg 500 mg at 03/25/18 1507   • Respiratory Care per Protocol     • nystatin (MYCOSTATIN) powder     • senna-docusate (PERICOLACE or SENOKOT S) 8.6-50 MG per tablet 2 Tab 2 Tab at 03/26/18 0747    And   • polyethylene glycol/lytes (MIRALAX) PACKET 1 Packet      And   • magnesium hydroxide (MILK OF MAGNESIA) suspension 30 mL      And   • bisacodyl (DULCOLAX) suppository 10 mg     • enoxaparin (LOVENOX) inj 40 mg 40 mg at 03/26/18 0748   • insulin regular (HUMULIN R) injection 1-6 Units Stopped at 03/24/18 1700    And   • glucose 4 g chewable tablet 16 g      And   • dextrose 50% (D50W) injection 25 mL Stopped at 03/22/18 0134   • calcium carbonate (OS-ROSE MARY 500) tablet 500 mg 500 mg at 03/26/18 0752   • insulin glargine (LANTUS) injection 15 Units 15 Units at 03/25/18 2224   • omeprazole (PRILOSEC) capsule 20 mg 20 mg at 03/26/18 0746   • sucralfate (CARAFATE) tablet 1 g 1 g at 03/26/18 0510   •  HYDROcodone-acetaminophen (NORCO) 7.5-325 MG per tablet 1 Tab 1 Tab at 03/26/18 0245   • tiotropium (SPIRIVA) 18 MCG inhalation capsule 1 Cap 1 Cap at 03/25/18 2144   • fluticasone (FLONASE) nasal spray 100 mcg 100 mcg at 03/23/18 0832   • cefTRIAXone (ROCEPHIN) syringe 1 g 1 g at 03/26/18 0753   • ezetimibe (ZETIA) tablet 10 mg 10 mg at 03/25/18 2143    And   • simvastatin (ZOCOR) tablet 40 mg 40 mg at 03/25/18 2143   • diphenhydrAMINE (BENADRYL) tablet/capsule 25 mg 25 mg at 03/21/18 2028       PROBLEM LIST:  No problems updated.    ASSESSMENT/PLAN: 71 yo female with a history of COPD (on 4L at home sating 70's-80s), DM II, HTN, opioid dependent chronic pain, benzo dependent insomnia presenting with newly  Right sided Heart failure,  generalized debility, UTI, decreased LLE pulses, and severe hyperkalemia. Patient transferred to ICU for increasing O2 demand and severe hyperkalemia. Now improved dramatically with reducing O2 demand, back on oximask.     #Fluid Overload (improved)  # Acute on CHF   #Cor Pulmonale likely 2/2 advanced COPD   -Elevated BNP at admission with s/s consistent with HF. Echo showing elevated RVSP (80's)  -Baseline O2 at presentation then increasing O2 demand after admission likely 2/2 to above   -Patient on pioglitazone at home however stopped at admission.     -Trop 3/23 0.27-->0.35 likely 2/2 heart failure   -Patient with some lower BPs and pulse at admission, was on chronic BB therapy with atenolol held at admisison.   -Cardiology consulted: Pulmonary HTN likely 2/2 to advanced COPD. Condition likely advanced and end stage. Poor prognosis, high 1 year mortality. Optimize COPD management and continue diuresis.    Interval: Trace edema, no crackles on exam this AM, appreciate cardio recs   -Continue lasix BID   -Continue to monitor BMP    # UTI- Afebrile, no cva tenderness.  Uncomplicated.  Based on exam/sx and ua.  Cx currently with e coli pending sensitivity.  -Ceftriaxone day 6/7  -Blood  Cx 1/2 for G+ Rods, likely a contaminant, will not change coverage as patient is afebrile and clinically improving     # HTN-  BP down to 90s systolic after admission.  -Holding home meds    # Acute hypoxic respiratory failure 2/2 COPD (improving).   Currently on high flow NC at 50L/min FIO2 of 40%    -Continue home inhalers  -O2 as needed, currently on high flow nasal canula, Patient did not tolerate BIPAP  -Continue Rocephin 6/7  -RT to try different O2 delivery system   Interval: Improving overnight, patient was on 10L oximask, now 8L this AM     # DM II- Apparently well controlled, A1c 7.0 6/17.  Adequate control without lows on current regimen.  -Continue 15U glargine HS which is half home dose and ISS  -Minimal insulin coverage   -Holding metformin for FLAVIA  -Hold pioglitazone    # Hyperkalemia(resolved)-  Patient responding appropriately to kayexalate and lasix. 4.5 on 3/24.  -Kayexalate dc per ICU  -Will repleat K+ this AM PO per cardio to keep K= >4.5    # FLAVIA-Resolved  - Baseline cr less than 1.  Currently 0.87<--2.32 (3/23)  -Continue to monitor    # Leg pain- Chronic.  US findings not compatible with claudication.  Pain likely associated with edema.    # Debility- Likely 2/2 CHF, UTI.  TSH with 4.3 11/17.  H/H wnl.  -PT/OT      # Chronic pain  -Continue norco     #Dispo  -Continued tele     #Core Measures   VTE PPx:Lovenox   Abx:Rocephin 6/7  Lines/Tubes:PIV/Sheets (will dc sheets prior to downgrade)  Fluids:None   Diet: Diabetic   Code Status: DNR/DNI    Armando Yap M.D.   PGY-1   [facility]   [phone]

## 2018-03-26 NOTE — RESPIRATORY CARE
COPD EDUCATION by COPD CLINICAL EDUCATOR  3/26/2018 at 9:43 AM by Isabella Patricio     Patient interviewed by COPD education team. Patient refused COPD program at this time. A comprehensive packet including information about COPD, treatments, and smoking cessation given.

## 2018-03-26 NOTE — CARE PLAN
Problem: Nutritional:  Goal: Achieve adequate nutritional intake  Patient will consume 50% of meals   Outcome: MET Date Met: 03/26/18

## 2018-03-26 NOTE — CARE PLAN
Problem: Communication  Goal: The ability to communicate needs accurately and effectively will improve  Outcome: PROGRESSING AS EXPECTED  Pt educated on POC for the day and medications given. Pt educated to voice concerns and ask questions. Pt verbalizes understanding. Pt has call light in reach and uses call light appropriately. Pt has no further questions at this time.     Problem: Skin Integrity  Goal: Risk for impaired skin integrity will decrease  Outcome: PROGRESSING AS EXPECTED  Moisturized used on pt feet. Interdry placed in skin folds. Nystatin used topically as ordered. Pillows in place to float heels and elevate arms. Pt encouraged to turn and get up to chair for meals.

## 2018-03-27 ENCOUNTER — APPOINTMENT (OUTPATIENT)
Dept: RADIOLOGY | Facility: MEDICAL CENTER | Age: 73
DRG: 291 | End: 2018-03-27
Attending: INTERNAL MEDICINE
Payer: COMMERCIAL

## 2018-03-27 LAB
ANION GAP SERPL CALC-SCNC: 6 MMOL/L (ref 0–11.9)
ANISOCYTOSIS BLD QL SMEAR: ABNORMAL
BASOPHILS # BLD AUTO: 0.2 % (ref 0–1.8)
BASOPHILS # BLD: 0.01 K/UL (ref 0–0.12)
BUN SERPL-MCNC: 18 MG/DL (ref 8–22)
CALCIUM SERPL-MCNC: 8.7 MG/DL (ref 8.5–10.5)
CHLORIDE SERPL-SCNC: 94 MMOL/L (ref 96–112)
CO2 SERPL-SCNC: 39 MMOL/L (ref 20–33)
COMMENT 1642: NORMAL
CREAT SERPL-MCNC: 0.81 MG/DL (ref 0.5–1.4)
EOSINOPHIL # BLD AUTO: 0.18 K/UL (ref 0–0.51)
EOSINOPHIL NFR BLD: 3.2 % (ref 0–6.9)
ERYTHROCYTE [DISTWIDTH] IN BLOOD BY AUTOMATED COUNT: 68 FL (ref 35.9–50)
GLUCOSE BLD-MCNC: 117 MG/DL (ref 65–99)
GLUCOSE BLD-MCNC: 150 MG/DL (ref 65–99)
GLUCOSE BLD-MCNC: 168 MG/DL (ref 65–99)
GLUCOSE SERPL-MCNC: 103 MG/DL (ref 65–99)
HCT VFR BLD AUTO: 38.9 % (ref 37–47)
HGB BLD-MCNC: 12.4 G/DL (ref 12–16)
HYPOCHROMIA BLD QL SMEAR: ABNORMAL
IMM GRANULOCYTES # BLD AUTO: 0.01 K/UL (ref 0–0.11)
IMM GRANULOCYTES NFR BLD AUTO: 0.2 % (ref 0–0.9)
LYMPHOCYTES # BLD AUTO: 0.46 K/UL (ref 1–4.8)
LYMPHOCYTES NFR BLD: 8.3 % (ref 22–41)
MAGNESIUM SERPL-MCNC: 1.7 MG/DL (ref 1.5–2.5)
MCH RBC QN AUTO: 29.8 PG (ref 27–33)
MCHC RBC AUTO-ENTMCNC: 31.9 G/DL (ref 33.6–35)
MCV RBC AUTO: 93.5 FL (ref 81.4–97.8)
MONOCYTES # BLD AUTO: 0.81 K/UL (ref 0–0.85)
MONOCYTES NFR BLD AUTO: 14.6 % (ref 0–13.4)
MORPHOLOGY BLD-IMP: NORMAL
NEUTROPHILS # BLD AUTO: 4.07 K/UL (ref 2–7.15)
NEUTROPHILS NFR BLD: 73.5 % (ref 44–72)
NRBC # BLD AUTO: 0 K/UL
NRBC BLD-RTO: 0 /100 WBC
PLATELET # BLD AUTO: 117 K/UL (ref 164–446)
PLATELET BLD QL SMEAR: NORMAL
PMV BLD AUTO: 11.1 FL (ref 9–12.9)
POTASSIUM SERPL-SCNC: 3.8 MMOL/L (ref 3.6–5.5)
RBC # BLD AUTO: 4.16 M/UL (ref 4.2–5.4)
RBC BLD AUTO: PRESENT
SODIUM SERPL-SCNC: 139 MMOL/L (ref 135–145)
STOMATOCYTES BLD QL SMEAR: NORMAL
WBC # BLD AUTO: 5.5 K/UL (ref 4.8–10.8)

## 2018-03-27 PROCEDURE — 83735 ASSAY OF MAGNESIUM: CPT

## 2018-03-27 PROCEDURE — 82962 GLUCOSE BLOOD TEST: CPT | Mod: 91

## 2018-03-27 PROCEDURE — A9270 NON-COVERED ITEM OR SERVICE: HCPCS | Performed by: EMERGENCY MEDICINE

## 2018-03-27 PROCEDURE — 94667 MNPJ CHEST WALL 1ST: CPT

## 2018-03-27 PROCEDURE — 80048 BASIC METABOLIC PNL TOTAL CA: CPT

## 2018-03-27 PROCEDURE — 85025 COMPLETE CBC W/AUTO DIFF WBC: CPT

## 2018-03-27 PROCEDURE — 36415 COLL VENOUS BLD VENIPUNCTURE: CPT

## 2018-03-27 PROCEDURE — 71045 X-RAY EXAM CHEST 1 VIEW: CPT

## 2018-03-27 PROCEDURE — 700102 HCHG RX REV CODE 250 W/ 637 OVERRIDE(OP): Performed by: EMERGENCY MEDICINE

## 2018-03-27 PROCEDURE — 700111 HCHG RX REV CODE 636 W/ 250 OVERRIDE (IP): Performed by: EMERGENCY MEDICINE

## 2018-03-27 PROCEDURE — 700111 HCHG RX REV CODE 636 W/ 250 OVERRIDE (IP): Performed by: INTERNAL MEDICINE

## 2018-03-27 PROCEDURE — 770020 HCHG ROOM/CARE - TELE (206)

## 2018-03-27 PROCEDURE — A9270 NON-COVERED ITEM OR SERVICE: HCPCS | Performed by: STUDENT IN AN ORGANIZED HEALTH CARE EDUCATION/TRAINING PROGRAM

## 2018-03-27 PROCEDURE — 700102 HCHG RX REV CODE 250 W/ 637 OVERRIDE(OP): Performed by: INTERNAL MEDICINE

## 2018-03-27 PROCEDURE — 94668 MNPJ CHEST WALL SBSQ: CPT

## 2018-03-27 PROCEDURE — 700111 HCHG RX REV CODE 636 W/ 250 OVERRIDE (IP): Performed by: STUDENT IN AN ORGANIZED HEALTH CARE EDUCATION/TRAINING PROGRAM

## 2018-03-27 PROCEDURE — 97530 THERAPEUTIC ACTIVITIES: CPT

## 2018-03-27 PROCEDURE — 700102 HCHG RX REV CODE 250 W/ 637 OVERRIDE(OP): Performed by: STUDENT IN AN ORGANIZED HEALTH CARE EDUCATION/TRAINING PROGRAM

## 2018-03-27 PROCEDURE — 97535 SELF CARE MNGMENT TRAINING: CPT

## 2018-03-27 RX ORDER — MAGNESIUM SULFATE HEPTAHYDRATE 40 MG/ML
2 INJECTION, SOLUTION INTRAVENOUS ONCE
Status: COMPLETED | OUTPATIENT
Start: 2018-03-27 | End: 2018-03-27

## 2018-03-27 RX ORDER — SILDENAFIL CITRATE 20 MG/1
10 TABLET ORAL 3 TIMES DAILY
Status: DISCONTINUED | OUTPATIENT
Start: 2018-03-27 | End: 2018-03-29 | Stop reason: HOSPADM

## 2018-03-27 RX ORDER — POTASSIUM CHLORIDE 20 MEQ/1
40 TABLET, EXTENDED RELEASE ORAL DAILY
Status: DISCONTINUED | OUTPATIENT
Start: 2018-03-27 | End: 2018-03-27

## 2018-03-27 RX ORDER — SODIUM CHLORIDE 9 MG/ML
INJECTION, SOLUTION INTRAVENOUS
Status: ACTIVE
Start: 2018-03-27 | End: 2018-03-28

## 2018-03-27 RX ADMIN — INSULIN HUMAN 1 UNITS: 100 INJECTION, SOLUTION PARENTERAL at 17:36

## 2018-03-27 RX ADMIN — METFORMIN HYDROCHLORIDE 850 MG: 850 TABLET ORAL at 17:40

## 2018-03-27 RX ADMIN — ACETAMINOPHEN 500 MG: 500 TABLET ORAL at 01:59

## 2018-03-27 RX ADMIN — OMEPRAZOLE 20 MG: 20 CAPSULE, DELAYED RELEASE ORAL at 20:01

## 2018-03-27 RX ADMIN — INSULIN GLARGINE 15 UNITS: 100 INJECTION, SOLUTION SUBCUTANEOUS at 20:00

## 2018-03-27 RX ADMIN — SILDENAFIL 10 MG: 20 TABLET ORAL at 22:53

## 2018-03-27 RX ADMIN — SUCRALFATE 1 G: 1 TABLET ORAL at 06:34

## 2018-03-27 RX ADMIN — SUCRALFATE 1 G: 1 TABLET ORAL at 11:30

## 2018-03-27 RX ADMIN — CEFTRIAXONE SODIUM 1 G: 10 INJECTION, POWDER, FOR SOLUTION INTRAVENOUS at 11:31

## 2018-03-27 RX ADMIN — SIMVASTATIN 40 MG: 40 TABLET, FILM COATED ORAL at 20:00

## 2018-03-27 RX ADMIN — SUCRALFATE 1 G: 1 TABLET ORAL at 20:02

## 2018-03-27 RX ADMIN — FLUTICASONE PROPIONATE 100 MCG: 50 SPRAY, METERED NASAL at 08:16

## 2018-03-27 RX ADMIN — NYSTATIN: 100000 POWDER TOPICAL at 15:00

## 2018-03-27 RX ADMIN — FUROSEMIDE 40 MG: 10 INJECTION, SOLUTION INTRAMUSCULAR; INTRAVENOUS at 17:39

## 2018-03-27 RX ADMIN — SUCRALFATE 1 G: 1 TABLET ORAL at 17:40

## 2018-03-27 RX ADMIN — Medication 500 MG: at 20:02

## 2018-03-27 RX ADMIN — POTASSIUM CHLORIDE 40 MEQ: 1500 TABLET, EXTENDED RELEASE ORAL at 08:17

## 2018-03-27 RX ADMIN — MAGNESIUM SULFATE IN WATER 2 G: 40 INJECTION, SOLUTION INTRAVENOUS at 12:27

## 2018-03-27 RX ADMIN — EZETIMIBE 10 MG: 10 TABLET ORAL at 20:01

## 2018-03-27 RX ADMIN — NYSTATIN: 100000 POWDER TOPICAL at 08:17

## 2018-03-27 RX ADMIN — OMEPRAZOLE 20 MG: 20 CAPSULE, DELAYED RELEASE ORAL at 08:17

## 2018-03-27 RX ADMIN — NYSTATIN: 100000 POWDER TOPICAL at 22:54

## 2018-03-27 RX ADMIN — TIOTROPIUM BROMIDE 1 CAPSULE: 18 CAPSULE ORAL; RESPIRATORY (INHALATION) at 19:59

## 2018-03-27 RX ADMIN — FUROSEMIDE 40 MG: 10 INJECTION, SOLUTION INTRAMUSCULAR; INTRAVENOUS at 06:34

## 2018-03-27 RX ADMIN — Medication 500 MG: at 08:17

## 2018-03-27 RX ADMIN — ENOXAPARIN SODIUM 40 MG: 100 INJECTION SUBCUTANEOUS at 08:16

## 2018-03-27 RX ADMIN — HYDROCODONE BITARTRATE AND ACETAMINOPHEN 1 TABLET: 7.5; 325 TABLET ORAL at 14:38

## 2018-03-27 ASSESSMENT — COGNITIVE AND FUNCTIONAL STATUS - GENERAL
WALKING IN HOSPITAL ROOM: A LITTLE
DRESSING REGULAR LOWER BODY CLOTHING: A LOT
TURNING FROM BACK TO SIDE WHILE IN FLAT BAD: A LOT
TOILETING: A LITTLE
MOVING FROM LYING ON BACK TO SITTING ON SIDE OF FLAT BED: UNABLE
MOVING TO AND FROM BED TO CHAIR: UNABLE
DRESSING REGULAR UPPER BODY CLOTHING: A LITTLE
STANDING UP FROM CHAIR USING ARMS: A LITTLE
MOBILITY SCORE: 12
PERSONAL GROOMING: A LITTLE
SUGGESTED CMS G CODE MODIFIER DAILY ACTIVITY: CK
CLIMB 3 TO 5 STEPS WITH RAILING: A LOT
DAILY ACTIVITIY SCORE: 17
HELP NEEDED FOR BATHING: A LOT
SUGGESTED CMS G CODE MODIFIER MOBILITY: CL

## 2018-03-27 ASSESSMENT — GAIT ASSESSMENTS
DISTANCE (FEET): 40
ASSISTIVE DEVICE: FRONT WHEEL WALKER
GAIT LEVEL OF ASSIST: CONTACT GUARD ASSIST
DEVIATION: INCREASED BASE OF SUPPORT;BRADYKINETIC;SHUFFLED GAIT

## 2018-03-27 ASSESSMENT — PAIN SCALES - GENERAL
PAINLEVEL_OUTOF10: 0
PAINLEVEL_OUTOF10: 2
PAINLEVEL_OUTOF10: 0
PAINLEVEL_OUTOF10: 8
PAINLEVEL_OUTOF10: 1
PAINLEVEL_OUTOF10: 5

## 2018-03-27 ASSESSMENT — LIFESTYLE VARIABLES: DO YOU DRINK ALCOHOL: NO

## 2018-03-27 NOTE — WOUND TEAM
Patient seen by wound team per consult order. Bilateral ears assessed. Patient has mild redness to right superior ear, blanches, no pressure wounds at this time. Patient with silicone oxygen tubing in place with gray foam pads. Skin beneath pannus much improved since photo taken yesterday. Nystatin powder and interdry cloths being applied per nursing, no draining or open wounds at this time. Discussed POC with staff RN. No advanced wound care needs at this time.

## 2018-03-27 NOTE — CARE PLAN
Problem: Hyperinflation:  Goal: Prevent or improve atelectasis  Outcome: PROGRESSING AS EXPECTED    Intervention: Instruct incentive spirometry usage  60% of predicted IS value 1410, pt's best value today 1250.  Intervention: Perform hyperinflation therapy as indicated by assessment  PEP QID        Problem: Oxygenation:  Goal: Maintain adequate oxygenation dependent on patient condition  Outcome: PROGRESSING AS EXPECTED    Intervention: Manage oxygen therapy by monitoring pulse oximetry and/or ABG values  Pt on 5L NC, SPO2 92%.  Will continue to titrate as tolerated.

## 2018-03-27 NOTE — PROGRESS NOTES
"Pulmonary Progress Note    Patient ID:   Name:             Siri Solis   YOB: 1945  Age:                 72 y.o.  female   MRN:               [id number]                                                  Subjective: feels weak; denies chest pain; shortness of breath requiring higher O2    Interval Changes: O2 sat= 90% on 5 L    Objective:   Vitals/ General Appearance:   Weight/BMI: Body mass index is 37.81 kg/m².  Blood pressure 138/82, pulse 94, temperature 36.7 °C (98 °F), resp. rate 20, height 1.753 m (5' 9.02\"), weight 116.2 kg (256 lb 2.8 oz), SpO2 90 %, not currently breastfeeding.  Vitals:    03/27/18 0701 03/27/18 0724 03/27/18 1057 03/27/18 1127   BP:  145/85  138/82   Pulse: 97 (!) 111 (!) 103 94   Resp: 17 20 17 20   Temp:  36.7 °C (98.1 °F)  36.7 °C (98 °F)   SpO2: 92% 91% 92% 90%   Weight:       Height:         Oxygen Therapy:  Pulse Oximetry: 90 %, O2 (LPM): 5, O2 Delivery: Silicone Nasal Cannula    Constitutional:   Well developed, Well nourished, No acute distress  HENMT:  Normocephalic, Atraumatic, Oropharynx moist mucous membranes, No oral exudates, Nose normal.  No thyromegaly.  Eyes:  EOMI, Conjunctiva normal, No discharge.  Neck:  Normal range of motion, No cervical tenderness,  no JVD.  Cardiovascular:  Normal heart rate, Normal rhythm, No murmurs, No rubs, No gallops.   Extremitites with intact distal pulses, no cyanosis, 1+ edema.  Lungs:  Normal breath sounds, breath sounds clear to auscultation bilaterally,  no crackles, no wheezing.   Abdomen: Bowel sounds normal, Soft, No tenderness, No guarding, No rebound, No masses, No hepatosplenomegaly.  Skin: Warm, Dry, No erythema, No rash, no induration.  Neurologic: Alert & oriented x 3, No focal deficits noted, cranial nerves II through X are grossly intact.  Psychiatric: Affect normal, Judgment normal, Mood normal.    Labs:  Recent Labs      03/25/18   0345  03/27/18   0631   WBC  6.6  5.5   RBC  4.09*  4.16*   HEMOGLOBIN  " "12.0  12.4   HEMATOCRIT  37.5  38.9   MCV  91.7  93.5   MCH  29.3  29.8   MCHC  32.0*  31.9*   RDW  69.1*  68.0*   PLATELETCT  135*  117*   MPV  11.1  11.1                 Recent Labs      03/25/18   0345  03/26/18   0304  03/27/18   0631   SODIUM  140  138  139   POTASSIUM  3.6  3.4*  3.8   CHLORIDE  95*  91*  94*   CO2  35*  36*  39*   GLUCOSE  133*  101*  103*   BUN  44*  28*  18     Recent Labs      03/25/18   0345  03/26/18   0304  03/27/18   0631   SODIUM  140  138  139   POTASSIUM  3.6  3.4*  3.8   CHLORIDE  95*  91*  94*   CO2  35*  36*  39*   BUN  44*  28*  18   CREATININE  1.30  0.87  0.81   MAGNESIUM   --   1.2*  1.7   PHOSPHORUS   --   2.0*   --    CALCIUM  8.8  9.0  8.7     Results for orders placed or performed during the hospital encounter of 03/21/18   ECHOCARDIOGRAM COMP W/O CONT   Result Value Ref Range    Eject.Frac. MOD BP 64.6     Eject.Frac. MOD 4C 63.98     Eject.Frac. MOD 2C 61.95     Left Ventrical Ejection Fraction 60        Last Resulted Time   Thu Mar 22, 2018 12:52 PM   Images     Show images for ECHOCARDIOGRAM COMP W/O CONT   Imaging Result Status     Status: Final result (Collected: 3/22/2018 11:41 AM)   Imaging Previous Results     Open Hard Copy Result Report (Order #[id number] - ECHOCARDIOGRAM COMP W/O CONT)   Narrative     Transthoracic  Echo Report      Echocardiography Laboratory    CONCLUSIONS  Left ventricular ejection fraction is visually estimated to be 60%.  Flattened septum in diastole (\"D\"-shaped left ventricle) consistent   with RV volume overload.  Severely dilated right ventricle.  Severely enlarged right atrium.  Mildly dilated left atrium.  Right ventricular systolic pressure is estimated to be 85 mmHg.  Severe tricuspid regurgitation.  Calcified aortic valve leaflets.  Aortic sclerosis without stenosis.  Trace mitral regurgitation.  Results to ordering physiscian via [place] [employer] at 1250 hrs.         Imaging:   DX-CHEST-PORTABLE (1 VIEW)   Final Result         1. No " significant interval change.      DX-CHEST-PORTABLE (1 VIEW)   Final Result         1. No significant interval change. Unchanged airspace opacity in the left lower lobe.      DX-CHEST-PORTABLE (1 VIEW)   Final Result      Cardiomegaly.      DX-CHEST-LIMITED (1 VIEW)   Final Result      Stable chest appearance compared with 3/23.               INTERPRETING LOCATION: [address] [place] [address], [place], [address]      US-RENAL   Final Result      1.  Normal renal ultrasound.   2.  Minimal ascites.      LE VENOUS DUPLEX (DVT)   Final Result      DX-CHEST-PORTABLE (1 VIEW)   Final Result      No significant interval change.      ECHOCARDIOGRAM COMP W/O CONT   Final Result      LE ART DUPLX/IMAG (Specify in Comments Left, Right Or Bilateral)   Final Result      DX-CHEST-LIMITED (1 VIEW)   Final Result      1.  Bibasilar atelectasis/consolidation.      2.  Cardiomegaly.      3.  Chronic elevation of the right hemidiaphragm.          Hospital Medications:    Current Facility-Administered Medications:   •  magnesium sulfate IVPB premix 2 g, 2 g, Intravenous, Once, Armando Yap M.D., Last Rate: 25 mL/hr at 03/27/18 1227, 2 g at 03/27/18 1227  •  SODIUM CHLORIDE 0.9 % IV SOLN, , , ,   •  metFORMIN (GLUCOPHAGE) tablet 850 mg, 850 mg, Oral, BID WITH MEALS, Armando Yap M.D.  •  potassium chloride SA (Kdur) tablet 40 mEq, 40 mEq, Oral, DAILY, Armando Yap M.D., 40 mEq at 03/27/18 0817  •  furosemide (LASIX) injection 40 mg, 40 mg, Intravenous, BID DIURETIC, Armando Yap M.D., 40 mg at 03/27/18 0634  •  acetaminophen (TYLENOL) tablet 500 mg, 500 mg, Oral, Q6HRS PRN, Armando Yap M.D., 500 mg at 03/27/18 0159  •  Respiratory Care per Protocol, , Nebulization, Continuous RT, Mary Brewer M.D.  •  nystatin (MYCOSTATIN) powder, , Topical, TID, Mary Brewer M.D.  •  senna-docusate (PERICOLACE or SENOKOT S) 8.6-50 MG per tablet 2 Tab, 2 Tab, Oral, BID, 2 Tab at 03/26/18 0747 **AND** polyethylene glycol/lytes (MIRALAX)  PACKET 1 Packet, 1 Packet, Oral, QDAY PRN **AND** magnesium hydroxide (MILK OF MAGNESIA) suspension 30 mL, 30 mL, Oral, QDAY PRN **AND** bisacodyl (DULCOLAX) suppository 10 mg, 10 mg, Rectal, QDAY PRN, Chalino Darden M.D.  •  enoxaparin (LOVENOX) inj 40 mg, 40 mg, Subcutaneous, DAILY, Chalino Darden M.D., 40 mg at 03/27/18 0816  •  insulin regular (HUMULIN R) injection 1-6 Units, 1-6 Units, Subcutaneous, 4X/DAY ACHS, Stopped at 03/24/18 1700 **AND** Accu-Chek ACHS, , , Q AC AND BEDTIME(S) **AND** NOTIFY MD and PharmD, , , Once **AND** glucose 4 g chewable tablet 16 g, 16 g, Oral, Q15 MIN PRN **AND** dextrose 50% (D50W) injection 25 mL, 25 mL, Intravenous, Q15 MIN PRN, Chalino Darden M.D., Stopped at 03/22/18 0134  •  calcium carbonate (OS-ROSE MARY 500) tablet 500 mg, 500 mg, Oral, BID, Chalino Darden M.D., 500 mg at 03/27/18 0817  •  insulin glargine (LANTUS) injection 15 Units, 15 Units, Subcutaneous, Nightly, Chalino Darden M.D., 15 Units at 03/26/18 2053  •  omeprazole (PRILOSEC) capsule 20 mg, 20 mg, Oral, BID, Chalino Darden M.D., 20 mg at 03/27/18 0817  •  sucralfate (CARAFATE) tablet 1 g, 1 g, Oral, 4X/DAY ACHS, Chalino Darden M.D., 1 g at 03/27/18 1130  •  HYDROcodone-acetaminophen (NORCO) 7.5-325 MG per tablet 1 Tab, 1 Tab, Oral, Q4HRS PRN, Chalino Darden M.D., 1 Tab at 03/26/18 0245  •  tiotropium (SPIRIVA) 18 MCG inhalation capsule 1 Cap, 1 Cap, Inhalation, Q EVENING, Chalino Darden M.D., 1 Cap at 03/26/18 2045  •  fluticasone (FLONASE) nasal spray 100 mcg, 2 Spray, Nasal, DAILY, Kisha Cobb M.D., 100 mcg at 03/27/18 0816  •  ezetimibe (ZETIA) tablet 10 mg, 10 mg, Oral, QHS, 10 mg at 03/26/18 2045 **AND** simvastatin (ZOCOR) tablet 40 mg, 40 mg, Oral, Q EVENING, Chalino Darden M.D., 40 mg at 03/26/18 2045  •  diphenhydrAMINE (BENADRYL) tablet/capsule 25 mg, 25 mg, Oral, HS PRN, Chalino Darden M.D., 25 mg at 03/21/18 2028    Current Outpatient Medications:  Prescriptions Prior to  Admission   Medication Sig Dispense Refill Last Dose   • pioglitazone-metformin (ACTOPLUS MET)  MG per tablet Take 1 Tab by mouth 2 times a day, with meals.   3/20/2018 at 1800   • ezetimibe-simvastatin (VYTORIN) 10-40 MG per tablet Take 1 Tab by mouth every evening.   3/20/2018 at 1800   • atenolol (TENORMIN) 100 MG Tab Take 100 mg by mouth every morning.   3/20/2018 at 0900   • omeprazole (PRILOSEC) 20 MG delayed-release capsule Take 20 mg by mouth every evening.   3/20/2018 at 1800   • temazepam (RESTORIL) 30 MG capsule Take 30 mg by mouth at bedtime as needed for Sleep.   3/20/2018 at 2200   • tiotropium (SPIRIVA HANDIHALER) 18 MCG Cap Inhale 18 mcg by mouth every evening.   3/20/2018 at 2200   • lisinopril (PRINIVIL) 10 MG Tab Take 10 mg by mouth every day.   3/20/2018 at 1800   • HYDROcodone-acetaminophen (NORCO) 7.5-325 MG per tablet Take 1 Tab by mouth every four hours as needed for Severe Pain.   3/20/2018 at 2200   • insulin glargine (LANTUS) 100 UNIT/ML Solution Inject 32 Units as instructed every evening.   3/20/2018 at 2200   • magnesium oxide (MAG-OX) 400 MG Tab Take 400 mg by mouth every day.   3/20/2018 at 1800   • Calcium Carb-Cholecalciferol (CALCIUM 1000 + D) 1000-800 MG-UNIT Tab Take 1 Tab by mouth every evening.   3/20/2018 at 1800       Medication Allergy:  Allergies   Allergen Reactions   • Zoloft Diarrhea   • Metformin Hives, Rash and Itching   • Amaryl [Amaryl]    • Amaryl [Glimepiride] Shortness of Breath and Rash   • Amoxicillin Rash   • Byetta Rash   • Epinephrine Shortness of Breath     Panic attack, Can't breath   • Hctz      Stopped urinary output   • Iodine Anaphylaxis     contrast   • Lasix [Furosemide]      Leg cramps, stopped urinary output   • Latex    • Lexapro      Leg cramping   • Penicillins Rash   • Spironolactone        Assessment and Plan:  Active Problems:    COPD (chronic obstructive pulmonary disease) (CMS-[facility]) POA: Yes    Pulmonary HTN POA: Yes    Hyperkalemia  POA: Unknown    Renal insufficiency POA: Unknown  Resolved Problems:    * No resolved hospital problems.     *Acute on chronic hypoxic respiratory failure.              - now onO2 at 4 lpm             - RT/O2 protocols              - cont IS/PEP therapy              - cont forced diuresis  Acute on Chronic Severe pulmonary hypertension                  - noted ECHO from 2012 with elevated RVSP at 57-73              - new ECHO with RVSP of 85              - cont O2 therapy              - needs work up:  HIV, PFTs, sleep study              - suspect due to primary lung disease and chronic hypoxia              - cont aggressive diuresis and O2 therapy              - may need repeat ECHO vs RT heat cath              - DVT study negative and doubt PE (no hx of dvt/pe, still CTEPH could always be entertained)                Will add Sildenafil 10 mg TID       Hyperkalemia.              - improving with medical therapies  Acute kidney injury.              - cont to improve              - nephrology following  Acute pulmonary edema.              - improving with force diuresis     Mild hyperglycemia.              - ISS/lantus  Acute tracheobronchitis              - no obvious infiltrate              - cont C3  History of oxygen-dependent chronic obstructive pulmonary disease.              - spiriva              - cont O2 therapy  History of type 2 diabetes.              - ISS/lantus  History of hypertension.              - holding home meds  History of dyslipidemia.              - cont zetia/zocor  History of GERD              - ppi/carafate  Prophylaxis              - lovenox, scds

## 2018-03-27 NOTE — DISCHARGE PLANNING
[facility] received call back from [employer] MD discussing pt's choice in HH. MD reports pt would like [facility] and [facility] faxed choice form to [facility].    Plan: [facility] will continue to assist with pt's d/c needs.

## 2018-03-27 NOTE — PROGRESS NOTES
[facility] FAMILY MEDICINE PROGRESS NOTE     Attending: Radha Meza M.D.  Senior Resident: Deon Darden M.D.  Alphonse Resident: Armando Yap M.D.  PATIENT: Siri Solis; [id number]; 1945    ID: 72 y.o. female admitted for acute hypoxic respiratory failure (resolved) 2/2 acute on chronic heart failure with fluid overload 2/2 cor pulmonale in the setting of COPD, generalized debility.    SUBJECTIVE: No acute events overnight, patient does not have any complaints this AM.     OBJECTIVE:     Vitals:    03/26/18 2334 03/27/18 0410 03/27/18 0701 03/27/18 0724   BP: 119/60 137/80  145/85   Pulse: (!) 101 94 97 (!) 111   Resp: 16 16 17 20   Temp: 37.3 °C (99.2 °F) 36.7 °C (98.1 °F)  36.7 °C (98.1 °F)   SpO2: 93% 95% 92% 91%   Weight:       Height:           Intake/Output Summary (Last 24 hours) at 03/27/18 0839  Last data filed at 03/27/18 0600   Gross per 24 hour   Intake             1080 ml   Output             3700 ml   Net            -2620 ml       PE:  General: No acute distress, resting comfortably in bed. Interval improvement in overall appearance.   HEENT: NC/AT. PERRLA. EOMI. MMM  Cardiovascular: RRR with no M/R/G.  Respiratory: Symmetrical chest. Lungs CTABL  Abdomen: soft, NT/ND, no masses, +BS   EXT:  REY, 5/5 strength, Trace LE edema   Neuro: non focal with no numbness, tingling or changes in sensation    LABS:  Recent Labs      03/25/18   0345  03/27/18   0631   WBC  6.6  5.5   RBC  4.09*  4.16*   HEMOGLOBIN  12.0  12.4   HEMATOCRIT  37.5  38.9   MCV  91.7  93.5   MCH  29.3  29.8   RDW  69.1*  68.0*   PLATELETCT  135*  117*   MPV  11.1  11.1   NEUTSPOLYS  77.20*  73.50*   LYMPHOCYTES  7.60*  8.30*   MONOCYTES  12.20  14.60*   EOSINOPHILS  2.30  3.20   BASOPHILS  0.20  0.20   RBCMORPHOLO   --   Present     Recent Labs      03/25/18   0345  03/26/18   0304  03/27/18   0631   SODIUM  140  138  139   POTASSIUM  3.6  3.4*  3.8   CHLORIDE  95*  91*  94*   CO2  35*  36*  39*   BUN  44*  28*  18   CREATININE   1.30  0.87  0.81   CALCIUM  8.8  9.0  8.7   MAGNESIUM   --   1.2*  1.7   PHOSPHORUS   --   2.0*   --      Estimated GFR/CRCL = Estimated Creatinine Clearance: 82.9 mL/min (by C-G formula based on SCr of 0.81 mg/dL).  Recent Labs      03/25/18   0345   03/26/18   0304   03/26/18   1724  03/26/18   2052  03/27/18   0631  03/27/18   0636   GLUCOSE  133*   --   101*   --    --    --   103*   --    POCGLUCOSE   --    < >   --    < >  106*  117*   --   117*    < > = values in this interval not displayed.                   Invalid input(s): [id number]  No results for input(s): INR, APTT, FIBRINOGEN in the last 72 hours.    Invalid input(s): DIMER    MICROBIOLOGY: 3/21 1/2 Blood culture: Aneorobic GN bacilli (likely containment)     IMAGING:   CXR: 3/27  1. No significant interval change.    MEDS:  Current Facility-Administered Medications   Medication Last Dose   • potassium chloride SA (Kdur) tablet 40 mEq 40 mEq at 03/27/18 0817   • furosemide (LASIX) injection 40 mg 40 mg at 03/27/18 0634   • acetaminophen (TYLENOL) tablet 500 mg 500 mg at 03/27/18 0159   • Respiratory Care per Protocol     • nystatin (MYCOSTATIN) powder     • senna-docusate (PERICOLACE or SENOKOT S) 8.6-50 MG per tablet 2 Tab 2 Tab at 03/26/18 0747    And   • polyethylene glycol/lytes (MIRALAX) PACKET 1 Packet      And   • magnesium hydroxide (MILK OF MAGNESIA) suspension 30 mL      And   • bisacodyl (DULCOLAX) suppository 10 mg     • enoxaparin (LOVENOX) inj 40 mg 40 mg at 03/27/18 0816   • insulin regular (HUMULIN R) injection 1-6 Units Stopped at 03/24/18 1700    And   • glucose 4 g chewable tablet 16 g      And   • dextrose 50% (D50W) injection 25 mL Stopped at 03/22/18 0134   • calcium carbonate (OS-ROSE MARY 500) tablet 500 mg 500 mg at 03/27/18 0817   • insulin glargine (LANTUS) injection 15 Units 15 Units at 03/26/18 2053   • omeprazole (PRILOSEC) capsule 20 mg 20 mg at 03/27/18 0817   • sucralfate (CARAFATE) tablet 1 g 1 g at 03/27/18 0634   •  HYDROcodone-acetaminophen (NORCO) 7.5-325 MG per tablet 1 Tab 1 Tab at 03/26/18 0245   • tiotropium (SPIRIVA) 18 MCG inhalation capsule 1 Cap 1 Cap at 03/26/18 2045   • fluticasone (FLONASE) nasal spray 100 mcg 100 mcg at 03/27/18 0816   • cefTRIAXone (ROCEPHIN) syringe 1 g 1 g at 03/26/18 0753   • ezetimibe (ZETIA) tablet 10 mg 10 mg at 03/26/18 2045    And   • simvastatin (ZOCOR) tablet 40 mg 40 mg at 03/26/18 2045   • diphenhydrAMINE (BENADRYL) tablet/capsule 25 mg 25 mg at 03/21/18 2028       PROBLEM LIST:  No problems updated.    ASSESSMENT/PLAN:73 yo female with a history of COPD (on 4L at home sating 70's-80s), DM II, HTN, opioid dependent chronic pain, benzo dependent insomnia presenting with newly  Right sided Heart failure,  generalized debility, UTI, decreased LLE pulses, and severe hyperkalemia. Patient transferred to ICU for increasing O2 demand and severe hyperkalemia. Now improved dramatically with reducing O2 demand, on home O2 4L NC.    #Acute Hypoxic Respiratory Failure 2/2   #Acute on chronic heart failure (Improved)   #Cor Pulmonale likely 2/2 advanced COPD + chronic hypoxia   - Patient has had net 15 L off since admission   -Creatinine still stable with good urine output   -Now back on Baseline O2   -Will continue IV diuresis until creatinine bump  -will dc on PO diuresis per Cardio   -Will repleat to keep K+ >4.5 and Mg >2.5   -Continue to monitor BMP     # UTI- Febrile x1 yesterday , no cva tenderness.  Uncomplicated.  Based on exam/sx and ua.  Cx currently with e coli pending sensitivity.  -Ceftriaxone day 7/7  -Blood Cx 1/2 for G+ Rods, likely a contaminant, will not change coverage as patient is afebrile and clinically improving     # DM II- Apparently well controlled, A1c 7.0 6/17.  Adequate control without lows on current regimen.  -Continue 15U glargine HS which is half home dose and ISS  -Minimal insulin coverage   -Restart Metformin 3/27  -Hold pioglitazone     #  Hyperkalemia(resolved)-  Patient responding appropriately to kayexalate and lasix. 4.5 on 3/24.  -Will repleat K+ this AM PO per cardio to keep K= >4.5     # FLAVIA-Resolved  - Baseline cr less than 1.  Currently 0.87<--2.32 (3/23)  -Continue to monitor     # Leg pain- Chronic.  US findings not compatible with claudication.  Pain likely associated with edema.     # Chronic pain  -Continue norco     #Dispo  -Continued tele   -Patient declining placement   -Will place consult for Home health and PT/OT  For dc     Discharge Needs:  -Home Safety eval   -Cardio f/u  -Pulmonary f/u  -Appt with PCP   -Labs (Mg and BMP) prior to PCP appointment   -Home health   -Home PT  -Home OT     #Core Measures   VTE PPx:Lovenox   Abx:Rocephin 7/7  Lines/Tubes:PIV/Jovel   Fluids:None   Diet: Diabetic     Armando Yap M.D.   PGY-1   [facility]   [phone]

## 2018-03-27 NOTE — DISCHARGE PLANNING
SW met with pt to choose choice on HH.  Pt's  reports he will not pick a company to work with until he communicates with pt's MD and PCP [place]. Sw called this office and they provided a phone number for pt [phone].  SW provided choice form and this number.

## 2018-03-27 NOTE — FACE TO FACE
Face to Face Supporting Documentation - Home Health    The encounter with this patient was in whole or in part the primary reason for home health admission.    Date of encounter:   Patient:                    MRN:                       YOB: 2020  Siri Solis  [id number]  1945     Home health to see patient for:  Physical Therapy evaluation and treatment and Occupational therapy evaluation and treatment    Skilled need for:  Exacerbation of Chronic Disease State CHF, hypoxia    Skilled nursing interventions to include:  Comment: Debility and inability to perform ADLs    Homebound status evidenced by:  Need the aid of supportive devices such as crutches, canes, wheelchairs or walkers or Needs the assistance of another person in order to leave the home. Leaving home requires a considerable and taxing effort. There is a normal inability to leave the home.    Community Physician to provide follow up care: [place] M.D.     Optional Interventions? No      I certify the face to face encounter for this home health care referral meets the CMS requirements and the encounter/clinical assessment with the patient was, in whole, or in part, for the medical condition(s) listed above, which is the primary reason for home health care. Based on my clinical findings: the service(s) are medically necessary, support the need for home health care, and the homebound criteria are met.  I certify that this patient has had a face to face encounter by myself.  Chalino Darden M.D. - NPI: [id number]

## 2018-03-27 NOTE — CARE PLAN
Problem: Safety  Goal: Will remain free from falls  Outcome: PROGRESSING AS EXPECTED  Patient calls appropriately for assistance to [facility].    Problem: Knowledge Deficit  Goal: Knowledge of disease process/condition, treatment plan, diagnostic tests, and medications will improve  Outcome: PROGRESSING AS EXPECTED  Patient updated on plan of care.

## 2018-03-27 NOTE — THERAPY
"Occupational Therapy Treatment completed with focus on ADLs, ADL transfers, patient education and upper extremity function.  Functional Status:  Min A supine>sit EOB (requested HOB be elevated), max A LB dressing to don socks (pt reports spouse assists), CGA w/sit>stand walked to bathroom w/fww and CGA, stood at sink for grooming w/close SBA, c/o fatigue standing activity. Declined use of toilet, txf to chair at bedside w/CGA remained up in chair, call light and tray table w/in reach, alarm on and spouse present. Encouraged to continue daily OOB activity. Discussed w/pt and RN pt is capable of ambulating to the bathroom for use of toilet.  Plan of Care: Will benefit from Occupational Therapy 3 times per week  Discharge Recommendations:  Equipment Will Continue to Assess for Equipment Needs. Post-acute therapy TBD- pt is requesting to go home, however is not yet demonstrating functional levels of ADL's. Educated pt that goal should pt ambulating to bathroom, up in chair for meals and discussion w/spouse on how much care he can provide to pt.     See \"Rehab Therapy-Acute\" Patient Summary Report for complete documentation.     Pt seen for OT tx, pt has made some progress w/improved bed mobility and ADL participation, however remains w/generlized deconditioning and low self efficacy to participate in tasks. Pt will continue to benefit from acute OT, pt may benefit from post acute placement to maximize independence and safety however pt prefers home. Recommend daily OOB activity w/nsg in this setting to continue to improve pts functional status.   "

## 2018-03-27 NOTE — PROGRESS NOTES
"Pulmonary Progress Note    Patient ID:   Name:             Siri Solis   YOB: 1945  Age:                 72 y.o.  female   MRN:               [id number]                                                  Subjective:  Breathing better    Interval Changes: less edema, loss 15 L of fluid since admission; O2v sat 95 % 0n 4 L O2    Objective:   Vitals/ General Appearance:   Weight/BMI: Body mass index is 38.95 kg/m².  Blood pressure 143/69, pulse 88, temperature 37.4 °C (99.3 °F), resp. rate 20, height 1.753 m (5' 9.02\"), weight 119.7 kg (263 lb 14.3 oz), SpO2 95 %, not currently breastfeeding.  Vitals:    03/26/18 1558 03/26/18 1610 03/26/18 1725 03/26/18 2001   BP:  143/69     Pulse: 99 97  88   Resp: 18 18  20   Temp:  37.4 °C (99.3 °F)     SpO2: 91% 93% 93% 95%   Weight:       Height:         Oxygen Therapy:  Pulse Oximetry: 95 %, O2 (LPM): 4, O2 Delivery: Oxymask    Constitutional:   Well developed, Well nourished, No acute distress; weak  HENMT:  Normocephalic, Atraumatic, Oropharynx moist mucous membranes, No oral exudates, Nose normal.  No thyromegaly.  Eyes:  EOMI, Conjunctiva normal, No discharge.  Neck:  Normal range of motion, No cervical tenderness,  no JVD.  Cardiovascular:  Normal heart rate, Normal rhythm, No murmurs, No rubs, No gallops. Load P2  Extremitites with intact distal pulses, no cyanosis, 1+ edema.  Lungs:  Normal breath sounds, few rales to auscultation bilaterally,   no wheezing.   Abdomen: Bowel sounds normal, Soft, No tenderness, No guarding, No rebound, No masses, No hepatosplenomegaly.  Skin: Warm, Dry, No erythema, No rash, no induration.  Neurologic: Alert & oriented x 3, No focal deficits noted, cranial nerves II through X are grossly intact.  Psychiatric: Affect normal, Judgment normal, Mood normal.    Labs:  Recent Labs      03/24/18   0403  03/25/18   0345   WBC  6.5  6.6   RBC  4.08*  4.09*   HEMOGLOBIN  11.8*  12.0   HEMATOCRIT  38.2  37.5   MCV  93.6  91.7 "   MCH  28.9  29.3   MCHC  30.9*  32.0*   RDW  73.9*  69.1*   PLATELETCT  142*  135*   MPV  10.7  11.1                 Recent Labs      03/24/18   0403  03/25/18   0345  03/26/18   0304   SODIUM  139  140  138   POTASSIUM  4.5  3.6  3.4*   CHLORIDE  101  95*  91*   CO2  30  35*  36*   GLUCOSE  129*  133*  101*   BUN  58*  44*  28*     Recent Labs      03/24/18   0403  03/25/18   0345  03/26/18   0304   SODIUM  139  140  138   POTASSIUM  4.5  3.6  3.4*   CHLORIDE  101  95*  91*   CO2  30  35*  36*   BUN  58*  44*  28*   CREATININE  1.89*  1.30  0.87   MAGNESIUM   --    --   1.2*   PHOSPHORUS   --    --   2.0*   CALCIUM  9.5  8.8  9.0     Results for orders placed or performed during the hospital encounter of 03/21/18   ECHOCARDIOGRAM COMP W/O CONT   Result Value Ref Range    Eject.Frac. MOD BP 64.6     Eject.Frac. MOD 4C 63.98     Eject.Frac. MOD 2C 61.95     Left Ventrical Ejection Fraction 60          Imaging:   DX-CHEST-PORTABLE (1 VIEW)   Final Result         1. No significant interval change. Unchanged airspace opacity in the left lower lobe.      DX-CHEST-PORTABLE (1 VIEW)   Final Result      Cardiomegaly.      DX-CHEST-LIMITED (1 VIEW)   Final Result      Stable chest appearance compared with 3/23.               INTERPRETING LOCATION: [address] [place] [address], [facility]      US-RENAL   Final Result      1.  Normal renal ultrasound.   2.  Minimal ascites.      LE VENOUS DUPLEX (DVT)   Final Result      DX-CHEST-PORTABLE (1 VIEW)   Final Result      No significant interval change.      ECHOCARDIOGRAM COMP W/O CONT   Final Result      LE ART DUPLX/IMAG (Specify in Comments Left, Right Or Bilateral)   Final Result      DX-CHEST-LIMITED (1 VIEW)   Final Result      1.  Bibasilar atelectasis/consolidation.      2.  Cardiomegaly.      3.  Chronic elevation of the right hemidiaphragm.      DX-CHEST-PORTABLE (1 VIEW)    (Results Pending)       Hospital Medications:    Current Facility-Administered Medications:   •  potassium  chloride SA (Kdur) tablet 40 mEq, 40 mEq, Oral, DAILY, Armando Yap M.D., 40 mEq at 03/26/18 1502  •  furosemide (LASIX) injection 40 mg, 40 mg, Intravenous, BID DIURETIC, Armando Yap M.D., 40 mg at 03/26/18 1516  •  acetaminophen (TYLENOL) tablet 500 mg, 500 mg, Oral, Q6HRS PRN, Armando Yap M.D., 500 mg at 03/26/18 1721  •  Respiratory Care per Protocol, , Nebulization, Continuous RT, Mary Brewer M.D.  •  nystatin (MYCOSTATIN) powder, , Topical, TID, Mary Berwer M.D.  •  senna-docusate (PERICOLACE or SENOKOT S) 8.6-50 MG per tablet 2 Tab, 2 Tab, Oral, BID, 2 Tab at 03/26/18 0747 **AND** polyethylene glycol/lytes (MIRALAX) PACKET 1 Packet, 1 Packet, Oral, QDAY PRN **AND** magnesium hydroxide (MILK OF MAGNESIA) suspension 30 mL, 30 mL, Oral, QDAY PRN **AND** bisacodyl (DULCOLAX) suppository 10 mg, 10 mg, Rectal, QDAY PRN, Chalino Darden M.D.  •  enoxaparin (LOVENOX) inj 40 mg, 40 mg, Subcutaneous, DAILY, Chalino Darden M.D., 40 mg at 03/26/18 0748  •  insulin regular (HUMULIN R) injection 1-6 Units, 1-6 Units, Subcutaneous, 4X/DAY ACHS, Stopped at 03/24/18 1700 **AND** Accu-Chek ACHS, , , Q AC AND BEDTIME(S) **AND** NOTIFY MD and PharmD, , , Once **AND** glucose 4 g chewable tablet 16 g, 16 g, Oral, Q15 MIN PRN **AND** dextrose 50% (D50W) injection 25 mL, 25 mL, Intravenous, Q15 MIN PRN, Chalino Darden M.D., Stopped at 03/22/18 0134  •  calcium carbonate (OS-ROSE MARY 500) tablet 500 mg, 500 mg, Oral, BID, Chalino Darden M.D., 500 mg at 03/26/18 0752  •  insulin glargine (LANTUS) injection 15 Units, 15 Units, Subcutaneous, Nightly, Chalino Darden M.D., 15 Units at 03/25/18 2147  •  omeprazole (PRILOSEC) capsule 20 mg, 20 mg, Oral, BID, Chalino Darden M.D., 20 mg at 03/26/18 0746  •  sucralfate (CARAFATE) tablet 1 g, 1 g, Oral, 4X/DAY ACHS, Chalino Darden M.D., 1 g at 03/26/18 1720  •  HYDROcodone-acetaminophen (NORCO) 7.5-325 MG per tablet 1 Tab, 1 Tab, Oral, Q4HRS PRNChalino  PARK Darden, 1 Tab at 03/26/18 0245  •  tiotropium (SPIRIVA) 18 MCG inhalation capsule 1 Cap, 1 Cap, Inhalation, Q EVENING, Chalino Darden M.D., 1 Cap at 03/25/18 2144  •  fluticasone (FLONASE) nasal spray 100 mcg, 2 Spray, Nasal, DAILY, Kisha Cobb M.D., 100 mcg at 03/23/18 0832  •  cefTRIAXone (ROCEPHIN) syringe 1 g, 1 g, Intravenous, Q24HRS, Mary Brewer M.D., 1 g at 03/26/18 0753  •  ezetimibe (ZETIA) tablet 10 mg, 10 mg, Oral, QHS, 10 mg at 03/25/18 2143 **AND** simvastatin (ZOCOR) tablet 40 mg, 40 mg, Oral, Q EVENING, Chalino Darden M.D., 40 mg at 03/25/18 2143  •  diphenhydrAMINE (BENADRYL) tablet/capsule 25 mg, 25 mg, Oral, HS PRN, Chalino Darden M.D., 25 mg at 03/21/18 2028    Current Outpatient Medications:  Prescriptions Prior to Admission   Medication Sig Dispense Refill Last Dose   • pioglitazone-metformin (ACTOPLUS MET)  MG per tablet Take 1 Tab by mouth 2 times a day, with meals.   3/20/2018 at 1800   • ezetimibe-simvastatin (VYTORIN) 10-40 MG per tablet Take 1 Tab by mouth every evening.   3/20/2018 at 1800   • atenolol (TENORMIN) 100 MG Tab Take 100 mg by mouth every morning.   3/20/2018 at 0900   • omeprazole (PRILOSEC) 20 MG delayed-release capsule Take 20 mg by mouth every evening.   3/20/2018 at 1800   • temazepam (RESTORIL) 30 MG capsule Take 30 mg by mouth at bedtime as needed for Sleep.   3/20/2018 at 2200   • tiotropium (SPIRIVA HANDIHALER) 18 MCG Cap Inhale 18 mcg by mouth every evening.   3/20/2018 at 2200   • lisinopril (PRINIVIL) 10 MG Tab Take 10 mg by mouth every day.   3/20/2018 at 1800   • HYDROcodone-acetaminophen (NORCO) 7.5-325 MG per tablet Take 1 Tab by mouth every four hours as needed for Severe Pain.   3/20/2018 at 2200   • insulin glargine (LANTUS) 100 UNIT/ML Solution Inject 32 Units as instructed every evening.   3/20/2018 at 2200   • magnesium oxide (MAG-OX) 400 MG Tab Take 400 mg by mouth every day.   3/20/2018 at 1800   • Calcium Carb-Cholecalciferol  (CALCIUM 1000 + D) 1000-800 MG-UNIT Tab Take 1 Tab by mouth every evening.   3/20/2018 at 1800       Medication Allergy:  Allergies   Allergen Reactions   • Zoloft Diarrhea   • Metformin Hives, Rash and Itching   • Amaryl [Amaryl]    • Amaryl [Glimepiride] Shortness of Breath and Rash   • Amoxicillin Rash   • Byetta Rash   • Epinephrine Shortness of Breath     Panic attack, Can't breath   • Hctz      Stopped urinary output   • Iodine Anaphylaxis     contrast   • Lasix [Furosemide]      Leg cramps, stopped urinary output   • Latex    • Lexapro      Leg cramping   • Penicillins Rash   • Spironolactone        Assessment and Plan:  Active Problems:    COPD (chronic obstructive pulmonary disease) (CMS-[facility]) POA: Yes    Pulmonary HTN POA: Yes    Hyperkalemia POA: Unknown    Renal insufficiency POA: Unknown  Resolved Problems:    * No resolved hospital problems. *  Acute on chronic hypoxic respiratory failure.              - now onO2 at 4 lpm             - RT/O2 protocols              - cont IS/PEP therapy              - cont forced diuresis  Acute on Chronic Severe pulmonary hypertension                  - noted ECHO from 2012 with elevated RVSP at 57-73              - new ECHO with RVSP of 85              - cont O2 therapy              - needs work up:  HIV, PFTs, sleep study              - suspect due to primary lung disease and chronic hypoxia              - cont aggressive diuresis and O2 therapy              - may need repeat ECHO vs RT heat cath              - DVT study negative and doubt PE (no hx of dvt/pe, still CTEPH could always be entertained)  Hyperkalemia.              - improving with medical therapies  Acute kidney injury.              - cont to improve              - nephrology following  Acute pulmonary edema.              - improving with force diuresis              Mild hyperglycemia.              - ISS/lantus  Acute tracheobronchitis              - no obvious infiltrate              - cont C3  History  of oxygen-dependent chronic obstructive pulmonary disease.              - spiriva              - cont O2 therapy  History of type 2 diabetes.              - ISS/lantus  History of hypertension.              - holding home meds  History of dyslipidemia.              - cont zetia/zocor  History of GERD              - ppi/carafate  Prophylaxis              - lovenox, scds

## 2018-03-28 ENCOUNTER — HOME HEALTH ADMISSION (OUTPATIENT)
Dept: HOME HEALTH SERVICES | Facility: HOME HEALTHCARE | Age: 73
End: 2018-03-28
Payer: COMMERCIAL

## 2018-03-28 LAB
ANION GAP SERPL CALC-SCNC: 8 MMOL/L (ref 0–11.9)
BUN SERPL-MCNC: 17 MG/DL (ref 8–22)
CALCIUM SERPL-MCNC: 8.9 MG/DL (ref 8.5–10.5)
CHLORIDE SERPL-SCNC: 94 MMOL/L (ref 96–112)
CO2 SERPL-SCNC: 36 MMOL/L (ref 20–33)
CREAT SERPL-MCNC: 0.78 MG/DL (ref 0.5–1.4)
GLUCOSE BLD-MCNC: 108 MG/DL (ref 65–99)
GLUCOSE BLD-MCNC: 113 MG/DL (ref 65–99)
GLUCOSE BLD-MCNC: 123 MG/DL (ref 65–99)
GLUCOSE BLD-MCNC: 148 MG/DL (ref 65–99)
GLUCOSE BLD-MCNC: 97 MG/DL (ref 65–99)
GLUCOSE SERPL-MCNC: 92 MG/DL (ref 65–99)
MAGNESIUM SERPL-MCNC: 1.7 MG/DL (ref 1.5–2.5)
POTASSIUM SERPL-SCNC: 3.7 MMOL/L (ref 3.6–5.5)
SODIUM SERPL-SCNC: 138 MMOL/L (ref 135–145)

## 2018-03-28 PROCEDURE — 700111 HCHG RX REV CODE 636 W/ 250 OVERRIDE (IP): Performed by: EMERGENCY MEDICINE

## 2018-03-28 PROCEDURE — 700102 HCHG RX REV CODE 250 W/ 637 OVERRIDE(OP): Performed by: EMERGENCY MEDICINE

## 2018-03-28 PROCEDURE — 83735 ASSAY OF MAGNESIUM: CPT

## 2018-03-28 PROCEDURE — A9270 NON-COVERED ITEM OR SERVICE: HCPCS | Performed by: EMERGENCY MEDICINE

## 2018-03-28 PROCEDURE — 36415 COLL VENOUS BLD VENIPUNCTURE: CPT

## 2018-03-28 PROCEDURE — 93308 TTE F-UP OR LMTD: CPT | Mod: 26 | Performed by: INTERNAL MEDICINE

## 2018-03-28 PROCEDURE — 700102 HCHG RX REV CODE 250 W/ 637 OVERRIDE(OP): Performed by: INTERNAL MEDICINE

## 2018-03-28 PROCEDURE — 700111 HCHG RX REV CODE 636 W/ 250 OVERRIDE (IP): Performed by: STUDENT IN AN ORGANIZED HEALTH CARE EDUCATION/TRAINING PROGRAM

## 2018-03-28 PROCEDURE — 770020 HCHG ROOM/CARE - TELE (206)

## 2018-03-28 PROCEDURE — A9270 NON-COVERED ITEM OR SERVICE: HCPCS | Performed by: STUDENT IN AN ORGANIZED HEALTH CARE EDUCATION/TRAINING PROGRAM

## 2018-03-28 PROCEDURE — 82962 GLUCOSE BLOOD TEST: CPT

## 2018-03-28 PROCEDURE — 94668 MNPJ CHEST WALL SBSQ: CPT

## 2018-03-28 PROCEDURE — 700102 HCHG RX REV CODE 250 W/ 637 OVERRIDE(OP): Performed by: STUDENT IN AN ORGANIZED HEALTH CARE EDUCATION/TRAINING PROGRAM

## 2018-03-28 PROCEDURE — 700105 HCHG RX REV CODE 258: Performed by: STUDENT IN AN ORGANIZED HEALTH CARE EDUCATION/TRAINING PROGRAM

## 2018-03-28 PROCEDURE — 80048 BASIC METABOLIC PNL TOTAL CA: CPT

## 2018-03-28 PROCEDURE — 93308 TTE F-UP OR LMTD: CPT

## 2018-03-28 RX ORDER — FUROSEMIDE 10 MG/ML
40 INJECTION INTRAMUSCULAR; INTRAVENOUS ONCE
Status: DISCONTINUED | OUTPATIENT
Start: 2018-03-28 | End: 2018-03-28

## 2018-03-28 RX ORDER — MAGNESIUM SULFATE HEPTAHYDRATE 40 MG/ML
4 INJECTION, SOLUTION INTRAVENOUS ONCE
Status: COMPLETED | OUTPATIENT
Start: 2018-03-28 | End: 2018-03-28

## 2018-03-28 RX ADMIN — SUCRALFATE 1 G: 1 TABLET ORAL at 06:12

## 2018-03-28 RX ADMIN — METFORMIN HYDROCHLORIDE 850 MG: 850 TABLET ORAL at 17:31

## 2018-03-28 RX ADMIN — ENOXAPARIN SODIUM 40 MG: 100 INJECTION SUBCUTANEOUS at 08:57

## 2018-03-28 RX ADMIN — FLUTICASONE PROPIONATE 100 MCG: 50 SPRAY, METERED NASAL at 08:57

## 2018-03-28 RX ADMIN — MAGNESIUM SULFATE IN WATER 4 G: 40 INJECTION, SOLUTION INTRAVENOUS at 10:54

## 2018-03-28 RX ADMIN — SILDENAFIL 10 MG: 20 TABLET ORAL at 16:16

## 2018-03-28 RX ADMIN — NYSTATIN: 100000 POWDER TOPICAL at 16:17

## 2018-03-28 RX ADMIN — ACETAMINOPHEN 500 MG: 500 TABLET ORAL at 16:16

## 2018-03-28 RX ADMIN — OMEPRAZOLE 20 MG: 20 CAPSULE, DELAYED RELEASE ORAL at 08:55

## 2018-03-28 RX ADMIN — SUCRALFATE 1 G: 1 TABLET ORAL at 21:24

## 2018-03-28 RX ADMIN — NYSTATIN: 100000 POWDER TOPICAL at 08:57

## 2018-03-28 RX ADMIN — SILDENAFIL 10 MG: 20 TABLET ORAL at 08:54

## 2018-03-28 RX ADMIN — POTASSIUM CHLORIDE 40 MEQ: 1500 TABLET, EXTENDED RELEASE ORAL at 08:56

## 2018-03-28 RX ADMIN — ACETAMINOPHEN 500 MG: 500 TABLET ORAL at 01:50

## 2018-03-28 RX ADMIN — DIPHENHYDRAMINE HCL 25 MG: 25 TABLET ORAL at 23:38

## 2018-03-28 RX ADMIN — EZETIMIBE 10 MG: 10 TABLET ORAL at 21:24

## 2018-03-28 RX ADMIN — INSULIN GLARGINE 15 UNITS: 100 INJECTION, SOLUTION SUBCUTANEOUS at 21:28

## 2018-03-28 RX ADMIN — SUCRALFATE 1 G: 1 TABLET ORAL at 17:31

## 2018-03-28 RX ADMIN — Medication 500 MG: at 21:24

## 2018-03-28 RX ADMIN — FUROSEMIDE 40 MG: 10 INJECTION, SOLUTION INTRAMUSCULAR; INTRAVENOUS at 10:55

## 2018-03-28 RX ADMIN — HYDROCODONE BITARTRATE AND ACETAMINOPHEN 1 TABLET: 7.5; 325 TABLET ORAL at 08:53

## 2018-03-28 RX ADMIN — Medication 500 MG: at 08:54

## 2018-03-28 RX ADMIN — TIOTROPIUM BROMIDE 1 CAPSULE: 18 CAPSULE ORAL; RESPIRATORY (INHALATION) at 22:27

## 2018-03-28 RX ADMIN — SILDENAFIL 10 MG: 20 TABLET ORAL at 21:26

## 2018-03-28 RX ADMIN — METFORMIN HYDROCHLORIDE 850 MG: 850 TABLET ORAL at 08:55

## 2018-03-28 RX ADMIN — OMEPRAZOLE 20 MG: 20 CAPSULE, DELAYED RELEASE ORAL at 21:26

## 2018-03-28 RX ADMIN — NYSTATIN: 100000 POWDER TOPICAL at 21:26

## 2018-03-28 RX ADMIN — SUCRALFATE 1 G: 1 TABLET ORAL at 10:53

## 2018-03-28 RX ADMIN — SIMVASTATIN 40 MG: 40 TABLET, FILM COATED ORAL at 21:25

## 2018-03-28 RX ADMIN — POTASSIUM CHLORIDE 20 MEQ: 2 INJECTION, SOLUTION, CONCENTRATE INTRAVENOUS at 07:06

## 2018-03-28 RX ADMIN — ACETAMINOPHEN 500 MG: 500 TABLET ORAL at 22:27

## 2018-03-28 ASSESSMENT — PAIN SCALES - GENERAL
PAINLEVEL_OUTOF10: 0
PAINLEVEL_OUTOF10: 6
PAINLEVEL_OUTOF10: 9
PAINLEVEL_OUTOF10: 4
PAINLEVEL_OUTOF10: 8
PAINLEVEL_OUTOF10: 3
PAINLEVEL_OUTOF10: 4
PAINLEVEL_OUTOF10: 4

## 2018-03-28 NOTE — PROGRESS NOTES
"[facility] FAMILY MEDICINE PROGRESS NOTE     Attending: Paige Loya M.D.  Senior Resident: Deon Darden M.D.  Alphonse Resident: Armando Yap M.D.  PATIENT: Siri Solis; [id number]; 1945    ID: 72 y.o. female admitted for acute hypoxic respiratory failure (resolved) 2/2 acute on chronic heart failure with fluid overload 2/2 cor pulmonale in the setting of COPD, generalized debility/deconditioning.    SUBJECTIVE: No acute events overnight. Patient states she is feeling well this morning.     OBJECTIVE:     Vitals:    03/27/18 2052 03/27/18 2125 03/28/18 0125 03/28/18 0500   BP: 140/73  143/71 112/59   Pulse: 100 98 (!) 110 (!) 101   Resp: 18 16 18 16   Temp: 37.1 °C (98.8 °F)  37.3 °C (99.1 °F) 37.2 °C (98.9 °F)   SpO2: 94% 95% 90% 91%   Weight: 114.2 kg (251 lb 12.3 oz)      Height:         No intake or output data in the 24 hours ending 03/28/18 0627    PE:  General: No acute distress, resting comfortably in her chair \"resting her tailbone\".   HEENT: NC/AT. PERRLA. EOMI. MMM  Cardiovascular: RRR with no M/R/G.  Respiratory: Symmetrical chest. CTABL  Abdomen: soft, NT/ND, no masses, +BS   EXT:  REY, 5/5 strength, Trace LE edema   Neuro: non focal with no numbness, tingling or changes in sensation  Skin: Patient has two lesions on her chest, one is ~1x1cm and pearly with neovascular markings the other is crusty with heaped up margins     LABS:  Recent Labs      03/27/18   0631   WBC  5.5   RBC  4.16*   HEMOGLOBIN  12.4   HEMATOCRIT  38.9   MCV  93.5   MCH  29.8   RDW  68.0*   PLATELETCT  117*   MPV  11.1   NEUTSPOLYS  73.50*   LYMPHOCYTES  8.30*   MONOCYTES  14.60*   EOSINOPHILS  3.20   BASOPHILS  0.20   RBCMORPHOLO  Present     Recent Labs      03/26/18   0304  03/27/18   0631  03/28/18   0241   SODIUM  138  139  138   POTASSIUM  3.4*  3.8  3.7   CHLORIDE  91*  94*  94*   CO2  36*  39*  36*   BUN  28*  18  17   CREATININE  0.87  0.81  0.78   CALCIUM  9.0  8.7  8.9   MAGNESIUM  1.2*  1.7  1.7   PHOSPHORUS  2.0*   --  "   --      Estimated GFR/CRCL = Estimated Creatinine Clearance: 85.3 mL/min (by C-G formula based on SCr of 0.78 mg/dL).  Recent Labs      03/26/18   0304   03/27/18   0631   03/27/18   1127  03/27/18   1728  03/27/18 1957 03/28/18   0241   GLUCOSE  101*   --   103*   --    --    --    --   92   POCGLUCOSE   --    < >   --    < >  150*  168*  148*   --     < > = values in this interval not displayed.                   Invalid input(s): [id number]  No results for input(s): INR, APTT, FIBRINOGEN in the last 72 hours.    Invalid input(s): DIMER    IMAGING: No New     MEDS:  Current Facility-Administered Medications   Medication Last Dose   • magnesium sulfate IVPB premix 4 g     • potassium chloride 20 mEq in  mL IVPB     • metFORMIN (GLUCOPHAGE) tablet 850 mg 850 mg at 03/27/18 1740   • sildenafil (REVATIO) tablet 10 mg 10 mg at 03/27/18 2253   • potassium chloride SA (Kdur) tablet 40 mEq 40 mEq at 03/27/18 0817   • furosemide (LASIX) injection 40 mg 40 mg at 03/27/18 1739   • acetaminophen (TYLENOL) tablet 500 mg 500 mg at 03/28/18 0150   • Respiratory Care per Protocol     • nystatin (MYCOSTATIN) powder     • senna-docusate (PERICOLACE or SENOKOT S) 8.6-50 MG per tablet 2 Tab 2 Tab at 03/26/18 0747    And   • polyethylene glycol/lytes (MIRALAX) PACKET 1 Packet      And   • magnesium hydroxide (MILK OF MAGNESIA) suspension 30 mL      And   • bisacodyl (DULCOLAX) suppository 10 mg     • enoxaparin (LOVENOX) inj 40 mg 40 mg at 03/27/18 0816   • insulin regular (HUMULIN R) injection 1-6 Units Stopped at 03/27/18 1957    And   • glucose 4 g chewable tablet 16 g      And   • dextrose 50% (D50W) injection 25 mL Stopped at 03/22/18 0134   • calcium carbonate (OS-ROSE MARY 500) tablet 500 mg 500 mg at 03/27/18 2002   • insulin glargine (LANTUS) injection 15 Units 15 Units at 03/27/18 2000   • omeprazole (PRILOSEC) capsule 20 mg 20 mg at 03/27/18 2001   • sucralfate (CARAFATE) tablet 1 g 1 g at 03/28/18 0612   •  HYDROcodone-acetaminophen (NORCO) 7.5-325 MG per tablet 1 Tab 1 Tab at 03/27/18 1438   • tiotropium (SPIRIVA) 18 MCG inhalation capsule 1 Cap 1 Cap at 03/27/18 1959   • fluticasone (FLONASE) nasal spray 100 mcg 100 mcg at 03/27/18 0816   • ezetimibe (ZETIA) tablet 10 mg 10 mg at 03/27/18 2001    And   • simvastatin (ZOCOR) tablet 40 mg 40 mg at 03/27/18 2000   • diphenhydrAMINE (BENADRYL) tablet/capsule 25 mg 25 mg at 03/21/18 2028       PROBLEM LIST:  No problems updated.    ASSESSMENT/PLAN: 73 yo female with a history of COPD (on 4L at home sating 70's-80s), DM II, HTN, opioid dependent chronic pain, benzo dependent insomnia presenting with worsening  Right sided Heart failure,  generalized debility, UTI, decreased LLE pulses, and severe hyperkalemia. Patient transferred to ICU for increasing O2 demand and severe hyperkalemia. Now improved dramatically with reducing O2 demand. Now stable near previous O2 level 4L.      #Acute Hypoxic Respiratory Failure (Improved) 2/2   #Acute on chronic heart failure (Improved)   #Cor Pulmonale likely 2/2   #advanced COPD + chronic hypoxia   - Patient has had net 16 L off since admission   -Creatinine still stable with good urine output   -Now close to Baseline O2   -will dc on PO diuresis per Cardio   -Will repleat to keep K+ >4.5 and Mg >2.5   -Continue to monitor BMP, Creatinine currently WNL  -Continue IV diuresis   -Patient started on sildenafil TID per cards 3/27  -Workup as oupatient to include: HIV, PFTs, Sleep study   -Echo prior to discharge to compare to echo on 3/22 in fluid overloaded state     # DM II- Apparently well controlled, A1c 7.0 6/17.  Adequate control without lows on current regimen.  -Continue 15U glargine HS which is half home dose and ISS  -Minimal insulin coverage   -Restart Metformin 3/27  -Hold pioglitazone     #Skin Lesions   -Likely basal cell and or SCC  -will need outpatient f/u for biopsy/excision     # Hyperkalemia(resolved)-  Patient  responding appropriately to kayexalate and lasix. 4.5 on 3/24.     # FLAVIA-(Resolved)  - Currently 0.78<--2.32 (3/23)  -Continue to monitor     # UTI-(Treated) , no cva tenderness.  Uncomplicated.  Based on exam/sx and ua.    -Completed 7 day course of ceftriaxone   -Blood Cx 1/2 for G+ Rods, likely a contaminant, will not change coverage as patient is afebrile and clinically improving     # Leg pain- Chronic.  US findings not compatible with claudication.  Pain likely associated with edema.     # Chronic pain  -Continue norco     #Dispo  -Continued tele   -Patient being evaluated for home health currently   -Patient may benefit from continue IV diuresis      Discharge Needs:  -Home Safety eval   -Cardio f/u  -Pulmonary f/u  -Appt with PCP   -Labs (Mg and BMP) prior to PCP appointment   -CHF Group   -Home health   -Home PT  -Home OT      #Core Measures   VTE PPx:Lovenox   Abx:None , completed rocephin 7/7  Lines/Tubes:PIV/Jovel   Fluids:None   Diet: Diabetic     Armando Yap M.D.   PGY-1   [facility]   [phone]

## 2018-03-28 NOTE — PROGRESS NOTES
Assumed care at 1900, bedside report received from day shift RN. Pt. Is ST on the monitor. Initial assessment completed, orders reviewed, call light within reach, bed alarm in use, and hourly rounding in place. POC addressed with patient, no additional questions at this time.

## 2018-03-28 NOTE — DISCHARGE PLANNING
[facility] received an updated HH choice form. Per the choice form the referral has been sent to HH of .

## 2018-03-28 NOTE — DISCHARGE PLANNING
GARETH called bedside nurse and charge nurse to discuss need for O2 sats for pt.    Gareth faxed HH choice to [facility] because pt's insurance will not work with [facility].  GARETH discussed this with Md.  MD reports no issues with this HH agency.     Plan:  SW will continue to assist with pt's d/c needs.

## 2018-03-28 NOTE — CARE PLAN
Problem: Safety  Goal: Will remain free from injury  Outcome: PROGRESSING AS EXPECTED  Pt remains free from injury. Bed in lowest position, locked, and alarm activated. Nonskid footwear in place. Call light and personal belongings within reach. Hourly rounding.    Problem: Skin Integrity  Goal: Risk for impaired skin integrity will decrease  Outcome: PROGRESSING AS EXPECTED  Head to toe skin assessment complete. Bilat ears red, blanching. Foam pads and silicone tubing in place. Refusing to offload tubing. Pannus and breasts red, moist. Nystatin powder applied and innerdrys changed. Sacrum pink, blanching. Encouraged to reposition [id number]. Up and down frequently between bed and chair this shift. Waffle cushion on chair. Bilat heels floated on pillows.

## 2018-03-28 NOTE — DISCHARGE PLANNING
SW called [facility] to f/u on referral and they provided verbal confirmation that pt has been accepted.  SW communicated this with pt and pt's spouse.    Plan:  SW will communicate this with bedside nurse. SW will continue to assist with pt's d/c needs.

## 2018-03-28 NOTE — CARE PLAN
Problem: Hyperinflation:  Goal: Prevent or improve atelectasis  Outcome: PROGRESSING AS EXPECTED  PEP QID  IS best 1000mL    Problem: Oxygenation:  Goal: Maintain adequate oxygenation dependent on patient condition  Outcome: PROGRESSING AS EXPECTED  6 LNC

## 2018-03-28 NOTE — DISCHARGE PLANNING
ATTN: Case Management  RE: Referral for Home Health    Reason for referral denial: We are not in network with this patients particular [facility] insurance plan                We would like to take this opportunity to thank you for submitting a referral for your patient to continue the journey to recovery with [facility]. We hope to facilitate continued referrals from your facility as our goal is to provide quality, skilled care to as many patients as possible.            Unfortunately, we are not able to accept your patient into our service for the reason listed above. If further clarity is needed, our Intake Coordinators are available to discuss any barriers to service.            If you have any questions or concerns regarding this or future patients’ transition to Home Health, please do not hesitate to contact us. We are open for referrals 7 days a week from 8AM to 5PM at [phone].      We look forward to collaborating with you in the future,  [facility] Team

## 2018-03-28 NOTE — PROGRESS NOTES
Dr. Wilks clarified with RN that pt currently has CHF and will need outpateint followup with cardiologist after discharge. Anticipated discharge 3/29/18. Message left at Shelly Hsu extension

## 2018-03-28 NOTE — PROGRESS NOTES
Assumed pt care. Pt resting in bed, agitated. Plan of care discussed with pt. Verbalizes understanding. MD came to bedside and pt agreed to keep in the sheets and take her 0600 lasix that she refused earlier this morning. Only IV is infiltrated. RN removed IV, but pt refusing new IV, as she wants to eat. Will start new IV after pt is done eating breakfast, then run potassium and magnesium per MAR. Bed in lowest position, locked, and alarm activated. Call light within reach. ST with several PAC's on monitor.

## 2018-03-28 NOTE — FACE TO FACE
Face to Face Note  -  Durable Medical Equipment    Chalino Darden M.D. - NPI: [id number]  I certify that this patient is under my care and that they had a durable medical equipment(DME)face to face encounter by myself that meets the physician DME face-to-face encounter requirements with this patient on:    Date of encounter:   Patient:                    MRN:                       YOB: 2020  Siri Solis  [id number]  1945     The encounter with the patient was in whole, or in part, for the following medical condition, which is the primary reason for durable medical equipment:  CHF    I certify that, based on my findings, the following durable medical equipment is medically necessary:  Oxygen.    HOME O2 Saturation Measurements:(Values must be present for Home Oxygen orders)  Room air sat at rest: 82  Room air sat with amb: 65  With liters of O2: 7, O2 sat at rest with O2: 92  With Liters of O2: 7, O2 sat with amb with O2 : 78  Is the patient mobile?: Yes    My Clinical findings support the need for the above equipment due to:  Hypoxia    Supporting Symptoms: Dyspnea, exercise intolerance.

## 2018-03-28 NOTE — NON-PROVIDER
[facility] [place] [facility] [place]     Attending: Paige Loya M.D.  Senior Resident: Deon Darden M.D.  Alphonse Resident: Armando Yap M.D.  PATIENT: Siri Solis; [id number]; 1945    ID: 72 y.o. Female with history of HFpEF, COPD, htn, insulin dependent diabetes mellitus, who presented with 1-month progressive SOB and weakness who was found to be in acute on chronic respiratory distress, critically hyperkalemic, and in acute renal failure.     SUBJECTIVE: No acute events overnight. Patient is breathing progressively better. She denies chest pain, abdominal pain, nausea, vomiting, fevers, or chills. She continues to have a mild productive cough she attributes to nasal congestion. She continues to walk with physical therapy to the hallway and back, but has shortness of breath and increased oxygen demands while ambulating.    OBJECTIVE:     Vitals:    03/28/18 0500 03/28/18 0719 03/28/18 0740 03/28/18 1117   BP: 112/59 129/60  128/59   Pulse: (!) 101 98 95 (!) 102   Resp: 16 18 17 16   Temp: 37.2 °C (98.9 °F) 37.4 °C (99.3 °F)     SpO2: 91% 90% 91% 92%   Weight:       Height:           Intake/Output Summary (Last 24 hours) at 03/28/18 1327  Last data filed at 03/28/18 0900   Gross per 24 hour   Intake                0 ml   Output             1700 ml   Net            -1700 ml       PE:  General: No acute distress, resting comfortably in bed. Slightly short of breath when moving around bed/talking.   HEENT: NC/AT. PERRLA. EOMI. MMM  Cardiovascular: RRR with no M/R/G. Distant heart sounds.   Respiratory: Symmetrical chest. Bilateral crackles on auscultation; good respiratory effort.   Abdomen: soft, NT/ND, no masses, +BS   EXT:  REY, 5/5 strength, trace pitting edema bilaterally.   Neuro: non focal with no numbness, tingling or changes in sensation    LABS:  Recent Labs      03/27/18   0631   WBC  5.5   RBC  4.16*   HEMOGLOBIN  12.4   HEMATOCRIT  38.9   MCV  93.5   MCH  29.8   RDW  68.0*   PLATELETCT  117*   MPV   11.1   NEUTSPOLYS  73.50*   LYMPHOCYTES  8.30*   MONOCYTES  14.60*   EOSINOPHILS  3.20   BASOPHILS  0.20   RBCMORPHOLO  Present     Recent Labs      03/26/18 0304 03/27/18 0631 03/28/18   0241   SODIUM  138  139  138   POTASSIUM  3.4*  3.8  3.7   CHLORIDE  91*  94*  94*   CO2  36*  39*  36*   BUN  28*  18  17   CREATININE  0.87  0.81  0.78   CALCIUM  9.0  8.7  8.9   MAGNESIUM  1.2*  1.7  1.7   PHOSPHORUS  2.0*   --    --      Estimated GFR/CRCL = Estimated Creatinine Clearance: 85.3 mL/min (by C-G formula based on SCr of 0.78 mg/dL).  Recent Labs      03/26/18 0304 03/27/18 0631 03/27/18 1957 03/28/18 0241 03/28/18   0611  03/28/18   1147   GLUCOSE  101*   --   103*   --    --   92   --    --    POCGLUCOSE   --    < >   --    < >  148*   --   97  123*    < > = values in this interval not displayed.                   Invalid input(s): [id number]  No results for input(s): INR, APTT, FIBRINOGEN in the last 72 hours.    Invalid input(s): DIMER    MICROBIOLOGY:   Urine culture 3/21: positive for E. Coli  Culture & Susceptibility     ESCHERICHIA COLI     Antibiotic Sensitivity Microscan Unit Status   Ampicillin Sensitive <=8 mcg/mL Final   Cefepime Sensitive <=8 mcg/mL Final   Cefotaxime Sensitive <=2 mcg/mL Final   Cefotetan Sensitive <=16 mcg/mL Final   Ceftazidime Sensitive <=1 mcg/mL Final   Ceftriaxone Sensitive <=8 mcg/mL Final   Cefuroxime Sensitive <=4 mcg/mL Final   Cephalothin Sensitive <=8 mcg/mL Final   Ciprofloxacin Sensitive <=1 mcg/mL Final   Gentamicin Sensitive <=4 mcg/mL Final   Levofloxacin Sensitive <=2 mcg/mL Final   Nitrofurantoin Sensitive <=32 mcg/mL Final   Pip/Tazobactam Sensitive <=16 mcg/mL Final   Piperacillin Sensitive <=16 mcg/mL Final   Tigecycline Sensitive <=2 mcg/mL Final   Tobramycin Sensitive <=4 mcg/mL Final   Trimeth/Sulfa Sensitive <=2/38 mcg/mL Final        Blood culture from 3/21:   Component   Significant Indicator  (Positive)   POS (POS)    Source   BLD     Site   PERIPHERAL    Blood Culture  (Abnormal)   Growth detected by Bactec instrument. 03/25/2018  04:41     Blood Culture  (Abnormal)    (A)   Bacillus sp.- possible contaminant   Isolated from one bottle only, possible skin contaminant   please correlate with clinical condition.              IMAGING:   PENDING: ECHO    CXR portable: 3/26  3/27/2018 6:06 AM    HISTORY/REASON FOR EXAM:  Shortness of Breath      TECHNIQUE/EXAM DESCRIPTION AND NUMBER OF VIEWS:  Single portable view of the chest.    COMPARISON: 3/26/2018    FINDINGS:  Low lung volume.  Diffuse interstitial prominence. Unchanged airspace opacity in the left lower lobe.  No pleural effusion. No pneumothorax.    Stable enlarged cardiopericardial silhouette.      MEDS:  Current Facility-Administered Medications   Medication Last Dose   • magnesium sulfate IVPB premix 4 g 4 g at 03/28/18 1054   • metFORMIN (GLUCOPHAGE) tablet 850 mg 850 mg at 03/28/18 0855   • sildenafil (REVATIO) tablet 10 mg 10 mg at 03/28/18 0854   • potassium chloride SA (Kdur) tablet 40 mEq 40 mEq at 03/28/18 0856   • furosemide (LASIX) injection 40 mg 40 mg at 03/28/18 1055   • acetaminophen (TYLENOL) tablet 500 mg 500 mg at 03/28/18 0150   • Respiratory Care per Protocol     • nystatin (MYCOSTATIN) powder     • senna-docusate (PERICOLACE or SENOKOT S) 8.6-50 MG per tablet 2 Tab 2 Tab at 03/26/18 0747    And   • polyethylene glycol/lytes (MIRALAX) PACKET 1 Packet      And   • magnesium hydroxide (MILK OF MAGNESIA) suspension 30 mL      And   • bisacodyl (DULCOLAX) suppository 10 mg     • enoxaparin (LOVENOX) inj 40 mg 40 mg at 03/28/18 0857   • insulin regular (HUMULIN R) injection 1-6 Units Stopped at 03/27/18 1957    And   • glucose 4 g chewable tablet 16 g      And   • dextrose 50% (D50W) injection 25 mL Stopped at 03/22/18 0134   • calcium carbonate (OS-ROSE MARY 500) tablet 500 mg 500 mg at 03/28/18 0854   • insulin glargine (LANTUS) injection 15 Units 15 Units at 03/27/18 2000   •  omeprazole (PRILOSEC) capsule 20 mg 20 mg at 03/28/18 0855   • sucralfate (CARAFATE) tablet 1 g 1 g at 03/28/18 1053   • HYDROcodone-acetaminophen (NORCO) 7.5-325 MG per tablet 1 Tab 1 Tab at 03/28/18 0853   • tiotropium (SPIRIVA) 18 MCG inhalation capsule 1 Cap 1 Cap at 03/27/18 1959   • fluticasone (FLONASE) nasal spray 100 mcg 100 mcg at 03/28/18 0857   • ezetimibe (ZETIA) tablet 10 mg 10 mg at 03/27/18 2001    And   • simvastatin (ZOCOR) tablet 40 mg 40 mg at 03/27/18 2000   • diphenhydrAMINE (BENADRYL) tablet/capsule 25 mg 25 mg at 03/21/18 2028       PROBLEM LIST:    ASSESSMENT AND PLAN: 72 y.o. Female with history of HFpEF, COPD, htn, insulin dependent diabetes mellitus, who presented with 1-month progressive SOB and weakness who was found to be in acute on chronic respiratory distress, critically hyperkalemic, and in acute renal failure.     1. Acute exacerbation of chronic heart failure with preserved ejection fraction.   - echo on 3/21 shows severe pulmonary htn with RVSP 85mmHg, LVEF 60%, RV volume overload, severely dilated right ventricle/atrium, severe tricuspid regurgitation.   - Repeat echo pending prior to discharge now that patient is -14 L after diuresis  - Continue lasix today 40 BID.   - Continue electrolyte replacement; per cardiology, keep Mg>2.5 and K>4.5  - continue lasix outpatient.   - Educate patient about daily weights/fluid restriction<2L/salt restriction  - Follow up with cardiology and heart failure program outpatient.     2. Acute on chronic hypoxemic respiratory failure (improved): likely due to fluid overload, longstanding pulmonary hypertension, and HFpEF.   - on 4L home O2, desaturating to 70% at home, per patient.   - now on ~5-6L O2 nasal cannula.   - s/p ceftriaxone x7 days (last dose 3/27) for persistent left lower lobe opacity on CXR.   - continue home oxygen with new machine that delivers >5L O2.     3. T2DM:  - HbA1C 7.0 6/17  - holding home pioglitazone, restarted home  metformin on 3/27  - continue 15 mg glargine with ISS (on 30 units lantus QHS at home)  - adequately controlled sugars inpatient.     4. Hyperkalemia: resolved  - K was >7.5 on admission with EKG changes.   - s/p kayexelate, insulin, albuterol, calcium carbonate treatment in ICU.   - Now K is 3.7.     5. UTI: (Treated/resolved)  - s/p ceftriaxone 7 days; last dose 3/27.  - asymptomatic.   - sheets catheter removed 3/28.     6. Chronic leg pain: Likely secondary to venous stasis and edema.   - s/p lower extremity ultrasound and arterial doppler ultrasounds with normal findings. Unlikely claudication or peripheral artery disease.     7. Hypertension:   -holding home spironolactone and lisinopril.       No problems updated.           #FEN/GI  - omeprazole PRN, normal diet. Nutrition is following.     #Dispo  - home health with home PT    #Core Measures   VTE PPx: lovenox  Abx: None; completed 7 days of ceftriaxone on 3/27  Lines/Tubes: PIV  Fluids: none  Diet: diabetic  Code Status: DNR        Radha Vigil, MS4  [facility]   [phone]

## 2018-03-28 NOTE — DISCHARGE PLANNING
[facility]S received a message from [facility] the referral has been denied. They are not contracted with the patient's insurance.  SW on floor has been notified via Skype

## 2018-03-28 NOTE — DISCHARGE PLANNING
CCS received  HH choice form per the choice form the referral has been sent to [facility]

## 2018-03-28 NOTE — THERAPY
"Physical Therapy Treatment completed.   Bed Mobility:  Supine to Sit: Minimal Assist  Transfers: Sit to Stand: Minimal Assist  Gait: Level Of Assist: Contact Guard Assist with Front-Wheel Walker       Plan of Care: Will benefit from Physical Therapy 3 times per week  Discharge Recommendations: Equipment: Will Continue to Assess for Equipment Needs.    Pt able to tolerate minimal activity and self reports that fatigue is limiting factor; SpO2% was at 90 on 4 L O2 after ambulation and pt did not appear SOB; pt appears to lack motivation to increase activity though  encourages her and is available for assistance; pt declined attempting steps this session and reports that she will be able to walk to front of house where she can go up smaller steps to enter home; reccomend home with home health at this time, will continue to follow inpatient at this time    See \"Rehab Therapy-Acute\" Patient Summary Report for complete documentation.       "

## 2018-03-29 ENCOUNTER — PATIENT OUTREACH (OUTPATIENT)
Dept: HEALTH INFORMATION MANAGEMENT | Facility: OTHER | Age: 73
End: 2018-03-29

## 2018-03-29 VITALS
TEMPERATURE: 97.9 F | HEART RATE: 98 BPM | WEIGHT: 251.77 LBS | OXYGEN SATURATION: 95 % | DIASTOLIC BLOOD PRESSURE: 68 MMHG | HEIGHT: 69 IN | RESPIRATION RATE: 16 BRPM | SYSTOLIC BLOOD PRESSURE: 145 MMHG | BODY MASS INDEX: 37.29 KG/M2

## 2018-03-29 PROBLEM — I50.9 CHF (CONGESTIVE HEART FAILURE) (HCC): Status: ACTIVE | Noted: 2018-03-29

## 2018-03-29 LAB
ANION GAP SERPL CALC-SCNC: 8 MMOL/L (ref 0–11.9)
BUN SERPL-MCNC: 17 MG/DL (ref 8–22)
CALCIUM SERPL-MCNC: 9 MG/DL (ref 8.5–10.5)
CHLORIDE SERPL-SCNC: 95 MMOL/L (ref 96–112)
CO2 SERPL-SCNC: 32 MMOL/L (ref 20–33)
CREAT SERPL-MCNC: 0.78 MG/DL (ref 0.5–1.4)
GLUCOSE BLD-MCNC: 106 MG/DL (ref 65–99)
GLUCOSE BLD-MCNC: 108 MG/DL (ref 65–99)
GLUCOSE SERPL-MCNC: 117 MG/DL (ref 65–99)
MAGNESIUM SERPL-MCNC: 2.2 MG/DL (ref 1.5–2.5)
POTASSIUM SERPL-SCNC: 4.1 MMOL/L (ref 3.6–5.5)
SODIUM SERPL-SCNC: 135 MMOL/L (ref 135–145)

## 2018-03-29 PROCEDURE — 82962 GLUCOSE BLOOD TEST: CPT | Mod: 91

## 2018-03-29 PROCEDURE — 94760 N-INVAS EAR/PLS OXIMETRY 1: CPT

## 2018-03-29 PROCEDURE — 700111 HCHG RX REV CODE 636 W/ 250 OVERRIDE (IP): Performed by: STUDENT IN AN ORGANIZED HEALTH CARE EDUCATION/TRAINING PROGRAM

## 2018-03-29 PROCEDURE — 700105 HCHG RX REV CODE 258

## 2018-03-29 PROCEDURE — A9270 NON-COVERED ITEM OR SERVICE: HCPCS | Performed by: EMERGENCY MEDICINE

## 2018-03-29 PROCEDURE — 83735 ASSAY OF MAGNESIUM: CPT

## 2018-03-29 PROCEDURE — 700102 HCHG RX REV CODE 250 W/ 637 OVERRIDE(OP): Performed by: EMERGENCY MEDICINE

## 2018-03-29 PROCEDURE — 700102 HCHG RX REV CODE 250 W/ 637 OVERRIDE(OP): Performed by: INTERNAL MEDICINE

## 2018-03-29 PROCEDURE — 700105 HCHG RX REV CODE 258: Performed by: STUDENT IN AN ORGANIZED HEALTH CARE EDUCATION/TRAINING PROGRAM

## 2018-03-29 PROCEDURE — 700102 HCHG RX REV CODE 250 W/ 637 OVERRIDE(OP): Performed by: STUDENT IN AN ORGANIZED HEALTH CARE EDUCATION/TRAINING PROGRAM

## 2018-03-29 PROCEDURE — A9270 NON-COVERED ITEM OR SERVICE: HCPCS | Performed by: INTERNAL MEDICINE

## 2018-03-29 PROCEDURE — A9270 NON-COVERED ITEM OR SERVICE: HCPCS | Performed by: STUDENT IN AN ORGANIZED HEALTH CARE EDUCATION/TRAINING PROGRAM

## 2018-03-29 PROCEDURE — 80048 BASIC METABOLIC PNL TOTAL CA: CPT

## 2018-03-29 PROCEDURE — 36415 COLL VENOUS BLD VENIPUNCTURE: CPT

## 2018-03-29 PROCEDURE — 700111 HCHG RX REV CODE 636 W/ 250 OVERRIDE (IP): Performed by: EMERGENCY MEDICINE

## 2018-03-29 RX ORDER — INSULIN GLARGINE 100 [IU]/ML
15 INJECTION, SOLUTION SUBCUTANEOUS
Qty: 10 ML | Refills: 0
Start: 2018-03-29 | End: 2019-05-01

## 2018-03-29 RX ORDER — BUDESONIDE AND FORMOTEROL FUMARATE DIHYDRATE 160; 4.5 UG/1; UG/1
2 AEROSOL RESPIRATORY (INHALATION) 2 TIMES DAILY
Qty: 2 INHALER | Refills: 0 | Status: SHIPPED | OUTPATIENT
Start: 2018-03-29 | End: 2018-04-30

## 2018-03-29 RX ORDER — MAGNESIUM SULFATE 1 G/100ML
1 INJECTION INTRAVENOUS DAILY
Status: DISCONTINUED | OUTPATIENT
Start: 2018-03-29 | End: 2018-03-29 | Stop reason: HOSPADM

## 2018-03-29 RX ORDER — SODIUM CHLORIDE 9 MG/ML
INJECTION, SOLUTION INTRAVENOUS
Status: COMPLETED
Start: 2018-03-29 | End: 2018-03-29

## 2018-03-29 RX ORDER — BUDESONIDE AND FORMOTEROL FUMARATE DIHYDRATE 160; 4.5 UG/1; UG/1
2 AEROSOL RESPIRATORY (INHALATION) 2 TIMES DAILY
Status: DISCONTINUED | OUTPATIENT
Start: 2018-03-29 | End: 2018-03-29 | Stop reason: HOSPADM

## 2018-03-29 RX ORDER — SILDENAFIL CITRATE 20 MG/1
10 TABLET ORAL 3 TIMES DAILY
Qty: 90 TAB | Refills: 1 | Status: SHIPPED | OUTPATIENT
Start: 2018-03-29 | End: 2018-04-30

## 2018-03-29 RX ORDER — BUDESONIDE AND FORMOTEROL FUMARATE DIHYDRATE 160; 4.5 UG/1; UG/1
2 AEROSOL RESPIRATORY (INHALATION)
Status: DISCONTINUED | OUTPATIENT
Start: 2018-03-29 | End: 2018-03-29

## 2018-03-29 RX ORDER — TORSEMIDE 20 MG/1
40 TABLET ORAL 2 TIMES DAILY
Qty: 60 TAB | Refills: 0 | Status: SHIPPED | OUTPATIENT
Start: 2018-03-29 | End: 2019-05-01

## 2018-03-29 RX ADMIN — ENOXAPARIN SODIUM 40 MG: 100 INJECTION SUBCUTANEOUS at 08:11

## 2018-03-29 RX ADMIN — SUCRALFATE 1 G: 1 TABLET ORAL at 11:09

## 2018-03-29 RX ADMIN — STANDARDIZED SENNA CONCENTRATE AND DOCUSATE SODIUM 2 TABLET: 8.6; 5 TABLET, FILM COATED ORAL at 08:10

## 2018-03-29 RX ADMIN — SILDENAFIL 10 MG: 20 TABLET ORAL at 08:11

## 2018-03-29 RX ADMIN — SODIUM CHLORIDE 200 ML: 9 INJECTION, SOLUTION INTRAVENOUS at 08:30

## 2018-03-29 RX ADMIN — FLUTICASONE PROPIONATE 100 MCG: 50 SPRAY, METERED NASAL at 08:11

## 2018-03-29 RX ADMIN — NYSTATIN: 100000 POWDER TOPICAL at 08:12

## 2018-03-29 RX ADMIN — POTASSIUM CHLORIDE 40 MEQ: 1500 TABLET, EXTENDED RELEASE ORAL at 08:10

## 2018-03-29 RX ADMIN — SUCRALFATE 1 G: 1 TABLET ORAL at 05:49

## 2018-03-29 RX ADMIN — OMEPRAZOLE 20 MG: 20 CAPSULE, DELAYED RELEASE ORAL at 08:11

## 2018-03-29 RX ADMIN — MAGNESIUM SULFATE IN DEXTROSE 1 G: 10 INJECTION, SOLUTION INTRAVENOUS at 08:10

## 2018-03-29 RX ADMIN — METFORMIN HYDROCHLORIDE 850 MG: 850 TABLET ORAL at 08:10

## 2018-03-29 RX ADMIN — POTASSIUM CHLORIDE 40 MEQ: 2 INJECTION, SOLUTION, CONCENTRATE INTRAVENOUS at 08:10

## 2018-03-29 RX ADMIN — ACETAMINOPHEN 500 MG: 500 TABLET ORAL at 11:10

## 2018-03-29 RX ADMIN — HYDROCODONE BITARTRATE AND ACETAMINOPHEN 1 TABLET: 7.5; 325 TABLET ORAL at 14:20

## 2018-03-29 RX ADMIN — Medication 500 MG: at 08:10

## 2018-03-29 ASSESSMENT — PAIN SCALES - GENERAL
PAINLEVEL_OUTOF10: 4
PAINLEVEL_OUTOF10: 0
PAINLEVEL_OUTOF10: 0
PAINLEVEL_OUTOF10: 8
PAINLEVEL_OUTOF10: 4
PAINLEVEL_OUTOF10: 0

## 2018-03-29 NOTE — DISCHARGE INSTRUCTIONS
Return to ED/PMD if fevers over 100.5 F, intractable pain or vomiting, chest pain, worsening shortness of breath, or any other concerning symptom      Discharge Instructions    Discharged to home by car with relative. Discharged via wheelchair, hospital escort: Yes.  Special equipment needed: Oxygen    Be sure to schedule a follow-up appointment with your primary care doctor or any specialists as instructed.     Discharge Plan:   Pneumococcal Vaccine Given - only chart on this line when given: Given (See MAR)  Influenza Vaccine Indication: Patient Refuses    I understand that a diet low in cholesterol, fat, and sodium is recommended for good health. Unless I have been given specific instructions below for another diet, I accept this instruction as my diet prescription.   Other diet: diabetic    Special Instructions:   HF Patient Discharge Instructions  · Monitor your weight daily, and maintain a weight chart, to track your weight changes.   · Activity as tolerated, unless your Doctor has ordered otherwise. Other activity order: as tolerated.  · Follow a low fat, low cholesterol, low salt diet unless instructed otherwise by your Doctor. Read the labels on the back of food products and track your intake of fat, cholesterol and salt.   · Fluid Restriction No. If a Fluid Restriction has been ordered by your Doctor, measure fluids with a measuring cup to ensure that you are not exceeding the restriction.   · No smoking.  · Oxygen Yes. If your Doctor has ordered that you wear Oxygen at home, it is important to wear it as ordered.  · Did you receive an explanation from staff on the importance of taking each of your medications and why it is necessary to keep taking them unless your doctor says to stop? Yes  · Were all of your questions answered about how to manage your heart failure and what to do if you have increased signs and symptoms after you go home? Yes  · Do you feel like your heart failure care team involved you  in the care treatment plan and allowed you to make decisions regarding your care while in the hospital and addressed any discharge needs you might have? Yes    See the educational handout provided at discharge for more information on monitoring your daily weight, activity and diet. This also explains more about Heart Failure, symptoms of a flare-up and some of the tests that you have undergone.     Warning Signs of a Flare-Up include:  · Swelling in the ankles or lower legs.  · Shortness of breath, while at rest, or while doing normal activities.   · Shortness of breath at night when in bed, or coughing in bed.   · Requiring more pillows to sleep at night, or needing to sit up at night to sleep.  · Feeling weak, dizzy or fatigued.     When to call your Doctor:  · Call [facility] seven days a week from 8:00 a.m. to 8:00 p.m. for medical questions [phone].  · Call your Primary Care Physician or Cardiologist if:   1. You experience any pain radiating to your jaw or neck.  2. You have any difficulty breathing.  3. You experience weight gain of 3 lbs in a day or 5 lbs in a week.   4. You feel any palpitations or irregular heartbeats.  5. You become dizzy or lose consciousness.   If you have had an angiogram or had a pacemaker or AICD placed, and experience:  1. Bleeding, drainage or swelling at the surgical / puncture site.  2. Fever greater than 100.0 F  3. Shock from internal defibrillator.  4. Cool and / or numb extremities.      · Is patient discharged on Warfarin / Coumadin?   No     Depression / Suicide Risk    As you are discharged from this [facility], it is important to learn how to keep safe from harming yourself.    Recognize the warning signs:  · Abrupt changes in personality, positive or negative- including increase in energy   · Giving away possessions  · Change in eating patterns- significant weight changes-  positive or negative  · Change in sleeping patterns- unable to sleep  or sleeping all the time   · Unwillingness or inability to communicate  · Depression  · Unusual sadness, discouragement and loneliness  · Talk of wanting to die  · Neglect of personal appearance   · Rebelliousness- reckless behavior  · Withdrawal from people/activities they love  · Confusion- inability to concentrate     If you or a loved one observes any of these behaviors or has concerns about self-harm, here's what you can do:  · Talk about it- your feelings and reasons for harming yourself  · Remove any means that you might use to hurt yourself (examples: pills, rope, extension cords, firearm)  · Get professional help from the community (Mental Health, Substance Abuse, psychological counseling)  · Do not be alone:Call your Safe Contact- someone whom you trust who will be there for you.  · Call your local CRISIS HOTLINE 184-0318 or 529-726-5310  · Call your local Children's Mobile Crisis Response Team [place] (488) 648-8960 or www.Ning  · Call the toll free National Suicide Prevention Hotlines   · National Suicide Prevention Lifeline 059-969-VNJA (7956)  · National Hope Line Network 800-SUICIDE (688-4010)    Heart Failure  Heart failure is a condition in which the heart has trouble pumping blood because it has become weak or stiff. This means that the heart does not pump blood efficiently for the body to work well. For some people with heart failure, fluid may back up into the lungs and there may be swelling (edema) in the lower legs. Heart failure is usually a long-term (chronic) condition. It is important for you to take good care of yourself and follow the treatment plan from your health care provider.  What are the causes?  This condition is caused by some health problems, including:  · High blood pressure (hypertension). Hypertension causes the heart muscle to work harder than normal. High blood pressure eventually causes the heart to become stiff and weak.  · Coronary artery disease (CAD).  CAD is the buildup of cholesterol and fat (plaques) in the arteries of the heart.  · Heart attack (myocardial infarction). Injured tissue, which is caused by the heart attack, does not contract as well and the heart's ability to pump blood is weakened.  · Abnormal heart valves. When the heart valves do not open and close properly, the heart muscle must pump harder to keep the blood flowing.  · Heart muscle disease (cardiomyopathy or myocarditis). Heart muscle disease is damage to the heart muscle from a variety of causes, such as drug or alcohol abuse, infections, or unknown causes. These can increase the risk of heart failure.  · Lung disease. When the lungs do not work properly, the heart must work harder.  What increases the risk?  Risk of heart failure increases as a person ages. This condition is also more likely to develop in people who:  · Are overweight.  · Are male.  · Smoke or chew tobacco.  · Abuse alcohol or illegal drugs.  · Have taken medicines that can damage the heart, such as chemotherapy drugs.  · Have diabetes.  ¨ High blood sugar (glucose) is associated with high fat (lipid) levels in the blood.  ¨ Diabetes can also damage tiny blood vessels that carry nutrients to the heart muscle.  · Have abnormal heart rhythms.  · Have thyroid problems.  · Have low blood counts (anemia).  What are the signs or symptoms?  Symptoms of this condition include:  · Shortness of breath with activity, such as when climbing stairs.  · Persistent cough.  · Swelling of the feet, ankles, legs, or abdomen.  · Unexplained weight gain.  · Difficulty breathing when lying flat (orthopnea).  · Waking from sleep because of the need to sit up and get more air.  · Rapid heartbeat.  · Fatigue and loss of energy.  · Feeling light-headed, dizzy, or close to fainting.  · Loss of appetite.  · Nausea.  · Increased urination during the night (nocturia).  · Confusion.  How is this diagnosed?  This condition is diagnosed based  on:  · Medical history, symptoms, and a physical exam.  · Diagnostic tests, which may include:  ¨ Echocardiogram.  ¨ Electrocardiogram (ECG).  ¨ Chest X-ray.  ¨ Blood tests.  ¨ Exercise stress test.  ¨ Radionuclide scans.  ¨ Cardiac catheterization and angiogram.  How is this treated?  Treatment for this condition is aimed at managing the symptoms of heart failure. Medicines, behavioral changes, or other treatments may be necessary to treat heart failure.  Medicines   These may include:  · Angiotensin-converting enzyme (ACE) inhibitors. This type of medicine blocks the effects of a blood protein called angiotensin-converting enzyme. ACE inhibitors relax (dilate) the blood vessels and help to lower blood pressure.  · Angiotensin receptor blockers (ARBs). This type of medicine blocks the actions of a blood protein called angiotensin. ARBs dilate the blood vessels and help to lower blood pressure.  · Water pills (diuretics). Diuretics cause the kidneys to remove salt and water from the blood. The extra fluid is removed through urination, leaving a lower volume of blood that the heart has to pump.  · Beta blockers. These improve heart muscle strength and they prevent the heart from beating too quickly.  · Digoxin. This increases the force of the heartbeat.  Healthy behavior changes   These may include:  · Reaching and maintaining a healthy weight.  · Stopping smoking or chewing tobacco.  · Eating heart-healthy foods.  · Limiting or avoiding alcohol.  · Stopping use of street drugs (illegal drugs).  · Physical activity.  Other treatments   These may include:  · Surgery to open blocked coronary arteries or repair damaged heart valves.  · Placement of a biventricular pacemaker to improve heart muscle function (cardiac resynchronization therapy). This device paces both the right ventricle and left ventricle.  · Placement of a device to treat serious abnormal heart rhythms (implantable cardioverter defibrillator, or  ICD).  · Placement of a device to improve the pumping ability of the heart (left ventricular assist device, or LVAD).  · Heart transplant. This can cure heart failure, and it is considered for certain patients who do not improve with other therapies.  Follow these instructions at home:  Medicines  · Take over-the-counter and prescription medicines only as told by your health care provider. Medicines are important in reducing the workload of your heart, slowing the progression of heart failure, and improving your symptoms.  ¨ Do not stop taking your medicine unless your health care provider told you to do that.  ¨ Do not skip any dose of medicine.  ¨ Refill your prescriptions before you run out of medicine. You need your medicines every day.  Eating and drinking  · Eat heart-healthy foods. Talk with a dietitian to make an eating plan that is right for you.  ¨ Choose foods that contain no trans fat and are low in saturated fat and cholesterol. Healthy choices include fresh or frozen fruits and vegetables, fish, lean meats, legumes, fat-free or low-fat dairy products, and whole-grain or high-fiber foods.  ¨ Limit salt (sodium) if directed by your health care provider. Sodium restriction may reduce symptoms of heart failure. Ask a dietitian to recommend heart-healthy seasonings.  ¨ Use healthy cooking methods instead of frying. Healthy methods include roasting, grilling, broiling, baking, poaching, steaming, and stir-frying.  · Limit your fluid intake if directed by your health care provider. Fluid restriction may reduce symptoms of heart failure.  Lifestyle  · Stop smoking or using chewing tobacco. Nicotine and tobacco can damage your heart and your blood vessels. Do not use nicotine gum or patches before talking to your health care provider.  · Limit alcohol intake to no more than 1 drink per day for non-pregnant women and 2 drinks per day for men. One drink equals 12 oz of beer, 5 oz of wine, or 1½ oz of hard  liquor.  ¨ Drinking more than that is harmful to your heart. Tell your health care provider if you drink alcohol several times a week.  ¨ Talk with your health care provider about whether any level of alcohol use is safe for you.  ¨ If your heart has already been damaged by alcohol or you have severe heart failure, drinking alcohol should be stopped completely.  · Stop use of illegal drugs.  · Lose weight if directed by your health care provider. Weight loss may reduce symptoms of heart failure.  · Do moderate physical activity if directed by your health care provider. People who are elderly and people with severe heart failure should consult with a health care provider for physical activity recommendations.  Monitor important information  · Weigh yourself every day. Keeping track of your weight daily helps you to notice excess fluid sooner.  ¨ Weigh yourself every morning after you urinate and before you eat breakfast.  ¨ Wear the same amount of clothing each time you weigh yourself.  ¨ Record your daily weight. Provide your health care provider with your weight record.  · Monitor and record your blood pressure as told by your health care provider.  · Check your pulse as told by your health care provider.  Dealing with extreme temperatures  · If the weather is extremely hot:  ¨ Avoid vigorous physical activity.  ¨ Use air conditioning or fans or seek a cooler location.  ¨ Avoid caffeine and alcohol.  ¨ Wear loose-fitting, lightweight, and light-colored clothing.  · If the weather is extremely cold:  ¨ Avoid vigorous physical activity.  ¨ Layer your clothes.  ¨ Wear mittens or gloves, a hat, and a scarf when you go outside.  ¨ Avoid alcohol.  General instructions  · Manage other health conditions such as hypertension, diabetes, thyroid disease, or abnormal heart rhythms as told by your health care provider.  · Learn to manage stress. If you need help to do this, ask your health care provider.  · Plan rest periods  when fatigued.  · Get ongoing education and support as needed.  · Participate in or seek rehabilitation as needed to maintain or improve independence and quality of life.  · Stay up to date with immunizations. Keeping current on pneumococcal and influenza immunizations is especially important to prevent respiratory infections.  · Keep all follow-up visits as told by your health care provider. This is important.  Contact a health care provider if:  · You have a rapid weight gain.  · You have increasing shortness of breath that is unusual for you.  · You are unable to participate in your usual physical activities.  · You tire easily.  · You cough more than normal, especially with physical activity.  · You have any swelling or more swelling in areas such as your hands, feet, ankles, or abdomen.  · You are unable to sleep because it is hard to breathe.  · You feel like your heart is beating quickly (palpitations).  · You become dizzy or light-headed when you stand up.  Get help right away if:  · You have difficulty breathing.  · You notice or your family notices a change in your awareness, such as having trouble staying awake or having difficulty with concentration.  · You have pain or discomfort in your chest.  · You have an episode of fainting (syncope).  This information is not intended to replace advice given to you by your health care provider. Make sure you discuss any questions you have with your health care provider.  Document Released: 12/18/2006 Document Revised: 08/22/2017 Document Reviewed: 07/12/2017  [employer] Interactive Patient Education © 2017 [employer]    Chronic Obstructive Pulmonary Disease  Chronic obstructive pulmonary disease (COPD) is a common lung condition in which airflow from the lungs is limited. COPD is a general term that can be used to describe many different lung problems that limit airflow, including both chronic bronchitis and emphysema. If you have COPD, your lung function will  probably never return to normal, but there are measures you can take to improve lung function and make yourself feel better.  What are the causes?  · Smoking (common).  · Exposure to secondhand smoke.  · Genetic problems.  · Chronic inflammatory lung diseases or recurrent infections.  What are the signs or symptoms?  · Shortness of breath, especially with physical activity.  · Deep, persistent (chronic) cough with a large amount of thick mucus.  · Wheezing.  · Rapid breaths (tachypnea).  · Gray or bluish discoloration (cyanosis) of the skin, especially in your fingers, toes, or lips.  · Fatigue.  · Weight loss.  · Frequent infections or episodes when breathing symptoms become much worse (exacerbations).  · Chest tightness.  How is this diagnosed?  Your health care provider will take a medical history and perform a physical examination to diagnose COPD. Additional tests for COPD may include:  · Lung (pulmonary) function tests.  · Chest X-ray.  · CT scan.  · Blood tests.  How is this treated?  Treatment for COPD may include:  · Inhaler and nebulizer medicines. These help manage the symptoms of COPD and make your breathing more comfortable.  · Supplemental oxygen. Supplemental oxygen is only helpful if you have a low oxygen level in your blood.  · Exercise and physical activity. These are beneficial for nearly all people with COPD.  · Lung surgery or transplant.  · Nutrition therapy to gain weight, if you are underweight.  · Pulmonary rehabilitation. This may involve working with a team of health care providers and specialists, such as respiratory, occupational, and physical therapists.  Follow these instructions at home:  · Take all medicines (inhaled or pills) as directed by your health care provider.  · Avoid over-the-counter medicines or cough syrups that dry up your airway (such as antihistamines) and slow down the elimination of secretions unless instructed otherwise by your health care provider.  · If you are a  smoker, the most important thing that you can do is stop smoking. Continuing to smoke will cause further lung damage and breathing trouble. Ask your health care provider for help with quitting smoking. He or she can direct you to community resources or hospitals that provide support.  · Avoid exposure to irritants such as smoke, chemicals, and fumes that aggravate your breathing.  · Use oxygen therapy and pulmonary rehabilitation if directed by your health care provider. If you require home oxygen therapy, ask your health care provider whether you should purchase a pulse oximeter to measure your oxygen level at home.  · Avoid contact with individuals who have a contagious illness.  · Avoid extreme temperature and humidity changes.  · Eat healthy foods. Eating smaller, more frequent meals and resting before meals may help you maintain your strength.  · Stay active, but balance activity with periods of rest. Exercise and physical activity will help you maintain your ability to do things you want to do.  · Preventing infection and hospitalization is very important when you have COPD. Make sure to receive all the vaccines your health care provider recommends, especially the pneumococcal and influenza vaccines. Ask your health care provider whether you need a pneumonia vaccine.  · Learn and use relaxation techniques to manage stress.  · Learn and use controlled breathing techniques as directed by your health care provider. Controlled breathing techniques include:  1. Pursed lip breathing. Start by breathing in (inhaling) through your nose for 1 second. Then, purse your lips as if you were going to whistle and breathe out (exhale) through the pursed lips for 2 seconds.  2. Diaphragmatic breathing. Start by putting one hand on your abdomen just above your waist. Inhale slowly through your nose. The hand on your abdomen should move out. Then purse your lips and exhale slowly. You should be able to feel the hand on your  abdomen moving in as you exhale.  · Learn and use controlled coughing to clear mucus from your lungs. Controlled coughing is a series of short, progressive coughs. The steps of controlled coughing are:  1. Lean your head slightly forward.  2. Breathe in deeply using diaphragmatic breathing.  3. Try to hold your breath for 3 seconds.  4. Keep your mouth slightly open while coughing twice.  5. Spit any mucus out into a tissue.  6. Rest and repeat the steps once or twice as needed.  Contact a health care provider if:  · You are coughing up more mucus than usual.  · There is a change in the color or thickness of your mucus.  · Your breathing is more labored than usual.  · Your breathing is faster than usual.  Get help right away if:  · You have shortness of breath while you are resting.  · You have shortness of breath that prevents you from:  ¨ Being able to talk.  ¨ Performing your usual physical activities.  · You have chest pain lasting longer than 5 minutes.  · Your skin color is more cyanotic than usual.  · You measure low oxygen saturations for longer than 5 minutes with a pulse oximeter.  This information is not intended to replace advice given to you by your health care provider. Make sure you discuss any questions you have with your health care provider.  Document Released: 09/27/2006 Document Revised: 05/25/2017 Document Reviewed: 08/14/2014  [employer] Interactive Patient Education © 2017 [employer]

## 2018-03-29 NOTE — DISCHARGE PLANNING
SW received call from [facility] and they denied pt because of insurance.  [facility] reports she will research to find out what HH will accept pt's insurance.      Plan:  [facility] will continue to assist with pt's d/c needs.

## 2018-03-29 NOTE — DISCHARGE PLANNING
CCS spoke to Loretta at [facility] the referral has been accepted. O2 should be delivered with 30-45 minutes. The patient's RN on has been notified

## 2018-03-29 NOTE — DISCHARGE SUMMARY
"[facility] DISCHARGE SUMMARY     PATIENT ID:    Name:             Siri Solis   YOB: 1945  Age:                 72 y.o.  female   MRN:               [id number]  Address:         [address] [place] [address] [place] [id number]  Phone:            [phone] (home)    ADMISSION DATE: 3/21/2018    DISCHARGE DATE: 3/29/2018    DISCHARGE DIAGNOSES:   Hyperkalemia  Acute respiratory failure  CHF exacerbation  CHF with preserved EF  Severe pulmonary HTN  FLAVIA 2/2 cardiorenal syndrome  Restrictive lung disease with chronic elevation of R hemidiaphragm  COPD  DMII  Skin lesions  UTI  Chronic pain    ATTENDING PHYSICIAN:   Paige Loya MD    RESIDENT: Chalino Darden MD    CONSULTANTS:    To Keron TOWNSEND- Cardiology  Lorraine Napier MD- Nephrology  [facility] Pul/critical care    PROCEDURES:    None    IMAGING:   Dx-chest-limited (1 View)  Result Date: 3/27/2018  FINDINGS:  Low lung volume.  Diffuse interstitial prominence. Unchanged airspace opacity in the left lower lobe.  No pleural effusion. No pneumothorax.  --Stable enlarged cardiopericardial silhouette.        Us-renal  Result Date: 3/24/2018  1.  Normal renal ultrasound. 2.  Minimal ascites.    Echocardiogram Comp W/o Cont    Result Date: 3/22/2018  Transthoracic Echo Report Echocardiography Laboratory CONCLUSIONS Left ventricular ejection fraction is visually estimated to be 60%. Flattened septum in diastole (\"D\"-shaped left ventricle) consistent with RV volume overload. Severely dilated right ventricle. Severely enlarged right atrium. Mildly dilated left atrium. Right ventricular systolic pressure is estimated to be 85 mmHg. Severe tricuspid regurgitation. Calcified aortic valve leaflets. Aortic sclerosis without stenosis. Trace mitral regurgitation. Echocardiogram Ltd W/o Cont      Echocardiogram  Result Date: 3/28/2018  Transthoracic Echo Report Echocardiography Laboratory CONCLUSIONS Limited. Severe tricuspid regurgitation.  Right " ventricular systolic pressure is estimated to be 95 mmHg. Reduced right ventricular systolic function. Similar function and pressure as last week's echocardiogram.   Le Art Duplx/imag (specify In Comments Left, Right Or Bilateral)    Result Date: 3/21/2018  Lower Extremity  Arterial Duplex Report  Vascular Laboratoy  FINDINGS  Left.  Triphasic flow throughout the common femoral, profunda femoral, superficial  femoral, and popliteal arteries with no evidence of significant stenosis.  Three vessel runoff to the ankle with biphasic flow.  The proximal peroneal artery is not well visualized.  Alexander Ernst  (Electronically Signed)  Final Date:      21 March 2018                   20:33    Le Venous Duplex (dvt)    Result Date: 3/23/2018   FINDINGS:  The deep & superficial veins of the lower extremities are readily  compressible where visualized with normal venous flow dynamics including  spontaneous flow, respiratory phasic variation and augmentation.  Micah Awan MD  (Electronically Signed)  Final Date:      23 March 2018                   14:13      LABS:  Recent Labs      03/27/18   0631   WBC  5.5   RBC  4.16*   HEMOGLOBIN  12.4   HEMATOCRIT  38.9   MCV  93.5   MCH  29.8   RDW  68.0*   PLATELETCT  117*   MPV  11.1   NEUTSPOLYS  73.50*   LYMPHOCYTES  8.30*   MONOCYTES  14.60*   EOSINOPHILS  3.20   BASOPHILS  0.20   RBCMORPHOLO  Present     Recent Labs      03/27/18   0631  03/28/18   0241  03/29/18   0319   SODIUM  139  138  135   POTASSIUM  3.8  3.7  4.1   CHLORIDE  94*  94*  95*   CO2  39*  36*  32   GLUCOSE  103*  92  117*   BUN  18  17  17     Lab Results   Component Value Date/Time    CHOLSTRLTOT 122 12/01/2016 08:58 AM    LDL 57 12/01/2016 08:58 AM    HDL 46 12/01/2016 08:58 AM    TRIGLYCERIDE 93 12/01/2016 08:58 AM       Lab Results   Component Value Date/Time    TROPONINI 0.35 (H) 03/23/2018 04:05 PM       Lab Results   Component Value Date/Time    TROPONINI 0.35 (H) 03/23/2018 04:05 PM         "  HISTORY OF PRESENT ILLNESS:  Per H and P    73 yo female with a history of COPD (on 4L), DM II, HTN, opioid dependent chronic pain, benzo dependent insomnia who presents with a 1 month history of progressive weakness and shortness of breath.  Her ADLs have become progressively more difficult, and today the patient was unable to arise from bed.  She endorses orthopnea, PND, and increasing symmetric LE edema over this timeframe.  She has not increased her oxygen supplementation at home. She admits to near syncope and dizziness, but denies any focal weakness/numbness, syncope.  She does have approximately 2 weeks of progressive cough and yellow sputum production.  She denies any associated fevers.  She denies chest pain, but does admit to some thoracic back pain worse with activity.  There are no night sweats or weight loss.       She is complaining of urinary retention today.  She denies any abdominal pain, urinary frequency, or dysuria.  Again, she denies fevers.     She endorses pain in her LLE particularly in her calf when she ambulates.  This has been present for approximately 1 month.  The pain is mild, dull in nature and improves with rest and \"hanging it over the side of the bed.\"     ROS otherwise postive for 2 weeks of watery brown diarrhea over the past 2 weeks.  Patient denies any antibiotic exposure, recent travel, or sick contacts.    HOSPITAL COURSE:   Patient presented as above, admitted for CHF exacerbation based on sx, BNP, and clinical presentation.  Labs on the evening of admission revealed a hyperkalemia to 7.6.  Patient was noted to have low voltages and prolonged pr interval on presentation.  She was promptly treated with calcium, insulin/d5.  Nephrology was consulted who recommended holding on emergent dialysis and treating with kayexalate and lasix.  The patient responded adequately to these treatements.  On HD 1 patient experienced progressively worsening respiratory status which resulted in " transfer to the ICU.  CXR at that time consistent with admission with poor volumes and pulmonary edema.  Upon workup it was thought that this decompensation was likely 2/2 worsening heart failure.  The patient was diuresed and experienced progressive improvement over her clinical status and renal function.  Her renal function eventually returned to normal.  Echocardiogram performed while in fulminant failure indicated severe pulmonary hypertension.  This did not improve on echocardiogram performed while patient was nearly euvolemic.  Patient refused further IV diuretics on day of discharge, and could possibly have diuresed further.  She was nearly euvolemic but still had significant lung crackles.  It was recommended that the patient go to SNF for rehab, but she refused.  She was discharged home in stable condition with home health, new oxygen equipment for her increased oxygen requirement, which was 5-6 L at rest.  It was emphasized that compliance with regimen will be essential to a successful outcome and that frequent adjustments/visits will be needed.  See below for details.    Items for follow up:  BMP and Mag ordered for 4/4/18    # Hyperkalemia- Likely due in part to home BP regimen of lisinopril and spironolactone.  Responded appropriately to diuretics/potassium binders.  Cards recommends keeping K at 4.5 or higher and mag at 2.5 or higher.  -Continue home magnesium supplement  -Stopped spironolactone  -Outpatient labs as above    # FLAVIA- Improved to baseline with diuresis.  -Cards recommended having patient on torsemide 40mg PO BID.  Given the aggressiveness of this regimen the above labs were ordered     # CHF/Pulmonary htn-  Preserved EF, echo results as above.  -Prognosis per cardiology poor with significant 1 year mortality.  Discussed these findings with patient and the options in regards to goals of care including palliative and hospice.  Patient pursuing full therapy at this time.  -Started on  sildenafil 10mg TID, should be increased to 20mg TID as tolerated per pulm  -Pulm f/u as outpatient     # UTI- Adequately treated with 7 days of rocephin.  Cx grew pansensitive e coli.     # Leg pain- Patient initially presented with findings concerning for left sided claudication.  Arterial study as above indicates that claudication is unlikely.     # Debility- Likely 2/2 CHF, UTI.  TSH with 4.3 11/17.  H/H wnl.  -Discharging with home health    # Chronic lung disease- Hx of COPD but also appears to have significant restrictive disease and likely MUNA.  -Pulmonary recommended sleep study, PFTs, HIV testing  -Started on symbicort per pulm recommendations    # DM II- Well controlled on an abbreviated home regimen. Some concern that home regimen may have been titrated up based on A1c that does not reflect patient's compliance.  -[id number] glargine HS which is half prior home dose   -Resume metformin  -Stop pioglitazone as may contribute to chf  -BID glucose checks at home     # HTN-  Patient well controlled without home meds while giving iv diuretics  -Restarted home lisinopril  -Stop spironolactone  -Hold atenolol     # Chronic pain  -Continue norco, using ~ 2 tabs per day while inpatient    DISCHARGE CONDITION:  Stable    DISPOSITION: Home with home health    DISCHARGE MEDICATIONS:      Medication List      START taking these medications      Instructions   * budesonide-formoterol 160-4.5 MCG/ACT Aero  Commonly known as:  SYMBICORT   Inhale 2 Puffs by mouth 2 Times a Day.  Dose:  2 Puff     * budesonide-formoterol 160-4.5 MCG/ACT Aero  Commonly known as:  SYMBICORT   Inhale 2 Puffs by mouth 2 Times a Day.  Dose:  2 Puff     metFORMIN 850 MG Tabs  Commonly known as:  GLUCOPHAGE   Take 1 Tab by mouth 2 times a day, with meals.  Dose:  850 mg     sildenafil 20 MG tablet  Commonly known as:  REVATIO   Take 0.5 Tabs by mouth 3 times a day.  Dose:  10 mg     torsemide 20 MG Tabs  Commonly known as:  DEMADEX   Take 2 Tabs  by mouth 2 times a day.  Dose:  40 mg        * This list has 2 medication(s) that are the same as other medications prescribed for you. Read the directions carefully, and ask your doctor or other care provider to review them with you.            CHANGE how you take these medications      Instructions   insulin glargine 100 UNIT/ML Soln  What changed:  · how much to take  · when to take this  Commonly known as:  LANTUS   Inject 15 Units as instructed every bedtime.  Dose:  15 Units        CONTINUE taking these medications      Instructions   CALCIUM 1000 + D 1000-800 MG-UNIT Tabs  Generic drug:  Calcium Carb-Cholecalciferol   Take 1 Tab by mouth every evening.  Dose:  1 Tab     HYDROcodone-acetaminophen 7.5-325 MG per tablet  Commonly known as:  NORCO   Take 1 Tab by mouth every four hours as needed for Severe Pain.  Dose:  1 Tab     lisinopril 10 MG Tabs  Commonly known as:  PRINIVIL   Take 10 mg by mouth every day.  Dose:  10 mg     magnesium oxide 400 MG Tabs  Commonly known as:  MAG-OX   Take 400 mg by mouth every day.  Dose:  400 mg     omeprazole 20 MG delayed-release capsule  Commonly known as:  PRILOSEC   Take 20 mg by mouth every evening.  Dose:  20 mg     SPIRIVA HANDIHALER 18 MCG Caps  Generic drug:  tiotropium   Inhale 18 mcg by mouth every evening.  Dose:  18 mcg     temazepam 30 MG capsule  Commonly known as:  RESTORIL   Take 30 mg by mouth at bedtime as needed for Sleep.  Dose:  30 mg     VYTORIN 10-40 MG per tablet  Generic drug:  ezetimibe-simvastatin   Take 1 Tab by mouth every evening.  Dose:  1 Tab        STOP taking these medications    atenolol 100 MG Tabs  Commonly known as:  TENORMIN     pioglitazone-metformin  MG per tablet  Commonly known as:  ACTOPLUS MET            ACTIVITY:  Normal Activity as Tolerated.    DIET: Healthy    DISCHARGE INSTRUCTIONS AND FOLLOW UP:  Patient is medically stable for discharge and will be discharged to home    Follow Up:   On 4/5/18 with   Don  With [facility] cardiology asap, preferrably within 1 week  F/u with Dr. Cabrales within 1 week if unable to schedule\  [facility] within 1-2 weeks    Discharge Instructions:   Patient was instructed to return the ER in the event of worsening symptoms including but not limited to worsening shortness of breath, chest pain, progressive weakness, or any other major concerns. Patient understands that failure to do so may indicate worsening of his/her medical condition(s) and result in adverse clinical outcomes including fatality. We have counseled the patient on the importance of compliance and the patient has agreed to proceed with all medical recommendations and follow up plan indicated above. The patient understands that the failure to do so may result in result in adverse clinical outcomes including fatality.     CC:    Lake Ochoa M.D.   Dr. Cabrales   [facility]

## 2018-03-29 NOTE — PROGRESS NOTES
"Pulmonary Progress Note    Patient ID:   Name:             Siri Solis   YOB: 1945  Age:                 72 y.o.  female   MRN:               [id number]                                                  Subjective: Feeling better and less dyspneic no chest pain no hypertension;       Interval Changes: started and sildenafil at the low dose of 10 mg 3 times a day      Objective:   Vitals/ General Appearance:   Weight/BMI: Body mass index is 37.16 kg/m².  Blood pressure 147/82, pulse 100, temperature 36.3 °C (97.4 °F), resp. rate 18, height 1.753 m (5' 9.02\"), weight 114.2 kg (251 lb 12.3 oz), SpO2 94 %, not currently breastfeeding.  Vitals:    03/28/18 0740 03/28/18 1117 03/28/18 1551 03/28/18 1900   BP:  128/59 123/67 147/82   Pulse: 95 (!) 102 (!) 102 100   Resp: 17 16 18 18   Temp:   37.9 °C (100.2 °F) 36.3 °C (97.4 °F)   SpO2: 91% 92% 96% 94%   Weight:       Height:         Oxygen Therapy:  Pulse Oximetry: 94 %, O2 (LPM): 6, O2 Delivery: Silicone Nasal Cannula    Constitutional:   Well developed, Well nourished, No acute distress  HENMT:  Normocephalic, Atraumatic, Oropharynx moist mucous membranes, No oral exudates, Nose normal.  No thyromegaly.  Eyes:  EOMI, Conjunctiva normal, No discharge.  Neck:  Normal range of motion, No cervical tenderness,  no JVD.  Cardiovascular:  Normal heart rate, Normal rhythm, No murmurs, No rubs, No gallops.   Extremitites with intact distal pulses, no cyanosis, or edema.  Lungs:  Normal breath sounds, breath sounds clear to auscultation bilaterally,  no crackles, no wheezing.   Abdomen: Bowel sounds normal, Soft, No tenderness, No guarding, No rebound, No masses, No hepatosplenomegaly.  Skin: Warm, Dry, No erythema, No rash, no induration.  Neurologic: Alert & oriented x 3, No focal deficits noted, cranial nerves II through X are grossly intact.  Psychiatric: Affect normal, Judgment normal, Mood normal.    Labs:  Recent Labs      03/27/18   0631   WBC  5.5 "   RBC  4.16*   HEMOGLOBIN  12.4   HEMATOCRIT  38.9   MCV  93.5   MCH  29.8   MCHC  31.9*   RDW  68.0*   PLATELETCT  117*   MPV  11.1                 Recent Labs      03/26/18   0304  03/27/18   0631  03/28/18   0241   SODIUM  138  139  138   POTASSIUM  3.4*  3.8  3.7   CHLORIDE  91*  94*  94*   CO2  36*  39*  36*   GLUCOSE  101*  103*  92   BUN  28*  18  17     Recent Labs      03/26/18   0304  03/27/18   0631  03/28/18   0241   SODIUM  138  139  138   POTASSIUM  3.4*  3.8  3.7   CHLORIDE  91*  94*  94*   CO2  36*  39*  36*   BUN  28* 18  17   CREATININE  0.87  0.81  0.78   MAGNESIUM  1.2*  1.7  1.7   PHOSPHORUS  2.0*   --    --    CALCIUM  9.0  8.7  8.9     Results for orders placed or performed during the hospital encounter of 03/21/18   ECHOCARDIOGRAM COMP W/O CONT   Result Value Ref Range    Eject.Frac. MOD BP 64.6     Eject.Frac. MOD 4C 63.98     Eject.Frac. MOD 2C 61.95     Left Ventrical Ejection Fraction 60          Imaging:   ECHOCARDIOGRAM LTD W/O CONT   Final Result      DX-CHEST-PORTABLE (1 VIEW)   Final Result         1. No significant interval change.      DX-CHEST-PORTABLE (1 VIEW)   Final Result         1. No significant interval change. Unchanged airspace opacity in the left lower lobe.      DX-CHEST-PORTABLE (1 VIEW)   Final Result      Cardiomegaly.      DX-CHEST-LIMITED (1 VIEW)   Final Result      Stable chest appearance compared with 3/23.               INTERPRETING LOCATION: [address] [place] [address] [place], [facility]      US-RENAL   Final Result      1.  Normal renal ultrasound.   2.  Minimal ascites.      LE VENOUS DUPLEX (DVT)   Final Result      DX-CHEST-PORTABLE (1 VIEW)   Final Result      No significant interval change.      ECHOCARDIOGRAM COMP W/O CONT   Final Result      LE ART DUPLX/IMAG (Specify in Comments Left, Right Or Bilateral)   Final Result      DX-CHEST-LIMITED (1 VIEW)   Final Result      1.  Bibasilar atelectasis/consolidation.      2.  Cardiomegaly.      3.  Chronic elevation of the  right hemidiaphragm.          Hospital Medications:    Current Facility-Administered Medications:   •  metFORMIN (GLUCOPHAGE) tablet 850 mg, 850 mg, Oral, BID WITH MEALS, Armando Yap M.D., 850 mg at 03/28/18 1731  •  sildenafil (REVATIO) tablet 10 mg, 10 mg, Oral, TID, Ramiro Kaiser M.D., 10 mg at 03/28/18 2126  •  potassium chloride SA (Kdur) tablet 40 mEq, 40 mEq, Oral, DAILY, Armando Yap M.D., 40 mEq at 03/28/18 0856  •  furosemide (LASIX) injection 40 mg, 40 mg, Intravenous, BID DIURETIC, Armando Yap M.D., 40 mg at 03/28/18 1055  •  acetaminophen (TYLENOL) tablet 500 mg, 500 mg, Oral, Q6HRS PRN, Armando Yap M.D., 500 mg at 03/28/18 1616  •  Respiratory Care per Protocol, , Nebulization, Continuous RT, Mary Brewer M.D.  •  nystatin (MYCOSTATIN) powder, , Topical, TID, Mary Brewer M.D.  •  senna-docusate (PERICOLACE or SENOKOT S) 8.6-50 MG per tablet 2 Tab, 2 Tab, Oral, BID, 2 Tab at 03/26/18 0747 **AND** polyethylene glycol/lytes (MIRALAX) PACKET 1 Packet, 1 Packet, Oral, QDAY PRN **AND** magnesium hydroxide (MILK OF MAGNESIA) suspension 30 mL, 30 mL, Oral, QDAY PRN **AND** bisacodyl (DULCOLAX) suppository 10 mg, 10 mg, Rectal, QDAY PRN, Chalino Darden M.D.  •  enoxaparin (LOVENOX) inj 40 mg, 40 mg, Subcutaneous, DAILY, Chalino Darden M.D., 40 mg at 03/28/18 0857  •  insulin regular (HUMULIN R) injection 1-6 Units, 1-6 Units, Subcutaneous, 4X/DAY ACHS, Stopped at 03/27/18 1957 **AND** Accu-Chek ACHS, , , Q AC AND BEDTIME(S) **AND** NOTIFY MD and PharmD, , , Once **AND** glucose 4 g chewable tablet 16 g, 16 g, Oral, Q15 MIN PRN **AND** dextrose 50% (D50W) injection 25 mL, 25 mL, Intravenous, Q15 MIN PRN, Chalino Darden M.D., Stopped at 03/22/18 0134  •  calcium carbonate (OS-ROSE MARY 500) tablet 500 mg, 500 mg, Oral, BID, Chalino Darden M.D., 500 mg at 03/28/18 2124  •  insulin glargine (LANTUS) injection 15 Units, 15 Units, Subcutaneous, Nightly, Chalino Darden M.D., 15  Units at 03/28/18 2128  •  omeprazole (PRILOSEC) capsule 20 mg, 20 mg, Oral, BID, Chalino Darden M.D., 20 mg at 03/28/18 2126  •  sucralfate (CARAFATE) tablet 1 g, 1 g, Oral, 4X/DAY ACHS, Chalino Darden M.D., 1 g at 03/28/18 2124  •  HYDROcodone-acetaminophen (NORCO) 7.5-325 MG per tablet 1 Tab, 1 Tab, Oral, Q4HRS PRN, Chalino Darden M.D., 1 Tab at 03/28/18 0853  •  tiotropium (SPIRIVA) 18 MCG inhalation capsule 1 Cap, 1 Cap, Inhalation, Q EVENING, Chalino Darden M.D., 1 Cap at 03/27/18 1959  •  fluticasone (FLONASE) nasal spray 100 mcg, 2 Spray, Nasal, DAILY, Kisha Cobb M.D., 100 mcg at 03/28/18 0857  •  ezetimibe (ZETIA) tablet 10 mg, 10 mg, Oral, QHS, 10 mg at 03/28/18 2124 **AND** simvastatin (ZOCOR) tablet 40 mg, 40 mg, Oral, Q EVENING, Chalino Darden M.D., 40 mg at 03/28/18 2125  •  diphenhydrAMINE (BENADRYL) tablet/capsule 25 mg, 25 mg, Oral, HS PRN, Chalino Darden M.D., 25 mg at 03/21/18 2028    Current Outpatient Medications:  Prescriptions Prior to Admission   Medication Sig Dispense Refill Last Dose   • pioglitazone-metformin (ACTOPLUS MET)  MG per tablet Take 1 Tab by mouth 2 times a day, with meals.   3/20/2018 at 1800   • ezetimibe-simvastatin (VYTORIN) 10-40 MG per tablet Take 1 Tab by mouth every evening.   3/20/2018 at 1800   • atenolol (TENORMIN) 100 MG Tab Take 100 mg by mouth every morning.   3/20/2018 at 0900   • omeprazole (PRILOSEC) 20 MG delayed-release capsule Take 20 mg by mouth every evening.   3/20/2018 at 1800   • temazepam (RESTORIL) 30 MG capsule Take 30 mg by mouth at bedtime as needed for Sleep.   3/20/2018 at 2200   • tiotropium (SPIRIVA HANDIHALER) 18 MCG Cap Inhale 18 mcg by mouth every evening.   3/20/2018 at 2200   • lisinopril (PRINIVIL) 10 MG Tab Take 10 mg by mouth every day.   3/20/2018 at 1800   • HYDROcodone-acetaminophen (NORCO) 7.5-325 MG per tablet Take 1 Tab by mouth every four hours as needed for Severe Pain.   3/20/2018 at 2200   • insulin  glargine (LANTUS) 100 UNIT/ML Solution Inject 32 Units as instructed every evening.   3/20/2018 at 2200   • magnesium oxide (MAG-OX) 400 MG Tab Take 400 mg by mouth every day.   3/20/2018 at 1800   • Calcium Carb-Cholecalciferol (CALCIUM 1000 + D) 1000-800 MG-UNIT Tab Take 1 Tab by mouth every evening.   3/20/2018 at 1800       Medication Allergy:  Allergies   Allergen Reactions   • Zoloft Diarrhea   • Amaryl [Amaryl]    • Amaryl [Glimepiride] Shortness of Breath and Rash   • Amoxicillin Rash   • Byetta Rash   • Epinephrine Shortness of Breath     Panic attack, Can't breath   • Hctz      Stopped urinary output   • Iodine Anaphylaxis     contrast   • Lasix [Furosemide]      Leg cramps, stopped urinary output   • Latex    • Lexapro      Leg cramping   • Penicillins Rash   • Spironolactone        Assessment and Plan:  Active Problems:    COPD (chronic obstructive pulmonary disease) (CMS-[facility]) POA: Yes    Pulmonary HTN POA: Yes    Hyperkalemia POA: Unknown    Renal insufficiency POA: Unknown  Resolved Problems:    * No resolved hospital problems. *         *Acute on chronic hypoxic respiratory failure.              - now onO2 at 4 lpm             - RT/O2 protocols              - cont IS/PEP therapy              - cont forced diuresis      Acute on Chronic Severe pulmonary hypertension      Will have a repeat echocardiogram today.  Cc tolerating sildenafil that was just started yesterday              - noted ECHO from 2012 with elevated RVSP at 57-73              - new ECHO with RVSP of 85              - cont O2 therapy              - needs work up:  HIV, PFTs, sleep study              - suspect due to primary lung disease and chronic hypoxia              - cont aggressive diuresis and O2 therapy              - may need repeat ECHO vs RT heat cath              - DVT study negative and doubt PE (no hx of dvt/pe, still CTEPH could always be entertained)        Possible obstructive sleep apnea  Will need outpatient sleep  study for documentation and therapy with CPAP or BiPAP once documented                 Hyperkalemia.              - improving with medical therapies  Acute kidney injury.              - cont to improve              - nephrology following  Acute pulmonary edema.              - improving with force diuresis     Mild hyperglycemia.              - ISS/lantus  Acute tracheobronchitis              - no obvious infiltrate              - cont C3  History of oxygen-dependent chronic obstructive pulmonary disease.              - spiriva              - cont O2 therapy  History of type 2 diabetes.              - ISS/lantus  History of hypertension.              - holding home meds  History of dyslipidemia.              - cont zetia/zocor  History of GERD              - ppi/carafate  Prophylaxis              - lovenox, scds

## 2018-03-29 NOTE — NON-PROVIDER
[facility] [place] FAMILY MEDICINE PROGRESS NOTE     Attending: Paige Loya M.D.  Senior Resident: Deon Darden M.D.  Alphonse Resident: Armando Yap M.D.  PATIENT: Siri Solsi; [id number]; 1945    ID: 72 y.o. Female with history of HFpEF, COPD, htn, insulin dependent diabetes mellitus, who presented with 1-month progressive SOB and weakness who was found to be in acute on chronic respiratory distress, critically hyperkalemic, and in acute renal failure.    SUBJECTIVE: No acute events overnight. Mrs. Covarrubias refused lasix twice since noon yesterday because she dislikes how often she must get up to use the restroom. She still  becomes hypoxic when walking to the toilet or to the hallway and requires up to 7 L of oxygen to saturate above 88%. She denies chest pain, abdominal pain, or dysuria. Her appetite has improved slightly since yesterday.     OBJECTIVE:     Vitals:    03/29/18 0324 03/29/18 0751 03/29/18 1100 03/29/18 1150   BP: 125/67 139/71  138/62   Pulse: (!) 102 90  100   Resp: 18 17  16   Temp: 37.5 °C (99.5 °F) 36.8 °C (98.2 °F)  37.2 °C (99 °F)   SpO2: 96% 94% 94% 98%   Weight:       Height:           Intake/Output Summary (Last 24 hours) at 03/29/18 1230  Last data filed at 03/29/18 1040   Gross per 24 hour   Intake             1430 ml   Output             1750 ml   Net             -320 ml       PE:  General: Interactive, alert, sitting up in bed and in no visible distress.   HEENT: NC/AT. PERRLA. MMM  Cardiovascular: RRR with no M/R/G. Normal S1 and S2. No S3.   Respiratory: Symmetrical chest. Bilateral course crackles from bases to apices. No cough.  Abdomen: soft, NT/ND, no masses, +BS   EXT:  REY, upper extremity/abdominal strength improved from yesterday (patient is able to pull herself up to sitting position on her own), No C/C/E 2+ pulses   Neuro: non focal with no numbness, tingling or changes in sensation    LABS:  Recent Labs      03/27/18   0631   WBC  5.5   RBC  4.16*   HEMOGLOBIN  12.4   HEMATOCRIT   38.9   MCV  93.5   MCH  29.8   RDW  68.0*   PLATELETCT  117*   MPV  11.1   NEUTSPOLYS  73.50*   LYMPHOCYTES  8.30*   MONOCYTES  14.60*   EOSINOPHILS  3.20   BASOPHILS  0.20   RBCMORPHOLO  Present     Recent Labs      03/27/18   0631  03/28/18   0241  03/29/18   0319   SODIUM  139  138  135   POTASSIUM  3.8  3.7  4.1   CHLORIDE  94*  94*  95*   CO2  39*  36*  32   BUN  18  17  17   CREATININE  0.81  0.78  0.78   CALCIUM  8.7  8.9  9.0   MAGNESIUM  1.7  1.7  2.2     Estimated GFR/CRCL = Estimated Creatinine Clearance: 85.3 mL/min (by C-G formula based on SCr of 0.78 mg/dL).  Recent Labs      03/27/18   0631   03/28/18   0241   03/28/18 2124  03/29/18   0319  03/29/18   0552  03/29/18   1116   GLUCOSE  103*   --   92   --    --   117*   --    --    POCGLUCOSE   --    < >   --    < >  108*   --   108*  106*    < > = values in this interval not displayed.                   Invalid input(s): [id number]  No results for input(s): INR, APTT, FIBRINOGEN in the last 72 hours.    Invalid input(s): DIMER    MICROBIOLOGY: No new results.     Urine culture 3/21 positive for E. Coli.     Blood culture from 3/21:   Component   Significant Indicator  (Positive)   POS (POS)    Source   BLD    Site   PERIPHERAL    Blood Culture  (Abnormal)   Growth detected by Bactec instrument. 03/25/2018  04:41     Blood Culture  (Abnormal)    (A)   Bacillus sp.- possible contaminant   Isolated from one bottle only, possible skin contaminant   please correlate with clinical condition.        IMAGING:     New Echo results from 3/28:   Transthoracic  Echo Report      Echocardiography Laboratory    CONCLUSIONS  Limited.   Severe tricuspid regurgitation.  Right ventricular systolic pressure is   estimated to be 95 mmHg.  Reduced right ventricular systolic function.  Similar function and pressure as last week's echocardiogram.    PEG XIONG  Exam Date:         03/28/2018                      13:20  Exam Location:     Inpatient  Priority:           Routine    Ordering Physician:        MAYRA HERNÁNDEZ  Referring Physician:  Sonographer:               Andrea Fletcher RDCS, T    Age:    72     Gender:    F  MRN:    [id number]  :    1945  BSA:    2.27   Ht (in):    69     Wt (lb):    251  Exam Type:     Limited    Indications:     Dyspnea  ICD Codes:       786.09    CPT Codes:       25430    BP:   112    /   59     HR:  Technical Quality:       Fair    MEASUREMENTS  (Male / Female) Normal Values  2D ECHO  IVC Diameter                      2.6 cm                    DOPPLER  TR Peak Velocity                  418 cm/s                  * Indicates values subject to auto-interpretation  LV EF:        %    FINDINGS  Severely dilated right ventricle. Reduced right ventricular systolic   function.  Structurally normal tricuspid valve. Severe tricuspid regurgitation.   Right ventricular systolic pressure is estimated to be 95 mmHg.      MEDS:  Current Facility-Administered Medications   Medication Last Dose   • Magnesium Sulfate in D5W IVPB premix 1 g Stopped at 18 0910   • budesonide-formoterol (SYMBICORT) 160-4.5 MCG/ACT inhaler 2 Puff Stopped at 18 1200   • metFORMIN (GLUCOPHAGE) tablet 850 mg 850 mg at 18 0810   • sildenafil (REVATIO) tablet 10 mg 10 mg at 18 0811   • potassium chloride SA (Kdur) tablet 40 mEq 40 mEq at 18 0810   • furosemide (LASIX) injection 40 mg 40 mg at 18 1055   • acetaminophen (TYLENOL) tablet 500 mg 500 mg at 18 1110   • Respiratory Care per Protocol     • nystatin (MYCOSTATIN) powder     • senna-docusate (PERICOLACE or SENOKOT S) 8.6-50 MG per tablet 2 Tab 2 Tab at 18 0810    And   • polyethylene glycol/lytes (MIRALAX) PACKET 1 Packet      And   • magnesium hydroxide (MILK OF MAGNESIA) suspension 30 mL      And   • bisacodyl (DULCOLAX) suppository 10 mg     • enoxaparin (LOVENOX) inj 40 mg 40 mg at 18 0811   • insulin regular (HUMULIN R) injection  1-6 Units Stopped at 03/27/18 1957    And   • glucose 4 g chewable tablet 16 g      And   • dextrose 50% (D50W) injection 25 mL Stopped at 03/22/18 0134   • calcium carbonate (OS-ROSE MARY 500) tablet 500 mg 500 mg at 03/29/18 0810   • insulin glargine (LANTUS) injection 15 Units 15 Units at 03/28/18 2128   • omeprazole (PRILOSEC) capsule 20 mg 20 mg at 03/29/18 0811   • sucralfate (CARAFATE) tablet 1 g 1 g at 03/29/18 1109   • HYDROcodone-acetaminophen (NORCO) 7.5-325 MG per tablet 1 Tab 1 Tab at 03/28/18 0853   • tiotropium (SPIRIVA) 18 MCG inhalation capsule 1 Cap 1 Cap at 03/28/18 2227   • fluticasone (FLONASE) nasal spray 100 mcg 100 mcg at 03/29/18 0811   • ezetimibe (ZETIA) tablet 10 mg 10 mg at 03/28/18 2124    And   • simvastatin (ZOCOR) tablet 40 mg 40 mg at 03/28/18 2125   • diphenhydrAMINE (BENADRYL) tablet/capsule 25 mg 25 mg at 03/28/18 2338       PROBLEM LIST:  No problems updated.    ASSESSMENT/PLAN: 72 y.o. Female with history of HFpEF, COPD, htn, insulin dependent diabetes mellitus, who presented with 1-month progressive SOB and weakness who was found to be in acute on chronic respiratory distress, critically hyperkalemic, and in acute renal failure.    1. Hypoxemic respiratory distress secondary to heart failure and severe pulmonary hypertension (improved)  - echo on 3/21 shows severe pulmonary htn with RVSP 85mmHg, LVEF 60%, RV volume overload, severely dilated right ventricle/atrium, severe tricuspid regurgitation. Echo on 3/28 shows similar cardiac function with increased RVSP to 95 mmHg  - Patient is -14 L after diuresis  - Continue torsemide at home on discharge based on cardiology recommendations  - Continue electrolyte replacement; per cardiology, keep Mg>2.5 and K>4.5  - Educate patient about daily weights/fluid restriction<2L/salt restriction.   Patient was educated multiple times about importance of medication adherence, close follow up with specialists and her primary care, as well as the  prognosis of her medical conditions. The possibility of hospice/palliative care were discussed but the patient declined.   - Follow up with cardiology and heart failure program outpatient.     2. Severe pulmonary hypertension:  - on 4L home O2, desaturating to 70% at home, per patient and outpatient charts.   - s/p ceftriaxone x7 days (last dose 3/27) for persistent left lower lobe opacity on CXR.   - continue home oxygen with new machine that delivers >5L O2.   - now on ~5-7L O2 nasal cannula with increased requirements while ambulating.  - Continue sildenafil.   - Follow up with pulmonary outpatient for sleep studies, HIV test, possible RHC, and PFT's.     3. T2DM:  - HbA1C 7.0 6/17  - holding home pioglitazone  - Recommend to continue 15 units lantus QHS at home and metformin 850 mg BID  - adequately controlled sugars inpatient.      4. Hyperkalemia: resolved  - K was >7.5 on admission with EKG changes.   - s/p kayexelate, insulin, albuterol, calcium carbonate treatment in ICU.   - Recommend discontinuing spironolactone outpatient.   - Now K is 4.1     5. UTI: (Treated/resolved)  - s/p ceftriaxone 7 days; last dose 3/27.  - asymptomatic.   - sheets catheter removed 3/28.   - Urinating spontaneously without discomfort.      6. Chronic leg pain: Likely secondary to venous stasis and edema.   - s/p lower extremity ultrasound and arterial doppler ultrasounds with normal findings. Unlikely claudication or peripheral artery disease.      7. Hypertension:   - blood pressures overall <140/90 this admission.  -holding home spironolactone; do not recommend discharging on two K-sparing diuretics given this patient's hyperkalemia this admission.   - Recommend lisinopril for discharge                 #FEN/GI  - omeprazole PRN, normal diet. Nutrition is following.      #Dispo  - home health with home PT     Abx: None; completed 7 days of ceftriaxone on 3/27  Lines/Tubes: PIV  Fluids: none  Diet: diabetic  Code Status:  DNR    Radha Vigil, MS4  [facility]  [phone]

## 2018-03-29 NOTE — DISCHARGE PLANNING
Referral failed to send to [facility]. [facility] resent the referral. [facility] also received a DME choice form. Per the choice form the referral has been sent to [facility]

## 2018-03-29 NOTE — DISCHARGE PLANNING
Gareth called [facility] to discuss referral and they deny receiving it.  [facility] reports the need for:  Facesheet, H&P, order and F2F.  GARETH faxed to [phone].

## 2018-03-29 NOTE — PROGRESS NOTES
[facility] [place] [facility]ICINE PROGRESS NOTE     Attending: Dr. Paige Loya    Resident: Dr. Miranda Ramirez, PGY-1    PATIENT: Siri Solis; [id number]; 1945    ID: 72 y.o. female admitted for new diagnosis of R sided heart failure likely 2/2 cor pulmonale in the setting of COPD, generalized debility.    SUBJECTIVE: No acute events overnight, patient refused her lasix again last night because she did not want to be up all night urinating. Discussed the importance of taking her medications while at home so that she does not come back to the hospital. She states that she is interested in going home today and understands that she will need to follow-up closely with specialists.    OBJECTIVE:     Vitals:    03/28/18 1551 03/28/18 1900 03/28/18 2335 03/29/18 0324   BP: 123/67 147/82 112/48 125/67   Pulse: (!) 102 100 98 (!) 102   Resp: 18 18 17 18   Temp: 37.9 °C (100.2 °F) 36.3 °C (97.4 °F) 36.8 °C (98.3 °F) 37.5 °C (99.5 °F)   SpO2: 96% 94% 94% 96%   Weight:       Height:           Intake/Output Summary (Last 24 hours) at 03/29/18 0625  Last data filed at 03/29/18 0200   Gross per 24 hour   Intake              830 ml   Output             3200 ml   Net            -2370 ml       PE:  General: No acute distress, resting comfortably in bed.  HEENT: NC/AT. PERRLA. EOMI. MMM  Cardiovascular: RRR with no M/R/G.  Respiratory: Symmetrical chest. +crackles  Abdomen: soft, NT/ND, no masses, +BS   EXT:  REY, 5/5 strength, No C/C, 2+ pulses, trace LE edema  Neuro: non focal with no numbness, tingling or changes in sensation    LABS:  Recent Labs      03/27/18   0631   WBC  5.5   RBC  4.16*   HEMOGLOBIN  12.4   HEMATOCRIT  38.9   MCV  93.5   MCH  29.8   RDW  68.0*   PLATELETCT  117*   MPV  11.1   NEUTSPOLYS  73.50*   LYMPHOCYTES  8.30*   MONOCYTES  14.60*   EOSINOPHILS  3.20   BASOPHILS  0.20   RBCMORPHOLO  Present     Recent Labs      03/27/18   0631  03/28/18   0241  03/29/18   0319   SODIUM  139  138  135   POTASSIUM  3.8  3.7  4.1    CHLORIDE  94*  94*  95*   CO2  39*  36*  32   BUN  18  17  17   CREATININE  0.81  0.78  0.78   CALCIUM  8.7  8.9  9.0   MAGNESIUM  1.7  1.7  2.2     Estimated GFR/CRCL = Estimated Creatinine Clearance: 85.3 mL/min (by C-G formula based on SCr of 0.78 mg/dL).  Recent Labs      03/27/18   0631   03/28/18   0241   03/28/18   1655  03/28/18   2124  03/29/18   0319  03/29/18   0552   GLUCOSE  103*   --   92   --    --    --   117*   --    POCGLUCOSE   --    < >   --    < >  113*  108*   --   108*    < > = values in this interval not displayed.       MICROBIOLOGY: 3/21: Blood Culture - 1 out of 2 bottles Anaerobic GN bacilli (likely contaminant)     IMAGING:   Repeat Echocardiogram on 3/28: Limited.   Severe tricuspid regurgitation. Right ventricular systolic pressure is   estimated to be 95 mmHg.  Reduced right ventricular systolic function.  Similar function and pressure as last week's echocardiogram.    MEDS:  Current Facility-Administered Medications   Medication Last Dose   • metFORMIN (GLUCOPHAGE) tablet 850 mg 850 mg at 03/28/18 1731   • sildenafil (REVATIO) tablet 10 mg 10 mg at 03/28/18 2126   • potassium chloride SA (Kdur) tablet 40 mEq 40 mEq at 03/28/18 0856   • furosemide (LASIX) injection 40 mg 40 mg at 03/28/18 1055   • acetaminophen (TYLENOL) tablet 500 mg 500 mg at 03/28/18 2227   • Respiratory Care per Protocol     • nystatin (MYCOSTATIN) powder     • senna-docusate (PERICOLACE or SENOKOT S) 8.6-50 MG per tablet 2 Tab 2 Tab at 03/26/18 0747    And   • polyethylene glycol/lytes (MIRALAX) PACKET 1 Packet      And   • magnesium hydroxide (MILK OF MAGNESIA) suspension 30 mL      And   • bisacodyl (DULCOLAX) suppository 10 mg     • enoxaparin (LOVENOX) inj 40 mg 40 mg at 03/28/18 0857   • insulin regular (HUMULIN R) injection 1-6 Units Stopped at 03/27/18 1957    And   • glucose 4 g chewable tablet 16 g      And   • dextrose 50% (D50W) injection 25 mL Stopped at 03/22/18 0134   • calcium carbonate (OS-ROSE MARY  500) tablet 500 mg 500 mg at 03/28/18 2124   • insulin glargine (LANTUS) injection 15 Units 15 Units at 03/28/18 2128   • omeprazole (PRILOSEC) capsule 20 mg 20 mg at 03/28/18 2126   • sucralfate (CARAFATE) tablet 1 g 1 g at 03/29/18 0549   • HYDROcodone-acetaminophen (NORCO) 7.5-325 MG per tablet 1 Tab 1 Tab at 03/28/18 0853   • tiotropium (SPIRIVA) 18 MCG inhalation capsule 1 Cap 1 Cap at 03/28/18 2227   • fluticasone (FLONASE) nasal spray 100 mcg 100 mcg at 03/28/18 0857   • ezetimibe (ZETIA) tablet 10 mg 10 mg at 03/28/18 2124    And   • simvastatin (ZOCOR) tablet 40 mg 40 mg at 03/28/18 2125   • diphenhydrAMINE (BENADRYL) tablet/capsule 25 mg 25 mg at 03/28/18 2338       PROBLEM LIST:  No problems updated.    ASSESSMENT/PLAN: 71 yo female with a history of COPD (on 4L at home saturating 70's-80s), DMII, HTN, opioid dependent chronic pain, benzo dependent insomnia, presented with worsening R-sided heart failure, generalized debility, UTI, decreased LLE pulses, and severe hyperkalemia. Patient transferred to ICU for increasing O2 demand and severe hyperkalemia. Now improved dramatically with reducing O2 demand. Now stable on 6L NC and diuresing well.      #Acute Hypoxic Respiratory Failure (Improved) 2/2   #Acute on Chronic Heart Failure (Improved)   #Cor Pulmonale   #Advanced COPD with Chronic Hypoxia   - patient has had approximately 18 L (net) off since admission   - Cr stable with good UOP   - refused lasix last night, has done previously   - likely why creatinine continues to be stable  - stable on 6L NC, usually on 4L at home  - will d/c on PO torsemide 40mg BID per cardiology recs  - will replete to keep K+ >4.5 and Mg >2.5    - repleted IV this morning  - continue diuresis outpatient as patient is refusing inpatient and is stable for discharge   - patient started on sildenafil TID per cards 3/27   - will d/c on torsemide  - workup as oupatient to include: HIV, PFTs, sleep study   - echocardiogram  completed   - showed worsening RV pressure and tricuspid regurg  - follow-up with cardiology as an outpatient  - cont oxygen supplementation at home  - lengthy discussion about importance of lasix and her other home medications, we discussed that if she is non-compliant with home medications she will likely be readmitted to the hospital and it will worsen her clinical condition  - f/u SW for home health and DME     #DMII  - apparently well controlled    - HbA1c 7.0 on 6/17     - adequate control without lows on current regimen  - continue 15U glargine HS and ISS   - cont home metformin  - hold pioglitazone, will restart when home and off ISS     #Skin Lesions   - likely basal cell and or SCC  - will need outpatient f/u for biopsy/excision  - follow-up with PCP      #Hyperkalemia - resolved   - patient responding appropriately to kayexalate and lasix  - 4.1 on 3/29   - replete to keep above 4.5     #FLAVIA - resolved  - currently 0.78<--2.32   - continue to monitor     #UTI - treated    - uncomplicated  - completed 7d course of abx  - blood culture, 1/2 bottles had growth, likely contaminant     #Leg pain - chronic    - US findings not compatible with claudication  - pain likely associated with edema     #Chronic Pain  - continue norco     Dispo: inpatient for continued tele, SW following for home health, respiratory support    CODE STATUS: DNR/DNI

## 2018-03-29 NOTE — DISCHARGE PLANNING
[facility] faxed choice form for O2 needs to [employer].  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  [employer] reports the need for a new referral and [facility] communicated this with [facility].    Plan:  [employer] will continue to assist with pt's d/c needs

## 2018-03-29 NOTE — CARE PLAN
Problem: Safety  Goal: Will remain free from falls    Intervention: Assess risk factors for falls  Fall precautions in place. Bed in lowest position. Non-skid socks in place. Personal possessions within reach. Mobility sign on door. Bed-alarm on. Call light within reach. Pt educated regarding fall prevention and states understanding.       Problem: Skin Integrity  Goal: Risk for impaired skin integrity will decrease    Intervention: Implement precautions to protect skin integrity in collaboration with the interdisciplinary team  Pt assessed for skin breakdown. Nystatin power applied to abdominal folds and interdrys placed to remove moisture. Ear pads placed over O2 tubing to protect ears

## 2018-03-29 NOTE — PROGRESS NOTES
"Pulmonary Progress Note    Patient ID:   Name:             Siri Solis   YOB: 1945  Age:                 72 y.o.  female   MRN:               [id number]                                                  Subjective:  Short of breath    Interval Changes:Echo done = has persistently elevated RVSP = 95; O2 sat 94% with O2 at 7 L    Objective:   Vitals/ General Appearance:   Weight/BMI: Body mass index is 37.16 kg/m².  Blood pressure 139/71, pulse 90, temperature 36.8 °C (98.2 °F), resp. rate 17, height 1.753 m (5' 9.02\"), weight 114.2 kg (251 lb 12.3 oz), SpO2 94 %, not currently breastfeeding.  Vitals:    03/28/18 1900 03/28/18 2335 03/29/18 0324 03/29/18 0751   BP: 147/82 112/48 125/67 139/71   Pulse: 100 98 (!) 102 90   Resp: 18 17 18 17   Temp: 36.3 °C (97.4 °F) 36.8 °C (98.3 °F) 37.5 °C (99.5 °F) 36.8 °C (98.2 °F)   SpO2: 94% 94% 96% 94%   Weight:       Height:         Oxygen Therapy:  Pulse Oximetry: 94 %, O2 (LPM): 7, O2 Delivery: Silicone Nasal Cannula    Constitutional:   Well developed, Well nourished, No acute distress  HENMT:  Normocephalic, Atraumatic, Oropharynx moist mucous membranes, No oral exudates, Nose normal.  No thyromegaly.  Eyes:  EOMI, Conjunctiva normal, No discharge.  Neck:  Normal range of motion, No cervical tenderness,  no JVD.  Cardiovascular:  Normal heart rate, Normal rhythm, No murmurs, No rubs, No gallops.   Extremitites with intact distal pulses, no cyanosis,has 1+ edema.  Lungs:  Normal breath sounds, breath sounds clear to auscultation bilaterally,  no crackles, no wheezing.   Abdomen: Bowel sounds normal, Soft, No tenderness, No guarding, No rebound, No masses, No hepatosplenomegaly.  Skin: Warm, Dry, No erythema, No rash, no induration.  Neurologic: Alert & oriented x 3, No focal deficits noted, cranial nerves II through X are grossly intact.  Psychiatric: Affect normal, Judgment normal, Mood normal.    Labs:  Recent Labs      03/27/18   0631   WBC  5.5   RBC "  4.16*   HEMOGLOBIN  12.4   HEMATOCRIT  38.9   MCV  93.5   MCH  29.8   MCHC  31.9*   RDW  68.0*   PLATELETCT  117*   MPV  11.1                 Recent Labs      03/27/18   0631  03/28/18   0241  03/29/18   0319   SODIUM  139  138  135   POTASSIUM  3.8  3.7  4.1   CHLORIDE  94*  94*  95*   CO2  39*  36*  32   GLUCOSE  103*  92  117*   BUN  18  17  17     Recent Labs      03/27/18   0631  03/28/18   0241  03/29/18   0319   SODIUM  139  138  135   POTASSIUM  3.8  3.7  4.1   CHLORIDE  94*  94*  95*   CO2  39*  36*  32   BUN  18  17  17   CREATININE  0.81  0.78  0.78   MAGNESIUM  1.7  1.7  2.2   CALCIUM  8.7  8.9  9.0     Results for orders placed or performed during the hospital encounter of 03/21/18   ECHOCARDIOGRAM COMP W/O CONT   Result Value Ref Range    Eject.Frac. MOD BP 64.6     Eject.Frac. MOD 4C 63.98     Eject.Frac. MOD 2C 61.95     Left Ventrical Ejection Fraction 60          Imaging:   ECHOCARDIOGRAM LTD W/O CONT   Final Result      DX-CHEST-PORTABLE (1 VIEW)   Final Result         1. No significant interval change.      DX-CHEST-PORTABLE (1 VIEW)   Final Result         1. No significant interval change. Unchanged airspace opacity in the left lower lobe.      DX-CHEST-PORTABLE (1 VIEW)   Final Result      Cardiomegaly.      DX-CHEST-LIMITED (1 VIEW)   Final Result      Stable chest appearance compared with 3/23.               INTERPRETING LOCATION: [address] [place] [address], [facility]      US-RENAL   Final Result      1.  Normal renal ultrasound.   2.  Minimal ascites.      LE VENOUS DUPLEX (DVT)   Final Result      DX-CHEST-PORTABLE (1 VIEW)   Final Result      No significant interval change.      ECHOCARDIOGRAM COMP W/O CONT   Final Result      LE ART DUPLX/IMAG (Specify in Comments Left, Right Or Bilateral)   Final Result      DX-CHEST-LIMITED (1 VIEW)   Final Result      1.  Bibasilar atelectasis/consolidation.      2.  Cardiomegaly.      3.  Chronic elevation of the right hemidiaphragm.          [facility]  Medications:    Current Facility-Administered Medications:   •  potassium chloride 40 mEq in  mL IVPB, 40 mEq, Intravenous, Once, Miranda Ramirez M.D., Last Rate: 125 mL/hr at 03/29/18 0810, 40 mEq at 03/29/18 0810  •  Magnesium Sulfate in D5W IVPB premix 1 g, 1 g, Intravenous, DAILY, Miranda Ramirez M.D., Stopped at 03/29/18 0910  •  metFORMIN (GLUCOPHAGE) tablet 850 mg, 850 mg, Oral, BID WITH MEALS, Armando Yap M.D., 850 mg at 03/29/18 0810  •  sildenafil (REVATIO) tablet 10 mg, 10 mg, Oral, TID, Ramiro Kaiser M.D., 10 mg at 03/29/18 0811  •  potassium chloride SA (Kdur) tablet 40 mEq, 40 mEq, Oral, DAILY, Armando Yap M.D., 40 mEq at 03/29/18 0810  •  furosemide (LASIX) injection 40 mg, 40 mg, Intravenous, BID DIURETIC, Armando Yap M.D., 40 mg at 03/28/18 1055  •  acetaminophen (TYLENOL) tablet 500 mg, 500 mg, Oral, Q6HRS PRN, Armando Yap M.D., 500 mg at 03/28/18 2227  •  Respiratory Care per Protocol, , Nebulization, Continuous RT, Mary Brewer M.D.  •  nystatin (MYCOSTATIN) powder, , Topical, TID, Mary Brewer M.D.  •  senna-docusate (PERICOLACE or SENOKOT S) 8.6-50 MG per tablet 2 Tab, 2 Tab, Oral, BID, 2 Tab at 03/29/18 0810 **AND** polyethylene glycol/lytes (MIRALAX) PACKET 1 Packet, 1 Packet, Oral, QDAY PRN **AND** magnesium hydroxide (MILK OF MAGNESIA) suspension 30 mL, 30 mL, Oral, QDAY PRN **AND** bisacodyl (DULCOLAX) suppository 10 mg, 10 mg, Rectal, QDAY PRN, Chalino Darden M.D.  •  enoxaparin (LOVENOX) inj 40 mg, 40 mg, Subcutaneous, DAILY, Chalino Darden M.D., 40 mg at 03/29/18 0811  •  insulin regular (HUMULIN R) injection 1-6 Units, 1-6 Units, Subcutaneous, 4X/DAY ACHS, Stopped at 03/27/18 1957 **AND** Accu-Chek ACHS, , , Q AC AND BEDTIME(S) **AND** NOTIFY MD and PharmD, , , Once **AND** glucose 4 g chewable tablet 16 g, 16 g, Oral, Q15 MIN PRN **AND** dextrose 50% (D50W) injection 25 mL, 25 mL, Intravenous, Q15 MIN PRN, Chalino Darden M.D.,  Stopped at 03/22/18 0134  •  calcium carbonate (OS-ROSE MARY 500) tablet 500 mg, 500 mg, Oral, BID, Chalino Darden M.D., 500 mg at 03/29/18 0810  •  insulin glargine (LANTUS) injection 15 Units, 15 Units, Subcutaneous, Nightly, Chalino Darden M.D., 15 Units at 03/28/18 2128  •  omeprazole (PRILOSEC) capsule 20 mg, 20 mg, Oral, BID, Chalino Darden M.D., 20 mg at 03/29/18 0811  •  sucralfate (CARAFATE) tablet 1 g, 1 g, Oral, 4X/DAY ACHS, Chalino Darden M.D., 1 g at 03/29/18 0549  •  HYDROcodone-acetaminophen (NORCO) 7.5-325 MG per tablet 1 Tab, 1 Tab, Oral, Q4HRS PRN, Chalino Darden M.D., 1 Tab at 03/28/18 0853  •  tiotropium (SPIRIVA) 18 MCG inhalation capsule 1 Cap, 1 Cap, Inhalation, Q EVENING, Chalino Darden M.D., 1 Cap at 03/28/18 2227  •  fluticasone (FLONASE) nasal spray 100 mcg, 2 Spray, Nasal, DAILY, Kisha Cobb M.D., 100 mcg at 03/29/18 0811  •  ezetimibe (ZETIA) tablet 10 mg, 10 mg, Oral, QHS, 10 mg at 03/28/18 2124 **AND** simvastatin (ZOCOR) tablet 40 mg, 40 mg, Oral, Q EVENING, Chalino Darden M.D., 40 mg at 03/28/18 2125  •  diphenhydrAMINE (BENADRYL) tablet/capsule 25 mg, 25 mg, Oral, HS PRN, Chalino Darden M.D., 25 mg at 03/28/18 2338    Current Outpatient Medications:  Prescriptions Prior to Admission   Medication Sig Dispense Refill Last Dose   • pioglitazone-metformin (ACTOPLUS MET)  MG per tablet Take 1 Tab by mouth 2 times a day, with meals.   3/20/2018 at 1800   • ezetimibe-simvastatin (VYTORIN) 10-40 MG per tablet Take 1 Tab by mouth every evening.   3/20/2018 at 1800   • atenolol (TENORMIN) 100 MG Tab Take 100 mg by mouth every morning.   3/20/2018 at 0900   • omeprazole (PRILOSEC) 20 MG delayed-release capsule Take 20 mg by mouth every evening.   3/20/2018 at 1800   • temazepam (RESTORIL) 30 MG capsule Take 30 mg by mouth at bedtime as needed for Sleep.   3/20/2018 at 2200   • tiotropium (SPIRIVA HANDIHALER) 18 MCG Cap Inhale 18 mcg by mouth every evening.   3/20/2018 at  2200   • lisinopril (PRINIVIL) 10 MG Tab Take 10 mg by mouth every day.   3/20/2018 at 1800   • HYDROcodone-acetaminophen (NORCO) 7.5-325 MG per tablet Take 1 Tab by mouth every four hours as needed for Severe Pain.   3/20/2018 at 2200   • insulin glargine (LANTUS) 100 UNIT/ML Solution Inject 32 Units as instructed every evening.   3/20/2018 at 2200   • magnesium oxide (MAG-OX) 400 MG Tab Take 400 mg by mouth every day.   3/20/2018 at 1800   • Calcium Carb-Cholecalciferol (CALCIUM 1000 + D) 1000-800 MG-UNIT Tab Take 1 Tab by mouth every evening.   3/20/2018 at 1800       Medication Allergy:  Allergies   Allergen Reactions   • Zoloft Diarrhea   • Amaryl [Amaryl]    • Amaryl [Glimepiride] Shortness of Breath and Rash   • Amoxicillin Rash   • Byetta Rash   • Epinephrine Shortness of Breath     Panic attack, Can't breath   • Hctz      Stopped urinary output   • Iodine Anaphylaxis     contrast   • Lasix [Furosemide]      Leg cramps, stopped urinary output   • Latex    • Lexapro      Leg cramping   • Penicillins Rash   • Spironolactone        Assessment and Plan:   COPD (chronic obstructive pulmonary disease) (CMS-[facility]) POA: Yes    Pulmonary HTN POA: Yes    Hyperkalemia POA: Unknown    Renal insufficiency POA: Unknown  Resolved Problems:    * No resolved hospital problems. *  Acute on chronic hypoxic respiratory failure.              - now onO2 at 4 lpm             - RT/O2 protocols              - cont IS/PEP therapy              - cont forced diuresis  Acute on Chronic Severe pulmonary hypertension                  - noted ECHO from 2012 with elevated RVSP at 57-73              - new ECHO with RVSP of 85              - cont O2 therapy              - needs work up:  HIV, PFTs, sleep study              - suspect due to primary lung disease and chronic hypoxia              - cont aggressive diuresis and O2 therapy              - may need repeat ECHO vs RT heat cath              - DVT study negative and doubt PE (no hx of  dvt/pe, still CTEPH could always be entertained)  Will consider CT pulm angio  PFT  Start symbicort    Hyperkalemia.              - improving with medical therapies  Acute kidney injury.              - cont to improve              - nephrology following  Acute pulmonary edema.              - improving with force diuresis     Mild hyperglycemia.              - ISS/lantus  Acute tracheobronchitis              - no obvious infiltrate              - cont C3  History of oxygen-dependent chronic obstructive pulmonary disease.              - spiriva              - cont O2 therapy  History of type 2 diabetes.              - ISS/lantus  History of hypertension.              - holding home meds  History of dyslipidemia.              - cont zetia/zocor  History of GERD              - ppi/carafate  Prophylaxis              - lovenox, scds

## 2018-03-29 NOTE — DISCHARGE PLANNING
SW called [employer] to f/u on O2 needs.  [facility] reports they only received half of the referral and asked to fax order again. SW faxed again and communicated with [facility].     Plan:  SW will continue to assist with pt's d/c needs.

## 2018-03-29 NOTE — PROGRESS NOTES
Assumed pt care. Pt resting in bed. Plan of care discussed with pt. Verbalizes understanding. Bed in lowest position, locked, and alarm activated. Call light within reach. SR on monitor.

## 2018-03-30 NOTE — PROGRESS NOTES
Reviewed discharge summary, medications, follow-up appts with pt and . IV removed. Pt left via wheelchair with staff escort and oxygen.

## 2018-04-04 ENCOUNTER — HOSPITAL ENCOUNTER (OUTPATIENT)
Dept: LAB | Facility: MEDICAL CENTER | Age: 73
End: 2018-04-04
Attending: EMERGENCY MEDICINE
Payer: COMMERCIAL

## 2018-04-04 LAB
ANION GAP SERPL CALC-SCNC: 12 MMOL/L (ref 0–11.9)
BUN SERPL-MCNC: 26 MG/DL (ref 8–22)
CALCIUM SERPL-MCNC: 10.4 MG/DL (ref 8.5–10.5)
CHLORIDE SERPL-SCNC: 90 MMOL/L (ref 96–112)
CO2 SERPL-SCNC: 36 MMOL/L (ref 20–33)
CREAT SERPL-MCNC: 1.05 MG/DL (ref 0.5–1.4)
GLUCOSE SERPL-MCNC: 109 MG/DL (ref 65–99)
MAGNESIUM SERPL-MCNC: 1.4 MG/DL (ref 1.5–2.5)
POTASSIUM SERPL-SCNC: 4.8 MMOL/L (ref 3.6–5.5)
SODIUM SERPL-SCNC: 138 MMOL/L (ref 135–145)

## 2018-04-04 PROCEDURE — 36415 COLL VENOUS BLD VENIPUNCTURE: CPT

## 2018-04-04 PROCEDURE — 83735 ASSAY OF MAGNESIUM: CPT

## 2018-04-04 PROCEDURE — 80048 BASIC METABOLIC PNL TOTAL CA: CPT

## 2018-04-30 ENCOUNTER — OFFICE VISIT (OUTPATIENT)
Dept: PULMONOLOGY | Facility: HOSPICE | Age: 73
End: 2018-04-30
Payer: COMMERCIAL

## 2018-04-30 VITALS
DIASTOLIC BLOOD PRESSURE: 70 MMHG | SYSTOLIC BLOOD PRESSURE: 141 MMHG | WEIGHT: 213 LBS | TEMPERATURE: 97.5 F | RESPIRATION RATE: 16 BRPM | BODY MASS INDEX: 31.55 KG/M2 | OXYGEN SATURATION: 90 % | HEART RATE: 96 BPM | HEIGHT: 69 IN

## 2018-04-30 DIAGNOSIS — J96.11 CHRONIC RESPIRATORY FAILURE WITH HYPOXIA (HCC): ICD-10-CM

## 2018-04-30 DIAGNOSIS — J98.6 ELEVATED HEMIDIAPHRAGM: ICD-10-CM

## 2018-04-30 DIAGNOSIS — R93.89 ABNORMAL CHEST X-RAY: ICD-10-CM

## 2018-04-30 DIAGNOSIS — J44.9 CHRONIC OBSTRUCTIVE PULMONARY DISEASE, UNSPECIFIED COPD TYPE (HCC): ICD-10-CM

## 2018-04-30 DIAGNOSIS — I27.20 MODERATE TO SEVERE PULMONARY HYPERTENSION (HCC): ICD-10-CM

## 2018-04-30 DIAGNOSIS — I50.9 CONGESTIVE HEART FAILURE, UNSPECIFIED HF CHRONICITY, UNSPECIFIED HEART FAILURE TYPE (HCC): ICD-10-CM

## 2018-04-30 PROCEDURE — 99214 OFFICE O/P EST MOD 30 MIN: CPT | Performed by: NURSE PRACTITIONER

## 2018-04-30 NOTE — PROGRESS NOTES
Chief Complaint   Patient presents with   • COPD     [facility] DC 3/29/18/ Xray / Last saw Dr Chen 2012         HPI: This patient is a 72 y.o. female, who presents for hospital follow-up.  She was last seen in our clinic in 2012.  Patient has a history of COPD and obesity hypoventilation, oxygen dependent 6L 24/7.    She has a history of pulmonary nodules stable over a two-year interval consistent with benign etiology.    Former PFTs 2009 indicate TLC of 88%, DLCO 83%.  She was unable to perform all maneuvers.  She declined further PFTs.  This was discussed again with her today.  She would like to hold off on these for now.  She uses Spiriva with subjective benefit.  She discontinued Symbicort due to lack of benefit.  She has never required albuterol and declines prescription.Patient has chronic elevation of the right hemidiaphragm causing restrictive lung disease in addition to COPD.    She was hospitalized in March for shortness of breath, worsening lower extremity edema.  Admitted for CHF exacerbation.  Echocardiogram showed fulminant failure indicating severe pulmonary hypertension (RVSP 95mm Hg) she was aggressively diuresed with symptomatic improvement.  She was started on sildenafil 10 mg 3 times daily in the hospital but was unable to afford this on an outpatient basis.  She tells me she has been referred to Dr. Pritchett and sees him tomorrow.     Sleep study has been recommended in the past but patient declines.  This was discussed again with her today.  She continues to adamantly decline, she is claustrophobic and will not tolerate CPAP therapy.     Past Medical History:   Diagnosis Date   • Anemia    • Arthritis    • Back pain    • Breath shortness     O2 24/7 for paralysis of diaphram right   • CATARACT    • Congestive heart failure (HCC)    • COPD (chronic obstructive pulmonary disease) (HCC)    • Diabetes    • EMPHYSEMA     mild   • Fall    • Hiatus hernia syndrome    • Hyperkalemia 3/23/2018   •  "Hyperlipidemia    • Hypertension    • Indigestion    • Other specified disorder of intestines     occas diarrhea   • Pain    • Paralysis of diaphragm     right side   • Renal insufficiency 3/23/2018   • Snoring    • Ulcer     \"bleeding stomach ulcer\"       Social History   Substance Use Topics   • Smoking status: Former Smoker     Packs/day: 2.00     Years: 20.00     Types: Cigarettes     Quit date: 1/1/2007   • Smokeless tobacco: Never Used   • Alcohol use No       Family History   Problem Relation Age of Onset   • Breast Cancer Mother    • Cancer Father    • Heart Failure Maternal Grandmother        Current medications as of today   Current Outpatient Prescriptions   Medication Sig Dispense Refill   • insulin glargine (LANTUS) 100 UNIT/ML Solution Inject 15 Units as instructed every bedtime. 10 mL 0   • metFORMIN (GLUCOPHAGE) 850 MG Tab Take 1 Tab by mouth 2 times a day, with meals. 60 Tab 0   • torsemide (DEMADEX) 20 MG Tab Take 2 Tabs by mouth 2 times a day. 60 Tab 0   • omeprazole (PRILOSEC) 20 MG delayed-release capsule Take 20 mg by mouth every evening.     • temazepam (RESTORIL) 30 MG capsule Take 30 mg by mouth at bedtime as needed for Sleep.     • tiotropium (SPIRIVA HANDIHALER) 18 MCG Cap Inhale 18 mcg by mouth every evening.     • lisinopril (PRINIVIL) 10 MG Tab Take 10 mg by mouth every day.     • HYDROcodone-acetaminophen (NORCO) 7.5-325 MG per tablet Take 1 Tab by mouth every four hours as needed for Severe Pain.     • magnesium oxide (MAG-OX) 400 MG Tab Take 400 mg by mouth every day.     • Calcium Carb-Cholecalciferol (CALCIUM 1000 + D) 1000-800 MG-UNIT Tab Take 1 Tab by mouth every evening.     • ezetimibe-simvastatin (VYTORIN) 10-40 MG per tablet Take 1 Tab by mouth every evening.       No current facility-administered medications for this visit.        Allergies: Zoloft; Amaryl [amaryl]; Amaryl [glimepiride]; Amoxicillin; Byetta; Epinephrine; Hctz; Iodine; Lasix [furosemide]; Latex; Lexapro; " "Penicillins; and Spironolactone    Blood pressure 141/70, pulse 96, temperature 36.4 °C (97.5 °F), resp. rate 16, height 1.753 m (5' 9\"), weight 96.6 kg (213 lb), SpO2 90 %.      ROS: As per HPI and otherwise negative if not stated.    Physical exam:   Constitutional: Obese, in no acute distress  Eyes: PERRL  Neck: supple, trachea midline  Respiratory: no intercostal retractions or accessory muscle use   Lungs auscultation: Clear to auscultation bilaterally, diminished right lower lobe  Cardiovascular: Regular rate rhythm   Musculoskeletal:  no clubbing or cyanosis, unsteady gait  Skin: No rashes or lesions noted on exposed skin  Neuro: No focal deficit noted  Psychiatric: Oriented to time, person and place.     Diagnosis:  1. Chronic obstructive pulmonary disease, unspecified COPD type (HCC)     2. Congestive heart failure, unspecified HF chronicity, unspecified heart failure type (HCC)     3. Chronic respiratory failure with hypoxia (HCC)     4. Elevated hemidiaphragm     5. Moderate to severe pulmonary hypertension (HCC)     6. Abnormal chest x-ray  DX-CHEST-2 VIEWS       Plan:  1.  Continue Spiriva daily  2.  Continue oxygen 24 7  3.  Follow-up with Dr. Givens as scheduled.  Will defer workup for pulmonary hypertension including HIV panel to Dr. Pritchett, although pulmonary hypertension is likely secondary to COPD and chronic hypoxemia.  4.  Follow with cardiology Dr Nunez as scheduled  5.  Patient would like to follow-up here with pulmonary physician  6.  Consider updated PFTs in the future  7.  Patient adamantly declines workup for sleep apnea and is aware of implications of this  8.  She is up-to-date on pneumococcal and influenza vaccine  9. Chest xray in 2-3 weeks to F/U on LLL opacity      "

## 2018-05-04 ENCOUNTER — TELEPHONE (OUTPATIENT)
Dept: PULMONOLOGY | Facility: HOSPICE | Age: 73
End: 2018-05-04

## 2018-05-04 NOTE — TELEPHONE ENCOUNTER
----- Message from ITM Jaffe sent at 4/30/2018 11:37 AM PDT -----  Will you please call the patient and let her know she needs F/U xray in 2-3 weeks. I have put order in computer, can you mail her order or get her scheduled?    Thank you   Sarah

## 2018-05-04 NOTE — TELEPHONE ENCOUNTER
Per Niecy -    With this pt, she was checked out by [facility] and Maico said back w/Pina in 2-3weeks.. But for some reason, she wasn't sched for an appt and was only put on a cxl list. Is Sarah willing to see her in 2-3weeks w/the x-ray or does she still want her to see pina? If she still wants her to see only pina, I'll have to sched the pt in sept-oct when he has an opening    Please advise Sarah

## 2018-05-15 ENCOUNTER — APPOINTMENT (OUTPATIENT)
Dept: RADIOLOGY | Facility: IMAGING CENTER | Age: 73
End: 2018-05-15
Attending: NURSE PRACTITIONER
Payer: COMMERCIAL

## 2018-05-15 ENCOUNTER — OFFICE VISIT (OUTPATIENT)
Dept: PULMONOLOGY | Facility: HOSPICE | Age: 73
End: 2018-05-15
Payer: COMMERCIAL

## 2018-05-15 VITALS
RESPIRATION RATE: 16 BRPM | DIASTOLIC BLOOD PRESSURE: 62 MMHG | SYSTOLIC BLOOD PRESSURE: 119 MMHG | OXYGEN SATURATION: 92 % | HEIGHT: 69 IN | WEIGHT: 212 LBS | BODY MASS INDEX: 31.4 KG/M2 | TEMPERATURE: 97.5 F | HEART RATE: 93 BPM

## 2018-05-15 DIAGNOSIS — J96.11 CHRONIC RESPIRATORY FAILURE WITH HYPOXIA (HCC): ICD-10-CM

## 2018-05-15 DIAGNOSIS — R93.89 ABNORMAL CHEST X-RAY: ICD-10-CM

## 2018-05-15 DIAGNOSIS — J98.6 ELEVATED HEMIDIAPHRAGM: ICD-10-CM

## 2018-05-15 DIAGNOSIS — I27.20 MODERATE TO SEVERE PULMONARY HYPERTENSION (HCC): ICD-10-CM

## 2018-05-15 DIAGNOSIS — I50.9 CONGESTIVE HEART FAILURE, UNSPECIFIED HF CHRONICITY, UNSPECIFIED HEART FAILURE TYPE (HCC): ICD-10-CM

## 2018-05-15 DIAGNOSIS — J44.9 CHRONIC OBSTRUCTIVE PULMONARY DISEASE, UNSPECIFIED COPD TYPE (HCC): ICD-10-CM

## 2018-05-15 PROCEDURE — 99214 OFFICE O/P EST MOD 30 MIN: CPT | Performed by: NURSE PRACTITIONER

## 2018-05-15 PROCEDURE — 71046 X-RAY EXAM CHEST 2 VIEWS: CPT | Mod: TC,FY | Performed by: NURSE PRACTITIONER

## 2018-05-15 NOTE — PROGRESS NOTES
Chief Complaint   Patient presents with   • COPD     Xray         HPI: This patient is a 72 y.o. female, who presents for follow-up with chest x-ray result.     Patient has a history of COPD and obesity hypoventilation, oxygen dependent 6L 24/7.     She has a history of pulmonary nodules stable over a two-year interval consistent with benign etiology.     Former PFTs 2009 indicate TLC of 88%, DLCO 83%.  She was unable to perform all maneuvers. She uses Spiriva with subjective benefit.  She discontinued Symbicort due to lack of benefit.  She has never required albuterol and declines prescription. Patient has chronic elevation of the right hemidiaphragm causing restrictive lung disease in addition to COPD.  Chest x-ray today shows right lung base atelectasis.     She was hospitalized in March for shortness of breath, worsening lower extremity edema.  Admitted for CHF exacerbation.  Echocardiogram showed fulminant failure indicating severe pulmonary hypertension (RVSP 95mm Hg) she was aggressively diuresed with symptomatic improvement.  She was started on sildenafil 10 mg 3 times daily in the hospital but was unable to afford this on an outpatient basis.  She has seen Dr Pritchett who is scheduled the patient for right heart catheterization on Wednesday.     Sleep study has been recommended in the past but patient declines.  This was discussed again with her today.  She continues to adamantly decline, she is claustrophobic and will not tolerate CPAP therapy.     Past Medical History:   Diagnosis Date   • Anemia    • Arthritis    • Back pain    • Breath shortness     O2 24/7 for paralysis of diaphram right   • CATARACT    • Congestive heart failure (HCC)    • COPD (chronic obstructive pulmonary disease) (HCC)    • Diabetes    • EMPHYSEMA     mild   • Fall    • Hiatus hernia syndrome    • Hyperkalemia 3/23/2018   • Hyperlipidemia    • Hypertension    • Indigestion    • Other specified disorder of intestines     occas  "diarrhea   • Pain    • Paralysis of diaphragm     right side   • Renal insufficiency 3/23/2018   • Snoring    • Ulcer     \"bleeding stomach ulcer\"       Social History   Substance Use Topics   • Smoking status: Former Smoker     Packs/day: 2.00     Years: 20.00     Types: Cigarettes     Quit date: 1/1/2007   • Smokeless tobacco: Never Used   • Alcohol use No       Family History   Problem Relation Age of Onset   • Breast Cancer Mother    • Cancer Father    • Heart Failure Maternal Grandmother        Current medications as of today   Current Outpatient Prescriptions   Medication Sig Dispense Refill   • insulin glargine (LANTUS) 100 UNIT/ML Solution Inject 15 Units as instructed every bedtime. 10 mL 0   • metFORMIN (GLUCOPHAGE) 850 MG Tab Take 1 Tab by mouth 2 times a day, with meals. 60 Tab 0   • torsemide (DEMADEX) 20 MG Tab Take 2 Tabs by mouth 2 times a day. 60 Tab 0   • ezetimibe-simvastatin (VYTORIN) 10-40 MG per tablet Take 1 Tab by mouth every evening.     • omeprazole (PRILOSEC) 20 MG delayed-release capsule Take 20 mg by mouth every evening.     • temazepam (RESTORIL) 30 MG capsule Take 30 mg by mouth at bedtime as needed for Sleep.     • tiotropium (SPIRIVA HANDIHALER) 18 MCG Cap Inhale 18 mcg by mouth every evening.     • lisinopril (PRINIVIL) 10 MG Tab Take 10 mg by mouth every day.     • HYDROcodone-acetaminophen (NORCO) 7.5-325 MG per tablet Take 1 Tab by mouth every four hours as needed for Severe Pain.     • magnesium oxide (MAG-OX) 400 MG Tab Take 400 mg by mouth every day.     • Calcium Carb-Cholecalciferol (CALCIUM 1000 + D) 1000-800 MG-UNIT Tab Take 1 Tab by mouth every evening.       No current facility-administered medications for this visit.        Allergies: Zoloft; Amaryl [amaryl]; Amaryl [glimepiride]; Amoxicillin; Byetta; Epinephrine; Hctz; Iodine; Lasix [furosemide]; Latex; Lexapro; Penicillins; and Spironolactone    Blood pressure 119/62, pulse 93, temperature 36.4 °C (97.5 °F), resp. " "rate 16, height 1.753 m (5' 9\"), weight 96.2 kg (212 lb), SpO2 92 %.      ROS: As per HPI and otherwise negative if not stated.    Physical exam:   Constitutional: Obese, in no acute distress  Eyes: PERRL  Mouth/Throat: Oropharynx is moist, clear, no lesions  Neck: supple, trachea midline  Respiratory: no intercostal retractions or accessory muscle use   Lungs auscultation: Diminished bilateral bases, otherwise clear   cardiovascular: Regular rate rhythm no murmurs, rubs or gallops  Musculoskeletal:  no clubbing or cyanosis  Skin: No rashes or lesions noted on exposed skin  Neuro: No focal deficit noted  Psychiatric: Oriented to time, person and place.     Diagnosis:  1. Chronic obstructive pulmonary disease, unspecified COPD type (HCC)  AMB PULMONARY FUNCTION TEST/LAB   2. Moderate to severe pulmonary hypertension (HCC)     3. Congestive heart failure, unspecified HF chronicity, unspecified heart failure type (HCC)     4. Elevated hemidiaphragm     5. Chronic respiratory failure with hypoxia (HCC)         Plan:  Chest x-ray reviewed with Dr. Chen.  No additional radiographic follow-up indicated at this point.    1.  Recommend updated PFTs for staging of lung disease.  Patient is amenable to this.  She is very claustrophobic and will attempt to do the majority of PFT maneuvers.    2. She will continue Spiriva daily and albuterol as needed.  3.  Continue oxygen 24 7  4.  Follow-up with Dr. Givens as scheduled for heart catheterization  5.  Follow-up with PCP for routine health maintenance  6.  Permanent and complete smoking cessation recommended  7.  Patient adamantly declines further workup for MUNA, she understands the implications of the especially given her pulmonary hypertension  8.  She has a follow-up appointment with pulmonary physician in September.        "

## 2018-05-15 NOTE — PATIENT INSTRUCTIONS
1.  Continue Spiriva daily, albuterol as needed  2.  Continue oxygen 24 7  3.  Updated pulmonary function testing in the next month  4.  Follow-up with Dr. Givens as scheduled  5.  Follow-up here in September with Dr. Pina, sooner if necessary

## 2018-05-18 ENCOUNTER — HOSPITAL ENCOUNTER (OUTPATIENT)
Dept: LAB | Facility: MEDICAL CENTER | Age: 73
End: 2018-05-18
Attending: INTERNAL MEDICINE
Payer: COMMERCIAL

## 2018-05-18 LAB
ANION GAP SERPL CALC-SCNC: 11 MMOL/L (ref 0–11.9)
APTT PPP: 33.2 SEC (ref 24.7–36)
BASOPHILS # BLD AUTO: 0.5 % (ref 0–1.8)
BASOPHILS # BLD: 0.04 K/UL (ref 0–0.12)
BUN SERPL-MCNC: 43 MG/DL (ref 8–22)
CALCIUM SERPL-MCNC: 9.9 MG/DL (ref 8.5–10.5)
CHLORIDE SERPL-SCNC: 95 MMOL/L (ref 96–112)
CO2 SERPL-SCNC: 30 MMOL/L (ref 20–33)
CREAT SERPL-MCNC: 1.1 MG/DL (ref 0.5–1.4)
EOSINOPHIL # BLD AUTO: 0.76 K/UL (ref 0–0.51)
EOSINOPHIL NFR BLD: 9.1 % (ref 0–6.9)
ERYTHROCYTE [DISTWIDTH] IN BLOOD BY AUTOMATED COUNT: 48.7 FL (ref 35.9–50)
GLUCOSE SERPL-MCNC: 203 MG/DL (ref 65–99)
HCT VFR BLD AUTO: 40.7 % (ref 37–47)
HGB BLD-MCNC: 12.9 G/DL (ref 12–16)
IMM GRANULOCYTES # BLD AUTO: 0.03 K/UL (ref 0–0.11)
IMM GRANULOCYTES NFR BLD AUTO: 0.4 % (ref 0–0.9)
INR PPP: 0.92 (ref 0.87–1.13)
LYMPHOCYTES # BLD AUTO: 0.98 K/UL (ref 1–4.8)
LYMPHOCYTES NFR BLD: 11.7 % (ref 22–41)
MCH RBC QN AUTO: 30.4 PG (ref 27–33)
MCHC RBC AUTO-ENTMCNC: 31.7 G/DL (ref 33.6–35)
MCV RBC AUTO: 95.8 FL (ref 81.4–97.8)
MONOCYTES # BLD AUTO: 0.75 K/UL (ref 0–0.85)
MONOCYTES NFR BLD AUTO: 8.9 % (ref 0–13.4)
NEUTROPHILS # BLD AUTO: 5.83 K/UL (ref 2–7.15)
NEUTROPHILS NFR BLD: 69.4 % (ref 44–72)
NRBC # BLD AUTO: 0 K/UL
NRBC BLD-RTO: 0 /100 WBC
PLATELET # BLD AUTO: 191 K/UL (ref 164–446)
PMV BLD AUTO: 12.1 FL (ref 9–12.9)
POTASSIUM SERPL-SCNC: 4.4 MMOL/L (ref 3.6–5.5)
PROTHROMBIN TIME: 12.1 SEC (ref 12–14.6)
RBC # BLD AUTO: 4.25 M/UL (ref 4.2–5.4)
SODIUM SERPL-SCNC: 136 MMOL/L (ref 135–145)
WBC # BLD AUTO: 8.4 K/UL (ref 4.8–10.8)

## 2018-05-18 PROCEDURE — 80048 BASIC METABOLIC PNL TOTAL CA: CPT

## 2018-05-18 PROCEDURE — 85730 THROMBOPLASTIN TIME PARTIAL: CPT

## 2018-05-18 PROCEDURE — 36415 COLL VENOUS BLD VENIPUNCTURE: CPT

## 2018-05-18 PROCEDURE — 85025 COMPLETE CBC W/AUTO DIFF WBC: CPT

## 2018-05-18 PROCEDURE — 85610 PROTHROMBIN TIME: CPT

## 2018-09-18 ENCOUNTER — NON-PROVIDER VISIT (OUTPATIENT)
Dept: PULMONOLOGY | Facility: HOSPICE | Age: 73
End: 2018-09-18
Payer: COMMERCIAL

## 2018-09-18 VITALS — HEIGHT: 67 IN | BODY MASS INDEX: 34.21 KG/M2 | WEIGHT: 218 LBS

## 2018-09-18 DIAGNOSIS — J44.9 CHRONIC OBSTRUCTIVE PULMONARY DISEASE, UNSPECIFIED COPD TYPE (HCC): ICD-10-CM

## 2018-09-18 PROCEDURE — 94060 EVALUATION OF WHEEZING: CPT | Performed by: INTERNAL MEDICINE

## 2018-09-18 PROCEDURE — 94726 PLETHYSMOGRAPHY LUNG VOLUMES: CPT | Performed by: INTERNAL MEDICINE

## 2018-09-18 PROCEDURE — 94729 DIFFUSING CAPACITY: CPT | Performed by: INTERNAL MEDICINE

## 2018-09-18 ASSESSMENT — PULMONARY FUNCTION TESTS
FVC_PERCENT_PREDICTED: 68
FVC_PERCENT_PREDICTED: 62
FEV1/FVC_PREDICTED: 75
FEV1/FVC_PERCENT_PREDICTED: 99
FEV1/FVC: 69
FEV1: 1.52
FEV1/FVC_PERCENT_LLN: 63
FEV1/FVC_PERCENT_PREDICTED: 75
FEV1/FVC: 75
FEV1_PERCENT_CHANGE: 9
FEV1/FVC_PERCENT_PREDICTED: 100
FEV1_PERCENT_PREDICTED: 62
FEV1/FVC_PERCENT_CHANGE: -8
FEV1/FVC: 68.78
FEV1/FVC: 75
FVC_LLN: 2.67
FVC: 2.21
FEV1_PREDICTED: 2.41
FEV1_PERCENT_CHANGE: 0
FEV1/FVC_PERCENT_PREDICTED: 91
FVC_PREDICTED: 3.2
FVC: 2.01
FEV1/FVC_PERCENT_PREDICTED: 91
FEV1/FVC_PERCENT_CHANGE: 0
FEV1_LLN: 2.01
FEV1_PERCENT_PREDICTED: 62
FEV1: 1.5

## 2018-09-18 NOTE — PROCEDURES
Technician Colleen Antonio, [facility] Comments:Good patient effort & cooperation.  The results of this test meet the ATS/ERS standards for acceptability & reproducibility.  Test was performed on the [employer] Body Plethysmograph-Elite DX system.  Predicted values were [facility]-3 for spirometry, [facility] for DLCO, ITS for Lung Volumes.  The DLCO was uncorrected for Hgb.  A bronchodilator of Xopenex HFA -2puffs via spacer administered.  DLCO performed during dilation period.    The FVC is 2.21 L or 68%, FEV1 is 1.52 L or 62%, FEV1/FVC 69%.  TLC 76%.  DLCO 58%.  No bronchodilator response.      Interpretation:  PFT show mixed obstructive and ventilatory defects.  This is consistent with COPD and elevated hemidiaphragm.  The total lung capacity is 76% predicted consistent with restriction.  The FEV1 is 1.52 L or 62%, consistent with obstruction.  The DLCO is 58% predicted, consistent with COPD/emphysema.

## 2018-09-24 ENCOUNTER — OFFICE VISIT (OUTPATIENT)
Dept: PULMONOLOGY | Facility: HOSPICE | Age: 73
End: 2018-09-24
Payer: COMMERCIAL

## 2018-09-24 VITALS
SYSTOLIC BLOOD PRESSURE: 132 MMHG | OXYGEN SATURATION: 98 % | BODY MASS INDEX: 29.18 KG/M2 | TEMPERATURE: 97.7 F | WEIGHT: 197 LBS | HEART RATE: 70 BPM | HEIGHT: 69 IN | DIASTOLIC BLOOD PRESSURE: 70 MMHG

## 2018-09-24 DIAGNOSIS — I27.20 MODERATE TO SEVERE PULMONARY HYPERTENSION (HCC): ICD-10-CM

## 2018-09-24 DIAGNOSIS — R06.02 SOB (SHORTNESS OF BREATH): ICD-10-CM

## 2018-09-24 DIAGNOSIS — J44.9 CHRONIC OBSTRUCTIVE PULMONARY DISEASE, UNSPECIFIED COPD TYPE (HCC): ICD-10-CM

## 2018-09-24 DIAGNOSIS — J98.6 ELEVATED HEMIDIAPHRAGM: ICD-10-CM

## 2018-09-24 DIAGNOSIS — R09.02 HYPOXEMIA: ICD-10-CM

## 2018-09-24 PROCEDURE — 99214 OFFICE O/P EST MOD 30 MIN: CPT | Performed by: INTERNAL MEDICINE

## 2018-09-24 NOTE — PROGRESS NOTES
No chief complaint on file.      HPI:  The patient is a 72-year-old woman with chronic obstructive pulmonary disease, pulmonary hypertension, and a nonfunctioning right hemidiaphragm.  She was hospitalized last spring with right heart failure.  With aggressive diuresis she has improved.  During her hospitalization she had an echocardiogram estimating her RVSP at 95 mmHg.  She has seen Dr. Pritchett.  Heart cath was performed.  She tells me that everything checked out fairly well.  There was no recommendation for sildenafil.  We do not have the results of the catheterization.  The patient is on supplemental oxygen 24/7 at 5-6 L/min.  Pulmonary function testing on 9/19/2018 demonstrated an FEV1 of 1.50 L which is 62% of predicted.  FEV1 FVC ratio was 75%.  There was no improvement after an inhaled bronchodilator.  Total lung capacity was 76% of predicted.  Diffusing capacity was 58% of predicted.  Airways resistance was not increased.  The study was compatible with a combined obstructive and restrictive process.  Patient also has a history of possible obstructive sleep apnea.  She has declined sleep evaluation.  She feels she would never be able to tolerate CPAP.  We also discussed the possibility of pulmonary rehabilitation.  She did not want to pursue that program at this time.  We discussed influenza vaccination.  She says she never gets flu vaccines.  Her current inhaled medication includes Spiriva.  Symbicort caused her to be anxious.  She has not used albuterol on any regular basis.  She continues on Demadex as a diuretic.    Past Medical History:   Diagnosis Date   • Anemia    • Arthritis    • Back pain    • Breath shortness     O2 24/7 for paralysis of diaphram right   • CATARACT    • Congestive heart failure (HCC)    • COPD (chronic obstructive pulmonary disease) ([facility])    • Diabetes    • EMPHYSEMA     mild   • Fall    • Hiatus hernia syndrome    • Hyperkalemia 3/23/2018   • Hyperlipidemia    • Hypertension   "  • Indigestion    • Other specified disorder of intestines     occas diarrhea   • Pain    • Paralysis of diaphragm     right side   • Renal insufficiency 3/23/2018   • Snoring    • Ulcer     \"bleeding stomach ulcer\"       ROS:   Constitutional: Denies fevers, chills, night sweats, fatigue or weight loss  Eyes: Denies vision loss, pain, drainage, double vision  Ears, Nose, Throat: Denies earache, tinnitus, hoarseness  Cardiovascular: Denies chest pain, tightness, palpitations at this time  Respiratory: See HPI  Sleep: See HPI  GI: Denies abdominal pain, nausea, vomiting, diarrhea  : Denies frequent urination, hematuria, painful urination  Musculoskeletal: Denies back pain, painful joints, sore muscles  Neurological: Denies headaches, seizures  Skin: Denies rashes, color changes  Psychiatric: Denies depression or thoughts of suicide  Hematologic: Denies bleeding tendency or clotting tendency  Allergic/Immunologic: Denies rhinitis, skin sensitivity    Social History     Social History   • Marital status:      Spouse name: N/A   • Number of children: N/A   • Years of education: N/A     Occupational History   • Not on file.     Social History Main Topics   • Smoking status: Former Smoker     Packs/day: 2.00     Years: 20.00     Types: Cigarettes     Quit date: 1/1/2007   • Smokeless tobacco: Never Used   • Alcohol use No   • Drug use: No   • Sexual activity: Not on file     Other Topics Concern   • Not on file     Social History Narrative   • No narrative on file     Zoloft; Amaryl [amaryl]; Amaryl [glimepiride]; Amoxicillin; Byetta; Epinephrine; Hctz; Iodine; Lasix [furosemide]; Latex; Lexapro; Penicillins; and Spironolactone  Current Outpatient Prescriptions on File Prior to Visit   Medication Sig Dispense Refill   • insulin glargine (LANTUS) 100 UNIT/ML Solution Inject 15 Units as instructed every bedtime. 10 mL 0   • metFORMIN (GLUCOPHAGE) 850 MG Tab Take 1 Tab by mouth 2 times a day, with meals. 60 Tab 0 " "  • torsemide (DEMADEX) 20 MG Tab Take 2 Tabs by mouth 2 times a day. 60 Tab 0   • omeprazole (PRILOSEC) 20 MG delayed-release capsule Take 20 mg by mouth every evening.     • temazepam (RESTORIL) 30 MG capsule Take 30 mg by mouth at bedtime as needed for Sleep.     • tiotropium (SPIRIVA HANDIHALER) 18 MCG Cap Inhale 18 mcg by mouth every evening.     • lisinopril (PRINIVIL) 10 MG Tab Take 10 mg by mouth every day.     • HYDROcodone-acetaminophen (NORCO) 7.5-325 MG per tablet Take 1 Tab by mouth every four hours as needed for Severe Pain.     • magnesium oxide (MAG-OX) 400 MG Tab Take 400 mg by mouth every day.     • Calcium Carb-Cholecalciferol (CALCIUM 1000 + D) 1000-800 MG-UNIT Tab Take 1 Tab by mouth every evening.     • ezetimibe-simvastatin (VYTORIN) 10-40 MG per tablet Take 1 Tab by mouth every evening.       No current facility-administered medications on file prior to visit.      Blood pressure 132/70, pulse 70, temperature 36.5 °C (97.7 °F), height 1.753 m (5' 9\"), weight 89.4 kg (197 lb), SpO2 98 %, not currently breastfeeding.  Family History   Problem Relation Age of Onset   • Breast Cancer Mother    • Cancer Father    • Heart Failure Maternal Grandmother        Physical Exam:  No distress at rest on supplemental oxygen  HEENT: PERRLA, EOMI, no scleral icterus, no nasal or oral lesions  Neck: No thyromegaly, no adenopathy, no bruits  Mallampatti: Grade II  Lungs: Distant breath sounds, no wheezes or crackles  Heart: Regular rate and rhythm, no gallops or murmurs  Abdomen: Soft, benign, no organomegaly  Extremities: No clubbing, cyanosis, or edema  Neurologic: Cranial nerve, motor, and sensory exam are normal    1. Chronic obstructive pulmonary disease, unspecified COPD type (HCC)    2. Moderate to severe pulmonary hypertension (HCC)    3. Elevated hemidiaphragm    4. Hypoxemia    5. SOB (shortness of breath)      She will continue Spiriva and supplemental oxygen.  Pulmonary function testing does show a " significant restrictive component.  She has no evidence of interstitial lung disease on chest x-ray or CT scanning.  We will seek to obtain her cardiac catheterization results from [facility].  We will see her back in 6 months for repeat evaluation

## 2019-01-15 ENCOUNTER — HOSPITAL ENCOUNTER (OUTPATIENT)
Dept: LAB | Facility: MEDICAL CENTER | Age: 74
End: 2019-01-15
Attending: FAMILY MEDICINE
Payer: COMMERCIAL

## 2019-01-15 LAB
ANION GAP SERPL CALC-SCNC: 9 MMOL/L (ref 0–11.9)
BASOPHILS # BLD AUTO: 0.3 % (ref 0–1.8)
BASOPHILS # BLD: 0.03 K/UL (ref 0–0.12)
BUN SERPL-MCNC: 51 MG/DL (ref 8–22)
CALCIUM SERPL-MCNC: 10.7 MG/DL (ref 8.5–10.5)
CHLORIDE SERPL-SCNC: 94 MMOL/L (ref 96–112)
CO2 SERPL-SCNC: 36 MMOL/L (ref 20–33)
CREAT SERPL-MCNC: 1.58 MG/DL (ref 0.5–1.4)
EOSINOPHIL # BLD AUTO: 0.35 K/UL (ref 0–0.51)
EOSINOPHIL NFR BLD: 4 % (ref 0–6.9)
ERYTHROCYTE [DISTWIDTH] IN BLOOD BY AUTOMATED COUNT: 46.5 FL (ref 35.9–50)
EST. AVERAGE GLUCOSE BLD GHB EST-MCNC: 206 MG/DL
FASTING STATUS PATIENT QL REPORTED: NORMAL
GLUCOSE SERPL-MCNC: 247 MG/DL (ref 65–99)
HBA1C MFR BLD: 8.8 % (ref 0–5.6)
HCT VFR BLD AUTO: 39.4 % (ref 37–47)
HGB BLD-MCNC: 12.6 G/DL (ref 12–16)
IMM GRANULOCYTES # BLD AUTO: 0.08 K/UL (ref 0–0.11)
IMM GRANULOCYTES NFR BLD AUTO: 0.9 % (ref 0–0.9)
LYMPHOCYTES # BLD AUTO: 1.39 K/UL (ref 1–4.8)
LYMPHOCYTES NFR BLD: 15.7 % (ref 22–41)
MCH RBC QN AUTO: 30.7 PG (ref 27–33)
MCHC RBC AUTO-ENTMCNC: 32 G/DL (ref 33.6–35)
MCV RBC AUTO: 95.9 FL (ref 81.4–97.8)
MONOCYTES # BLD AUTO: 0.76 K/UL (ref 0–0.85)
MONOCYTES NFR BLD AUTO: 8.6 % (ref 0–13.4)
NEUTROPHILS # BLD AUTO: 6.23 K/UL (ref 2–7.15)
NEUTROPHILS NFR BLD: 70.5 % (ref 44–72)
NRBC # BLD AUTO: 0 K/UL
NRBC BLD-RTO: 0 /100 WBC
PLATELET # BLD AUTO: 172 K/UL (ref 164–446)
PMV BLD AUTO: 12 FL (ref 9–12.9)
POTASSIUM SERPL-SCNC: 5 MMOL/L (ref 3.6–5.5)
RBC # BLD AUTO: 4.11 M/UL (ref 4.2–5.4)
SODIUM SERPL-SCNC: 139 MMOL/L (ref 135–145)
WBC # BLD AUTO: 8.8 K/UL (ref 4.8–10.8)

## 2019-01-15 PROCEDURE — 36415 COLL VENOUS BLD VENIPUNCTURE: CPT

## 2019-01-15 PROCEDURE — 80048 BASIC METABOLIC PNL TOTAL CA: CPT

## 2019-01-15 PROCEDURE — 83036 HEMOGLOBIN GLYCOSYLATED A1C: CPT

## 2019-01-15 PROCEDURE — 85025 COMPLETE CBC W/AUTO DIFF WBC: CPT

## 2019-03-26 ENCOUNTER — OFFICE VISIT (OUTPATIENT)
Dept: PULMONOLOGY | Facility: HOSPICE | Age: 74
End: 2019-03-26
Payer: COMMERCIAL

## 2019-03-26 VITALS
RESPIRATION RATE: 16 BRPM | DIASTOLIC BLOOD PRESSURE: 58 MMHG | OXYGEN SATURATION: 97 % | HEART RATE: 80 BPM | SYSTOLIC BLOOD PRESSURE: 118 MMHG | BODY MASS INDEX: 33.27 KG/M2 | HEIGHT: 69 IN | WEIGHT: 224.6 LBS

## 2019-03-26 DIAGNOSIS — J30.2 SEASONAL ALLERGIES: ICD-10-CM

## 2019-03-26 DIAGNOSIS — J96.11 CHRONIC RESPIRATORY FAILURE WITH HYPOXIA (HCC): ICD-10-CM

## 2019-03-26 DIAGNOSIS — J44.1 CHRONIC OBSTRUCTIVE PULMONARY DISEASE WITH ACUTE EXACERBATION (HCC): ICD-10-CM

## 2019-03-26 DIAGNOSIS — I50.9 CONGESTIVE HEART FAILURE, UNSPECIFIED HF CHRONICITY, UNSPECIFIED HEART FAILURE TYPE (HCC): ICD-10-CM

## 2019-03-26 PROCEDURE — 99214 OFFICE O/P EST MOD 30 MIN: CPT | Performed by: NURSE PRACTITIONER

## 2019-03-26 RX ORDER — AZITHROMYCIN 250 MG/1
TABLET, FILM COATED ORAL
Qty: 6 TAB | Refills: 0 | Status: SHIPPED | OUTPATIENT
Start: 2019-03-26 | End: 2019-05-01

## 2019-03-26 RX ORDER — METHYLPREDNISOLONE 4 MG/1
TABLET ORAL
Qty: 21 TAB | Refills: 0 | Status: SHIPPED | OUTPATIENT
Start: 2019-03-26 | End: 2019-05-01

## 2019-03-26 ASSESSMENT — ENCOUNTER SYMPTOMS
MUSCULOSKELETAL NEGATIVE: 1
EYE PAIN: 0
CARDIOVASCULAR NEGATIVE: 1
SPUTUM PRODUCTION: 1
SORE THROAT: 0
HEMOPTYSIS: 0
SINUS PAIN: 0
BRUISES/BLEEDS EASILY: 0
EYE DISCHARGE: 0
COUGH: 1
GASTROINTESTINAL NEGATIVE: 1
PSYCHIATRIC NEGATIVE: 1
WHEEZING: 0
STRIDOR: 0
NEUROLOGICAL NEGATIVE: 1
SHORTNESS OF BREATH: 0
CONSTITUTIONAL NEGATIVE: 1

## 2019-03-26 NOTE — PROGRESS NOTES
Chief Complaint   Patient presents with   • COPD     Last Seen 09/24/18         HPI: This patient is a 73 y.o. female, who presents for follow-up end-stage COPD, chronic respiratory failure, severe pulmonary hypertension, nonfunctioning right hemidiaphragm using 6-7 L of O2 24/7.     She has a history of pulmonary nodules stable over a two-year interval consistent with benign etiology.     Recent PFTs show mixed restrictive and obstructive changes with an FEV1 of 1.50 L 62% predicted, FEV1 FEC ratio of 75.  TLC 76% predicted, DLCO 58% predicted.  Consistent with COPD/emphysema and right hemidiaphragm elevation.  She has no evidence of ILD on x-ray.  She uses Spiriva with subjective benefit.  She discontinued Symbicort due to lack of benefit.  She has never required albuterol and declines prescription. She has declined pulm rehab.  She reports increased chest congestion, productive cough over the past 10 days.  She reports a low-grade fever typically in the afternoons.  She denies chest pain, pressure, hemoptysis or increased shortness of breath.  She feels part of her symptoms may be allergy related     She was hospitalized in March 2018 for shortness of breath, worsening lower extremity edema.  Admitted for CHF exacerbation.  Echocardiogram showed fulminant failure indicating severe pulmonary hypertension (RVSP 95mm Hg) she was aggressively diuresed with symptomatic improvement.  She was seen by Dr Pritchett on outpt basis.  Right heart cath was performed and showed only mild pulmonary pressures with an RVSP of 26 mmHg.  Coronary arteries were normal.  She remains compliant with diuretics.  She watches her weight which has been stable.    Patient has suspected sleep apnea but has adamantly declined further workup, she would not tolerate CPAP due to claustrophobia.    Past Medical History:   Diagnosis Date   • Anemia    • Arthritis    • Back pain    • Breath shortness     O2 24/7 for paralysis of diaphram right   •  "CATARACT    • Congestive heart failure (HCC)    • COPD (chronic obstructive pulmonary disease) ([facility])    • Diabetes    • EMPHYSEMA     mild   • Fall    • Hiatus hernia syndrome    • Hyperkalemia 3/23/2018   • Hyperlipidemia    • Hypertension    • Indigestion    • Other specified disorder of intestines     occas diarrhea   • Pain    • Paralysis of diaphragm     right side   • Renal insufficiency 3/23/2018   • Snoring    • Ulcer     \"bleeding stomach ulcer\"       Social History   Substance Use Topics   • Smoking status: Former Smoker     Packs/day: 2.00     Years: 20.00     Types: Cigarettes     Quit date: 1/1/2007   • Smokeless tobacco: Never Used   • Alcohol use No       Family History   Problem Relation Age of Onset   • Breast Cancer Mother    • Cancer Father    • Heart Failure Maternal Grandmother        Immunization History   Administered Date(s) Administered   • Pneumococcal Conjugate Vaccine (Prevnar/PCV-13) 03/25/2018       Current medications as of today   Current Outpatient Prescriptions   Medication Sig Dispense Refill   • insulin glargine (LANTUS) 100 UNIT/ML Solution Inject 15 Units as instructed every bedtime. 10 mL 0   • metFORMIN (GLUCOPHAGE) 850 MG Tab Take 1 Tab by mouth 2 times a day, with meals. 60 Tab 0   • torsemide (DEMADEX) 20 MG Tab Take 2 Tabs by mouth 2 times a day. 60 Tab 0   • ezetimibe-simvastatin (VYTORIN) 10-40 MG per tablet Take 1 Tab by mouth every evening.     • omeprazole (PRILOSEC) 20 MG delayed-release capsule Take 20 mg by mouth every evening.     • temazepam (RESTORIL) 30 MG capsule Take 30 mg by mouth at bedtime as needed for Sleep.     • tiotropium (SPIRIVA HANDIHALER) 18 MCG Cap Inhale 18 mcg by mouth every evening.     • lisinopril (PRINIVIL) 10 MG Tab Take 10 mg by mouth every day.     • HYDROcodone-acetaminophen (NORCO) 7.5-325 MG per tablet Take 1 Tab by mouth every four hours as needed for Severe Pain.     • magnesium oxide (MAG-OX) 400 MG Tab Take 400 mg by mouth every " "day.     • Calcium Carb-Cholecalciferol (CALCIUM 1000 + D) 1000-800 MG-UNIT Tab Take 1 Tab by mouth every evening.       No current facility-administered medications for this visit.        Allergies: Zoloft; Amaryl [amaryl]; Amaryl [glimepiride]; Amoxicillin; Byetta; Epinephrine; Hctz; Iodine; Lasix [furosemide]; Latex; Lexapro; Penicillins; and Spironolactone    Blood pressure 118/58, pulse 80, resp. rate 16, height 1.753 m (5' 9\"), weight 101.9 kg (224 lb 9.6 oz), SpO2 97 %, not currently breastfeeding.      Review of Systems   Constitutional: Negative.    HENT: Positive for congestion. Negative for ear discharge, ear pain, hearing loss, nosebleeds, sinus pain, sore throat and tinnitus.    Eyes: Negative for pain and discharge.   Respiratory: Positive for cough and sputum production. Negative for hemoptysis, shortness of breath, wheezing and stridor.    Cardiovascular: Negative.    Gastrointestinal: Negative.    Musculoskeletal: Negative.    Skin: Negative.    Neurological: Negative.    Endo/Heme/Allergies: Negative for environmental allergies. Does not bruise/bleed easily.   Psychiatric/Behavioral: Negative.        Physical Exam   Constitutional: She is oriented to person, place, and time and well-developed, well-nourished, and in no distress.   HENT:   Head: Normocephalic and atraumatic.   Mouth/Throat: Oropharynx is clear and moist.   Eyes: Pupils are equal, round, and reactive to light.   Neck: Normal range of motion. Neck supple. No tracheal deviation present.   Cardiovascular: Normal rate, regular rhythm and normal heart sounds.    Pulmonary/Chest: Effort normal and breath sounds normal. No respiratory distress. She has no wheezes. She has no rales.   No wheeze, crackles or rhonchi   Musculoskeletal: Normal range of motion.   Neurological: She is alert and oriented to person, place, and time.   Skin: Skin is warm and dry.   Psychiatric: Mood, memory, affect and judgment normal.   Vitals " reviewed.      Diagnoses/Plan:    1. Chronic obstructive pulmonary disease with acute exacerbation (HCC)  will treat patient for mild COPD exacerbation/bronchitis with azithromycin and Medrol Dosepak.  She will pick prescriptions up today.  If symptoms do not resolve she is encouraged to follow-up.  She will continue Spiriva daily.  She declines albuterol.  - azithromycin (ZITHROMAX) 250 MG Tab; Take 2 tablets on day 1, then take 1 tablet a day for 4 days.  Dispense: 6 Tab; Refill: 0  - MethylPREDNISolone (MEDROL DOSEPAK) 4 MG Tablet Therapy Pack; Take as directed.  Dispense: 21 Tab; Refill: 0    2. Chronic respiratory failure with hypoxia (HCC)  Stable, continue oxygen 24/7    3. Congestive heart failure, unspecified HF chronicity, unspecified heart failure type (HCC)  Symptoms are stable, no evidence of decompensated heart failure on exam.  No lower extremity edema.  She is compliant with diuretics and follows with cardiology.    4. Seasonal allergies  Increased allergy symptoms over the past several days.  Recommended she continue Flonase, add OTC antihistamine if needed.    She will follow-up in 6 months, sooner if necessary                This dictation was created using voice recognition software. The accuracy of the dictation is limited to the abilities of the software. I expect there may be some errors of grammar and possibly content.

## 2019-04-02 ENCOUNTER — APPOINTMENT (OUTPATIENT)
Dept: NEPHROLOGY | Facility: MEDICAL CENTER | Age: 74
End: 2019-04-02
Payer: MEDICARE

## 2019-05-01 ENCOUNTER — HOSPITAL ENCOUNTER (INPATIENT)
Facility: MEDICAL CENTER | Age: 74
LOS: 3 days | DRG: 683 | End: 2019-05-04
Attending: EMERGENCY MEDICINE | Admitting: FAMILY MEDICINE
Payer: COMMERCIAL

## 2019-05-01 ENCOUNTER — APPOINTMENT (OUTPATIENT)
Dept: RADIOLOGY | Facility: MEDICAL CENTER | Age: 74
DRG: 683 | End: 2019-05-01
Attending: EMERGENCY MEDICINE
Payer: COMMERCIAL

## 2019-05-01 DIAGNOSIS — E87.5 HYPERKALEMIA: ICD-10-CM

## 2019-05-01 DIAGNOSIS — N17.9 ACUTE RENAL FAILURE, UNSPECIFIED ACUTE RENAL FAILURE TYPE (HCC): ICD-10-CM

## 2019-05-01 PROBLEM — R11.2 NAUSEA AND VOMITING: Status: ACTIVE | Noted: 2019-05-01

## 2019-05-01 LAB
ALBUMIN SERPL BCP-MCNC: 4.5 G/DL (ref 3.2–4.9)
ALBUMIN/GLOB SERPL: 1.7 G/DL
ALP SERPL-CCNC: 49 U/L (ref 30–99)
ALT SERPL-CCNC: 8 U/L (ref 2–50)
ANION GAP SERPL CALC-SCNC: 14 MMOL/L (ref 0–11.9)
ANION GAP SERPL CALC-SCNC: 16 MMOL/L (ref 0–11.9)
ANION GAP SERPL CALC-SCNC: 19 MMOL/L (ref 0–11.9)
ANION GAP SERPL CALC-SCNC: 19 MMOL/L (ref 0–11.9)
APPEARANCE UR: CLEAR
AST SERPL-CCNC: 11 U/L (ref 12–45)
BASOPHILS # BLD AUTO: 0.4 % (ref 0–1.8)
BASOPHILS # BLD: 0.03 K/UL (ref 0–0.12)
BILIRUB SERPL-MCNC: 0.5 MG/DL (ref 0.1–1.5)
BILIRUB UR QL STRIP.AUTO: NEGATIVE
BUN SERPL-MCNC: 175 MG/DL (ref 8–22)
BUN SERPL-MCNC: 180 MG/DL (ref 8–22)
BUN SERPL-MCNC: 185 MG/DL (ref 8–22)
BUN SERPL-MCNC: 189 MG/DL (ref 8–22)
CALCIUM SERPL-MCNC: 9.3 MG/DL (ref 8.5–10.5)
CALCIUM SERPL-MCNC: 9.4 MG/DL (ref 8.5–10.5)
CALCIUM SERPL-MCNC: 9.5 MG/DL (ref 8.5–10.5)
CALCIUM SERPL-MCNC: 9.9 MG/DL (ref 8.5–10.5)
CHLORIDE SERPL-SCNC: 88 MMOL/L (ref 96–112)
CHLORIDE SERPL-SCNC: 89 MMOL/L (ref 96–112)
CHLORIDE SERPL-SCNC: 92 MMOL/L (ref 96–112)
CHLORIDE SERPL-SCNC: 94 MMOL/L (ref 96–112)
CK SERPL-CCNC: 37 U/L (ref 0–154)
CO2 SERPL-SCNC: 22 MMOL/L (ref 20–33)
CO2 SERPL-SCNC: 22 MMOL/L (ref 20–33)
CO2 SERPL-SCNC: 23 MMOL/L (ref 20–33)
CO2 SERPL-SCNC: 25 MMOL/L (ref 20–33)
COLOR UR: YELLOW
CREAT SERPL-MCNC: 6.94 MG/DL (ref 0.5–1.4)
CREAT SERPL-MCNC: 7.41 MG/DL (ref 0.5–1.4)
CREAT SERPL-MCNC: 7.96 MG/DL (ref 0.5–1.4)
CREAT SERPL-MCNC: 8.26 MG/DL (ref 0.5–1.4)
CREAT UR-MCNC: 73.3 MG/DL
CREAT UR-MCNC: 74.5 MG/DL
EKG IMPRESSION: NORMAL
EOSINOPHIL # BLD AUTO: 0.16 K/UL (ref 0–0.51)
EOSINOPHIL NFR BLD: 2.1 % (ref 0–6.9)
ERYTHROCYTE [DISTWIDTH] IN BLOOD BY AUTOMATED COUNT: 43.3 FL (ref 35.9–50)
ERYTHROCYTE [DISTWIDTH] IN BLOOD BY AUTOMATED COUNT: 43.4 FL (ref 35.9–50)
GLOBULIN SER CALC-MCNC: 2.7 G/DL (ref 1.9–3.5)
GLUCOSE BLD-MCNC: 138 MG/DL (ref 65–99)
GLUCOSE BLD-MCNC: 155 MG/DL (ref 65–99)
GLUCOSE SERPL-MCNC: 138 MG/DL (ref 65–99)
GLUCOSE SERPL-MCNC: 169 MG/DL (ref 65–99)
GLUCOSE SERPL-MCNC: 244 MG/DL (ref 65–99)
GLUCOSE SERPL-MCNC: 295 MG/DL (ref 65–99)
GLUCOSE UR STRIP.AUTO-MCNC: 100 MG/DL
HCT VFR BLD AUTO: 33.1 % (ref 37–47)
HCT VFR BLD AUTO: 33.8 % (ref 37–47)
HGB BLD-MCNC: 10.4 G/DL (ref 12–16)
HGB BLD-MCNC: 11 G/DL (ref 12–16)
IMM GRANULOCYTES # BLD AUTO: 0.06 K/UL (ref 0–0.11)
IMM GRANULOCYTES NFR BLD AUTO: 0.8 % (ref 0–0.9)
KETONES UR STRIP.AUTO-MCNC: NEGATIVE MG/DL
LEUKOCYTE ESTERASE UR QL STRIP.AUTO: NEGATIVE
LIPASE SERPL-CCNC: 108 U/L (ref 11–82)
LYMPHOCYTES # BLD AUTO: 1.02 K/UL (ref 1–4.8)
LYMPHOCYTES NFR BLD: 13.5 % (ref 22–41)
MAGNESIUM SERPL-MCNC: 2.1 MG/DL (ref 1.5–2.5)
MCH RBC QN AUTO: 29.4 PG (ref 27–33)
MCH RBC QN AUTO: 30.2 PG (ref 27–33)
MCHC RBC AUTO-ENTMCNC: 31.4 G/DL (ref 33.6–35)
MCHC RBC AUTO-ENTMCNC: 32.5 G/DL (ref 33.6–35)
MCV RBC AUTO: 92.9 FL (ref 81.4–97.8)
MCV RBC AUTO: 93.5 FL (ref 81.4–97.8)
MICRO URNS: ABNORMAL
MICROALBUMIN UR-MCNC: 2.3 MG/DL
MICROALBUMIN/CREAT UR: 31 MG/G (ref 0–30)
MONOCYTES # BLD AUTO: 0.66 K/UL (ref 0–0.85)
MONOCYTES NFR BLD AUTO: 8.7 % (ref 0–13.4)
NEUTROPHILS # BLD AUTO: 5.63 K/UL (ref 2–7.15)
NEUTROPHILS NFR BLD: 74.5 % (ref 44–72)
NITRITE UR QL STRIP.AUTO: NEGATIVE
NRBC # BLD AUTO: 0 K/UL
NRBC BLD-RTO: 0 /100 WBC
PH UR STRIP.AUTO: 5.5 [PH]
PLATELET # BLD AUTO: 117 K/UL (ref 164–446)
PLATELET # BLD AUTO: 127 K/UL (ref 164–446)
PMV BLD AUTO: 13.1 FL (ref 9–12.9)
PMV BLD AUTO: 13.1 FL (ref 9–12.9)
POTASSIUM SERPL-SCNC: 5.4 MMOL/L (ref 3.6–5.5)
POTASSIUM SERPL-SCNC: 5.8 MMOL/L (ref 3.6–5.5)
PROT SERPL-MCNC: 7.2 G/DL (ref 6–8.2)
PROT UR QL STRIP: NEGATIVE MG/DL
PROT UR-MCNC: 21.7 MG/DL (ref 0–15)
RBC # BLD AUTO: 3.54 M/UL (ref 4.2–5.4)
RBC # BLD AUTO: 3.64 M/UL (ref 4.2–5.4)
RBC UR QL AUTO: NEGATIVE
SODIUM SERPL-SCNC: 127 MMOL/L (ref 135–145)
SODIUM SERPL-SCNC: 128 MMOL/L (ref 135–145)
SODIUM SERPL-SCNC: 133 MMOL/L (ref 135–145)
SODIUM SERPL-SCNC: 135 MMOL/L (ref 135–145)
SP GR UR STRIP.AUTO: 1.01
TROPONIN I SERPL-MCNC: 0.03 NG/ML (ref 0–0.04)
TSH SERPL DL<=0.005 MIU/L-ACNC: 1.36 UIU/ML (ref 0.38–5.33)
UROBILINOGEN UR STRIP.AUTO-MCNC: 0.2 MG/DL
WBC # BLD AUTO: 6.5 K/UL (ref 4.8–10.8)
WBC # BLD AUTO: 7.6 K/UL (ref 4.8–10.8)

## 2019-05-01 PROCEDURE — 82550 ASSAY OF CK (CPK): CPT

## 2019-05-01 PROCEDURE — 82962 GLUCOSE BLOOD TEST: CPT

## 2019-05-01 PROCEDURE — 82570 ASSAY OF URINE CREATININE: CPT

## 2019-05-01 PROCEDURE — 83690 ASSAY OF LIPASE: CPT

## 2019-05-01 PROCEDURE — 84160 ASSAY OF PROTEIN ANY SOURCE: CPT

## 2019-05-01 PROCEDURE — 96365 THER/PROPH/DIAG IV INF INIT: CPT

## 2019-05-01 PROCEDURE — 99291 CRITICAL CARE FIRST HOUR: CPT

## 2019-05-01 PROCEDURE — 99291 CRITICAL CARE FIRST HOUR: CPT | Performed by: INTERNAL MEDICINE

## 2019-05-01 PROCEDURE — A9270 NON-COVERED ITEM OR SERVICE: HCPCS | Performed by: FAMILY MEDICINE

## 2019-05-01 PROCEDURE — 770022 HCHG ROOM/CARE - ICU (200)

## 2019-05-01 PROCEDURE — 700102 HCHG RX REV CODE 250 W/ 637 OVERRIDE(OP): Performed by: EMERGENCY MEDICINE

## 2019-05-01 PROCEDURE — 304561 HCHG STAT O2

## 2019-05-01 PROCEDURE — 71045 X-RAY EXAM CHEST 1 VIEW: CPT

## 2019-05-01 PROCEDURE — 700105 HCHG RX REV CODE 258: Performed by: EMERGENCY MEDICINE

## 2019-05-01 PROCEDURE — 84484 ASSAY OF TROPONIN QUANT: CPT

## 2019-05-01 PROCEDURE — 80053 COMPREHEN METABOLIC PANEL: CPT

## 2019-05-01 PROCEDURE — 93005 ELECTROCARDIOGRAM TRACING: CPT | Performed by: EMERGENCY MEDICINE

## 2019-05-01 PROCEDURE — 80048 BASIC METABOLIC PNL TOTAL CA: CPT

## 2019-05-01 PROCEDURE — 85027 COMPLETE CBC AUTOMATED: CPT

## 2019-05-01 PROCEDURE — 700102 HCHG RX REV CODE 250 W/ 637 OVERRIDE(OP): Performed by: FAMILY MEDICINE

## 2019-05-01 PROCEDURE — 84165 PROTEIN E-PHORESIS SERUM: CPT

## 2019-05-01 PROCEDURE — 84156 ASSAY OF PROTEIN URINE: CPT

## 2019-05-01 PROCEDURE — 99223 1ST HOSP IP/OBS HIGH 75: CPT | Performed by: INTERNAL MEDICINE

## 2019-05-01 PROCEDURE — 700111 HCHG RX REV CODE 636 W/ 250 OVERRIDE (IP): Performed by: EMERGENCY MEDICINE

## 2019-05-01 PROCEDURE — 82043 UR ALBUMIN QUANTITATIVE: CPT

## 2019-05-01 PROCEDURE — 96372 THER/PROPH/DIAG INJ SC/IM: CPT

## 2019-05-01 PROCEDURE — 84443 ASSAY THYROID STIM HORMONE: CPT

## 2019-05-01 PROCEDURE — 85025 COMPLETE CBC W/AUTO DIFF WBC: CPT

## 2019-05-01 PROCEDURE — 83735 ASSAY OF MAGNESIUM: CPT

## 2019-05-01 PROCEDURE — 83883 ASSAY NEPHELOMETRY NOT SPEC: CPT | Mod: 91

## 2019-05-01 PROCEDURE — 96375 TX/PRO/DX INJ NEW DRUG ADDON: CPT

## 2019-05-01 PROCEDURE — 81003 URINALYSIS AUTO W/O SCOPE: CPT

## 2019-05-01 PROCEDURE — 76775 US EXAM ABDO BACK WALL LIM: CPT

## 2019-05-01 RX ORDER — EZETIMIBE AND SIMVASTATIN 10; 40 MG/1; MG/1
1 TABLET ORAL NIGHTLY
Status: DISCONTINUED | OUTPATIENT
Start: 2019-05-01 | End: 2019-05-01

## 2019-05-01 RX ORDER — TEMAZEPAM 15 MG/1
15 CAPSULE ORAL NIGHTLY PRN
Status: DISCONTINUED | OUTPATIENT
Start: 2019-05-01 | End: 2019-05-04 | Stop reason: HOSPADM

## 2019-05-01 RX ORDER — ONDANSETRON 2 MG/ML
4 INJECTION INTRAMUSCULAR; INTRAVENOUS EVERY 4 HOURS PRN
Status: DISCONTINUED | OUTPATIENT
Start: 2019-05-01 | End: 2019-05-04 | Stop reason: HOSPADM

## 2019-05-01 RX ORDER — HYDROCODONE BITARTRATE AND ACETAMINOPHEN 5; 325 MG/1; MG/1
1 TABLET ORAL EVERY 4 HOURS PRN
Status: DISCONTINUED | OUTPATIENT
Start: 2019-05-01 | End: 2019-05-04 | Stop reason: HOSPADM

## 2019-05-01 RX ORDER — INSULIN GLARGINE 100 [IU]/ML
INJECTION, SOLUTION SUBCUTANEOUS
COMMUNITY
Start: 2019-02-01 | End: 2019-05-01

## 2019-05-01 RX ORDER — SODIUM CHLORIDE 9 MG/ML
250 INJECTION, SOLUTION INTRAVENOUS ONCE
Status: DISCONTINUED | OUTPATIENT
Start: 2019-05-01 | End: 2019-05-01

## 2019-05-01 RX ORDER — SODIUM POLYSTYRENE SULFONATE 15 G/60ML
30 SUSPENSION ORAL; RECTAL ONCE
Status: DISCONTINUED | OUTPATIENT
Start: 2019-05-01 | End: 2019-05-01

## 2019-05-01 RX ORDER — EZETIMIBE 10 MG/1
10 TABLET ORAL EVERY EVENING
Status: DISCONTINUED | OUTPATIENT
Start: 2019-05-01 | End: 2019-05-04 | Stop reason: HOSPADM

## 2019-05-01 RX ORDER — SIMVASTATIN 40 MG
40 TABLET ORAL EVERY EVENING
Status: DISCONTINUED | OUTPATIENT
Start: 2019-05-01 | End: 2019-05-04 | Stop reason: HOSPADM

## 2019-05-01 RX ORDER — TORSEMIDE 20 MG/1
40 TABLET ORAL 2 TIMES DAILY
COMMUNITY
End: 2021-11-08 | Stop reason: SDUPTHER

## 2019-05-01 RX ORDER — MOMETASONE FUROATE 50 UG/1
SPRAY, METERED NASAL
COMMUNITY
Start: 2019-03-12 | End: 2019-05-01

## 2019-05-01 RX ORDER — INSULIN GLARGINE 100 [IU]/ML
30 INJECTION, SOLUTION SUBCUTANEOUS
COMMUNITY
End: 2021-11-11

## 2019-05-01 RX ORDER — SODIUM CHLORIDE 9 MG/ML
2000 INJECTION, SOLUTION INTRAVENOUS ONCE
Status: COMPLETED | OUTPATIENT
Start: 2019-05-01 | End: 2019-05-01

## 2019-05-01 RX ORDER — DEXTROSE MONOHYDRATE 25 G/50ML
25 INJECTION, SOLUTION INTRAVENOUS ONCE
Status: DISCONTINUED | OUTPATIENT
Start: 2019-05-01 | End: 2019-05-01

## 2019-05-01 RX ORDER — TIOTROPIUM BROMIDE 18 UG/1
1 CAPSULE ORAL; RESPIRATORY (INHALATION)
Status: DISCONTINUED | OUTPATIENT
Start: 2019-05-01 | End: 2019-05-02

## 2019-05-01 RX ORDER — ONDANSETRON 4 MG/1
4 TABLET, ORALLY DISINTEGRATING ORAL EVERY 4 HOURS PRN
Status: DISCONTINUED | OUTPATIENT
Start: 2019-05-01 | End: 2019-05-04 | Stop reason: HOSPADM

## 2019-05-01 RX ADMIN — INSULIN HUMAN 10 UNITS: 100 INJECTION, SOLUTION PARENTERAL at 13:54

## 2019-05-01 RX ADMIN — INSULIN HUMAN 5 UNITS: 100 INJECTION, SOLUTION PARENTERAL at 15:37

## 2019-05-01 RX ADMIN — EZETIMIBE 10 MG: 10 TABLET ORAL at 18:02

## 2019-05-01 RX ADMIN — SIMVASTATIN 40 MG: 20 TABLET, FILM COATED ORAL at 18:01

## 2019-05-01 RX ADMIN — DEXTROSE MONOHYDRATE 250 ML: 100 INJECTION, SOLUTION INTRAVENOUS at 13:58

## 2019-05-01 RX ADMIN — SODIUM BICARBONATE 50 MEQ: 84 INJECTION, SOLUTION INTRAVENOUS at 14:07

## 2019-05-01 RX ADMIN — SODIUM CHLORIDE 2000 ML: 9 INJECTION, SOLUTION INTRAVENOUS at 14:03

## 2019-05-01 ASSESSMENT — COGNITIVE AND FUNCTIONAL STATUS - GENERAL
MOBILITY SCORE: 19
PERSONAL GROOMING: A LITTLE
SUGGESTED CMS G CODE MODIFIER MOBILITY: CK
CLIMB 3 TO 5 STEPS WITH RAILING: A LITTLE
MOVING FROM LYING ON BACK TO SITTING ON SIDE OF FLAT BED: A LITTLE
DRESSING REGULAR LOWER BODY CLOTHING: A LITTLE
DAILY ACTIVITIY SCORE: 18
WALKING IN HOSPITAL ROOM: A LITTLE
HELP NEEDED FOR BATHING: A LITTLE
TOILETING: A LITTLE
SUGGESTED CMS G CODE MODIFIER DAILY ACTIVITY: CK
EATING MEALS: A LITTLE
DRESSING REGULAR UPPER BODY CLOTHING: A LITTLE
MOVING TO AND FROM BED TO CHAIR: A LITTLE
STANDING UP FROM CHAIR USING ARMS: A LITTLE

## 2019-05-01 ASSESSMENT — ENCOUNTER SYMPTOMS
HEADACHES: 1
DIZZINESS: 1
SORE THROAT: 0
SHORTNESS OF BREATH: 0
ABDOMINAL PAIN: 0
BACK PAIN: 1
DIARRHEA: 0
VOMITING: 1
WEIGHT LOSS: 1
WEIGHT LOSS: 0
CHILLS: 0
FEVER: 0
DIARRHEA: 1
NAUSEA: 1

## 2019-05-01 ASSESSMENT — PATIENT HEALTH QUESTIONNAIRE - PHQ9
1. LITTLE INTEREST OR PLEASURE IN DOING THINGS: NOT AT ALL
2. FEELING DOWN, DEPRESSED, IRRITABLE, OR HOPELESS: NOT AT ALL
SUM OF ALL RESPONSES TO PHQ9 QUESTIONS 1 AND 2: 0

## 2019-05-01 ASSESSMENT — LIFESTYLE VARIABLES
EVER_SMOKED: YES
DO YOU DRINK ALCOHOL: NO

## 2019-05-01 ASSESSMENT — COPD QUESTIONNAIRES
COPD SCREENING SCORE: 6
DURING THE PAST 4 WEEKS HOW MUCH DID YOU FEEL SHORT OF BREATH: NONE/LITTLE OF THE TIME
HAVE YOU SMOKED AT LEAST 100 CIGARETTES IN YOUR ENTIRE LIFE: YES
DO YOU EVER COUGH UP ANY MUCUS OR PHLEGM?: NO/ONLY WITH OCCASIONAL COLDS OR INFECTIONS

## 2019-05-01 NOTE — CONSULTS
Critical Care Consultation    Date of consult: 5/1/2019    Referring Physician  Regina Mcmullen M.D.    Reason for Consultation  Acute kidney injury, hyperkalemia.    History of Presenting Illness  73 y.o. female with a history of CHF, COPD, diabetes, emphysema who presented 5/1/2019 c/o n/v x 2 weeks.  She is only been able to tolerate yogurt.  She has continued to take her medications including torsemide.  She denies taking any NSAIDs.  She noted some coffee ground emesis in the last couple of days. ED evaluation revealed hyperkalemia, with acute on chronic kidney injury.  Potassium shifting agents were administered.  Nephrology consulted. Critical care consultation requested for concurrent evaluation and management.    Code Status  DNAR/DNI    Review of Systems  Review of Systems   Constitutional: Negative for chills, fever and weight loss.   HENT: Negative for sore throat.    Respiratory: Negative for shortness of breath.    Cardiovascular: Negative for chest pain.   Gastrointestinal: Positive for diarrhea, nausea and vomiting.        No bright red blood, dark at times   Neurological: Positive for dizziness and headaches.   All other systems reviewed and are negative.      Past Medical History   has a past medical history of Anemia; Arthritis; Back pain; Breath shortness; CATARACT; Congestive heart failure (HCC); COPD (chronic obstructive pulmonary disease) ([facility]); Diabetes; EMPHYSEMA; Fall; Hiatus hernia syndrome; Hyperkalemia (3/23/2018); Hyperlipidemia; Hypertension; Indigestion; Other specified disorder of intestines; Pain; Paralysis of diaphragm; Renal insufficiency (3/23/2018); Snoring; and Ulcer. She also has no past medical history of At risk for sleep apnea; Blood clotting disorder ([facility]); Dialysis patient ([facility]); Disorder of thyroid; Seizure ([facility]); Stroke ([facility]); or Urinary incontinence.    Surgical History   has a past surgical history that includes other orthopedic surgery; gyn surgery; other; other;  and recovery (11/6/2013).    Family History  family history includes Breast Cancer in her mother; Cancer in her father; Heart Failure in her maternal grandmother.    Social History   reports that she quit smoking about 12 years ago. Her smoking use included Cigarettes. She has a 40.00 pack-year smoking history. She has never used smokeless tobacco. She reports that she does not drink alcohol or use drugs.    Medications  Home Medications     Reviewed by William Powers (Pharmacy [facility]) on 05/01/19 at 1310  Med List Status: Complete   Medication Last Dose Status   Calcium Carb-Cholecalciferol (CALCIUM 1000 + D) 1000-800 MG-UNIT Tab 4/30/2019 Active   ezetimibe-simvastatin (VYTORIN) 10-40 MG per tablet 4/30/2019 Active   HYDROcodone-acetaminophen (NORCO) 7.5-325 MG per tablet 4/30/2019 Active   insulin glargine (LANTUS) 100 UNIT/ML Solution 4/30/2019 Active   lisinopril (PRINIVIL) 10 MG Tab 5/1/2019 Active   magnesium oxide (MAG-OX) 400 MG Tab 5/1/2019 Active   metFORMIN (GLUCOPHAGE) 850 MG Tab 5/1/2019 Active   omeprazole (PRILOSEC) 20 MG delayed-release capsule 4/30/2019 Active   temazepam (RESTORIL) 30 MG capsule 4/30/2019 Active   tiotropium (SPIRIVA HANDIHALER) 18 MCG Cap 4/30/2019 Active   torsemide (DEMADEX) 20 MG Tab 5/1/2019 Active              Current Facility-Administered Medications   Medication Dose Route Frequency Provider Last Rate Last Dose   • Respiratory Care per Protocol   Nebulization Continuous RT Rodríguez Lassiter D.O.       • ondansetron (ZOFRAN) syringe/vial injection 4 mg  4 mg Intravenous Q4HRS PRN Rodríguez Lassiter D.O.       • ondansetron (ZOFRAN ODT) dispertab 4 mg  4 mg Oral Q4HRS PRN CLAUDIA RatliffO.       • insulin regular (HUMULIN R) injection 2-9 Units  2-9 Units Subcutaneous Q6HRS KAROLYN Ratliff.O.   Stopped at 05/01/19 1800    And   • glucose 4 g chewable tablet 16 g  16 g Oral Q15 MIN PRN Rodríguez Lassiter D.O.        And   • DEXTROSE 10% BOLUS 250 mL   250 mL Intravenous Q15 MIN PRN Rodríguez Lassiter D.O.       • ezetimibe (ZETIA) tablet 10 mg  10 mg Oral Q EVENING Rodríguez Lassiter D.O.   10 mg at 05/01/19 1802    And   • simvastatin (ZOCOR) tablet 40 mg  40 mg Oral Q EVENING CLAUDIA RatliffOMegan   40 mg at 05/01/19 1801   • tiotropium (SPIRIVA) 18 MCG inhalation capsule 1 Cap  1 Cap Inhalation QDAILY (RT) Regina Mcmullen M.D.       • HYDROcodone-acetaminophen (NORCO) 5-325 MG per tablet 1 Tab  1 Tab Oral Q4HRS PRN Rodríguez Lassiter D.O.       • temazepam (RESTORIL) capsule 15 mg  15 mg Oral HS PRN Rodríguez Lassiter D.O.           Allergies  Allergies   Allergen Reactions   • Zoloft Diarrhea   • Amaryl [Amaryl]    • Amaryl [Glimepiride] Shortness of Breath and Rash   • Amoxicillin Rash   • Byetta Rash   • Epinephrine Shortness of Breath     Panic attack, Can't breath   • Hctz      Stopped urinary output   • Iodine Anaphylaxis     contrast   • Lasix [Furosemide]      Leg cramps, stopped urinary output   • Latex    • Lexapro      Leg cramping   • Penicillins Rash   • Spironolactone        Vital Signs last 24 hours  Temp:  [35.8 °C (96.4 °F)-36.4 °C (97.5 °F)] 36.2 °C (97.2 °F)  Pulse:  [77-85] 80  Resp:  [12-33] 17  BP: (152)/(55) 152/55  SpO2:  [89 %-100 %] 95 %    Physical Exam  Physical Exam   Constitutional: She is oriented to person, place, and time.   Sitting upright in no acute distress   HENT:   Mucous membranes dry   Eyes: Pupils are equal, round, and reactive to light.   Neck: Neck supple.   Cardiovascular: Regular rhythm.  Exam reveals no gallop and no friction rub.    No murmur heard.  Pulmonary/Chest: No respiratory distress. She has no wheezes. She has no rales.   Abdominal: Soft. She exhibits no distension. There is no tenderness.   Musculoskeletal: She exhibits edema.   Bilateral lower extremity edema,Baseline for patient   Neurological: She is alert and oriented to person, place, and time.   Skin: Skin is warm and dry.   Psychiatric:  She has a normal mood and affect. Her behavior is normal.       Fluids  No intake or output data in the 24 hours ending 05/01/19 2015    Laboratory  Recent Results (from the past 48 hour(s))   CBC WITH DIFFERENTIAL    Collection Time: 05/01/19 10:39 AM   Result Value Ref Range    WBC 7.6 4.8 - 10.8 K/uL    RBC 3.64 (L) 4.20 - 5.40 M/uL    Hemoglobin 11.0 (L) 12.0 - 16.0 g/dL    Hematocrit 33.8 (L) 37.0 - 47.0 %    MCV 92.9 81.4 - 97.8 fL    MCH 30.2 27.0 - 33.0 pg    MCHC 32.5 (L) 33.6 - 35.0 g/dL    RDW 43.3 35.9 - 50.0 fL    Platelet Count 127 (L) 164 - 446 K/uL    MPV 13.1 (H) 9.0 - 12.9 fL    Neutrophils-Polys 74.50 (H) 44.00 - 72.00 %    Lymphocytes 13.50 (L) 22.00 - 41.00 %    Monocytes 8.70 0.00 - 13.40 %    Eosinophils 2.10 0.00 - 6.90 %    Basophils 0.40 0.00 - 1.80 %    Immature Granulocytes 0.80 0.00 - 0.90 %    Nucleated RBC 0.00 /100 WBC    Neutrophils (Absolute) 5.63 2.00 - 7.15 K/uL    Lymphs (Absolute) 1.02 1.00 - 4.80 K/uL    Monos (Absolute) 0.66 0.00 - 0.85 K/uL    Eos (Absolute) 0.16 0.00 - 0.51 K/uL    Baso (Absolute) 0.03 0.00 - 0.12 K/uL    Immature Granulocytes (abs) 0.06 0.00 - 0.11 K/uL    NRBC (Absolute) 0.00 K/uL   COMP METABOLIC PANEL    Collection Time: 05/01/19 10:39 AM   Result Value Ref Range    Sodium 127 (L) 135 - 145 mmol/L    Potassium 5.8 (H) 3.6 - 5.5 mmol/L    Chloride 88 (L) 96 - 112 mmol/L    Co2 23 20 - 33 mmol/L    Anion Gap 16.0 (H) 0.0 - 11.9    Glucose 244 (H) 65 - 99 mg/dL    Bun 180 (HH) 8 - 22 mg/dL    Creatinine 8.26 (HH) 0.50 - 1.40 mg/dL    Calcium 9.9 8.5 - 10.5 mg/dL    AST(SGOT) 11 (L) 12 - 45 U/L    ALT(SGPT) 8 2 - 50 U/L    Alkaline Phosphatase 49 30 - 99 U/L    Total Bilirubin 0.5 0.1 - 1.5 mg/dL    Albumin 4.5 3.2 - 4.9 g/dL    Total Protein 7.2 6.0 - 8.2 g/dL    Globulin 2.7 1.9 - 3.5 g/dL    A-G Ratio 1.7 g/dL   LIPASE    Collection Time: 05/01/19 10:39 AM   Result Value Ref Range    Lipase 108 (H) 11 - 82 U/L   TROPONIN    Collection Time: 05/01/19 10:39  AM   Result Value Ref Range    Troponin I 0.03 0.00 - 0.04 ng/mL   TSH    Collection Time: 19 10:39 AM   Result Value Ref Range    TSH 1.360 0.380 - 5.330 uIU/mL   ESTIMATED GFR    Collection Time: 19 10:39 AM   Result Value Ref Range    GFR If  6 (A) >60 mL/min/1.73 m 2    GFR If Non African American 5 (A) >60 mL/min/1.73 m 2   URINALYSIS (UA)    Collection Time: 19 11:35 AM   Result Value Ref Range    Color Yellow     Character Clear     Specific Gravity 1.010 <1.035    Ph 5.5 5.0 - 8.0    Glucose 100 (A) Negative mg/dL    Ketones Negative Negative mg/dL    Protein Negative Negative mg/dL    Bilirubin Negative Negative    Urobilinogen, Urine 0.2 Negative    Nitrite Negative Negative    Leukocyte Esterase Negative Negative    Occult Blood Negative Negative    Micro Urine Req see below    EKG    Collection Time: 19 12:49 PM   Result Value Ref Range    Report       [facility] Emergency Dept.    Test Date:  2019  Pt Name:    PEG XIONG                Department: ER  MRN:        [id number]                      Room:       [facility]  Gender:     Female                       Technician: [id number]  :        [id number]                   Requested By:JACOB PURDY  Order #:    [id number]                    Reading MD:    Measurements  Intervals                                Axis  Rate:       83                           P:          41  RI:         200                          QRS:        93  QRSD:       78                           T:          3  QT:         344  QTc:        405    Interpretive Statements  SINUS RHYTHM  RIGHT AXIS DEVIATION  BORDERLINE T ABNORMALITIES, ANTERIOR LEADS  Compared to ECG 2018 10:29:32  T-wave abnormality now present     ACCU-CHEK GLUCOSE    Collection Time: 19  1:42 PM   Result Value Ref Range    Glucose - Accu-Ck 155 (H) 65 - 99 mg/dL   Basic Metabolic Panel (BMP)    Collection Time: 19  2:22 PM   Result Value  Ref Range    Sodium 128 (L) 135 - 145 mmol/L    Potassium 5.4 3.6 - 5.5 mmol/L    Chloride 89 (L) 96 - 112 mmol/L    Co2 25 20 - 33 mmol/L    Glucose 295 (H) 65 - 99 mg/dL    Bun 189 (HH) 8 - 22 mg/dL    Creatinine 7.96 (HH) 0.50 - 1.40 mg/dL    Calcium 9.4 8.5 - 10.5 mg/dL    Anion Gap 14.0 (H) 0.0 - 11.9   ESTIMATED GFR    Collection Time: 05/01/19  2:22 PM   Result Value Ref Range    GFR If  6 (A) >60 mL/min/1.73 m 2    GFR If Non African American 5 (A) >60 mL/min/1.73 m 2   Basic Metabolic Panel (BMP)    Collection Time: 05/01/19  4:10 PM   Result Value Ref Range    Sodium 133 (L) 135 - 145 mmol/L    Potassium 5.4 3.6 - 5.5 mmol/L    Chloride 92 (L) 96 - 112 mmol/L    Co2 22 20 - 33 mmol/L    Glucose 169 (H) 65 - 99 mg/dL    Bun 185 (HH) 8 - 22 mg/dL    Creatinine 7.41 (HH) 0.50 - 1.40 mg/dL    Calcium 9.3 8.5 - 10.5 mg/dL    Anion Gap 19.0 (H) 0.0 - 11.9   MAGNESIUM    Collection Time: 05/01/19  4:10 PM   Result Value Ref Range    Magnesium 2.1 1.5 - 2.5 mg/dL   CBC WITHOUT DIFFERENTIAL    Collection Time: 05/01/19  4:10 PM   Result Value Ref Range    WBC 6.5 4.8 - 10.8 K/uL    RBC 3.54 (L) 4.20 - 5.40 M/uL    Hemoglobin 10.4 (L) 12.0 - 16.0 g/dL    Hematocrit 33.1 (L) 37.0 - 47.0 %    MCV 93.5 81.4 - 97.8 fL    MCH 29.4 27.0 - 33.0 pg    MCHC 31.4 (L) 33.6 - 35.0 g/dL    RDW 43.4 35.9 - 50.0 fL    Platelet Count 117 (L) 164 - 446 K/uL    MPV 13.1 (H) 9.0 - 12.9 fL   ESTIMATED GFR    Collection Time: 05/01/19  4:10 PM   Result Value Ref Range    GFR If  6 (A) >60 mL/min/1.73 m 2    GFR If Non African American 5 (A) >60 mL/min/1.73 m 2   ACCU-CHEK GLUCOSE    Collection Time: 05/01/19  6:03 PM   Result Value Ref Range    Glucose - Accu-Ck 138 (H) 65 - 99 mg/dL       Imaging  US-RENAL   Final Result      1.  Normal sonographic appearance of the kidneys.   2.  Minimal urine output with maturation, prevoid volume was 376 mL, postvoid residual was 327 mL.      DX-CHEST-PORTABLE (1  VIEW)   Final Result      Left basilar atelectasis and/or consolidation. Underlying infection is possible.          Assessment/Plan  Acute kidney injury ([facility])   Assessment & Plan    Acute on chronic kidney injury  Etiology unclear.  Hypovolemia certainly contributing at this time.  Urinary retention could lead to obstructive uropathy.  Consider cardiorenal syndrome, with history of congestive heart failure.  Aggressive volume resuscitation has been initiated.  Closely monitor, electrolytes, urine output.  Avoid nephrotoxic agents.  If renal function her potassium does not improve hemodialysis may be required.  Plan developed with Dr. Badillo       Hyperkalemia- (present on admission)   Assessment & Plan    Secondary to acute kidney injury  Continued volume resuscitation with forced diuresis  Repeat potassium shifting agents for EKG changes  Emergency hemodialysis as clinically indicated         COPD (chronic obstructive pulmonary disease) (CMS-[facility])- (present on admission)   Assessment & Plan    Bronchodilators as needed.     Nausea and vomiting   Assessment & Plan    Etiology unclear.  Medication side effect?  Versus infectious causes.  Check CK     CHF (congestive heart failure) ([facility])- (present on admission)   Assessment & Plan    History of right ventricular dysfunction   Repeat transthoracic echocardiogram.  Cardiology consultation is clinically indicated     DM (diabetes mellitus) ([facility])- (present on admission)   Assessment & Plan    Moderate glycemic control with insulin therapy.  Hold metformin         Discussed patient condition and risk of morbidity and/or mortality with Family, RN, Patient and nephrology.    The patient remains critically ill.  Critical care time 45 minutes in directly providing and coordinating critical care and extensive data review.  No time overlap and excludes procedures.

## 2019-05-01 NOTE — ED TRIAGE NOTES
Chief Complaint   Patient presents with   • N/V     X 2 weeks   pt BABS RODARTE from home reports pt called ems for worsening in condition. Pt states having some diarrhea and dysuria. She is A&O pt arrived with O2 via NC at 7 what she uses at home.

## 2019-05-01 NOTE — ED NOTES
Pt assisted to bathroom via WC she uses walker at home. A urine specimen has been collected and sent to lab.  is at bedside and aware of plan of care.

## 2019-05-01 NOTE — ED PROVIDER NOTES
"ED Provider Note    CHIEF COMPLAINT  Chief Complaint   Patient presents with   • N/V     X 2 weeks       HPI  Siri Solis is a 73 y.o. female with a history of diabetes, dyslipidemia, hypertension, COPD on home O2 at 7 L who presents complaining of nausea and vomiting.    Patient states she has been having nausea and vomiting for the last 2 to 4 weeks.  She reports vomiting one to 2 times daily and in the last 2 days she has noted blood that is dark in the emesis.  She also reports dark stool.  She denies melena, hematochezia, aspirin/anticoagulation therapy, chest pain, shortness of breath.  She reports \"pain all over\" which she received pain medication in the ambulance for.  She is only been tolerating yogurt on a daily basis.  She has a history of peptic ulcer disease.  She is compliant taking her Prilosec 20 mg daily.    ALLERGIES  Allergies   Allergen Reactions   • Zoloft Diarrhea   • Amaryl [Amaryl]    • Amaryl [Glimepiride] Shortness of Breath and Rash   • Amoxicillin Rash   • Byetta Rash   • Epinephrine Shortness of Breath     Panic attack, Can't breath   • Hctz      Stopped urinary output   • Iodine Anaphylaxis     contrast   • Lasix [Furosemide]      Leg cramps, stopped urinary output   • Latex    • Lexapro      Leg cramping   • Penicillins Rash   • Spironolactone        CURRENT MEDICATIONS  Home Medications     Reviewed by Bel Ambriz R.N. (Registered Nurse) on 05/01/19 at 1043  Med List Status: Partial   Medication Last Dose Status   azithromycin (ZITHROMAX) 250 MG Tab  Active   Calcium Carb-Cholecalciferol (CALCIUM 1000 + D) 1000-800 MG-UNIT Tab  Active   ezetimibe-simvastatin (VYTORIN) 10-40 MG per tablet  Active   HYDROcodone-acetaminophen (NORCO) 7.5-325 MG per tablet  Active   insulin glargine (LANTUS) 100 UNIT/ML Solution  Active   lisinopril (PRINIVIL) 10 MG Tab  Active   magnesium oxide (MAG-OX) 400 MG Tab  Active   metFORMIN (GLUCOPHAGE) 850 MG Tab  Active   MethylPREDNISolone (MEDROL " DOSEPAK) 4 MG Tablet Therapy Pack  Active   omeprazole (PRILOSEC) 20 MG delayed-release capsule  Active   temazepam (RESTORIL) 30 MG capsule  Active   tiotropium (SPIRIVA HANDIHALER) 18 MCG Cap  Active   torsemide (DEMADEX) 20 MG Tab  Active                PAST MEDICAL HISTORY   has a past medical history of Anemia; Arthritis; Back pain; Breath shortness; CATARACT; Congestive heart failure (HCC); COPD (chronic obstructive pulmonary disease) (HCC); Diabetes; EMPHYSEMA; Fall; Hiatus hernia syndrome; Hyperkalemia (3/23/2018); Hyperlipidemia; Hypertension; Indigestion; Other specified disorder of intestines; Pain; Paralysis of diaphragm; Renal insufficiency (3/23/2018); Snoring; and Ulcer.    SURGICAL HISTORY   has a past surgical history that includes other orthopedic surgery; gyn surgery; other; other; and recovery (11/6/2013).    SOCIAL HISTORY  Social History     Social History Main Topics   • Smoking status: Former Smoker     Packs/day: 2.00     Years: 20.00     Types: Cigarettes     Quit date: 1/1/2007   • Smokeless tobacco: Never Used   • Alcohol use No   • Drug use: No   • Sexual activity: Not on file       Uses a walker at home    REVIEW OF SYSTEMS  See HPI for further details.  All other systems are negative except as above in HPI.      PHYSICAL EXAM  VITAL SIGNS: /55   Pulse 81   Temp 36.4 °C (97.5 °F) (Temporal)   Resp 15   Wt 83 kg (182 lb 15.7 oz)   SpO2 99%   BMI 27.02 kg/m²     General:  WDWN, nontoxic appearing in NAD; A+Ox3; V/S as above; hypertensive, not tachycardic  Skin: warm and dry; pale color; no rash  HEENT: NCAT; EOMs intact; PERRL; no scleral icterus   Neck: FROM; soft, no JVD  Cardiovascular: Regular heart rate and rhythm.  No murmurs, rubs, or gallops; pulses 2+ bilaterally radially and DP areas  Lungs: Clear to auscultation with good air movement bilaterally.  No wheezes, rhonchi, or rales.   Abdomen: BS present; soft; NTND; no rebound, guarding, or rigidity.  No  organomegaly or pulsatile mass  Extremities: RYE x 4; no e/o trauma; no pedal edema; neg Laci's  Neurologic: CNs III-XII grossly intact; speech clear; distal sensation intact; strength 5/5 UE/LEs  Psychiatric: Appropriate affect, depressed mood    LABS  Results for orders placed or performed during the hospital encounter of 05/01/19   CBC WITH DIFFERENTIAL   Result Value Ref Range    WBC 7.6 4.8 - 10.8 K/uL    RBC 3.64 (L) 4.20 - 5.40 M/uL    Hemoglobin 11.0 (L) 12.0 - 16.0 g/dL    Hematocrit 33.8 (L) 37.0 - 47.0 %    MCV 92.9 81.4 - 97.8 fL    MCH 30.2 27.0 - 33.0 pg    MCHC 32.5 (L) 33.6 - 35.0 g/dL    RDW 43.3 35.9 - 50.0 fL    Platelet Count 127 (L) 164 - 446 K/uL    MPV 13.1 (H) 9.0 - 12.9 fL    Neutrophils-Polys 74.50 (H) 44.00 - 72.00 %    Lymphocytes 13.50 (L) 22.00 - 41.00 %    Monocytes 8.70 0.00 - 13.40 %    Eosinophils 2.10 0.00 - 6.90 %    Basophils 0.40 0.00 - 1.80 %    Immature Granulocytes 0.80 0.00 - 0.90 %    Nucleated RBC 0.00 /100 WBC    Neutrophils (Absolute) 5.63 2.00 - 7.15 K/uL    Lymphs (Absolute) 1.02 1.00 - 4.80 K/uL    Monos (Absolute) 0.66 0.00 - 0.85 K/uL    Eos (Absolute) 0.16 0.00 - 0.51 K/uL    Baso (Absolute) 0.03 0.00 - 0.12 K/uL    Immature Granulocytes (abs) 0.06 0.00 - 0.11 K/uL    NRBC (Absolute) 0.00 K/uL   COMP METABOLIC PANEL   Result Value Ref Range    Sodium 127 (L) 135 - 145 mmol/L    Potassium 5.8 (H) 3.6 - 5.5 mmol/L    Chloride 88 (L) 96 - 112 mmol/L    Co2 23 20 - 33 mmol/L    Anion Gap 16.0 (H) 0.0 - 11.9    Glucose 244 (H) 65 - 99 mg/dL    Bun 180 (HH) 8 - 22 mg/dL    Creatinine 8.26 (HH) 0.50 - 1.40 mg/dL    Calcium 9.9 8.5 - 10.5 mg/dL    AST(SGOT) 11 (L) 12 - 45 U/L    ALT(SGPT) 8 2 - 50 U/L    Alkaline Phosphatase 49 30 - 99 U/L    Total Bilirubin 0.5 0.1 - 1.5 mg/dL    Albumin 4.5 3.2 - 4.9 g/dL    Total Protein 7.2 6.0 - 8.2 g/dL    Globulin 2.7 1.9 - 3.5 g/dL    A-G Ratio 1.7 g/dL   LIPASE   Result Value Ref Range    Lipase 108 (H) 11 - 82 U/L   TROPONIN    Result Value Ref Range    Troponin I 0.03 0.00 - 0.04 ng/mL   URINALYSIS (UA)   Result Value Ref Range    Color Yellow     Character Clear     Specific Gravity 1.010 <1.035    Ph 5.5 5.0 - 8.0    Glucose 100 (A) Negative mg/dL    Ketones Negative Negative mg/dL    Protein Negative Negative mg/dL    Bilirubin Negative Negative    Urobilinogen, Urine 0.2 Negative    Nitrite Negative Negative    Leukocyte Esterase Negative Negative    Occult Blood Negative Negative    Micro Urine Req see below    TSH   Result Value Ref Range    TSH 1.360 0.380 - 5.330 uIU/mL   ESTIMATED GFR   Result Value Ref Range    GFR If  6 (A) >60 mL/min/1.73 m 2    GFR If Non African American 5 (A) >60 mL/min/1.73 m 2   Basic Metabolic Panel (BMP)   Result Value Ref Range    Sodium 128 (L) 135 - 145 mmol/L    Potassium 5.4 3.6 - 5.5 mmol/L    Chloride 89 (L) 96 - 112 mmol/L    Co2 25 20 - 33 mmol/L    Glucose 295 (H) 65 - 99 mg/dL    Bun 189 (HH) 8 - 22 mg/dL    Creatinine 7.96 (HH) 0.50 - 1.40 mg/dL    Calcium 9.4 8.5 - 10.5 mg/dL    Anion Gap 14.0 (H) 0.0 - 11.9   ESTIMATED GFR   Result Value Ref Range    GFR If  6 (A) >60 mL/min/1.73 m 2    GFR If Non African American 5 (A) >60 mL/min/1.73 m 2   MAGNESIUM   Result Value Ref Range    Magnesium 2.1 1.5 - 2.5 mg/dL   CBC WITHOUT DIFFERENTIAL   Result Value Ref Range    WBC 6.5 4.8 - 10.8 K/uL    RBC 3.54 (L) 4.20 - 5.40 M/uL    Hemoglobin 10.4 (L) 12.0 - 16.0 g/dL    Hematocrit 33.1 (L) 37.0 - 47.0 %    MCV 93.5 81.4 - 97.8 fL    MCH 29.4 27.0 - 33.0 pg    MCHC 31.4 (L) 33.6 - 35.0 g/dL    RDW 43.4 35.9 - 50.0 fL    Platelet Count 117 (L) 164 - 446 K/uL    MPV 13.1 (H) 9.0 - 12.9 fL   ACCU-CHEK GLUCOSE   Result Value Ref Range    Glucose - Accu-Ck 155 (H) 65 - 99 mg/dL   EKG   Result Value Ref Range    Report       [facility] Emergency Dept.    Test Date:  2019-05-01  Pt Name:    PEG XIONG                Department: ER  MRN:        [id number]                       Room:        20  Gender:     Female                       Technician: [id number]  :        [id number]                   Requested By:JACOB PURDY  Order #:    [id number]                    Reading MD:    Measurements  Intervals                                Axis  Rate:       83                           P:          41  CO:         200                          QRS:        93  QRSD:       78                           T:          3  QT:         344  QTc:        405    Interpretive Statements  SINUS RHYTHM  RIGHT AXIS DEVIATION  BORDERLINE T ABNORMALITIES, ANTERIOR LEADS  Compared to ECG 2018 10:29:32  T-wave abnormality now present         IMAGING  DX-CHEST-PORTABLE (1 VIEW)   Final Result      Left basilar atelectasis and/or consolidation. Underlying infection is possible.          MEDICAL RECORD  I have reviewed patient's medical record and pertinent results are listed below.      COURSE & MEDICAL DECISION MAKING  I have reviewed any medical record information, laboratory studies and radiographic results as noted.    Siri Solis is a 73 y.o. female who presents complaining of generalized weakness, nausea, vomiting for several weeks.  She now reports more recent coffee-ground emesis.    Differential diagnosis includes anemia, dehydration, UTI, electrolyte abnormality, GI bleed.    We obtained a chest x-ray and lab work.  No abdominal imaging is indicated at this time given her nontender/benign abdominal exam.    Pt was re-evaluated at 12:34 PM  Labs noted  Hyperkalemia protocol ordered;  EKG ordered, NS bolus ordered  Paging nephrology and [facility]    12:38 PM  Discussed with Dr. Austin/nephrology who will come see pt  Discussed with Dr. Graham/intensivist who will consult/admit with [facility]  Discussed with [facility] who agrees to admit    EKG demonstrates no hyperkalemic changes at this time    12:50 PM  Advised pt and  of plan to admit to the ICU      The total critical care  time on this patient is 40 minutes, resuscitating patient, speaking with admitting physician, and deciphering test results. This 40 minutes is exclusive of separately billable procedures.      FINAL IMPRESSION  1. Acute renal failure, unspecified acute renal failure type (HCC)    2. Hyperkalemia        Electronically signed by: Regina Mcmullen, 5/1/2019 10:49 AM

## 2019-05-01 NOTE — CONSULTS
Consults     Nephrology Consultation    Date of Service  5/1/2019    Referring Physician  Regina Mcmullen M.D.    Consulting Physician  Valerio Badillo M.D.    Reason for Consultation  Acute kidney injury    History of Presenting Illness  73 y.o. female who presented 5/1/2019 with 2 to 3 weeks of nausea and vomiting.    The patient was in her usual state of health few weeks ago, and then developed nausea and vomiting.  Occasionally, she would vomit some coffee-ground emesis.  She says for the last 2 to 3 weeks, she has only been able to tolerate sips of water and yogurt to eat.  She has lost a significant amount of weight in the last few weeks.  Patient also complains of chronic back pain, that may be worse recently.  The patient denies taking any NSAIDs.  The patient thinks that she is urinating normal amounts, denies any problems with her urination.  Despite the low oral intake, her  says that she has been taking her home diuretics, which is torsemide to twice daily.    The patient decided to come into the hospital, where she was found to be in acute kidney failure.  Reviewing labs, the patient had a creatinine of 1.6 in January 2019, corresponding to a GFR of 32.  Her creatinine on admission was 8.3.    Review of Systems  Review of Systems   Constitutional: Positive for weight loss. Negative for fever.   Respiratory: Negative for shortness of breath.    Cardiovascular: Negative for chest pain.   Gastrointestinal: Positive for nausea and vomiting. Negative for abdominal pain and diarrhea.   Musculoskeletal: Positive for back pain.   All other systems reviewed and are negative.      Past Medical History  Past Medical History:   Diagnosis Date   • Anemia    • Arthritis    • Back pain    • Breath shortness     O2 24/7 for paralysis of diaphram right   • CATARACT    • Congestive heart failure (HCC)    • COPD (chronic obstructive pulmonary disease) ([facility])    • Diabetes    • EMPHYSEMA     mild   • Fall    • Hiatus  "hernia syndrome    • Hyperkalemia 3/23/2018   • Hyperlipidemia    • Hypertension    • Indigestion    • Other specified disorder of intestines     occas diarrhea   • Pain    • Paralysis of diaphragm     right side   • Renal insufficiency 3/23/2018   • Snoring    • Ulcer     \"bleeding stomach ulcer\"       Surgical History  Past Surgical History:   Procedure Laterality Date   • RECOVERY  11/6/2013    Performed by [facility]   • GYN SURGERY      hysterectomy   • OTHER      cataract surgery    • OTHER      carpal tunnel    • OTHER ORTHOPEDIC SURGERY      left knee replacement       Family History  Family History   Problem Relation Age of Onset   • Breast Cancer Mother    • Cancer Father    • Heart Failure Maternal Grandmother        Social History  Social History     Social History   • Marital status:      Spouse name: N/A   • Number of children: N/A   • Years of education: N/A     Occupational History   • Not on file.     Social History Main Topics   • Smoking status: Former Smoker     Packs/day: 2.00     Years: 20.00     Types: Cigarettes     Quit date: 1/1/2007   • Smokeless tobacco: Never Used   • Alcohol use No   • Drug use: No   • Sexual activity: Not on file     Other Topics Concern   • Not on file     Social History Narrative   • No narrative on file       Medications  Current Facility-Administered Medications   Medication Dose Route Frequency Provider Last Rate Last Dose   • albuterol (PROVENTIL) 2.5 mg/0.5 mL nebulizer solution 10 mg  10 mg Nebulization ONCE (RT) Regina Mcmullen M.D.       • Respiratory Care per Protocol   Nebulization Continuous RT KAROLYN Ratliff.TARIK.       • ondansetron (ZOFRAN) syringe/vial injection 4 mg  4 mg Intravenous Q4HRS PRN KAROLYN Ratliff.O.       • ondansetron (ZOFRAN ODT) dispertab 4 mg  4 mg Oral Q4HRS PRN KAROLYN Ratliff.O.       • insulin regular (HUMULIN R) injection 2-9 Units  2-9 Units Subcutaneous Q6HRS " CLAUDIA RatliffOMegan   5 Units at 05/01/19 1537    And   • glucose 4 g chewable tablet 16 g  16 g Oral Q15 MIN PRN Rodríguez Lassiter D.O.        And   • DEXTROSE 10% BOLUS 250 mL  250 mL Intravenous Q15 MIN PRN Rodríguez Lassiter D.O.       • ezetimibe (ZETIA) tablet 10 mg  10 mg Oral Q EVENING Rodríguez Lassiter D.O.        And   • simvastatin (ZOCOR) tablet 40 mg  40 mg Oral Q EVENING CLAUDIA RatliffO.       • tiotropium (SPIRIVA) 18 MCG inhalation capsule 1 Cap  1 Cap Inhalation QDAILY (RT) Regina Mcmullen M.D.       • HYDROcodone-acetaminophen (NORCO) 5-325 MG per tablet 1 Tab  1 Tab Oral Q4HRS PRN Rodríguez Lassiter D.O.       • temazepam (RESTORIL) capsule 15 mg  15 mg Oral HS PRN Rodríguez Lassiter D.O.         Current Outpatient Prescriptions   Medication Sig Dispense Refill   • insulin glargine (LANTUS) 100 UNIT/ML Solution Inject 30 Units as instructed every bedtime.     • metFORMIN (GLUCOPHAGE) 850 MG Tab Take 850 mg by mouth 2 times a day, with meals.     • torsemide (DEMADEX) 20 MG Tab Take 40 mg by mouth 2 Times a Day.     • ezetimibe-simvastatin (VYTORIN) 10-40 MG per tablet Take 1 Tab by mouth every evening.     • omeprazole (PRILOSEC) 20 MG delayed-release capsule Take 20 mg by mouth every evening.     • temazepam (RESTORIL) 30 MG capsule Take 30 mg by mouth at bedtime as needed for Sleep.     • tiotropium (SPIRIVA HANDIHALER) 18 MCG Cap Inhale 18 mcg by mouth every evening.     • lisinopril (PRINIVIL) 10 MG Tab Take 10 mg by mouth every day.     • HYDROcodone-acetaminophen (NORCO) 7.5-325 MG per tablet Take 1 Tab by mouth every four hours as needed for Severe Pain.     • magnesium oxide (MAG-OX) 400 MG Tab Take 400 mg by mouth every day.     • Calcium Carb-Cholecalciferol (CALCIUM 1000 + D) 1000-800 MG-UNIT Tab Take 1 Tab by mouth every evening.         Allergies  Allergies   Allergen Reactions   • Zoloft Diarrhea   • Amaryl [Amaryl]    • Amaryl [Glimepiride] Shortness of Breath  and Rash   • Amoxicillin Rash   • Byetta Rash   • Epinephrine Shortness of Breath     Panic attack, Can't breath   • Hctz      Stopped urinary output   • Iodine Anaphylaxis     contrast   • Lasix [Furosemide]      Leg cramps, stopped urinary output   • Latex    • Lexapro      Leg cramping   • Penicillins Rash   • Spironolactone        Physical Exam  Temp:  [36.4 °C (97.5 °F)] 36.4 °C (97.5 °F)  Pulse:  [77-85] 78  Resp:  [12-33] 12  BP: (152)/(55) 152/55  SpO2:  [95 %-100 %] 97 %    Physical Exam   Constitutional: She is oriented to person, place, and time. She appears well-developed. No distress.   HENT:   Mouth/Throat: No oropharyngeal exudate.   Dry mucous membranes   Eyes: EOM are normal. No scleral icterus.   Neck: No tracheal deviation present.   Cardiovascular: Normal heart sounds.    No murmur heard.  Pulmonary/Chest: Effort normal. No stridor. She has no rales.   Abdominal: Soft. There is no tenderness.   Musculoskeletal: Normal range of motion. She exhibits no edema.   Neurological: She is alert and oriented to person, place, and time.   Skin: Skin is warm and dry.   Psychiatric: She has a normal mood and affect.       Fluids       Laboratory  Labs reviewed, pertinent labs below.  Recent Labs      05/01/19   1039   WBC  7.6   RBC  3.64*   HEMOGLOBIN  11.0*   HEMATOCRIT  33.8*   MCV  92.9   MCH  30.2   MCHC  32.5*   RDW  43.3   PLATELETCT  127*   MPV  13.1*     Recent Labs      05/01/19   1039  05/01/19   1422   SODIUM  127*  128*   POTASSIUM  5.8*  5.4   CHLORIDE  88*  89*   CO2  23  25   GLUCOSE  244*  295*   BUN  180*  189*   CREATININE  8.26*  7.96*   CALCIUM  9.9  9.4                URINALYSIS:    Lab Results  Component Value Date/Time   COLORURINE Yellow 05/01/2019 1135   CLARITY Clear 05/01/2019 1135   SPECGRAVITY 1.010 05/01/2019 1135   PHURINE 5.5 05/01/2019 1135   KETONES Negative 05/01/2019 1135   PROTEINURIN Negative 05/01/2019 1135   BILIRUBINUR Negative 05/01/2019 1135   UROBILU 0.2 05/01/2019  1135   NITRITE Negative 05/01/2019 1135   LEUKESTERAS Negative 05/01/2019 1135   OCCULTBLOOD Negative 05/01/2019 1135     UPC  No results found for: TOTPROTUR No results found for: CREATININEU    Imaging reviewed  US-RENAL   Final Result      1.  Normal sonographic appearance of the kidneys.   2.  Minimal urine output with maturation, prevoid volume was 376 mL, postvoid residual was 327 mL.      DX-CHEST-PORTABLE (1 VIEW)   Final Result      Left basilar atelectasis and/or consolidation. Underlying infection is possible.            Assessment/Plan  73 y.o. female who presented 5/1/2019 with 2 to 3 weeks of nausea and vomiting, found to have acute kidney injury on chronic kidney disease stage III.    1.  Acute kidney injury, nonoliguric.  Unclear at this time if this is all prerenal from volume depletion from nausea and vomiting, or if she has progression of kidney failure and uremia that is causing the nausea and vomiting.  I recommend aggressive volume resuscitation with at least 2 L of normal saline.  If the patient urinates and her kidney parameters improve, there is no acute need for dialysis.  If the patient has minimal output with volume repletion, would recommend starting dialysis.  -As part of the work-up, given her mildly high calcium, back pain, I will evaluate SPEP, UPEP, serum free light chains to evaluate for multiple myeloma.    2.  Azotemia, again unclear if the nausea and vomiting is due to uremia, or if she is uremic which led to nausea and vomiting.    3.  Hyperkalemia, due to acute kidney injury.  Agree with acute management with insulin and dextrose.  Volume resuscitate with 2 L of saline, which should help excrete potassium through the urine.  If hyperkalemia worsens, patient will need dialysis.    4.  Hyponatremia, likely due to SIADH from nausea and vomiting.  Limit hypotonic fluids.  Check daily labs.    Prognosis guarded.  Plan discussed with Dr. Graham.      Valerio Badillo,  MD  Nephrology

## 2019-05-01 NOTE — ED NOTES
Med Rec Updated and Complete per Pts family with List (returned)  Allergies Reviewed  No PO ABX last 30 days.    Family reports Pt took all morning meds today.

## 2019-05-01 NOTE — PROGRESS NOTES
Bedside report received from Bel PARK. Patient transferred to T601 with two CIC RNs on transport monitor. Patient AO and oriented to room and use of call light. VSS.

## 2019-05-01 NOTE — H&P
"[facility] MEDICINE HISTORY AND PHYSICAL     PATIENT ID:  NAME:  Siri Solis  MRN:               [id number]  YOB: 1945    Date of Admission: 5/1/2019     Attending: Dion    Resident: Sravani    Primary Care Physician:  Lake Ochoa M.D.    CC:  Nausea and vomiting    HPI: This is a 73 year old female with a history of type 2 DM, COPD, restrictive lung disease, PUD and severe pulmonary HTN. She presents today with a 1 month history of nausea and vomiting and poor appetite. Just today she started to have coffee ground emesis. She denies any abdominal pain. She continues to have bowel movement about every 2 days with the most recent being today and reports flatus. She does report that some of her stools are dark and this has been going on for quite some time. She denies cough, chest pain or shortness of breath. She does continue to void.    She states that she was recently referred to nephrology for decreased GFR at about 38. She was unable to go due to insurance issues.    In the ED Nephrology was consulted who asked that patient be admitted to the ICU as she may need emergent dialysis.     REVIEW OF SYSTEMS:   Ten systems reviewed and were negative except as noted in the HPI.                PAST MEDICAL HISTORY:  Past Medical History:   Diagnosis Date   • Anemia    • Arthritis    • Back pain    • Breath shortness     O2 24/7 for paralysis of diaphram right   • CATARACT    • Congestive heart failure (HCC)    • COPD (chronic obstructive pulmonary disease) (HCC)    • Diabetes    • EMPHYSEMA     mild   • Fall    • Hiatus hernia syndrome    • Hyperkalemia 3/23/2018   • Hyperlipidemia    • Hypertension    • Indigestion    • Other specified disorder of intestines     occas diarrhea   • Pain    • Paralysis of diaphragm     right side   • Renal insufficiency 3/23/2018   • Snoring    • Ulcer     \"bleeding stomach ulcer\"       PAST SURGICAL HISTORY:  Past Surgical History:   Procedure Laterality " Date   • RECOVERY  11/6/2013    Performed by [facility] at SURGERY SAME DAY [facility] ORS   • GYN SURGERY      hysterectomy   • OTHER      cataract surgery    • OTHER      carpal tunnel    • OTHER ORTHOPEDIC SURGERY      left knee replacement       FAMILY HISTORY:  Family History   Problem Relation Age of Onset   • Breast Cancer Mother    • Cancer Father    • Heart Failure Maternal Grandmother        SOCIAL HISTORY:   Smoking 40 pack year. Quit 10 years ago  Etoh use- Denies  Drug use -Denies    DIET:   No orders of the defined types were placed in this encounter.      ALLERGIES:  Allergies   Allergen Reactions   • Zoloft Diarrhea   • Amaryl [Amaryl]    • Amaryl [Glimepiride] Shortness of Breath and Rash   • Amoxicillin Rash   • Byetta Rash   • Epinephrine Shortness of Breath     Panic attack, Can't breath   • Hctz      Stopped urinary output   • Iodine Anaphylaxis     contrast   • Lasix [Furosemide]      Leg cramps, stopped urinary output   • Latex    • Lexapro      Leg cramping   • Penicillins Rash   • Spironolactone        OUTPATIENT MEDICATIONS:    Current Facility-Administered Medications:   •  [DISCONTINUED] dextrose 50% (D50W) injection 50 mL, 25 g, Intravenous, Once **AND** insulin regular (HUMULIN R) injection 10 Units, 10 Units, Intravenous, Once, Regina Mcmullen M.D.  •  SMALL VOLUME NEBULIZER, , , ONCE **AND** albuterol (PROVENTIL) 2.5 mg/0.5 mL nebulizer solution 10 mg, 10 mg, Nebulization, ONCE (RT), Regina Mcmullen M.D.  •  DEXTROSE 10% BOLUS 250 mL, 250 mL, Intravenous, Once, Regina Mcmullen M.D.  •  NS infusion 2,000 mL, 2,000 mL, Intravenous, Once, Regina Mcmullen M.D.  •  sodium bicarbonate 8.4 % injection 50 mEq, 50 mEq, Intravenous, Once, Regina Mcmullen M.D.    Current Outpatient Prescriptions:   •  insulin glargine (LANTUS) 100 UNIT/ML Solution, Inject 30 Units as instructed every bedtime., Disp: , Rfl:   •  metFORMIN (GLUCOPHAGE) 850 MG Tab, Take 850 mg by mouth 2 times a  day, with meals., Disp: , Rfl:   •  torsemide (DEMADEX) 20 MG Tab, Take 40 mg by mouth 2 Times a Day., Disp: , Rfl:   •  ezetimibe-simvastatin (VYTORIN) 10-40 MG per tablet, Take 1 Tab by mouth every evening., Disp: , Rfl:   •  omeprazole (PRILOSEC) 20 MG delayed-release capsule, Take 20 mg by mouth every evening., Disp: , Rfl:   •  temazepam (RESTORIL) 30 MG capsule, Take 30 mg by mouth at bedtime as needed for Sleep., Disp: , Rfl:   •  tiotropium (SPIRIVA HANDIHALER) 18 MCG Cap, Inhale 18 mcg by mouth every evening., Disp: , Rfl:   •  lisinopril (PRINIVIL) 10 MG Tab, Take 10 mg by mouth every day., Disp: , Rfl:   •  HYDROcodone-acetaminophen (NORCO) 7.5-325 MG per tablet, Take 1 Tab by mouth every four hours as needed for Severe Pain., Disp: , Rfl:   •  magnesium oxide (MAG-OX) 400 MG Tab, Take 400 mg by mouth every day., Disp: , Rfl:   •  Calcium Carb-Cholecalciferol (CALCIUM 1000 + D) 1000-800 MG-UNIT Tab, Take 1 Tab by mouth every evening., Disp: , Rfl:     PHYSICAL EXAM:  Vitals:    19 1036 19 1038 19 1045 19 1100   BP:  152/55     Pulse:  78 77 81   Resp:  18 16 15   Temp:  36.4 °C (97.5 °F)     TempSrc:  Temporal     SpO2:  99% 98% 99%   Weight: 83 kg (182 lb 15.7 oz)      , Temp (24hrs), Av.4 °C (97.5 °F), Min:36.4 °C (97.5 °F), Max:36.4 °C (97.5 °F)  , Pulse Oximetry: 99 %, O2 (LPM): 7 (pt wears at home), O2 Delivery: Silicone Nasal Cannula    General: Pt resting in NAD, cooperative   Skin:  Pink, warm and dry.  No rashes  HEENT: NC/AT. PERRL. EOMI. Dry mucous membranes  Neck:  Supple without lymphadenopathy or rigidity.  Lungs:  Symmetrical.  Poor air movement  Cardiovascular:  S1/S2 RRR 2/6 systolic murmur  Abdomen:  Abdomen is soft NT/ND. No masses noted.   Extremities:  Full range of motion. No gross deformities noted. 2+ pulses in all extremities. No C/C/E   Spine:  Straight without vertebral anomalies.  CNS:  Muscle tone is normal. Cranial nerves II-XII grossly intact.       LAB TESTS:   Recent Labs      05/01/19   1039   WBC  7.6   RBC  3.64*   HEMOGLOBIN  11.0*   HEMATOCRIT  33.8*   MCV  92.9   MCH  30.2   RDW  43.3   PLATELETCT  127*   MPV  13.1*   NEUTSPOLYS  74.50*   LYMPHOCYTES  13.50*   MONOCYTES  8.70   EOSINOPHILS  2.10   BASOPHILS  0.40     Recent Labs      05/01/19   1039   TROPONINI  0.03     Recent Labs      05/01/19   1039   SODIUM  127*   POTASSIUM  5.8*   CHLORIDE  88*   CO2  23   BUN  180*   CREATININE  8.26*   CALCIUM  9.9   ALBUMIN  4.5       CULTURES:   Results     Procedure Component Value Units Date/Time    URINALYSIS (UA) [[id number]]  (Abnormal) Collected:  05/01/19 1135    Order Status:  Completed Specimen:  Blood Updated:  05/01/19 1152     Color Yellow     Character Clear     Specific Gravity 1.010     Ph 5.5     Glucose 100 (A) mg/dL      Ketones Negative mg/dL      Protein Negative mg/dL      Bilirubin Negative     Urobilinogen, Urine 0.2     Nitrite Negative     Leukocyte Esterase Negative     Occult Blood Negative     Micro Urine Req see below     Comment: Microscopic examination not performed when specimen is clear  and chemically negative for protein, blood, leukocyte esterase  and nitrite.               IMAGES:  DX-CHEST-PORTABLE (1 VIEW)   Final Result      Left basilar atelectasis and/or consolidation. Underlying infection is possible.      US-RENAL    (Results Pending)       ASSESSMENT/PLAN: 73 y.o. female admitted for acute on chronic renal failure secondary to dehydration.    # Acute on chronic renal failure  Admitted last year with cardiorenal syndrome. Most recent GFR of 32 in January 2019. Worsening possibly due to dehydration. She is making some urine at this point. Potassium at 5.8. GFR of 5    Plan  -Nephrology consulted  -Avoid nephrotoxins  -Monitor I/O's  -Q4H BMP    #Coffee ground emesis  Known Hx of PUD.  Hgb at 11    Plan  -Trend Hgb  -Consider GI consult    #Dehydration  #Nausea/vomiting  Unknown etiology. Benign abdominal exam with  baseline bowel function. Possibility of worsening renal function/uremia leading as an etiology    Plan  -Antiemetics  -IV hydration until PO improves    #DM  On Lantus and metformin at home.    Plan  -Hold metformin for FLAVIA  -SSI    #HF with preserved EF  Last echo over 1 year ago. Dx with cardiorenal syndrome at that admission.    Plan  -Will repeat echo  -Hold torsemide for FLAVIA    #HTN  -Hold lisinopril for FLAVIA  -Monitor closely    #COPD, oxygen dependant  #Restrictive lung disease(per chart review)  On 7 liter O2 at home    Plan  -O2  -RT protocol    #Chronic pain  -Continue home regimen    #Insomnia  -Continue home regimen    Dipso: Admit to ICU

## 2019-05-02 ENCOUNTER — APPOINTMENT (OUTPATIENT)
Dept: CARDIOLOGY | Facility: MEDICAL CENTER | Age: 74
DRG: 683 | End: 2019-05-02
Attending: INTERNAL MEDICINE
Payer: COMMERCIAL

## 2019-05-02 LAB
ANION GAP SERPL CALC-SCNC: 12 MMOL/L (ref 0–11.9)
ANION GAP SERPL CALC-SCNC: 13 MMOL/L (ref 0–11.9)
ANION GAP SERPL CALC-SCNC: 15 MMOL/L (ref 0–11.9)
ANION GAP SERPL CALC-SCNC: 16 MMOL/L (ref 0–11.9)
ANION GAP SERPL CALC-SCNC: 16 MMOL/L (ref 0–11.9)
ANION GAP SERPL CALC-SCNC: 9 MMOL/L (ref 0–11.9)
BUN SERPL-MCNC: 156 MG/DL (ref 8–22)
BUN SERPL-MCNC: 161 MG/DL (ref 8–22)
BUN SERPL-MCNC: 169 MG/DL (ref 8–22)
BUN SERPL-MCNC: 169 MG/DL (ref 8–22)
BUN SERPL-MCNC: 172 MG/DL (ref 8–22)
BUN SERPL-MCNC: 177 MG/DL (ref 8–22)
CALCIUM SERPL-MCNC: 9 MG/DL (ref 8.5–10.5)
CALCIUM SERPL-MCNC: 9.3 MG/DL (ref 8.5–10.5)
CALCIUM SERPL-MCNC: 9.6 MG/DL (ref 8.5–10.5)
CALCIUM SERPL-MCNC: 9.9 MG/DL (ref 8.5–10.5)
CHLORIDE SERPL-SCNC: 94 MMOL/L (ref 96–112)
CHLORIDE SERPL-SCNC: 95 MMOL/L (ref 96–112)
CHLORIDE SERPL-SCNC: 95 MMOL/L (ref 96–112)
CO2 SERPL-SCNC: 23 MMOL/L (ref 20–33)
CO2 SERPL-SCNC: 23 MMOL/L (ref 20–33)
CO2 SERPL-SCNC: 24 MMOL/L (ref 20–33)
CO2 SERPL-SCNC: 25 MMOL/L (ref 20–33)
CO2 SERPL-SCNC: 25 MMOL/L (ref 20–33)
CO2 SERPL-SCNC: 26 MMOL/L (ref 20–33)
CREAT SERPL-MCNC: 5.62 MG/DL (ref 0.5–1.4)
CREAT SERPL-MCNC: 6.15 MG/DL (ref 0.5–1.4)
CREAT SERPL-MCNC: 6.28 MG/DL (ref 0.5–1.4)
CREAT SERPL-MCNC: 6.75 MG/DL (ref 0.5–1.4)
CREAT SERPL-MCNC: 6.87 MG/DL (ref 0.5–1.4)
CREAT SERPL-MCNC: 7.25 MG/DL (ref 0.5–1.4)
ERYTHROCYTE [DISTWIDTH] IN BLOOD BY AUTOMATED COUNT: 44.1 FL (ref 35.9–50)
GLUCOSE BLD-MCNC: 137 MG/DL (ref 65–99)
GLUCOSE BLD-MCNC: 174 MG/DL (ref 65–99)
GLUCOSE BLD-MCNC: 181 MG/DL (ref 65–99)
GLUCOSE BLD-MCNC: 215 MG/DL (ref 65–99)
GLUCOSE SERPL-MCNC: 155 MG/DL (ref 65–99)
GLUCOSE SERPL-MCNC: 173 MG/DL (ref 65–99)
GLUCOSE SERPL-MCNC: 203 MG/DL (ref 65–99)
GLUCOSE SERPL-MCNC: 203 MG/DL (ref 65–99)
GLUCOSE SERPL-MCNC: 213 MG/DL (ref 65–99)
GLUCOSE SERPL-MCNC: 229 MG/DL (ref 65–99)
HCT VFR BLD AUTO: 33 % (ref 37–47)
HGB BLD-MCNC: 10.6 G/DL (ref 12–16)
INR PPP: 1.06 (ref 0.87–1.13)
LV EJECT FRACT  99904: 75
LV EJECT FRACT MOD 2C 99903: 76.39
LV EJECT FRACT MOD 4C 99902: 72.94
LV EJECT FRACT MOD BP 99901: 74.3
MCH RBC QN AUTO: 30.1 PG (ref 27–33)
MCHC RBC AUTO-ENTMCNC: 32.1 G/DL (ref 33.6–35)
MCV RBC AUTO: 93.8 FL (ref 81.4–97.8)
PLATELET # BLD AUTO: 129 K/UL (ref 164–446)
PMV BLD AUTO: 13.2 FL (ref 9–12.9)
POTASSIUM SERPL-SCNC: 4.8 MMOL/L (ref 3.6–5.5)
POTASSIUM SERPL-SCNC: 5 MMOL/L (ref 3.6–5.5)
POTASSIUM SERPL-SCNC: 5 MMOL/L (ref 3.6–5.5)
POTASSIUM SERPL-SCNC: 5.2 MMOL/L (ref 3.6–5.5)
POTASSIUM SERPL-SCNC: 5.6 MMOL/L (ref 3.6–5.5)
POTASSIUM SERPL-SCNC: 5.7 MMOL/L (ref 3.6–5.5)
PROTHROMBIN TIME: 13.9 SEC (ref 12–14.6)
RBC # BLD AUTO: 3.52 M/UL (ref 4.2–5.4)
SODIUM SERPL-SCNC: 130 MMOL/L (ref 135–145)
SODIUM SERPL-SCNC: 130 MMOL/L (ref 135–145)
SODIUM SERPL-SCNC: 131 MMOL/L (ref 135–145)
SODIUM SERPL-SCNC: 133 MMOL/L (ref 135–145)
SODIUM SERPL-SCNC: 134 MMOL/L (ref 135–145)
SODIUM SERPL-SCNC: 135 MMOL/L (ref 135–145)
WBC # BLD AUTO: 7.6 K/UL (ref 4.8–10.8)

## 2019-05-02 PROCEDURE — 700105 HCHG RX REV CODE 258: Performed by: FAMILY MEDICINE

## 2019-05-02 PROCEDURE — 700111 HCHG RX REV CODE 636 W/ 250 OVERRIDE (IP): Performed by: FAMILY MEDICINE

## 2019-05-02 PROCEDURE — A9270 NON-COVERED ITEM OR SERVICE: HCPCS | Performed by: FAMILY MEDICINE

## 2019-05-02 PROCEDURE — 85610 PROTHROMBIN TIME: CPT

## 2019-05-02 PROCEDURE — 93306 TTE W/DOPPLER COMPLETE: CPT | Mod: 26 | Performed by: INTERNAL MEDICINE

## 2019-05-02 PROCEDURE — 80048 BASIC METABOLIC PNL TOTAL CA: CPT | Mod: 91

## 2019-05-02 PROCEDURE — 99233 SBSQ HOSP IP/OBS HIGH 50: CPT | Performed by: INTERNAL MEDICINE

## 2019-05-02 PROCEDURE — 99291 CRITICAL CARE FIRST HOUR: CPT | Performed by: INTERNAL MEDICINE

## 2019-05-02 PROCEDURE — 82962 GLUCOSE BLOOD TEST: CPT | Mod: 91

## 2019-05-02 PROCEDURE — 85027 COMPLETE CBC AUTOMATED: CPT

## 2019-05-02 PROCEDURE — 770022 HCHG ROOM/CARE - ICU (200)

## 2019-05-02 PROCEDURE — 93306 TTE W/DOPPLER COMPLETE: CPT

## 2019-05-02 PROCEDURE — 700102 HCHG RX REV CODE 250 W/ 637 OVERRIDE(OP): Performed by: FAMILY MEDICINE

## 2019-05-02 RX ORDER — TIOTROPIUM BROMIDE 18 UG/1
1 CAPSULE ORAL; RESPIRATORY (INHALATION) DAILY
Status: DISCONTINUED | OUTPATIENT
Start: 2019-05-03 | End: 2019-05-04 | Stop reason: HOSPADM

## 2019-05-02 RX ORDER — SODIUM CHLORIDE 9 MG/ML
250 INJECTION, SOLUTION INTRAVENOUS
Status: DISCONTINUED | OUTPATIENT
Start: 2019-05-02 | End: 2019-05-02

## 2019-05-02 RX ORDER — SODIUM CHLORIDE 9 MG/ML
INJECTION, SOLUTION INTRAVENOUS CONTINUOUS
Status: DISCONTINUED | OUTPATIENT
Start: 2019-05-02 | End: 2019-05-03

## 2019-05-02 RX ORDER — ALBUMIN (HUMAN) 12.5 G/50ML
12.5 SOLUTION INTRAVENOUS
Status: DISCONTINUED | OUTPATIENT
Start: 2019-05-02 | End: 2019-05-02

## 2019-05-02 RX ADMIN — INSULIN HUMAN 2 UNITS: 100 INJECTION, SOLUTION PARENTERAL at 05:12

## 2019-05-02 RX ADMIN — EZETIMIBE 10 MG: 10 TABLET ORAL at 17:11

## 2019-05-02 RX ADMIN — SODIUM CHLORIDE: 9 INJECTION, SOLUTION INTRAVENOUS at 22:31

## 2019-05-02 RX ADMIN — ONDANSETRON 4 MG: 2 INJECTION INTRAMUSCULAR; INTRAVENOUS at 13:11

## 2019-05-02 RX ADMIN — INSULIN HUMAN 3 UNITS: 100 INJECTION, SOLUTION PARENTERAL at 11:22

## 2019-05-02 RX ADMIN — ONDANSETRON 4 MG: 4 TABLET, ORALLY DISINTEGRATING ORAL at 00:56

## 2019-05-02 RX ADMIN — ONDANSETRON 4 MG: 4 TABLET, ORALLY DISINTEGRATING ORAL at 05:04

## 2019-05-02 RX ADMIN — INSULIN HUMAN 2 UNITS: 100 INJECTION, SOLUTION PARENTERAL at 17:11

## 2019-05-02 RX ADMIN — SODIUM CHLORIDE: 9 INJECTION, SOLUTION INTRAVENOUS at 11:34

## 2019-05-02 RX ADMIN — SIMVASTATIN 40 MG: 20 TABLET, FILM COATED ORAL at 17:10

## 2019-05-02 ASSESSMENT — ENCOUNTER SYMPTOMS
SHORTNESS OF BREATH: 0
FEVER: 0
ABDOMINAL PAIN: 0

## 2019-05-02 ASSESSMENT — LIFESTYLE VARIABLES
EVER_SMOKED: YES

## 2019-05-02 NOTE — PROGRESS NOTES
Critical Care Progress Note    Date of admission  5/1/2019    Chief Complaint  73 y.o. female admitted 5/1/2019 with FLAVIA, nausea vomiting x2 weeks    Hospital Course    73 y.o. female with a history of CHF, COPD, diabetes, emphysema who presented 5/1/2019 c/o n/v x 2 weeks.  She is only been able to tolerate yogurt.  She has continued to take her medications including torsemide.  She denies taking any NSAIDs.  She noted some coffee ground emesis in the last couple of days. ED evaluation revealed hyperkalemia, with acute on chronic kidney injury.  Potassium shifting agents were administered.  Nephrology consulted. Critical care consultation requested for concurrent evaluation and management.     Prior PFTs showed mixed obstructive and restrictive changes, FEV1 1.5 L (62% predicted), FEV1 to FVC ratio 75%, TLC 76%, DLCO 58% predicted.  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  Prior hospitalization in March for dyspnea and worsening lower extremity edema was found to have exacerbation of heart failure with preserved ejection fraction (LVEF 60%) and severe pulmonary hypertension, RVSP 95 mmHg.  She responded well to diuretic therapy and outpatient follow-up with Dr. Pritchett included a right heart catheterization which showed only mild pulmonary hypertension at that time, RVSP 26 mmHg.     Interval Problem Update  Reviewed last 24 hour events:  Improved nausea and vomiting, ongoing urine output  Remains on baseline supplemental oxygen    Review of Systems  Review of Systems   Unable to perform ROS: Acuity of condition        Vital Signs for last 24 hours   Temp:  [35.8 °C (96.4 °F)-36.7 °C (98.1 °F)] 36.7 °C (98.1 °F)  Pulse:  [77-92] 85  Resp:  [12-33] 20  BP: (152)/(55) 152/55  SpO2:  [89 %-100 %] 95 %    Hemodynamic parameters for last 24 hours       Respiratory Information for the last 24 hours       Physical Exam   Physical Exam   Constitutional: She is oriented to person, place, and time. She appears  well-developed.   HENT:   Head: Normocephalic and atraumatic.   Eyes: Pupils are equal, round, and reactive to light. No scleral icterus.   Neck: No tracheal deviation present. No thyromegaly present.   Cardiovascular: Normal rate and regular rhythm.    No murmur heard.  Pulmonary/Chest: She has no wheezes.   Diminished, dullness right base   Abdominal: Soft. She exhibits no distension. There is no tenderness.   Musculoskeletal: She exhibits edema. She exhibits no tenderness.   Neurological: She is alert and oriented to person, place, and time.   Skin: Skin is warm and dry.       Medications  Current Facility-Administered Medications   Medication Dose Route Frequency Provider Last Rate Last Dose   • NS (BOLUS) infusion 250 mL  250 mL Intravenous DIALYSIS PRN Valerio Badillo M.D.       • albumin human 25% solution 12.5 g  12.5 g Intravenous DIALYSIS PRN Valerio Badillo M.D.       • NS infusion   Intravenous Continuous Rodríguez Lassiter D.O.       • [START ON 5/3/2019] tiotropium (SPIRIVA) 18 MCG inhalation capsule 1 Cap  1 Cap Inhalation DAILY Marty Randall M.D.       • Respiratory Care per Protocol   Nebulization Continuous RT Rodríguez Lassiter D.O.       • ondansetron (ZOFRAN) syringe/vial injection 4 mg  4 mg Intravenous Q4HRS PRN KAROLYN Ratliff.O.       • ondansetron (ZOFRAN ODT) dispertab 4 mg  4 mg Oral Q4HRS PRN Rodríguez Lassiter D.O.   4 mg at 05/02/19 0504   • insulin regular (HUMULIN R) injection 2-9 Units  2-9 Units Subcutaneous Q6HRS Rodríguez Lassiter D.O.   2 Units at 05/02/19 0512    And   • glucose 4 g chewable tablet 16 g  16 g Oral Q15 MIN PRN Rodríguez Lassiter D.O.        And   • DEXTROSE 10% BOLUS 250 mL  250 mL Intravenous Q15 MIN PRN Rodríguez Lassiter D.O.       • ezetimibe (ZETIA) tablet 10 mg  10 mg Oral Q EVENING Rodríguez Lassiter D.O.   10 mg at 05/01/19 1802    And   • simvastatin (ZOCOR) tablet 40 mg  40 mg Oral Q EVENING CLAUDIA RatliffOMegan   40 mg at 05/01/19 1803   •  HYDROcodone-acetaminophen (NORCO) 5-325 MG per tablet 1 Tab  1 Tab Oral Q4HRS PRN Rodríguez Lassiter D.O.       • temazepam (RESTORIL) capsule 15 mg  15 mg Oral HS PRN CLAUDIA RatliffO.           Fluids    Intake/Output Summary (Last 24 hours) at 05/02/19 0907  Last data filed at 05/02/19 0800   Gross per 24 hour   Intake                0 ml   Output              900 ml   Net             -900 ml       Laboratory      Recent Labs      05/01/19 1039 05/01/19 2007   CPKTOTAL   --   37   TROPONINI  0.03   --      Recent Labs      05/01/19 1610 05/01/19 2007 05/01/19 2348 05/02/19   0500   SODIUM  133*  135  134*  133*   POTASSIUM  5.4  5.4  5.2  5.6*   CHLORIDE  92*  94*  95*  94*   CO2  22  22  23  24   BUN  185*  175*  172*  177*   CREATININE  7.41*  6.94*  6.87*  7.25*   MAGNESIUM  2.1   --    --    --    CALCIUM  9.3  9.5  9.3  9.6     Recent Labs      05/01/19 1039 05/01/19 2007 05/01/19 2348 05/02/19   0500   ALTSGPT  8   --    --    --    --    ASTSGOT  11*   --    --    --    --    ALKPHOSPHAT  49   --    --    --    --    TBILIRUBIN  0.5   --    --    --    --    LIPASE  108*   --    --    --    --    GLUCOSE  244*   < >  138*  155*  213*    < > = values in this interval not displayed.     Recent Labs      05/01/19 1039 05/01/19 1610 05/02/19   0500   WBC  7.6  6.5  7.6   NEUTSPOLYS  74.50*   --    --    LYMPHOCYTES  13.50*   --    --    MONOCYTES  8.70   --    --    EOSINOPHILS  2.10   --    --    BASOPHILS  0.40   --    --    ASTSGOT  11*   --    --    ALTSGPT  8   --    --    ALKPHOSPHAT  49   --    --    TBILIRUBIN  0.5   --    --      Recent Labs      05/01/19 1039 05/01/19 1610 05/02/19   0500   RBC  3.64*  3.54*  3.52*   HEMOGLOBIN  11.0*  10.4*  10.6*   HEMATOCRIT  33.8*  33.1*  33.0*   PLATELETCT  127*  117*  129*       Imaging  X-Ray:  I have personally reviewed the images and compared with prior images.    Assessment/Plan  Acute kidney injury (HCC)    Assessment & Plan    Acute on chronic kidney injury  Etiology unclear.  Hypovolemia certainly contributing at this time.  Urinary retention could lead to obstructive uropathy.  Consider cardiorenal syndrome, with history of congestive heart failure.  Aggressive volume resuscitation has been initiated.  Closely monitor, electrolytes, urine output.  Avoid nephrotoxic agents.  If renal function her potassium does not improve hemodialysis may be required.  Plan developed with Dr. Badillo       Hyperkalemia- (present on admission)   Assessment & Plan    Secondary to acute kidney injury  Continued volume resuscitation with forced diuresis  Repeat potassium shifting agents for EKG changes  Emergency hemodialysis as clinically indicated         COPD (chronic obstructive pulmonary disease) (CMS-[facility])- (present on admission)   Assessment & Plan    Bronchodilators as needed.     Nausea and vomiting   Assessment & Plan    Etiology unclear.  Medication side effect?  Versus infectious causes.  Check CK     CHF (congestive heart failure) ([facility])- (present on admission)   Assessment & Plan    History of right ventricular dysfunction   Repeat transthoracic echocardiogram.  Cardiology consultation is clinically indicated     DM (diabetes mellitus) ([facility])- (present on admission)   Assessment & Plan    Moderate glycemic control with insulin therapy.  Hold metformin          VTE:  Not Indicated  Ulcer: Not Indicated  Lines: None    I have performed a physical exam and reviewed and updated ROS and Plan today (5/2/2019). In review of yesterday's note (5/1/2019), there are no changes except as documented above.   Patient is critically ill.  She requires continuous monitoring in the intensive care unit for critical hyperkalemia, acute kidney injury and chronic respiratory failure.  I reviewed the case with nephrology.  She is receiving some additional crystalloid volume resuscitation and is currently making urine.  She may require urgent  hemodialysis however uric this is being deferred and she is somewhat resistant to initiation of hemodialysis.  CODE STATUS is currently DNR/DNI however she may require further positive pressure ventilation with BiPAP.  She has a known paralyzed hemidiaphragm and chronic hypoxia.  She will be followed very closely in the ICU.  Discussed patient condition and risk of morbidity and/or mortality with RN, RT, Pharmacy and QA team  The patient remains critically ill.  Critical care time = 31 minutes in directly providing and coordinating critical care and extensive data review.  No time overlap and excludes procedures.

## 2019-05-02 NOTE — PROGRESS NOTES
[facility] [place] [facility] MEDICINE PROGRESS NOTE     Attending: Vincenzo    Resident: Sravani    PATIENT: Siri Solis; [id number]; 1945    ID: 73 y.o. female with extensive past medical history including CKD admitted for FLAVIA possibly 2/2 dehydration.    SUBJECTIVE: Some continued nausea overnight. No emesis, Zofran helped. No new complaints. No acute events.    OBJECTIVE:     Vitals:    05/02/19 0400 05/02/19 0453 05/02/19 0500 05/02/19 0600   BP:       Pulse: 85  84 87   Resp:       Temp: 36.3 °C (97.3 °F)   36.2 °C (97.2 °F)   TempSrc: Temporal Temporal  Temporal   SpO2: 96%  97% 96%   Weight:           Intake/Output Summary (Last 24 hours) at 05/02/19 0652  Last data filed at 05/02/19 0600   Gross per 24 hour   Intake                0 ml   Output              600 ml   Net             -600 ml       PE:  General: Pt resting in NAD, cooperative   Skin:  Pink, warm and dry.  No rashes  HEENT: NC/AT. PERRL. EOMI. Dry mucous membranes  Neck:  Supple without lymphadenopathy or rigidity.  Lungs:  Symmetrical.  Poor air movement  Cardiovascular:  S1/S2 RRR 2/6 systolic murmur  Abdomen:  Abdomen is soft NT/ND. No masses noted.   Extremities:  Full range of motion. 2+ pitting edema BL LE    LABS:  Recent Labs      05/01/19   1039 05/01/19   1610 05/02/19   0500   WBC  7.6  6.5  7.6   RBC  3.64*  3.54*  3.52*   HEMOGLOBIN  11.0*  10.4*  10.6*   HEMATOCRIT  33.8*  33.1*  33.0*   MCV  92.9  93.5  93.8   MCH  30.2  29.4  30.1   RDW  43.3  43.4  44.1   PLATELETCT  127*  117*  129*   MPV  13.1*  13.1*  13.2*   NEUTSPOLYS  74.50*   --    --    LYMPHOCYTES  13.50*   --    --    MONOCYTES  8.70   --    --    EOSINOPHILS  2.10   --    --    BASOPHILS  0.40   --    --      Recent Labs      05/01/19   1039   05/01/19   1610  05/01/19 2007 05/01/19   2348  05/02/19   0500   SODIUM  127*   < >  133*  135  134*  133*   POTASSIUM  5.8*   < >  5.4  5.4  5.2  5.6*   CHLORIDE  88*   < >  92*  94*  95*  94*   CO2  23   < >  22  22  23  24   BUN   180*   < >  185*  175*  172*  177*   CREATININE  8.26*   < >  7.41*  6.94*  6.87*  7.25*   CALCIUM  9.9   < >  9.3  9.5  9.3  9.6   MAGNESIUM   --    --   2.1   --    --    --    ALBUMIN  4.5   --    --    --    --    --     < > = values in this interval not displayed.     Estimated GFR/CRCL = CrCl cannot be calculated (Unknown ideal weight.).  Recent Labs      05/01/19   1342   05/01/19   1803  05/01/19 2007 05/01/19   2348  05/02/19   0500   GLUCOSE   --    < >   --   138*  155*  213*   POCGLUCOSE  155*   --   138*   --    --    --     < > = values in this interval not displayed.     Recent Labs      05/01/19   1039   ASTSGOT  11*   ALTSGPT  8   TBILIRUBIN  0.5   ALKPHOSPHAT  49   GLOBULIN  2.7     Recent Labs      05/01/19   1039  05/01/19 2007   CPKTOTAL   --   37   TROPONINI  0.03   --          No results for input(s): INR, APTT, FIBRINOGEN in the last 72 hours.    Invalid input(s): DIMER    MICROBIOLOGY:   Results     Procedure Component Value Units Date/Time    URINALYSIS (UA) [[id number]]  (Abnormal) Collected:  05/01/19 1135    Order Status:  Completed Specimen:  Blood Updated:  05/01/19 1152     Color Yellow     Character Clear     Specific Gravity 1.010     Ph 5.5     Glucose 100 (A) mg/dL      Ketones Negative mg/dL      Protein Negative mg/dL      Bilirubin Negative     Urobilinogen, Urine 0.2     Nitrite Negative     Leukocyte Esterase Negative     Occult Blood Negative     Micro Urine Req see below     Comment: Microscopic examination not performed when specimen is clear  and chemically negative for protein, blood, leukocyte esterase  and nitrite.               IMAGING:   US-RENAL   Final Result      1.  Normal sonographic appearance of the kidneys.   2.  Minimal urine output with maturation, prevoid volume was 376 mL, postvoid residual was 327 mL.      DX-CHEST-PORTABLE (1 VIEW)   Final Result      Left basilar atelectasis and/or consolidation. Underlying infection is possible.       EC-ECHOCARDIOGRAM COMPLETE W/O CONT    (Results Pending)       MEDS:  Current Facility-Administered Medications   Medication Last Dose   • Respiratory Care per Protocol     • ondansetron (ZOFRAN) syringe/vial injection 4 mg     • ondansetron (ZOFRAN ODT) dispertab 4 mg 4 mg at 05/02/19 0504   • insulin regular (HUMULIN R) injection 2-9 Units 2 Units at 05/02/19 0512    And   • glucose 4 g chewable tablet 16 g      And   • DEXTROSE 10% BOLUS 250 mL     • ezetimibe (ZETIA) tablet 10 mg 10 mg at 05/01/19 1802    And   • simvastatin (ZOCOR) tablet 40 mg 40 mg at 05/01/19 1801   • tiotropium (SPIRIVA) 18 MCG inhalation capsule 1 Cap     • HYDROcodone-acetaminophen (NORCO) 5-325 MG per tablet 1 Tab     • temazepam (RESTORIL) capsule 15 mg         PROBLEM LIST:  No problems updated.    ASSESSMENT/PLAN: 73 y.o. female with multiple comorbidities admitted for acute on chronic renal failure      # Acute on chronic renal failure  Unclear etiology, but possibly due to dehydration with poor PO intake and vomiting. Most recent GFR of 32 in January 2019.   She is making some urine. Renal function essentially unchanged overnight.    Plan  -Nephrology consulted, considering emergent dialysis and renal biopsy  -Avoid nephrotoxins  -Monitor I/O's  -Q4H BMP  -judicious IV fluid use given Hx of CHF     #Coffee ground emesis  Known Hx of PUD. No further hematemesis  Hgb stable overnight     Plan  -Trend Hgb  -Consider GI consult if this remains a concern     #Dehydration  #Nausea/vomiting  Unknown etiology. Benign abdominal exam with baseline bowel function. Possibility of worsening renal function/uremia as an etiology     Plan  -Antiemetics  -IV hydration until PO improves     #DM  On Lantus and metformin at home.     Plan  -Hold metformin for FLAVIA  -SSI     #HF with preserved EF  Last echo over 1 year ago. Dx with cardiorenal syndrome at that admission.     Plan  -Will repeat echo  -Hold torsemide for FLAVIA  -Careful fluid  balance     #HTN  -Hold lisinopril for FLAVIA  -Monitor closely     #COPD, oxygen dependant  #Restrictive lung disease(per chart review)  On 7 liter O2 at home     Plan  -O2  -RT protocol     #Chronic pain  -Continue home regimen     #Insomnia  -Continue home regimen     Dispo: ICU for possible emergent dialysis  CODE STATUS:DNR  VTE PPx:SCDs given possible GI bleed  PCP: [place] Ochoa M.D.

## 2019-05-02 NOTE — DIETARY
"Nutrition services: Day 1 of admit.  Siri Solis is a 73 y.o. female with admitting DX of ARF    RD alerted to pt with poor PO intake per nutritional admit screen.    Assessment:  Height:  (5\"9)  Weight: 85.2 kg (187 lb 13.3 oz)  Body mass index is 27.74 kg/m².    Diet/Intake: Diabetic, renal, 1.5 gram sodium diet in place. PO intake % of lunch per ADLs. However, per self-report, pt only ate \"some\" of the meal. Pt said she felt nauseous after eating the melon.     Evaluation:   1. N/v/d and decreased PO intake x2-4 weeks PTA. At one point, pt was only able to tolerate yogurt to eat. Pt BIB [facility] 5/1 with worsening condition, blood in vomit.   2. H/o CHF, COPD, DM, DLD, HTN, renal insufficiency  3. Reviewed upcoming meal choices. Pt requesting small portions.  4. Offered snacks, but pt declined.   5. When asked about nutrition supplements, pt said she thought about trying them PTA but never did. Pt willing to try Boost; will send vanilla Boost Glucose Control @ dinner.  6. Pt has menu @ bedside. Nutrition Representative will see pt daily for meal choices.  7. Nephrology following.  8. REE per MSJ x1.1-1.2 = [id number] kcal/day    Malnutrition Risk: Decreased PO intake x2-4 weeks. However, pt does not meet criteria to Dx malnutrition @ this time.     Recommendations/Plan:  1. Encourage intake of meals and document accurately in ADLs to provide interdisciplinary communication across all shifts.   2. Trial Boost supplements. Will need MD order for supplements if pt would like to receive regularly.  3. Monitor weight.  4. Nutrition Representative will continue to see pt meal preferences.     RD following.             "

## 2019-05-02 NOTE — PROGRESS NOTES
· 2 RN skin check complete with XAVIER Cowan.  · Devices in place BP cuff, SCDs, 2 PIVs, SpO2 probe, EKG leads, Oxymask.  · Skin assessed under devices and intact  · The following interventions in place, pillows for support, patient turns self, tubing removed from skin.   · R big toe red but blanching, small scab to chest, Sacrum, elbows and heels intact

## 2019-05-02 NOTE — CARE PLAN
Problem: Bowel/Gastric:  Goal: Will not experience complications related to bowel motility    Intervention: Assess baseline bowel pattern  Patient advanced to clear liquid diet       Problem: Knowledge Deficit  Goal: Knowledge of disease process/condition, treatment plan, diagnostic tests, and medications will improve    Intervention: Assess knowledge level of disease process/condition, treatment plan, diagnostic tests, and medications  Patient verbalizes understanding of diagnosis and current treatment

## 2019-05-02 NOTE — PROGRESS NOTES
Currently admitted to T601. N/V x 2 weeks. Some coffee ground emesis. Found to be hyperkalemic with FLAVIA.    Notes indicating CHF diagnosis. Patient was seen by cardiology in March of 2018 where it was noted that she had pulmonary hypertension secondary to significant COPD and that she had a very poor prognosis. HF diagnosis was not given at that time.    Patient is receiving IV fluids, and hemodialysis is begun. Notes say repeat echo, none are ordered.    Very respectfully request that the status of CHF be clarified and if it is to be an active diagnosis that cardiology be consulted.    Anny HOYOS RN, [facility] ext. [phone] M-F

## 2019-05-02 NOTE — ASSESSMENT & PLAN NOTE
Secondary to acute kidney injury  Continued volume resuscitation with forced diuresis  Repeat potassium shifting agents for EKG changes  Emergency hemodialysis as clinically indicated

## 2019-05-02 NOTE — ASSESSMENT & PLAN NOTE
Acute on chronic kidney injury  Etiology unclear.  Hypovolemia certainly contributing at this time.  Urinary retention could lead to obstructive uropathy.  Consider cardiorenal syndrome, with history of congestive heart failure.  Aggressive volume resuscitation has been initiated.  Closely monitor, electrolytes, urine output.  Avoid nephrotoxic agents.  If renal function her potassium does not improve hemodialysis may be required.  Plan developed with Dr. Badillo

## 2019-05-02 NOTE — CARE PLAN
Problem: Nutritional:  Goal: Achieve adequate nutritional intake  Patient will consume >50% of small portioned meals (and Boost if ordered).  Outcome: PROGRESSING AS EXPECTED

## 2019-05-02 NOTE — CARE PLAN
Problem: Safety  Goal: Will remain free from injury    Intervention: Provide assistance with mobility  Patient assisted to bathroom with RN       Problem: Venous Thromboembolism (VTW)/Deep Vein Thrombosis (DVT) Prevention:  Goal: Patient will participate in Venous Thrombosis (VTE)/Deep Vein Thrombosis (DVT)Prevention Measures    Intervention: Ensure patient wears graduated elastic stockings (ИВАН hose) and/or SCDs, if ordered, when in bed or chair (Remove at least once per shift for skin check)  SCDs in place while in bed

## 2019-05-02 NOTE — ASSESSMENT & PLAN NOTE
History of right ventricular dysfunction   Repeat transthoracic echocardiogram.  Cardiology consultation is clinically indicated

## 2019-05-02 NOTE — CARE PLAN
Problem: Safety  Goal: Will remain free from injury    Intervention: Provide assistance with mobility  Assistance provided with ambulation and reinforced use of call light      Problem: Infection  Goal: Will remain free from infection    Intervention: Implement standard precautions and perform hand washing before and after patient contact  Standard precautions for prevent infection in place such as hand washing and cleaning the IV port when accessing.

## 2019-05-03 LAB
ANION GAP SERPL CALC-SCNC: 10 MMOL/L (ref 0–11.9)
ANION GAP SERPL CALC-SCNC: 12 MMOL/L (ref 0–11.9)
ANION GAP SERPL CALC-SCNC: 4 MMOL/L (ref 0–11.9)
BUN SERPL-MCNC: 139 MG/DL (ref 8–22)
BUN SERPL-MCNC: 144 MG/DL (ref 8–22)
BUN SERPL-MCNC: 150 MG/DL (ref 8–22)
CALCIUM SERPL-MCNC: 8.8 MG/DL (ref 8.5–10.5)
CALCIUM SERPL-MCNC: 9 MG/DL (ref 8.5–10.5)
CALCIUM SERPL-MCNC: 9.2 MG/DL (ref 8.5–10.5)
CHLORIDE SERPL-SCNC: 101 MMOL/L (ref 96–112)
CHLORIDE SERPL-SCNC: 96 MMOL/L (ref 96–112)
CHLORIDE SERPL-SCNC: 97 MMOL/L (ref 96–112)
CO2 SERPL-SCNC: 25 MMOL/L (ref 20–33)
CO2 SERPL-SCNC: 25 MMOL/L (ref 20–33)
CO2 SERPL-SCNC: 26 MMOL/L (ref 20–33)
CREAT SERPL-MCNC: 5.09 MG/DL (ref 0.5–1.4)
CREAT SERPL-MCNC: 5.33 MG/DL (ref 0.5–1.4)
CREAT SERPL-MCNC: 5.41 MG/DL (ref 0.5–1.4)
GLUCOSE BLD-MCNC: 154 MG/DL (ref 65–99)
GLUCOSE BLD-MCNC: 156 MG/DL (ref 65–99)
GLUCOSE BLD-MCNC: 180 MG/DL (ref 65–99)
GLUCOSE BLD-MCNC: 289 MG/DL (ref 65–99)
GLUCOSE SERPL-MCNC: 183 MG/DL (ref 65–99)
GLUCOSE SERPL-MCNC: 185 MG/DL (ref 65–99)
GLUCOSE SERPL-MCNC: 197 MG/DL (ref 65–99)
POTASSIUM SERPL-SCNC: 4.6 MMOL/L (ref 3.6–5.5)
POTASSIUM SERPL-SCNC: 4.9 MMOL/L (ref 3.6–5.5)
POTASSIUM SERPL-SCNC: 5 MMOL/L (ref 3.6–5.5)
SODIUM SERPL-SCNC: 131 MMOL/L (ref 135–145)
SODIUM SERPL-SCNC: 131 MMOL/L (ref 135–145)
SODIUM SERPL-SCNC: 134 MMOL/L (ref 135–145)

## 2019-05-03 PROCEDURE — 82962 GLUCOSE BLOOD TEST: CPT | Mod: 91

## 2019-05-03 PROCEDURE — 80048 BASIC METABOLIC PNL TOTAL CA: CPT

## 2019-05-03 PROCEDURE — 770020 HCHG ROOM/CARE - TELE (206)

## 2019-05-03 PROCEDURE — 99233 SBSQ HOSP IP/OBS HIGH 50: CPT | Performed by: INTERNAL MEDICINE

## 2019-05-03 PROCEDURE — 700111 HCHG RX REV CODE 636 W/ 250 OVERRIDE (IP): Performed by: FAMILY MEDICINE

## 2019-05-03 PROCEDURE — 700102 HCHG RX REV CODE 250 W/ 637 OVERRIDE(OP): Performed by: INTERNAL MEDICINE

## 2019-05-03 PROCEDURE — 700102 HCHG RX REV CODE 250 W/ 637 OVERRIDE(OP): Performed by: FAMILY MEDICINE

## 2019-05-03 PROCEDURE — A9270 NON-COVERED ITEM OR SERVICE: HCPCS | Performed by: FAMILY MEDICINE

## 2019-05-03 PROCEDURE — A9270 NON-COVERED ITEM OR SERVICE: HCPCS | Performed by: INTERNAL MEDICINE

## 2019-05-03 RX ADMIN — TIOTROPIUM BROMIDE 1 CAPSULE: 18 CAPSULE ORAL; RESPIRATORY (INHALATION) at 05:32

## 2019-05-03 RX ADMIN — EZETIMIBE 10 MG: 10 TABLET ORAL at 17:02

## 2019-05-03 RX ADMIN — INSULIN HUMAN 2 UNITS: 100 INJECTION, SOLUTION PARENTERAL at 00:27

## 2019-05-03 RX ADMIN — INSULIN HUMAN 2 UNITS: 100 INJECTION, SOLUTION PARENTERAL at 05:33

## 2019-05-03 RX ADMIN — INSULIN HUMAN 5 UNITS: 100 INJECTION, SOLUTION PARENTERAL at 11:35

## 2019-05-03 RX ADMIN — SIMVASTATIN 40 MG: 20 TABLET, FILM COATED ORAL at 17:02

## 2019-05-03 RX ADMIN — ONDANSETRON 4 MG: 4 TABLET, ORALLY DISINTEGRATING ORAL at 08:07

## 2019-05-03 ASSESSMENT — ENCOUNTER SYMPTOMS
FEVER: 0
VOMITING: 0
SHORTNESS OF BREATH: 0
DIARRHEA: 1
ABDOMINAL PAIN: 0
NAUSEA: 1

## 2019-05-03 NOTE — PROGRESS NOTES
Patient transferred to Tele 8 with CIC RN on telemetry monitoring. Rosie RN at Bedside with JACOB.

## 2019-05-03 NOTE — CARE PLAN
Problem: Safety  Goal: Will remain free from injury    Intervention: Provide assistance with mobility  Patient calls RN with needs. RN or other PCT at bedside to help patient with ambulation       Problem: Infection  Goal: Will remain free from infection    Intervention: Implement standard precautions and perform hand washing before and after patient contact  Standard precautions in use. Hand hygiene performed before and after entering room and patient care. Alcohol and chlorhexidine used appropriately

## 2019-05-03 NOTE — PROGRESS NOTES
Monitor Summary:   SR  70-90s  No ectopy   0.18/0.08/0.36    2 RN skin check. Skin intact, scab on chest and R big toe red but blanching. Skin assessed under devices to be intact. Heels, sacrum, elbows, and behind ears intact.

## 2019-05-03 NOTE — PROGRESS NOTES
Critical Care Progress Note    Date of admission  5/1/2019    Chief Complaint  73 y.o. female admitted 5/1/2019 with FLAVIA, nausea vomiting x2 weeks    Hospital Course    73 y.o. female with a history of CHF, COPD, diabetes, emphysema who presented 5/1/2019 c/o n/v x 2 weeks.  She is only been able to tolerate yogurt.  She has continued to take her medications including torsemide.  She denies taking any NSAIDs.  She noted some coffee ground emesis in the last couple of days. ED evaluation revealed hyperkalemia, with acute on chronic kidney injury.  Potassium shifting agents were administered.  Nephrology consulted. Critical care consultation requested for concurrent evaluation and management.     Prior PFTs showed mixed obstructive and restrictive changes, FEV1 1.5 L (62% predicted), FEV1 to FVC ratio 75%, TLC 76%, DLCO 58% predicted.  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  Prior hospitalization in March for dyspnea and worsening lower extremity edema was found to have exacerbation of heart failure with preserved ejection fraction (LVEF 60%) and severe pulmonary hypertension, RVSP 95 mmHg.  She responded well to diuretic therapy and outpatient follow-up with Dr. Pritchett included a right heart catheterization which showed only mild pulmonary hypertension at that time, RVSP 26 mmHg.     Interval Problem Update  Reviewed last 24 hour events:   A/o x 4   SR 70's   Normotensive   magdy PO diet   + nausea; zofran, improving   I/O - >1L o/p   RT; 7 lpm oxygen   No abx   Cr decreasing   K 5.0      OK to tele    Improved nausea and vomiting, ongoing urine output  Remains on baseline supplemental oxygen    Review of Systems  Review of Systems   Unable to perform ROS: Acuity of condition        Vital Signs for last 24 hours   Temp:  [36.4 °C (97.5 °F)-36.8 °C (98.2 °F)] 36.6 °C (97.8 °F)  Pulse:  [63-87] 66  Resp:  [16-18] 16  SpO2:  [91 %-100 %] 99 %    Hemodynamic parameters for last 24 hours        Respiratory Information for the last 24 hours       Physical Exam   Physical Exam   Constitutional: She is oriented to person, place, and time. She appears well-developed.   HENT:   Head: Normocephalic and atraumatic.   Eyes: Pupils are equal, round, and reactive to light. No scleral icterus.   Neck: No tracheal deviation present. No thyromegaly present.   Cardiovascular: Normal rate and regular rhythm.    No murmur heard.  Pulmonary/Chest: She has no wheezes.   Diminished, dullness right base   Abdominal: Soft. She exhibits no distension. There is no tenderness.   Musculoskeletal: She exhibits edema. She exhibits no tenderness.   Neurological: She is alert and oriented to person, place, and time.   Skin: Skin is warm and dry.       Medications  Current Facility-Administered Medications   Medication Dose Route Frequency Provider Last Rate Last Dose   • NS infusion   Intravenous Continuous Rodríguez Lassiter D.O. 100 mL/hr at 05/02/19 2231     • tiotropium (SPIRIVA) 18 MCG inhalation capsule 1 Cap  1 Cap Inhalation DAILY Marty Randall M.D.   1 Cap at 05/03/19 0532   • Respiratory Care per Protocol   Nebulization Continuous RT Rodríguez Lassiter D.O.       • ondansetron (ZOFRAN) syringe/vial injection 4 mg  4 mg Intravenous Q4HRS PRN KAROLYN Ratliff.O.   4 mg at 05/02/19 1311   • ondansetron (ZOFRAN ODT) dispertab 4 mg  4 mg Oral Q4HRS PRN KAROLYN Ratliff.O.   4 mg at 05/03/19 0807   • insulin regular (HUMULIN R) injection 2-9 Units  2-9 Units Subcutaneous Q6HRS KAROLYN Ratliff.O.   2 Units at 05/03/19 0533    And   • glucose 4 g chewable tablet 16 g  16 g Oral Q15 MIN PRN KAROLYN Ratliff.O.        And   • DEXTROSE 10% BOLUS 250 mL  250 mL Intravenous Q15 MIN PRN KAROLYN Ratliff.O.       • ezetimibe (ZETIA) tablet 10 mg  10 mg Oral Q EVENING KAROLYN Ratliff.O.   10 mg at 05/02/19 1711    And   • simvastatin (ZOCOR) tablet 40 mg  40 mg Oral Q EVENING Rodríguez Lassiter D.O.    40 mg at 05/02/19 1710   • HYDROcodone-acetaminophen (NORCO) 5-325 MG per tablet 1 Tab  1 Tab Oral Q4HRS PRN Rodríguez Lassiter D.O.       • temazepam (RESTORIL) capsule 15 mg  15 mg Oral HS PRN Rodríguez Lassiter D.O.           Fluids    Intake/Output Summary (Last 24 hours) at 05/03/19 0902  Last data filed at 05/03/19 0800   Gross per 24 hour   Intake             2000 ml   Output             1050 ml   Net              950 ml       Laboratory      Recent Labs      05/01/19 1039 05/01/19 2007   CPKTOTAL   --   37   TROPONINI  0.03   --      Recent Labs      05/01/19 1610 05/03/19 0142 05/03/19 0430 05/03/19   0753   SODIUM  133*   < >  131*  134*  131*   POTASSIUM  5.4   < >  4.6  4.9  5.0   CHLORIDE  92*   < >  96  97  101   CO2  22   < >  25  25  26   BUN  185*   < >  139*  150*  144*   CREATININE  7.41*   < >  5.41*  5.33*  5.09*   MAGNESIUM  2.1   --    --    --    --    CALCIUM  9.3   < >  9.0  8.8  9.2    < > = values in this interval not displayed.     Recent Labs      05/01/19 1039 05/03/19 0142 05/03/19 0430 05/03/19   0753   ALTSGPT  8   --    --    --    --    ASTSGOT  11*   --    --    --    --    ALKPHOSPHAT  49   --    --    --    --    TBILIRUBIN  0.5   --    --    --    --    LIPASE  108*   --    --    --    --    GLUCOSE  244*   < >  185*  183*  197*    < > = values in this interval not displayed.     Recent Labs      05/01/19 1039 05/01/19 1610 05/02/19   0500   WBC  7.6  6.5  7.6   NEUTSPOLYS  74.50*   --    --    LYMPHOCYTES  13.50*   --    --    MONOCYTES  8.70   --    --    EOSINOPHILS  2.10   --    --    BASOPHILS  0.40   --    --    ASTSGOT  11*   --    --    ALTSGPT  8   --    --    ALKPHOSPHAT  49   --    --    TBILIRUBIN  0.5   --    --      Recent Labs      05/01/19   1039  05/01/19   1610  05/02/19   0500  05/02/19   0933   RBC  3.64*  3.54*  3.52*   --    HEMOGLOBIN  11.0*  10.4*  10.6*   --    HEMATOCRIT  33.8*  33.1*  33.0*   --    PLATELETCT  127*   117*  129*   --    PROTHROMBTM   --    --    --   13.9   INR   --    --    --   1.06       Imaging  X-Ray:  I have personally reviewed the images and compared with prior images.    Assessment/Plan  Acute kidney injury ([facility])   Assessment & Plan    Acute on chronic kidney injury  Etiology unclear.  Hypovolemia certainly contributing at this time.  Urinary retention could lead to obstructive uropathy.  Consider cardiorenal syndrome, with history of congestive heart failure.  Aggressive volume resuscitation has been initiated.  Closely monitor, electrolytes, urine output.  Avoid nephrotoxic agents.  If renal function her potassium does not improve hemodialysis may be required.  Plan developed with Dr. Badillo       Hyperkalemia- (present on admission)   Assessment & Plan    Secondary to acute kidney injury  Continued volume resuscitation with forced diuresis  Repeat potassium shifting agents for EKG changes  Emergency hemodialysis as clinically indicated         COPD (chronic obstructive pulmonary disease) ([facility])- (present on admission)   Assessment & Plan    Bronchodilators as needed.     Nausea and vomiting   Assessment & Plan    Etiology unclear.  Medication side effect?  Versus infectious causes.  Check CK     CHF (congestive heart failure) ([facility])- (present on admission)   Assessment & Plan    History of right ventricular dysfunction   Repeat transthoracic echocardiogram.  Cardiology consultation is clinically indicated     DM (diabetes mellitus) ([facility])- (present on admission)   Assessment & Plan    Moderate glycemic control with insulin therapy.  Hold metformin          VTE:  Not Indicated  Ulcer: Not Indicated  Lines: None     Okay to move out of ICU.  Pulmonary service will follow.  Reviewed with nephrology today, holding off on kidney biopsy or hemodialysis.  Monitor respiratory status closely with fluid administration.    I have performed a physical exam and reviewed and updated ROS and Plan today (5/3/2019). In  review of yesterday's note (5/2/2019), there are no changes except as documented above.     Discussed patient condition and risk of morbidity and/or mortality with RN, RT, Pharmacy and QA team

## 2019-05-03 NOTE — PROGRESS NOTES
[facility] FAMILY MEDICINE PROGRESS NOTE     Attending:   Paige Loya MD    Resident:   Padmini Mora MD    PATIENT: Siri Solis; [id number]; 1945    ID: 73 y.o. female admitted for with extensive past medical history including CKD admitted for nausa, vomiting and FLAVIA possibly 2/2 dehydration vs uremia.     SUBJECTIVE: No acute events overnight.  Patient reports improvement of nausea and vomiting.  Having normal bowel movements.  Still with mild abdominal tenderness.  Breathing at baseline on home oxygen.  Still hoping to defer dialysis if possible.  No other new complaints    OBJECTIVE:     Vitals:    05/03/19 0300 05/03/19 0400 05/03/19 0500 05/03/19 0600   BP:       Pulse: 74 63 77 70   Resp:       Temp:  36.4 °C (97.5 °F)     TempSrc:  Temporal     SpO2: 91% 93% 96% 96%   Weight:       Height:           Intake/Output Summary (Last 24 hours) at 05/03/19 0720  Last data filed at 05/03/19 0600   Gross per 24 hour   Intake             1800 ml   Output             1150 ml   Net              650 ml       PE:   General: No acute distress, resting comfortably in bed.  Oxygen mask in place  HEENT: Normocephalic, atraumatic. EOMI. MMM  Cardiovascular: RRR with systolic flow murmur  Respiratory: Symmetrical chest. Clear to auscultation bilaterally with no wheezes, rales or rhonchi  Abdomen: soft, non-tender, non-distended no masses, +BS   EXT:  Moves all extremities, grossly normal. No edema or cyanosis. 2+ pulses in all extremities  Neuro: non focal, sensation grossly intact    LABS:  Recent Labs      05/01/19   1039  05/01/19   1610  05/02/19   0500   WBC  7.6  6.5  7.6   RBC  3.64*  3.54*  3.52*   HEMOGLOBIN  11.0*  10.4*  10.6*   HEMATOCRIT  33.8*  33.1*  33.0*   MCV  92.9  93.5  93.8   MCH  30.2  29.4  30.1   RDW  43.3  43.4  44.1   PLATELETCT  127*  117*  129*   MPV  13.1*  13.1*  13.2*   NEUTSPOLYS  74.50*   --    --    LYMPHOCYTES  13.50*   --    --    MONOCYTES  8.70   --    --    EOSINOPHILS  2.10   --    --     BASOPHILS  0.40   --    --      Recent Labs      05/01/19   1039   05/01/19   1610   05/02/19 2119  05/03/19   0142  05/03/19   0430   SODIUM  127*   < >  133*   < >  130*  131*  134*   POTASSIUM  5.8*   < >  5.4   < >  4.8  4.6  4.9   CHLORIDE  88*   < >  92*   < >  94*  96  97   CO2  23   < >  22   < >  23  25  25   BUN  180*   < >  185*   < >  156*  139*  150*   CREATININE  8.26*   < >  7.41*   < >  5.62*  5.41*  5.33*   CALCIUM  9.9   < >  9.3   < >  9.0  9.0  8.8   MAGNESIUM   --    --   2.1   --    --    --    --    ALBUMIN  4.5   --    --    --    --    --    --     < > = values in this interval not displayed.     Estimated GFR/CRCL = Estimated Creatinine Clearance: 10.6 mL/min (A) (by C-G formula based on SCr of 5.33 mg/dL ()).  Recent Labs      05/02/19   1710   05/02/19 2119 05/03/19   0026  05/03/19   0142  05/03/19   0430  05/03/19   0530   GLUCOSE   --    < >  203*   --   185*  183*   --    POCGLUCOSE  174*   --    --   180*   --    --   154*    < > = values in this interval not displayed.     Recent Labs      05/01/19   1039 05/02/19   0933   ASTSGOT  11*   --    ALTSGPT  8   --    TBILIRUBIN  0.5   --    ALKPHOSPHAT  49   --    GLOBULIN  2.7   --    INR   --   1.06     Recent Labs      05/01/19   1039  05/01/19 2007   CPKTOTAL   --   37   TROPONINI  0.03   --          Recent Labs      05/02/19   0933   INR  1.06       MICROBIOLOGY:   None new      IMAGING:   EC-ECHOCARDIOGRAM COMPLETE W/O CONT   Final Result      US-RENAL   Final Result      1.  Normal sonographic appearance of the kidneys.   2.  Minimal urine output with maturation, prevoid volume was 376 mL, postvoid residual was 327 mL.      DX-CHEST-PORTABLE (1 VIEW)   Final Result      Left basilar atelectasis and/or consolidation. Underlying infection is possible.          MEDS:  Current Facility-Administered Medications   Medication Last Dose   • NS infusion     • tiotropium (SPIRIVA) 18 MCG inhalation capsule 1 Cap 1 Cap at  05/03/19 0532   • Respiratory Care per Protocol     • ondansetron (ZOFRAN) syringe/vial injection 4 mg 4 mg at 05/02/19 1311   • ondansetron (ZOFRAN ODT) dispertab 4 mg 4 mg at 05/02/19 0504   • insulin regular (HUMULIN R) injection 2-9 Units 2 Units at 05/03/19 0533    And   • glucose 4 g chewable tablet 16 g      And   • DEXTROSE 10% BOLUS 250 mL     • ezetimibe (ZETIA) tablet 10 mg 10 mg at 05/02/19 1711    And   • simvastatin (ZOCOR) tablet 40 mg 40 mg at 05/02/19 1710   • HYDROcodone-acetaminophen (NORCO) 5-325 MG per tablet 1 Tab     • temazepam (RESTORIL) capsule 15 mg           ASSESSMENT/PLAN:   73-year-old female with history of pulmonary hypertension, chronic kidney disease, hypertension, diabetes mellitus, PUD presenting with acute on chronic renal failure, nausea, vomiting      # Acute on chronic renal failure  Unclear etiology-possibly related to dehydration from nausea and vomiting versus nausea and vomiting secondary to worsening uremia.  Patient still making urine.  Renal function mildly improving today  Creatinine now 5.33 from 5.62    Plan:  Continue judicious fluids given history of pulmonary hypertension  Nephrology on board, continue to follow the recommendations  Avoid nephrotoxins  Monitor eyes nose  Continue to monitor BMP every 4 hours  Possible renal biopsy on 5/6      #Coffee ground emesis  Known history of PUD  No hematemesis while on hospital  no changes in hemoglobin level  Continue to monitor      #Dehydration  #Nausea/vomiting  Unknown etiology. Benign abdominal exam with baseline bowel function. Possibility of worsening renal function/uremia as an etiology     Plan  -Antiemetics  -IV hydration until PO improves       #DM  On Lantus and metformin at home.     Plan  -Hold metformin for FLAVIA  -SSI     #HF with preserved EF  Echo performed 5/2 with EF of 75%.  Unable to measure right ventricular pressure on pulmonary artery pressure     Plan  -Hold torsemide for FLAVIA  -Careful fluid  balance       #HTN  -Hold lisinopril for FLAVIA  -Monitor closely       #COPD, oxygen dependant  #Restrictive lung disease(per chart review)  On 7 liter O2 at home     Plan  -O2  -RT protocol     #Chronic pain  -Continue home regimen     #Insomnia  -Continue home regimen     Dispo: ICU for possible emergent dialysis  CODE STATUS:DNR  VTE PPx:SCDs given possible GI bleed  PCP: [place] M.D.

## 2019-05-03 NOTE — PROGRESS NOTES
Nephrology Daily Progress Note    Date of Service  5/3/2019    Chief Complaint  73 y.o. female who presented 2019 with 2 to 3 weeks of nausea and vomiting, found to have acute kidney injury on chronic kidney disease stage III.    Interval Problem Update   -patient received 2 L of saline overnight, and had 600 of recorded urine output.  She says there might have been some urine that was not recorded.  She says her nausea and vomiting is slightly better, but still complains of poor appetite.  She denies chest pain, headache, shortness of breath.  She had a son who  of liver disease and had kidney failure at the end of his life.  She is very hesitant to consider dialysis, and declines it today due to these experiences.  She says she might be okay with temporary dialysis, and would be okay with a kidney biopsy if needed.  5/3 - 1.1 L of urine output recorded yesterday.  Seen this AM. Complains of nausea and diarrhea, denies chest pain, SOB, vomiting.  Says that she would like to go home.    Review of Systems  Review of Systems   Constitutional: Negative for fever.   Respiratory: Negative for shortness of breath.    Cardiovascular: Negative for chest pain.   Gastrointestinal: Positive for diarrhea and nausea. Negative for abdominal pain and vomiting.   All other systems reviewed and are negative.       Physical Exam  Temp:  [36.3 °C (97.4 °F)-36.6 °C (97.8 °F)] 36.3 °C (97.4 °F)  Pulse:  [63-85] 72  Resp:  [13-18] 13  SpO2:  [91 %-100 %] 99 %    Physical Exam   Constitutional: She is oriented to person, place, and time. She appears well-developed. No distress.   Eyes: EOM are normal. No scleral icterus.   Neck: No tracheal deviation present.   Cardiovascular: Normal heart sounds.    No murmur heard.  Pulmonary/Chest: Effort normal. No stridor. She has no rales.   Abdominal: Soft. There is no tenderness.   Musculoskeletal: Normal range of motion. She exhibits no edema.   Neurological: She is alert and oriented  to person, place, and time.   No asterixis on handgrip   Skin: Skin is warm and dry.   Psychiatric: She has a normal mood and affect.       Fluids    Intake/Output Summary (Last 24 hours) at 05/03/19 1456  Last data filed at 05/03/19 1200   Gross per 24 hour   Intake             2380 ml   Output              850 ml   Net             1530 ml       Laboratory  Labs reviewed, pertinent labs below.  Recent Labs      05/01/19   1039  05/01/19   1610  05/02/19   0500   WBC  7.6  6.5  7.6   RBC  3.64*  3.54*  3.52*   HEMOGLOBIN  11.0*  10.4*  10.6*   HEMATOCRIT  33.8*  33.1*  33.0*   MCV  92.9  93.5  93.8   MCH  30.2  29.4  30.1   MCHC  32.5*  31.4*  32.1*   RDW  43.3  43.4  44.1   PLATELETCT  127*  117*  129*   MPV  13.1*  13.1*  13.2*     Recent Labs      05/03/19   0142  05/03/19   0430  05/03/19   0753   SODIUM  131*  134*  131*   POTASSIUM  4.6  4.9  5.0   CHLORIDE  96  97  101   CO2  25  25  26   GLUCOSE  185*  183*  197*   BUN  139*  150*  144*   CREATININE  5.41*  5.33*  5.09*   CALCIUM  9.0  8.8  9.2     Recent Labs      05/02/19   0933   INR  1.06           URINALYSIS:    Lab Results  Component Value Date/Time   COLORURINE Yellow 05/01/2019 1135   CLARITY Clear 05/01/2019 1135   SPECGRAVITY 1.010 05/01/2019 1135   PHURINE 5.5 05/01/2019 1135   KETONES Negative 05/01/2019 1135   PROTEINURIN Negative 05/01/2019 1135   BILIRUBINUR Negative 05/01/2019 1135   UROBILU 0.2 05/01/2019 1135   NITRITE Negative 05/01/2019 1135   LEUKESTERAS Negative 05/01/2019 1135   OCCULTBLOOD Negative 05/01/2019 1135       UPC    Lab Results  Component Value Date/Time   TOTPROTUR 21.7 (H) 05/01/2019 1701      Lab Results  Component Value Date/Time   CREATININEU 74.50 05/01/2019 1701         Imaging reviewed  EC-ECHOCARDIOGRAM COMPLETE W/O CONT   Final Result      US-RENAL   Final Result      1.  Normal sonographic appearance of the kidneys.   2.  Minimal urine output with maturation, prevoid volume was 376 mL, postvoid residual was 327  mL.      DX-CHEST-PORTABLE (1 VIEW)   Final Result      Left basilar atelectasis and/or consolidation. Underlying infection is possible.            Current Facility-Administered Medications   Medication Dose Route Frequency Provider Last Rate Last Dose   • ipratropium (ATROVENT) 0.02 % nebulizer solution 0.5 mg  0.5 mg Nebulization Q4H PRN (RT) Pam Ashley M.D.       • insulin regular (HUMULIN R) injection 2-9 Units  2-9 Units Subcutaneous 4X/DAY ACHS Marty Randall M.D.   Stopped at 05/03/19 1200    And   • glucose 4 g chewable tablet 16 g  16 g Oral Q15 MIN PRN Marty Randall M.D.        And   • DEXTROSE 10% BOLUS 250 mL  250 mL Intravenous Q15 MIN PRN Marty Randall M.D.       • tiotropium (SPIRIVA) 18 MCG inhalation capsule 1 Cap  1 Cap Inhalation DAILY Marty Randall M.D.   1 Cap at 05/03/19 0532   • Respiratory Care per Protocol   Nebulization Continuous RT KAROLYN Ratliff.O.       • ondansetron (ZOFRAN) syringe/vial injection 4 mg  4 mg Intravenous Q4HRS PRN Rodríguez Lassiter D.O.   4 mg at 05/02/19 1311   • ondansetron (ZOFRAN ODT) dispertab 4 mg  4 mg Oral Q4HRS PRN Rodríguez Lassiter D.O.   4 mg at 05/03/19 0807   • ezetimibe (ZETIA) tablet 10 mg  10 mg Oral Q EVENING Rodríguez Lassiter D.O.   10 mg at 05/02/19 1711    And   • simvastatin (ZOCOR) tablet 40 mg  40 mg Oral Q EVENING Rodríguez Lassiter D.O.   40 mg at 05/02/19 1710   • HYDROcodone-acetaminophen (NORCO) 5-325 MG per tablet 1 Tab  1 Tab Oral Q4HRS PRN KAROLYN Ratliff.O.       • temazepam (RESTORIL) capsule 15 mg  15 mg Oral HS PRN Rodríguez Lassiter D.OMegan             Assessment/Plan  73 y.o. female who presented 5/1/2019 with 2 to 3 weeks of nausea and vomiting, found to have acute kidney injury on chronic kidney disease stage III.    1.  Acute kidney injury, nonoliguric, improving.  Given improving kidney function with volume resuscitation, no acute need for dialysis.  Recommend challenge patient with oral intake  alone, and stop IV fluids.  If she is able to maintain enough oral intake to continue to improve her kidney function, I would be more comfortable with discharge, which is the patient's goal.  Given benign urine sediment, no need for kidney biopsy at this time.  Okay to space chemistry checks out to once daily.    2.  Hyperkalemia, improved with volume resuscitation.  Check daily labs.    3.  Hyponatremia, likely due to excess ADH from nausea and vomiting.  Treat nausea.  Allow patient to drink oral fluids as above.    4.  Normocytic anemia, stable.  Check CBC at least thrice weekly.  SPEP and UPEP are pending.    Plan discussed with Dr. Aakash Badillo MD  Nephrology

## 2019-05-03 NOTE — CARE PLAN
Problem: Communication  Goal: The ability to communicate needs accurately and effectively will improve    Intervention: Educate patient and significant other/support system about the plan of care, procedures, treatments, medications and allow for questions  Pt educated on plan of care, and upcoming procedures. No questions at this time.       Problem: Safety  Goal: Will remain free from falls  Outcome: PROGRESSING AS EXPECTED  Pt will remain free from falls, standard precautions in place, call light use reinforced.

## 2019-05-03 NOTE — PROGRESS NOTES
Nephrology Daily Progress Note    Date of Service  2019    Chief Complaint  73 y.o. female who presented 2019 with 2 to 3 weeks of nausea and vomiting, found to have acute kidney injury on chronic kidney disease stage III.    Interval Problem Update   -patient received 2 L of saline overnight, and had 600 of recorded urine output.  She says there might have been some urine that was not recorded.  She says her nausea and vomiting is slightly better, but still complains of poor appetite.  She denies chest pain, headache, shortness of breath.  She had a son who  of liver disease and had kidney failure at the end of his life.  She is very hesitant to consider dialysis, and declines it today due to these experiences.  She says she might be okay with temporary dialysis, and would be okay with a kidney biopsy if needed.    Review of Systems  Review of Systems   Constitutional: Negative for fever.   Respiratory: Negative for shortness of breath.    Cardiovascular: Negative for chest pain.   Gastrointestinal: Negative for abdominal pain.   All other systems reviewed and are negative.       Physical Exam  Temp:  [36.2 °C (97.1 °F)-36.8 °C (98.2 °F)] 36.6 °C (97.8 °F)  Pulse:  [72-92] 72  Resp:  [15-23] 20  SpO2:  [90 %-100 %] 98 %    Physical Exam   Constitutional: She is oriented to person, place, and time. She appears well-developed. No distress.   Eyes: EOM are normal. No scleral icterus.   Neck: No tracheal deviation present.   Cardiovascular: Normal heart sounds.    No murmur heard.  Pulmonary/Chest: Effort normal. No stridor. She has no rales.   Abdominal: Soft. There is no tenderness.   Musculoskeletal: Normal range of motion. She exhibits no edema.   Neurological: She is alert and oriented to person, place, and time.   No asterixis on handgrip   Skin: Skin is warm and dry.   Psychiatric: She has a normal mood and affect.       Fluids    Intake/Output Summary (Last 24 hours) at 19 3309  Last data  filed at 05/02/19 1600   Gross per 24 hour   Intake              400 ml   Output             1300 ml   Net             -900 ml       Laboratory  Labs reviewed, pertinent labs below.  Recent Labs      05/01/19   1039  05/01/19   1610  05/02/19   0500   WBC  7.6  6.5  7.6   RBC  3.64*  3.54*  3.52*   HEMOGLOBIN  11.0*  10.4*  10.6*   HEMATOCRIT  33.8*  33.1*  33.0*   MCV  92.9  93.5  93.8   MCH  30.2  29.4  30.1   MCHC  32.5*  31.4*  32.1*   RDW  43.3  43.4  44.1   PLATELETCT  127*  117*  129*   MPV  13.1*  13.1*  13.2*     Recent Labs      05/02/19   0500  05/02/19   0933  05/02/19   1415   SODIUM  133*  135  130*   POTASSIUM  5.6*  5.7*  5.0   CHLORIDE  94*  94*  95*   CO2  24  25  26   GLUCOSE  213*  203*  229*   BUN  177*  169*  169*   CREATININE  7.25*  6.75*  6.15*   CALCIUM  9.6  9.9  9.3     Recent Labs      05/02/19   0933   INR  1.06           URINALYSIS:    Lab Results  Component Value Date/Time   COLORURINE Yellow 05/01/2019 1135   CLARITY Clear 05/01/2019 1135   SPECGRAVITY 1.010 05/01/2019 1135   PHURINE 5.5 05/01/2019 1135   KETONES Negative 05/01/2019 1135   PROTEINURIN Negative 05/01/2019 1135   BILIRUBINUR Negative 05/01/2019 1135   UROBILU 0.2 05/01/2019 1135   NITRITE Negative 05/01/2019 1135   LEUKESTERAS Negative 05/01/2019 1135   OCCULTBLOOD Negative 05/01/2019 1135     UPC    Lab Results  Component Value Date/Time   TOTPROTUR 21.7 (H) 05/01/2019 1701      Lab Results  Component Value Date/Time   CREATININEU 74.50 05/01/2019 1701         Imaging reviewed  EC-ECHOCARDIOGRAM COMPLETE W/O CONT   Final Result      US-RENAL   Final Result      1.  Normal sonographic appearance of the kidneys.   2.  Minimal urine output with maturation, prevoid volume was 376 mL, postvoid residual was 327 mL.      DX-CHEST-PORTABLE (1 VIEW)   Final Result      Left basilar atelectasis and/or consolidation. Underlying infection is possible.            Current Facility-Administered Medications   Medication Dose Route  Frequency Provider Last Rate Last Dose   • NS infusion   Intravenous Continuous KAROLYN Ratliff.O. 100 mL/hr at 05/02/19 1134     • [START ON 5/3/2019] tiotropium (SPIRIVA) 18 MCG inhalation capsule 1 Cap  1 Cap Inhalation DAILY Marty Randall M.D.       • Respiratory Care per Protocol   Nebulization Continuous RT Rodríguez Lassiter D.O.       • ondansetron (ZOFRAN) syringe/vial injection 4 mg  4 mg Intravenous Q4HRS PRN KAROLYN Ratliff.O.   4 mg at 05/02/19 1311   • ondansetron (ZOFRAN ODT) dispertab 4 mg  4 mg Oral Q4HRS PRN KAROLYN Ratliff.O.   4 mg at 05/02/19 0504   • insulin regular (HUMULIN R) injection 2-9 Units  2-9 Units Subcutaneous Q6HRS KAROLYN Ratliff.O.   2 Units at 05/02/19 1711    And   • glucose 4 g chewable tablet 16 g  16 g Oral Q15 MIN PRN KAROLYN Ratliff.O.        And   • DEXTROSE 10% BOLUS 250 mL  250 mL Intravenous Q15 MIN PRN KAROLYN Ratliff.O.       • ezetimibe (ZETIA) tablet 10 mg  10 mg Oral Q EVENING Rodríguez Lassiter D.O.   10 mg at 05/02/19 1711    And   • simvastatin (ZOCOR) tablet 40 mg  40 mg Oral Q EVENING Rodríguez Lassiter D.O.   40 mg at 05/02/19 1710   • HYDROcodone-acetaminophen (NORCO) 5-325 MG per tablet 1 Tab  1 Tab Oral Q4HRS PRN KAROLYN Ratliff.O.       • temazepam (RESTORIL) capsule 15 mg  15 mg Oral HS PRN KAROLYN Ratliff.O.             Assessment/Plan  73 y.o. female who presented 5/1/2019 with 2 to 3 weeks of nausea and vomiting, found to have acute kidney injury on chronic kidney disease stage III.    1.  Acute kidney injury, nonoliguric, improving.  As the patient has improving kidney function with volume resuscitation, I am hopeful that she continues to have improved kidney function.  However, it is still possible that she had worsening kidney function, leading to uremia, which led to her nausea and vomiting.  Patient is declining dialysis today.  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  If the kidney function recovery stalls, or worsens, I would recommend kidney biopsy tomorrow to further elucidate etiology of kidney injury.    2.  Hyperkalemia, improving with volume resuscitation.  Check daily labs.    3.  Hyponatremia, likely due to excess ADH from nausea and vomiting.  Limit hypotonic fluids.  Check daily labs.    4.  Normocytic anemia, slightly worsening.  Unclear etiology of anemia.  I have ordered SPEP, UPEP and serum free light chains to evaluate for multiple myeloma.  These tests are pending.    Plan discussed with Dr. Sravani Badillo MD  Nephrology

## 2019-05-04 ENCOUNTER — PATIENT OUTREACH (OUTPATIENT)
Dept: HEALTH INFORMATION MANAGEMENT | Facility: OTHER | Age: 74
End: 2019-05-04

## 2019-05-04 VITALS
SYSTOLIC BLOOD PRESSURE: 134 MMHG | WEIGHT: 200.62 LBS | BODY MASS INDEX: 29.71 KG/M2 | HEIGHT: 69 IN | HEART RATE: 64 BPM | DIASTOLIC BLOOD PRESSURE: 64 MMHG | TEMPERATURE: 96.9 F | OXYGEN SATURATION: 99 % | RESPIRATION RATE: 14 BRPM

## 2019-05-04 PROBLEM — E87.5 HYPERKALEMIA: Status: RESOLVED | Noted: 2018-03-23 | Resolved: 2019-05-04

## 2019-05-04 LAB
ALBUMIN 24H UR QL ELPH: DETECTED
ALPHA1 GLOB 24H UR QL ELPH: DETECTED
ALPHA2 GLOB 24H UR QL ELPH: DETECTED
ANION GAP SERPL CALC-SCNC: 10 MMOL/L (ref 0–11.9)
B-GLOBULIN UR QL ELPH: DETECTED
BUN SERPL-MCNC: 107 MG/DL (ref 8–22)
CALCIUM SERPL-MCNC: 9.2 MG/DL (ref 8.5–10.5)
CHLORIDE SERPL-SCNC: 100 MMOL/L (ref 96–112)
CO2 SERPL-SCNC: 24 MMOL/L (ref 20–33)
COLLECT DURATION TIME SPEC: ABNORMAL H
CREAT SERPL-MCNC: 3.55 MG/DL (ref 0.5–1.4)
GAMMA GLOB UR QL ELPH: DETECTED
GLUCOSE BLD-MCNC: 187 MG/DL (ref 65–99)
GLUCOSE BLD-MCNC: 195 MG/DL (ref 65–99)
GLUCOSE BLD-MCNC: 209 MG/DL (ref 65–99)
GLUCOSE SERPL-MCNC: 192 MG/DL (ref 65–99)
INTERPRETATION UR IFE-IMP: ABNORMAL
KAPPA LC FREE SER-MCNC: 8.18 MG/DL (ref 0.33–1.94)
KAPPA LC FREE UR-MCNC: 7.56 MG/DL (ref 0.14–2.42)
KAPPA LC FREE/LAMBDA FREE SER NEPH: 2.34 {RATIO} (ref 0.26–1.65)
KAPPA LC FREE/LAMBDA FREE UR: 5.91 RATIO (ref 2.04–10.37)
LAMBDA LC FREE SERPL-MCNC: 3.5 MG/DL (ref 0.57–2.63)
LAMBDA LC FREE UR-MCNC: 1.28 MG/DL (ref 0.02–0.67)
POTASSIUM SERPL-SCNC: 4.5 MMOL/L (ref 3.6–5.5)
PROT 24H UR-MRATE: ABNORMAL MG/D (ref 10–140)
SODIUM SERPL-SCNC: 134 MMOL/L (ref 135–145)
TOTAL VOLUME 1105: ABNORMAL ML

## 2019-05-04 PROCEDURE — 36415 COLL VENOUS BLD VENIPUNCTURE: CPT

## 2019-05-04 PROCEDURE — 82962 GLUCOSE BLOOD TEST: CPT

## 2019-05-04 PROCEDURE — 99232 SBSQ HOSP IP/OBS MODERATE 35: CPT | Performed by: INTERNAL MEDICINE

## 2019-05-04 PROCEDURE — 80048 BASIC METABOLIC PNL TOTAL CA: CPT

## 2019-05-04 RX ORDER — ONDANSETRON 4 MG/1
4 TABLET, ORALLY DISINTEGRATING ORAL EVERY 4 HOURS PRN
Qty: 10 TAB | Refills: 0 | Status: SHIPPED | OUTPATIENT
Start: 2019-05-04 | End: 2019-05-04

## 2019-05-04 RX ORDER — ONDANSETRON 4 MG/1
4 TABLET, ORALLY DISINTEGRATING ORAL EVERY 4 HOURS PRN
Qty: 10 TAB | Refills: 0 | Status: SHIPPED | OUTPATIENT
Start: 2019-05-04 | End: 2019-07-08

## 2019-05-04 RX ADMIN — TIOTROPIUM BROMIDE 1 CAPSULE: 18 CAPSULE ORAL; RESPIRATORY (INHALATION) at 06:29

## 2019-05-04 ASSESSMENT — ENCOUNTER SYMPTOMS
VOMITING: 0
DIARRHEA: 1
NAUSEA: 0
ABDOMINAL PAIN: 0
FEVER: 0
SHORTNESS OF BREATH: 0

## 2019-05-04 NOTE — DISCHARGE INSTRUCTIONS
Discharge Instructions    Discharged to home by car with relative. Discharged via wheelchair, hospital escort: Yes.  Special equipment needed: Not Applicable    Be sure to schedule a follow-up appointment with your primary care doctor or any specialists as instructed.     Discharge Plan:   Diet Plan: Discussed  Activity Level: Discussed  Confirmed Follow up Appointment: Appointment Scheduled  Confirmed Symptoms Management: Discussed  Medication Reconciliation Updated: Yes  Influenza Vaccine Indication: Patient Refuses  Influenza Vaccine Given - only chart on this line when given:  (pt states of receing this vaccine this year. )    I understand that a diet low in cholesterol, fat, and sodium is recommended for good health. Unless I have been given specific instructions below for another diet, I accept this instruction as my diet prescription.   Other diet: diabetic    Special Instructions: None    · Is patient discharged on Warfarin / Coumadin?   No       Chronic Kidney Disease, Adult  Chronic kidney disease (CKD) happens when the kidneys are damaged during a time of 3 or more months. The kidneys are two organs that do many important jobs in the body. These jobs include:  · Removing wastes and extra fluids from the blood.  · Making hormones that maintain the amount of fluid in your tissues and blood vessels.  · Making sure that the body has the right amount of fluids and chemicals.  Most of the time, this condition does not go away, but it can usually be controlled. Steps must be taken to slow down the kidney damage or stop it from getting worse. Otherwise, the kidneys may stop working.  Follow these instructions at home:  · Follow your diet as told by your doctor. You may need to avoid alcohol, salty foods (sodium), and foods that are high in potassium, calcium, and protein.  · Take over-the-counter and prescription medicines only as told by your doctor. Do not take any new medicines unless your doctor says you can  do that. These include vitamins and minerals.  ¨ Medicines and nutritional supplements can make kidney damage worse.  ¨ Your doctor may need to change how much medicine you take.  · Do not use any tobacco products. These include cigarettes, chewing tobacco, and e-cigarettes. If you need help quitting, ask your doctor.  · Keep all follow-up visits as told by your doctor. This is important.  · Check your blood pressure. Tell your doctor if there are changes to your blood pressure.  · Get to a healthy weight. Stay at that weight. If you need help with this, ask your doctor.  · Start or continue an exercise plan. Try to exercise at least 30 minutes a day, 5 days a week.  · Stay up-to-date with your shots (immunizations) as told by your doctor.  Contact a doctor if:  · Your symptoms get worse.  · You have new symptoms.  Get help right away if:  · You have symptoms of end-stage kidney disease. These include:  ¨ Headaches.  ¨ Skin that is darker or lighter than normal.  ¨ Numbness in your hands or feet.  ¨ Easy bruising.  ¨ Having hiccups often.  ¨ Chest pain.  ¨ Shortness of breath.  ¨ Stopping of menstrual periods in women.  · You have a fever.  · You are making very little pee (urine).  · You have pain or bleeding when you pee (urinate).  This information is not intended to replace advice given to you by your health care provider. Make sure you discuss any questions you have with your health care provider.  Document Released: 03/14/2011 Document Revised: 05/25/2017 Document Reviewed: 08/16/2013  [employer] Interactive Patient Education © 2017 [employer]        Chronic Obstructive Pulmonary Disease  Chronic obstructive pulmonary disease (COPD) is a common lung problem. In COPD, the flow of air from the lungs is limited. The way your lungs work will probably never return to normal, but there are things you can do to improve your lungs and make yourself feel better. Your doctor may treat your condition  with:  · Medicines.  · Oxygen.  · Lung surgery.  · Changes to your diet.  · Rehabilitation. This may involve a team of specialists.  Follow these instructions at home:  · Take all medicines as told by your doctor.  · Avoid medicines or cough syrups that dry up your airway (such as antihistamines) and do not allow you to get rid of thick spit. You do not need to avoid them if told differently by your doctor.  · If you smoke, stop. Smoking makes the problem worse.  · Avoid being around things that make your breathing worse (like smoke, chemicals, and fumes).  · Use oxygen therapy and therapy to help improve your lungs (pulmonary rehabilitation) if told by your doctor. If you need home oxygen therapy, ask your doctor if you should buy a tool to measure your oxygen level (oximeter).  · Avoid people who have a sickness you can catch (contagious).  · Avoid going outside when it is very hot, cold, or humid.  · Eat healthy foods. Eat smaller meals more often. Rest before meals.  · Stay active, but remember to also rest.  · Make sure to get all the shots (vaccines) your doctor recommends. Ask your doctor if you need a pneumonia shot.  · Learn and use tips on how to relax.  · Learn and use tips on how to control your breathing as told by your doctor. Try:  1. Breathing in (inhaling) through your nose for 1 second. Then, pucker your lips and breath out (exhale) through your lips for 2 seconds.  2. Putting one hand on your belly (abdomen). Breathe in slowly through your nose for 1 second. Your hand on your belly should move out. Pucker your lips and breathe out slowly through your lips. Your hand on your belly should move in as you breathe out.  · Learn and use controlled coughing to clear thick spit from your lungs. The steps are:  1. Lean your head a little forward.  2. Breathe in deeply.  3. Try to hold your breath for 3 seconds.  4. Keep your mouth slightly open while coughing 2 times.  5. Spit any thick spit out into a  tissue.  6. Rest and do the steps again 1 or 2 times as needed.  Contact a doctor if:  · You cough up more thick spit than usual.  · There is a change in the color or thickness of the spit.  · It is harder to breathe than usual.  · Your breathing is faster than usual.  Get help right away if:  · You have shortness of breath while resting.  · You have shortness of breath that stops you from:  ¨ Being able to talk.  ¨ Doing normal activities.  · You chest hurts for longer than 5 minutes.  · Your skin color is more blue than usual.  · Your pulse oximeter shows that you have low oxygen for longer than 5 minutes.  This information is not intended to replace advice given to you by your health care provider. Make sure you discuss any questions you have with your health care provider.  Document Released: 06/05/2009 Document Revised: 05/25/2017 Document Reviewed: 08/14/2014  [employer] Interactive Patient Education © 2017 [employer]      Depression / Suicide Risk    As you are discharged from this [facility] facility, it is important to learn how to keep safe from harming yourself.    Recognize the warning signs:  · Abrupt changes in personality, positive or negative- including increase in energy   · Giving away possessions  · Change in eating patterns- significant weight changes-  positive or negative  · Change in sleeping patterns- unable to sleep or sleeping all the time   · Unwillingness or inability to communicate  · Depression  · Unusual sadness, discouragement and loneliness  · Talk of wanting to die  · Neglect of personal appearance   · Rebelliousness- reckless behavior  · Withdrawal from people/activities they love  · Confusion- inability to concentrate     If you or a loved one observes any of these behaviors or has concerns about self-harm, here's what you can do:  · Talk about it- your feelings and reasons for harming yourself  · Remove any means that you might use to hurt yourself (examples: pills, rope,  extension cords, firearm)  · Get professional help from the community (Mental Health, Substance Abuse, psychological counseling)  · Do not be alone:Call your Safe Contact- someone whom you trust who will be there for you.  · Call your local CRISIS HOTLINE 368-5763 or 643-518-7414  · Call your local Children's Mobile Crisis Response Team [place] (319) 318-8839 or www.Regalister  · Call the toll free National Suicide Prevention Hotlines   · National Suicide Prevention Lifeline 631-244-FGDM (6485)  · [employer] Line Network 800-SUICIDE (388-2367)    DC Instructions:   You will need to follow up with Dr. Badillo in 3-4 weeks. Please call his office to schedule at: [phone]   Follow up with [facility] in 1-2 weeks   You should get blood work done to check kidney function in a few days   Return to the ED if symptoms return or worse, as your kidneys may not be functioning properly. Return to the ED if you develop chest pain, confusion, itching, swelling, less than normal about of urine or other new or concerning symptoms arise.

## 2019-05-04 NOTE — PROGRESS NOTES
Nephrology Daily Progress Note    Date of Service  2019    Chief Complaint  73 y.o. female who presented 2019 with 2 to 3 weeks of nausea and vomiting, found to have acute kidney injury on chronic kidney disease stage III.    Interval Problem Update   -patient received 2 L of saline overnight, and had 600 of recorded urine output.  She says there might have been some urine that was not recorded.  She says her nausea and vomiting is slightly better, but still complains of poor appetite.  She denies chest pain, headache, shortness of breath.  She had a son who  of liver disease and had kidney failure at the end of his life.  She is very hesitant to consider dialysis, and declines it today due to these experiences.  She says she might be okay with temporary dialysis, and would be okay with a kidney biopsy if needed.  5/3 - 1.1 L of urine output recorded yesterday.  Seen this AM. Complains of nausea and diarrhea, denies chest pain, SOB, vomiting.  Says that she would like to go home.   - tolerated food and drink yesterday without vomiting. Complains of diarrhea. Denies chest pain, SOB.     Review of Systems  Review of Systems   Constitutional: Negative for fever.   Respiratory: Negative for shortness of breath.    Cardiovascular: Negative for chest pain.   Gastrointestinal: Positive for diarrhea. Negative for abdominal pain, nausea and vomiting.   All other systems reviewed and are negative.       Physical Exam  Temp:  [36.7 °C (98 °F)-36.9 °C (98.5 °F)] 36.9 °C (98.5 °F)  Pulse:  [67-74] 73  Resp:  [12-18] 12  BP: (105-121)/(56-63) 119/60  SpO2:  [90 %-99 %] 95 %    Physical Exam   Constitutional: She is oriented to person, place, and time. She appears well-developed. No distress. Nasal cannula in place.   Eyes: EOM are normal. No scleral icterus.   Neck: No tracheal deviation present.   Cardiovascular: Normal heart sounds.    No murmur heard.  Pulmonary/Chest: Effort normal. No stridor. She has no  rales.   Abdominal: Soft. There is no tenderness.   Musculoskeletal: Normal range of motion. She exhibits no edema.   Neurological: She is alert and oriented to person, place, and time.   Skin: Skin is warm and dry.   Psychiatric: She has a normal mood and affect.       Fluids  No intake or output data in the 24 hours ending 05/04/19 1203    Laboratory  Labs reviewed, pertinent labs below.  Recent Labs      05/01/19   1610  05/02/19   0500   WBC  6.5  7.6   RBC  3.54*  3.52*   HEMOGLOBIN  10.4*  10.6*   HEMATOCRIT  33.1*  33.0*   MCV  93.5  93.8   MCH  29.4  30.1   MCHC  31.4*  32.1*   RDW  43.4  44.1   PLATELETCT  117*  129*   MPV  13.1*  13.2*     Recent Labs      05/03/19   0430  05/03/19   0753  05/04/19   0349   SODIUM  134*  131*  134*   POTASSIUM  4.9  5.0  4.5   CHLORIDE  97  101  100   CO2  25  26  24   GLUCOSE  183*  197*  192*   BUN  150*  144*  107*   CREATININE  5.33*  5.09*  3.55*   CALCIUM  8.8  9.2  9.2     Recent Labs      05/02/19   0933   INR  1.06                   Imaging reviewed  EC-ECHOCARDIOGRAM COMPLETE W/O CONT   Final Result      US-RENAL   Final Result      1.  Normal sonographic appearance of the kidneys.   2.  Minimal urine output with maturation, prevoid volume was 376 mL, postvoid residual was 327 mL.      DX-CHEST-PORTABLE (1 VIEW)   Final Result      Left basilar atelectasis and/or consolidation. Underlying infection is possible.            Current Facility-Administered Medications   Medication Dose Route Frequency Provider Last Rate Last Dose   • Influenza Vaccine Quad pf injection 0.5 mL  0.5 mL Intramuscular Once Marty Randall M.D.       • ipratropium (ATROVENT) 0.02 % nebulizer solution 0.5 mg  0.5 mg Nebulization Q4H PRN (RT) Pam Ashley M.D.       • insulin regular (HUMULIN R) injection 2-9 Units  2-9 Units Subcutaneous 4X/DAY ACHS Marty Randall M.D.   2 Units at 05/04/19 0629    And   • glucose 4 g chewable tablet 16 g  16 g Oral Q15 MIN PRN Marty Randall M.D.         And   • DEXTROSE 10% BOLUS 250 mL  250 mL Intravenous Q15 MIN PRN Marty Randall M.D.       • tiotropium (SPIRIVA) 18 MCG inhalation capsule 1 Cap  1 Cap Inhalation DAILY Marty Randall M.D.   1 Cap at 05/04/19 0629   • Respiratory Care per Protocol   Nebulization Continuous RT KAROLYN Ratliff.O.       • ondansetron (ZOFRAN) syringe/vial injection 4 mg  4 mg Intravenous Q4HRS PRN Rodríguez Lassiter D.O.   4 mg at 05/02/19 1311   • ondansetron (ZOFRAN ODT) dispertab 4 mg  4 mg Oral Q4HRS PRN Rodríguez Lassiter D.O.   4 mg at 05/03/19 0807   • ezetimibe (ZETIA) tablet 10 mg  10 mg Oral Q EVENING Rodríguez Lassiter D.O.   10 mg at 05/03/19 1702    And   • simvastatin (ZOCOR) tablet 40 mg  40 mg Oral Q EVENING Rodríguez Lassiter D.O.   40 mg at 05/03/19 1702   • HYDROcodone-acetaminophen (NORCO) 5-325 MG per tablet 1 Tab  1 Tab Oral Q4HRS PRN KAROLYN Ratliff.O.       • temazepam (RESTORIL) capsule 15 mg  15 mg Oral HS PRN Rodríguez Lassiter D.O.             Assessment/Plan  73 y.o. female who presented 5/1/2019 with 2 to 3 weeks of nausea and vomiting, found to have acute kidney injury on chronic kidney disease stage III.    1.  Acute kidney injury, nonoliguric, improving. Given improvement, no plans for dialysis or kidney biopsy. Avoid nephrotoxins. Explained to patient to monitor urine output and color and ensure she is hydrated enough that urine is clear or pale yellow.     2. Hyponatremia, improving. Check daily labs while inpatient.     3. Anemia, normocytic, stable. UPEP/UIFE negative for monoclonal protein. K/L ratio wnl. SPEP ordered and pending.     OK for discharge. Patient needs follow up with nephrology, says she will follow up with SNN due to insurance not covering outpatient visits with [facility].         Valerio Badillo MD  Nephrology

## 2019-05-04 NOTE — PROGRESS NOTES
· 2 RN skin check complete with XAVIER Alegre.  · Devices in place oxygen tubing.  · Skin assessed under devices intact.  · Confirmed pressure ulcers found on n/a.  · New potential pressure ulcers noted on n/a.   · The following interventions in place mepilex, silicone tubing, foam pads.     Bilatereal ears red, blanching.  Elbows pink, blanching.  Sacrum red, blanching. Mepilex in place.   Heels dry, flaky. R big toe red, blanching.

## 2019-05-04 NOTE — DISCHARGE SUMMARY
[facility] [place] [facility] [place]     Attending:   Yanelis Rodriguez MD    Resident:   Padmini Mora MD    PATIENT: Siri Solis; [id number]; 1945    ID: 73 y.o. female admitted with extensive past medical history including CKD admitted for nausa, vomiting and FLAVIA possibly 2/2 dehydration vs uremia.     24 Hour Events: No acute events overnight, patient reports improvement of nausea and no vomiting.  No abdominal pain, no chest pain, no difficulty breathing.  Desires discharge home today.    OBJECTIVE:     Vitals:    05/03/19 2052 05/03/19 2054 05/04/19 0044 05/04/19 0402   BP:   105/56 113/57   Pulse:   74 67   Resp:   16 17   Temp:   36.8 °C (98.2 °F) 36.7 °C (98 °F)   TempSrc:   Temporal Temporal   SpO2: 90% 98% 97% 94%   Weight:       Height:           Intake/Output Summary (Last 24 hours) at 05/04/19 0643  Last data filed at 05/03/19 1200   Gross per 24 hour   Intake              780 ml   Output              400 ml   Net              380 ml       PE:   General: No acute distress, resting comfortably in bed.  HEENT: Normocephalic, atraumatic. EOMI. MMM, nasal cannula in place  Cardiovascular: RRR with 2/6 systolic murmur.  Respiratory: Symmetrical chest. Clear to auscultation bilaterally with no wheezes, rales or rhonchi  Abdomen: soft, non-tender, non-distended, +BS   EXT:  Moves all extremities, grossly normal. No edema or cyanosis. 2+ pulses in all extremities  Neuro: non focal    LABS:  Recent Labs      05/01/19   1039  05/01/19   1610  05/02/19   0500   WBC  7.6  6.5  7.6   RBC  3.64*  3.54*  3.52*   HEMOGLOBIN  11.0*  10.4*  10.6*   HEMATOCRIT  33.8*  33.1*  33.0*   MCV  92.9  93.5  93.8   MCH  30.2  29.4  30.1   RDW  43.3  43.4  44.1   PLATELETCT  127*  117*  129*   MPV  13.1*  13.1*  13.2*   NEUTSPOLYS  74.50*   --    --    LYMPHOCYTES  13.50*   --    --    MONOCYTES  8.70   --    --    EOSINOPHILS  2.10   --    --    BASOPHILS  0.40   --    --      Recent Labs      05/01/19   1039   05/01/19   7404    05/03/19   0430  05/03/19   0753  05/04/19   0349   SODIUM  127*   < >  133*   < >  134*  131*  134*   POTASSIUM  5.8*   < >  5.4   < >  4.9  5.0  4.5   CHLORIDE  88*   < >  92*   < >  97  101  100   CO2  23   < >  22   < >  25  26  24   BUN  180*   < >  185*   < >  150*  144*  107*   CREATININE  8.26*   < >  7.41*   < >  5.33*  5.09*  3.55*   CALCIUM  9.9   < >  9.3   < >  8.8  9.2  9.2   MAGNESIUM   --    --   2.1   --    --    --    --    ALBUMIN  4.5   --    --    --    --    --    --     < > = values in this interval not displayed.     Estimated GFR/CRCL = Estimated Creatinine Clearance: 16.4 mL/min (A) (by C-G formula based on SCr of 3.55 mg/dL (H)).  Recent Labs      05/03/19   0430   05/03/19   0753  05/03/19   1135  05/03/19   1701  05/03/19   2103  05/04/19   0349   GLUCOSE  183*   --   197*   --    --    --   192*   POCGLUCOSE   --    < >   --   289*  156*  209*   --     < > = values in this interval not displayed.     Recent Labs      05/01/19   1039  05/02/19   0933   ASTSGOT  11*   --    ALTSGPT  8   --    TBILIRUBIN  0.5   --    ALKPHOSPHAT  49   --    GLOBULIN  2.7   --    INR   --   1.06     Recent Labs      05/01/19   1039  05/01/19 2007   CPKTOTAL   --   37   TROPONINI  0.03   --          Recent Labs      05/02/19   0933   INR  1.06       MICROBIOLOGY:   None new    IMAGING:   None new      DISCHARGE SUMMARY:   Brief HPI:  Siri  is a 73 y.o.  Female with a history of type 2 DM, COPD, restrictive lung disease, PUD and severe pulmonary HTN who was admitted on 5/1/2019 with a 1 month history of nausea and vomiting and poor appetite. Just today she started to have coffee ground emesis. She denies any abdominal pain. She continues to have bowel movement about every 2 days with the most recent being today and reports flatus. She does report that some of her stools are dark and this has been going on for quite some time. She denies cough, chest pain or shortness of breath. She does continue to  void.     She states that she was recently referred to nephrology for decreased GFR at about 38. She was unable to go due to insurance issues.     In the ED Nephrology was consulted who asked that patient be admitted to the ICU as she may need emergent dialysis      Hospital Problem List  Acute kidney injury  COPD  Nausea and vomiting    Chronic problems:  Pulmonary hypertension  Peptic ulcer disease  CHF with preserved EF  Diabetes mellitus      Discharge Diagnosis:  Acute kidney injury on CKD stage III  COPD  Nausea and vomiting    Chronic problems:  Pulmonary hypertension  Peptic ulcer disease  CHF with preserved EF  Diabetes mellitus      Hospital Course:   In the ED, patient was found to have hyponatremia, hyperkalemia and significantly elevated BUN and creatinine at 180 and 8.26 respectively.  Creatinine ranged from 0.78-1.58 in 2018.  Nephrology was consulted.  In the ED, the patient was admitted to the ICU for possible emergent dialysis.  However, after multiple conversations, the patient was reluctant to initiate dialysis unless it would be only a temporary measure.  Patient was recently referred to nephrology by her primary care provider due to concern over decreasing GFR and possible cardiorenal syndrome, but she has been admitted able to go to insurance related issues.  Due to family history, she is reluctant to initiate dialysis.  For that reason, she was given cautious fluid resuscitation, insulin and Kayexalate to remove potassium, and monitored.  She was also given Zofran.  Over the next 3 days, the patient's creatinine slowly improved.  Over the last 12 to 15 hours of hospitalization, the patient was taking only oral fluids and creatinine continued to improve.  For this reason, the renal biopsy that was considered was deferred.  Patient never received dialysis as it was not deemed necessary.  The patient was counseled that despite this present improvement in her renal function, her prognosis is  still undetermined, and her kidneys could continue to fail at any time.  Despite discussion about possible poor prognosis, the patient was not agreeable to meeting with a palliative care nurse or discussing goals of care. She is instructed to follow-up with nephrology outpatient and plans to do so with the [place] neph[facility] group.  Patient is also instructed to follow-up with her PCP in 1 to 2 weeks.    In regards to her reported coffee-ground emesis, she had no changes in her hemoglobin or episodes of hematemesis while admitted.    Her home medications torsemide, lisinopril and metformin was held during admission due to her FLAVIA, but will be resumed on discharge.      Procedures:  None    Significant Imaging Findings:  EC-ECHOCARDIOGRAM COMPLETE W/O CONT   Final Result      US-RENAL   Final Result      1.  Normal sonographic appearance of the kidneys.   2.  Minimal urine output with maturation, prevoid volume was 376 mL, postvoid residual was 327 mL.      DX-CHEST-PORTABLE (1 VIEW)   Final Result      Left basilar atelectasis and/or consolidation. Underlying infection is possible.        Transthoracic  Echo Report    CONCLUSIONS  Compared to the images of the study done 3/28/18 - there has been an   increase in the RV function.  The RV systolic pressure could not be   measured in this study.  Left ventricular ejection fraction is visually estimated to be greater   than 75%.  No valvular heart disease.  Unable to estimate pulmonary artery pressure due to an inadequate   tricuspid regurgitant jet.      Significant Laboratory Findings:  See above    Disposition:  Discharge to: Home    Follow Up:  With Dr. Ochoa in 1 to 2 weeks  With [place] [facility] group in 2 to 4 weeks    Discharge  Medications:      Medication List      START taking these medications      Instructions   ondansetron 4 MG Tbdp  Commonly known as:  ZOFRAN ODT   Take 1 Tab by mouth every four hours as needed for Nausea.  Dose:  4 mg         CONTINUE taking these medications      Instructions   CALCIUM 1000 + D 1000-800 MG-UNIT Tabs  Generic drug:  Calcium Carb-Cholecalciferol   Take 1 Tab by mouth every evening.  Dose:  1 Tab     HYDROcodone-acetaminophen 7.5-325 MG per tablet  Commonly known as:  NORCO   Take 1 Tab by mouth every four hours as needed for Severe Pain.  Dose:  1 Tab     insulin glargine 100 UNIT/ML Soln  Commonly known as:  LANTUS   Inject 30 Units as instructed every bedtime.  Dose:  30 Units     lisinopril 10 MG Tabs  Commonly known as:  PRINIVIL   Take 10 mg by mouth every day.  Dose:  10 mg     magnesium oxide 400 MG Tabs  Commonly known as:  MAG-OX   Take 400 mg by mouth every day.  Dose:  400 mg     metFORMIN 850 MG Tabs  Commonly known as:  GLUCOPHAGE   Take 850 mg by mouth 2 times a day, with meals.  Dose:  850 mg     omeprazole 20 MG delayed-release capsule  Commonly known as:  PRILOSEC   Take 20 mg by mouth every evening.  Dose:  20 mg     SPIRIVA HANDIHALER 18 MCG Caps  Generic drug:  tiotropium   Inhale 18 mcg by mouth every evening.  Dose:  18 mcg     temazepam 30 MG capsule  Commonly known as:  RESTORIL   Take 30 mg by mouth at bedtime as needed for Sleep.  Dose:  30 mg     torsemide 20 MG Tabs  Commonly known as:  DEMADEX   Take 40 mg by mouth 2 Times a Day.  Dose:  40 mg     VYTORIN 10-40 MG per tablet  Generic drug:  ezetimibe-simvastatin   Take 1 Tab by mouth every evening.  Dose:  1 Tab            CC:   Lake Brito MD, [facility]

## 2019-05-04 NOTE — PROGRESS NOTES
Received report from day shift RN. Assumed care of pt. Pt reports no pain at this time. Updated pt on plan of care. Pt resting comfortably in bed. Educated on use of call light. Hourly rounding and continuous monitoring in place.

## 2019-05-04 NOTE — CARE PLAN
Problem: Safety  Goal: Will remain free from falls  Outcome: PROGRESSING AS EXPECTED  Patient has remained free from falls. Calls appropriately. Uses walker appropriately and ambulates steadily.    Problem: Respiratory:  Goal: Respiratory status will improve  Outcome: PROGRESSING AS EXPECTED  Patient's baseline O2 is 7L and she is currently on 4L here with good O2 saturation even with exertion. Does not report SOB.

## 2019-05-04 NOTE — PROGRESS NOTES
Patient discharged from hospital to home. Tele monitor and IV removed with no signs or symptoms of bleeding or infection. Discussed prescriptions, discharge education, and symptom management/signs of worsening symptoms with patient. Patient verbalized understanding. Patient aware of follow-up appointment. Patient stable and vitals are within normal limits. Transported to home via .

## 2019-05-04 NOTE — PROGRESS NOTES
Assumed care of patient and bedside report received from previous shift. Plan of care discussed with patient. All concerns voiced at this time. Patient is alert and oriented x 4. Patient educated on importance of calling, bed controls on, bed locked and in lowest position, call light with patient.

## 2019-05-04 NOTE — PROGRESS NOTES
2 RN skin check completed with XAVIER Fierro. Skin intact. Feet dry. Right big toe red but blanchable.

## 2019-05-04 NOTE — RESPIRATORY CARE
COPD EDUCATION by COPD CLINICAL EDUCATOR  5/4/2019 at 11:03 AM by Dariela Olivares     Patient interviewed by COPD education team. Patient refused COPD program at this time. She sees [facility]. Currently uses Spiriva and no rescue inhaler (per her). She quit smoking >10 years ago.She is on Oxygen 6-7 LPM at home at baseline.

## 2019-05-05 LAB
ALBUMIN SERPL-MCNC: 4.7 G/DL (ref 3.75–5.01)
ALPHA1 GLOB SERPL ELPH-MCNC: 0.3 G/DL (ref 0.19–0.46)
ALPHA2 GLOB SERPL ELPH-MCNC: 1.04 G/DL (ref 0.48–1.05)
B-GLOBULIN SERPL ELPH-MCNC: 0.81 G/DL (ref 0.48–1.1)
GAMMA GLOB SERPL ELPH-MCNC: 0.74 G/DL (ref 0.62–1.51)
INTERPRETATION SERPL IFE-IMP: NORMAL
MONOCLON BAND OBS SERPL: NORMAL
PATHOLOGY STUDY: NORMAL
PROT SERPL-MCNC: 7.6 G/DL (ref 6–8.3)

## 2019-06-03 ENCOUNTER — HOSPITAL ENCOUNTER (OUTPATIENT)
Dept: LAB | Facility: MEDICAL CENTER | Age: 74
End: 2019-06-03
Attending: STUDENT IN AN ORGANIZED HEALTH CARE EDUCATION/TRAINING PROGRAM
Payer: COMMERCIAL

## 2019-06-03 LAB
ANION GAP SERPL CALC-SCNC: 11 MMOL/L (ref 0–11.9)
BUN SERPL-MCNC: 56 MG/DL (ref 8–22)
CALCIUM SERPL-MCNC: 10.2 MG/DL (ref 8.5–10.5)
CHLORIDE SERPL-SCNC: 97 MMOL/L (ref 96–112)
CO2 SERPL-SCNC: 28 MMOL/L (ref 20–33)
CREAT SERPL-MCNC: 2.82 MG/DL (ref 0.5–1.4)
GLUCOSE SERPL-MCNC: 106 MG/DL (ref 65–99)
POTASSIUM SERPL-SCNC: 4.4 MMOL/L (ref 3.6–5.5)
SODIUM SERPL-SCNC: 136 MMOL/L (ref 135–145)

## 2019-06-03 PROCEDURE — 36415 COLL VENOUS BLD VENIPUNCTURE: CPT

## 2019-06-03 PROCEDURE — 80048 BASIC METABOLIC PNL TOTAL CA: CPT

## 2019-06-18 ENCOUNTER — HOSPITAL ENCOUNTER (OUTPATIENT)
Dept: LAB | Facility: MEDICAL CENTER | Age: 74
End: 2019-06-18
Attending: INTERNAL MEDICINE
Payer: COMMERCIAL

## 2019-06-18 LAB
ALBUMIN SERPL BCP-MCNC: 4.6 G/DL (ref 3.2–4.9)
ALBUMIN/GLOB SERPL: 1.7 G/DL
ALP SERPL-CCNC: 74 U/L (ref 30–99)
ALT SERPL-CCNC: 12 U/L (ref 2–50)
ANION GAP SERPL CALC-SCNC: 11 MMOL/L (ref 0–11.9)
APPEARANCE UR: CLEAR
AST SERPL-CCNC: 15 U/L (ref 12–45)
BASOPHILS # BLD AUTO: 0.6 % (ref 0–1.8)
BASOPHILS # BLD: 0.06 K/UL (ref 0–0.12)
BILIRUB SERPL-MCNC: 0.3 MG/DL (ref 0.1–1.5)
BILIRUB UR QL STRIP.AUTO: NEGATIVE
BUN SERPL-MCNC: 56 MG/DL (ref 8–22)
CALCIUM SERPL-MCNC: 10.6 MG/DL (ref 8.5–10.5)
CHLORIDE SERPL-SCNC: 94 MMOL/L (ref 96–112)
CO2 SERPL-SCNC: 29 MMOL/L (ref 20–33)
COLOR UR: YELLOW
CREAT SERPL-MCNC: 3.63 MG/DL (ref 0.5–1.4)
CREAT UR-MCNC: 75.5 MG/DL
EOSINOPHIL # BLD AUTO: 1.05 K/UL (ref 0–0.51)
EOSINOPHIL NFR BLD: 10.3 % (ref 0–6.9)
ERYTHROCYTE [DISTWIDTH] IN BLOOD BY AUTOMATED COUNT: 51.1 FL (ref 35.9–50)
GLOBULIN SER CALC-MCNC: 2.7 G/DL (ref 1.9–3.5)
GLUCOSE SERPL-MCNC: 107 MG/DL (ref 65–99)
GLUCOSE UR STRIP.AUTO-MCNC: NEGATIVE MG/DL
HCT VFR BLD AUTO: 33.9 % (ref 37–47)
HGB BLD-MCNC: 10.2 G/DL (ref 12–16)
IMM GRANULOCYTES # BLD AUTO: 0.05 K/UL (ref 0–0.11)
IMM GRANULOCYTES NFR BLD AUTO: 0.5 % (ref 0–0.9)
KETONES UR STRIP.AUTO-MCNC: NEGATIVE MG/DL
LEUKOCYTE ESTERASE UR QL STRIP.AUTO: NEGATIVE
LYMPHOCYTES # BLD AUTO: 1.74 K/UL (ref 1–4.8)
LYMPHOCYTES NFR BLD: 17.1 % (ref 22–41)
MCH RBC QN AUTO: 29.2 PG (ref 27–33)
MCHC RBC AUTO-ENTMCNC: 30.1 G/DL (ref 33.6–35)
MCV RBC AUTO: 97.1 FL (ref 81.4–97.8)
MICRO URNS: NORMAL
MONOCYTES # BLD AUTO: 0.89 K/UL (ref 0–0.85)
MONOCYTES NFR BLD AUTO: 8.8 % (ref 0–13.4)
NEUTROPHILS # BLD AUTO: 6.36 K/UL (ref 2–7.15)
NEUTROPHILS NFR BLD: 62.7 % (ref 44–72)
NITRITE UR QL STRIP.AUTO: NEGATIVE
NRBC # BLD AUTO: 0 K/UL
NRBC BLD-RTO: 0 /100 WBC
PH UR STRIP.AUTO: 7 [PH]
PHOSPHATE SERPL-MCNC: 3.5 MG/DL (ref 2.5–4.5)
PLATELET # BLD AUTO: 220 K/UL (ref 164–446)
PMV BLD AUTO: 12.2 FL (ref 9–12.9)
POTASSIUM SERPL-SCNC: 5 MMOL/L (ref 3.6–5.5)
PROT SERPL-MCNC: 7.3 G/DL (ref 6–8.2)
PROT UR QL STRIP: NEGATIVE MG/DL
PROT UR-MCNC: 32 MG/DL (ref 0–15)
PROT/CREAT UR: 424 MG/G (ref 10–107)
RBC # BLD AUTO: 3.49 M/UL (ref 4.2–5.4)
RBC UR QL AUTO: NEGATIVE
SODIUM SERPL-SCNC: 134 MMOL/L (ref 135–145)
SP GR UR STRIP.AUTO: 1.01
UROBILINOGEN UR STRIP.AUTO-MCNC: 0.2 MG/DL
WBC # BLD AUTO: 10.2 K/UL (ref 4.8–10.8)

## 2019-06-18 PROCEDURE — 36415 COLL VENOUS BLD VENIPUNCTURE: CPT

## 2019-06-18 PROCEDURE — 84100 ASSAY OF PHOSPHORUS: CPT

## 2019-06-18 PROCEDURE — 81003 URINALYSIS AUTO W/O SCOPE: CPT

## 2019-06-18 PROCEDURE — 84156 ASSAY OF PROTEIN URINE: CPT

## 2019-06-18 PROCEDURE — 80053 COMPREHEN METABOLIC PANEL: CPT

## 2019-06-18 PROCEDURE — 85025 COMPLETE CBC W/AUTO DIFF WBC: CPT

## 2019-06-18 PROCEDURE — 82570 ASSAY OF URINE CREATININE: CPT

## 2019-07-08 ENCOUNTER — APPOINTMENT (OUTPATIENT)
Dept: ADMISSIONS | Facility: MEDICAL CENTER | Age: 74
End: 2019-07-08
Attending: INTERNAL MEDICINE
Payer: COMMERCIAL

## 2019-07-09 ENCOUNTER — HOSPITAL ENCOUNTER (OUTPATIENT)
Facility: MEDICAL CENTER | Age: 74
End: 2019-07-09
Attending: INTERNAL MEDICINE | Admitting: RADIOLOGY
Payer: COMMERCIAL

## 2019-07-09 ENCOUNTER — APPOINTMENT (OUTPATIENT)
Dept: RADIOLOGY | Facility: MEDICAL CENTER | Age: 74
End: 2019-07-09
Attending: INTERNAL MEDICINE
Payer: COMMERCIAL

## 2019-07-09 VITALS
SYSTOLIC BLOOD PRESSURE: 158 MMHG | BODY MASS INDEX: 30.04 KG/M2 | DIASTOLIC BLOOD PRESSURE: 79 MMHG | RESPIRATION RATE: 17 BRPM | TEMPERATURE: 96.8 F | OXYGEN SATURATION: 97 % | WEIGHT: 198.19 LBS | HEART RATE: 64 BPM | HEIGHT: 68 IN

## 2019-07-09 DIAGNOSIS — E11.22 TYPE 2 DIABETES MELLITUS WITH ESRD (END-STAGE RENAL DISEASE) (HCC): ICD-10-CM

## 2019-07-09 DIAGNOSIS — N18.30 CHRONIC KIDNEY DISEASE, STAGE III (MODERATE) (HCC): ICD-10-CM

## 2019-07-09 DIAGNOSIS — R80.9 PROTEINURIA, UNSPECIFIED TYPE: ICD-10-CM

## 2019-07-09 DIAGNOSIS — N18.6 TYPE 2 DIABETES MELLITUS WITH ESRD (END-STAGE RENAL DISEASE) (HCC): ICD-10-CM

## 2019-07-09 DIAGNOSIS — E83.52 HYPERCALCEMIA: ICD-10-CM

## 2019-07-09 DIAGNOSIS — I12.9 RENAL HYPERTENSION: ICD-10-CM

## 2019-07-09 DIAGNOSIS — N17.9 ACUTE RENAL FAILURE, UNSPECIFIED ACUTE RENAL FAILURE TYPE (HCC): ICD-10-CM

## 2019-07-09 LAB
ERYTHROCYTE [DISTWIDTH] IN BLOOD BY AUTOMATED COUNT: 49.6 FL (ref 35.9–50)
GLUCOSE BLD-MCNC: 352 MG/DL (ref 65–99)
HCT VFR BLD AUTO: 36.6 % (ref 37–47)
HGB BLD-MCNC: 11.3 G/DL (ref 12–16)
INR PPP: 0.92 (ref 0.87–1.13)
MCH RBC QN AUTO: 29.4 PG (ref 27–33)
MCHC RBC AUTO-ENTMCNC: 30.9 G/DL (ref 33.6–35)
MCV RBC AUTO: 95.3 FL (ref 81.4–97.8)
PATHOLOGY CONSULT NOTE: NORMAL
PLATELET # BLD AUTO: 147 K/UL (ref 164–446)
PMV BLD AUTO: 12.1 FL (ref 9–12.9)
PROTHROMBIN TIME: 12.6 SEC (ref 12–14.6)
RBC # BLD AUTO: 3.84 M/UL (ref 4.2–5.4)
WBC # BLD AUTO: 6.9 K/UL (ref 4.8–10.8)

## 2019-07-09 PROCEDURE — 700111 HCHG RX REV CODE 636 W/ 250 OVERRIDE (IP)

## 2019-07-09 PROCEDURE — 88348 ELECTRON MICROSCOPY DX: CPT

## 2019-07-09 PROCEDURE — 77012 CT SCAN FOR NEEDLE BIOPSY: CPT

## 2019-07-09 PROCEDURE — 85610 PROTHROMBIN TIME: CPT

## 2019-07-09 PROCEDURE — 85027 COMPLETE CBC AUTOMATED: CPT

## 2019-07-09 PROCEDURE — 88346 IMFLUOR 1ST 1ANTB STAIN PX: CPT

## 2019-07-09 PROCEDURE — 82962 GLUCOSE BLOOD TEST: CPT

## 2019-07-09 PROCEDURE — 88300 SURGICAL PATH GROSS: CPT

## 2019-07-09 PROCEDURE — 88305 TISSUE EXAM BY PATHOLOGIST: CPT

## 2019-07-09 PROCEDURE — 88313 SPECIAL STAINS GROUP 2: CPT | Mod: 91

## 2019-07-09 PROCEDURE — 700105 HCHG RX REV CODE 258: Performed by: RADIOLOGY

## 2019-07-09 PROCEDURE — 160002 HCHG RECOVERY MINUTES (STAT)

## 2019-07-09 RX ORDER — ONDANSETRON 2 MG/ML
4 INJECTION INTRAMUSCULAR; INTRAVENOUS EVERY 8 HOURS PRN
Status: DISCONTINUED | OUTPATIENT
Start: 2019-07-09 | End: 2019-07-09 | Stop reason: HOSPADM

## 2019-07-09 RX ORDER — OXYCODONE HYDROCHLORIDE 5 MG/1
5 TABLET ORAL
Status: DISCONTINUED | OUTPATIENT
Start: 2019-07-09 | End: 2019-07-09 | Stop reason: HOSPADM

## 2019-07-09 RX ORDER — MORPHINE SULFATE 4 MG/ML
4 INJECTION, SOLUTION INTRAMUSCULAR; INTRAVENOUS
Status: DISCONTINUED | OUTPATIENT
Start: 2019-07-09 | End: 2019-07-09 | Stop reason: HOSPADM

## 2019-07-09 RX ORDER — ONDANSETRON 2 MG/ML
4 INJECTION INTRAMUSCULAR; INTRAVENOUS PRN
Status: DISCONTINUED | OUTPATIENT
Start: 2019-07-09 | End: 2019-07-09 | Stop reason: HOSPADM

## 2019-07-09 RX ORDER — MIDAZOLAM HYDROCHLORIDE 1 MG/ML
INJECTION INTRAMUSCULAR; INTRAVENOUS
Status: COMPLETED
Start: 2019-07-09 | End: 2019-07-09

## 2019-07-09 RX ORDER — MIDAZOLAM HYDROCHLORIDE 1 MG/ML
.5-2 INJECTION INTRAMUSCULAR; INTRAVENOUS PRN
Status: DISCONTINUED | OUTPATIENT
Start: 2019-07-09 | End: 2019-07-09 | Stop reason: HOSPADM

## 2019-07-09 RX ORDER — SODIUM CHLORIDE 9 MG/ML
INJECTION, SOLUTION INTRAVENOUS
Status: DISCONTINUED | OUTPATIENT
Start: 2019-07-09 | End: 2019-07-09 | Stop reason: HOSPADM

## 2019-07-09 RX ORDER — OXYCODONE HYDROCHLORIDE 10 MG/1
10 TABLET ORAL
Status: DISCONTINUED | OUTPATIENT
Start: 2019-07-09 | End: 2019-07-09 | Stop reason: HOSPADM

## 2019-07-09 RX ORDER — SODIUM CHLORIDE 9 MG/ML
500 INJECTION, SOLUTION INTRAVENOUS
Status: DISCONTINUED | OUTPATIENT
Start: 2019-07-09 | End: 2019-07-09 | Stop reason: HOSPADM

## 2019-07-09 RX ADMIN — MIDAZOLAM HYDROCHLORIDE 1 MG: 1 INJECTION INTRAMUSCULAR; INTRAVENOUS at 13:20

## 2019-07-09 RX ADMIN — FENTANYL CITRATE 50 MCG: 50 INJECTION INTRAMUSCULAR; INTRAVENOUS at 13:20

## 2019-07-09 RX ADMIN — MIDAZOLAM HYDROCHLORIDE 1 MG: 1 INJECTION, SOLUTION INTRAMUSCULAR; INTRAVENOUS at 13:20

## 2019-07-09 RX ADMIN — SODIUM CHLORIDE: 9 INJECTION, SOLUTION INTRAVENOUS at 11:30

## 2019-07-09 NOTE — OR NURSING
1347 Pt over from radiology, post L renal biopsy. Pt awake and alert, denies pain and nausea. Site CDI and soft, no signs of bleeding. VSS.   1400 Pt tolerating orals  1500 Pt resting quietly, no needs at this time  1610 Pt ambulated to restroom, voided clear yellow urine, tolerated well  1620 Criteria met  1625 Discharge instructions provided to pt and spouse. Discussed diet, activity, follow up, medications and symptom management. Pt and spouse state understanding. Pt and spouse state all questions have been answered. Copy of discharge provided to pt. L flank biopsy site CDI and soft, no signs of bleeding.  1630 Pt wheeled off unit by CNA with all belongings without incident.

## 2019-07-09 NOTE — OR SURGEON
Immediate Post- Operative Note        PostOp Diagnosis: ARF.       Procedure(s): CT guided renal biopsy.       Estimated Blood Loss: Less than 5 ml        Complications: None            7/9/2019     1329PM     Trip Asencio

## 2019-07-09 NOTE — PROGRESS NOTES
Patient consented for procedure by Dr. Asencio.  All questions regarding procedure and moderate sedation answered.  Patient brought to CT4.  Assisted into prone position.  Oxygen and monitors applied.  Safety strap utilized.  Time out performed, all team members in agreement.  LEFT renal biopsy completed.  Specimen labeled and taken to lab by Amairani.  Jocy and tegaderm applied to site.  Assisted patient back onto stretcher.  Report called to XAVIER Coto.  Patient transported back to PPU.  Bedside report given.

## 2019-07-10 ENCOUNTER — TELEPHONE (OUTPATIENT)
Dept: NEPHROLOGY | Facility: MEDICAL CENTER | Age: 74
End: 2019-07-10

## 2019-07-10 NOTE — TELEPHONE ENCOUNTER
Discussed with Dr. Archuleta in [facility].     Protracted tubular injury. Likely had ATN and continued to have the insult leading to chronic changes.     Informed Dr. Archuleta that I did not order the biopsy and the patient is likely being followed by [facility]. He said he will get in touch with them.    Valerio Badillo MD  Nephrology

## 2019-08-06 ENCOUNTER — HOSPITAL ENCOUNTER (OUTPATIENT)
Dept: HOSPITAL 8 - CVU | Age: 74
Discharge: HOME | End: 2019-08-06
Attending: INTERNAL MEDICINE
Payer: MEDICARE

## 2019-08-06 DIAGNOSIS — E11.9: ICD-10-CM

## 2019-08-06 DIAGNOSIS — I35.8: Primary | ICD-10-CM

## 2019-08-06 DIAGNOSIS — I11.9: ICD-10-CM

## 2019-08-06 DIAGNOSIS — J44.9: ICD-10-CM

## 2019-08-06 PROCEDURE — 93306 TTE W/DOPPLER COMPLETE: CPT

## 2019-08-07 ENCOUNTER — HOSPITAL ENCOUNTER (OUTPATIENT)
Dept: LAB | Facility: MEDICAL CENTER | Age: 74
End: 2019-08-07
Attending: INTERNAL MEDICINE
Payer: COMMERCIAL

## 2019-08-07 LAB
25(OH)D3 SERPL-MCNC: 11 NG/ML (ref 30–100)
ALBUMIN SERPL BCP-MCNC: 4.7 G/DL (ref 3.2–4.9)
APPEARANCE UR: CLEAR
BASOPHILS # BLD AUTO: 0.4 % (ref 0–1.8)
BASOPHILS # BLD: 0.04 K/UL (ref 0–0.12)
BILIRUB UR QL STRIP.AUTO: NEGATIVE
BUN SERPL-MCNC: 71 MG/DL (ref 8–22)
C3 SERPL-MCNC: 148 MG/DL (ref 87–200)
C4 SERPL-MCNC: 53 MG/DL (ref 19–52)
CALCIUM SERPL-MCNC: 9.9 MG/DL (ref 8.5–10.5)
CHLORIDE SERPL-SCNC: 100 MMOL/L (ref 96–112)
CO2 SERPL-SCNC: 28 MMOL/L (ref 20–33)
COLOR UR: YELLOW
CREAT SERPL-MCNC: 1.65 MG/DL (ref 0.5–1.4)
CREAT UR-MCNC: 94.3 MG/DL
EOSINOPHIL # BLD AUTO: 0.57 K/UL (ref 0–0.51)
EOSINOPHIL NFR BLD: 5.9 % (ref 0–6.9)
ERYTHROCYTE [DISTWIDTH] IN BLOOD BY AUTOMATED COUNT: 49.1 FL (ref 35.9–50)
EST. AVERAGE GLUCOSE BLD GHB EST-MCNC: 169 MG/DL
FERRITIN SERPL-MCNC: 203.7 NG/ML (ref 10–291)
GLUCOSE SERPL-MCNC: 190 MG/DL (ref 65–99)
GLUCOSE UR STRIP.AUTO-MCNC: NEGATIVE MG/DL
HAV IGM SERPL QL IA: NEGATIVE
HBA1C MFR BLD: 7.5 % (ref 0–5.6)
HBV CORE IGM SER QL: NEGATIVE
HBV SURFACE AG SER QL: NEGATIVE
HCT VFR BLD AUTO: 40.8 % (ref 37–47)
HCV AB SER QL: NEGATIVE
HGB BLD-MCNC: 12.4 G/DL (ref 12–16)
IMM GRANULOCYTES # BLD AUTO: 0.05 K/UL (ref 0–0.11)
IMM GRANULOCYTES NFR BLD AUTO: 0.5 % (ref 0–0.9)
IRON SATN MFR SERPL: 22 % (ref 15–55)
IRON SERPL-MCNC: 82 UG/DL (ref 40–170)
KETONES UR STRIP.AUTO-MCNC: NEGATIVE MG/DL
LEUKOCYTE ESTERASE UR QL STRIP.AUTO: NEGATIVE
LYMPHOCYTES # BLD AUTO: 1.22 K/UL (ref 1–4.8)
LYMPHOCYTES NFR BLD: 12.6 % (ref 22–41)
MAGNESIUM SERPL-MCNC: 2.3 MG/DL (ref 1.5–2.5)
MCH RBC QN AUTO: 29.5 PG (ref 27–33)
MCHC RBC AUTO-ENTMCNC: 30.4 G/DL (ref 33.6–35)
MCV RBC AUTO: 96.9 FL (ref 81.4–97.8)
MICRO URNS: NORMAL
MONOCYTES # BLD AUTO: 0.89 K/UL (ref 0–0.85)
MONOCYTES NFR BLD AUTO: 9.2 % (ref 0–13.4)
NEUTROPHILS # BLD AUTO: 6.93 K/UL (ref 2–7.15)
NEUTROPHILS NFR BLD: 71.4 % (ref 44–72)
NITRITE UR QL STRIP.AUTO: NEGATIVE
NRBC # BLD AUTO: 0 K/UL
NRBC BLD-RTO: 0 /100 WBC
PH UR STRIP.AUTO: 6 [PH] (ref 5–8)
PHOSPHATE SERPL-MCNC: 4.2 MG/DL (ref 2.5–4.5)
PLATELET # BLD AUTO: 178 K/UL (ref 164–446)
PMV BLD AUTO: 12.5 FL (ref 9–12.9)
POTASSIUM SERPL-SCNC: 4.5 MMOL/L (ref 3.6–5.5)
PROT UR QL STRIP: NEGATIVE MG/DL
PROT UR-MCNC: 6.3 MG/DL (ref 0–15)
PROT/CREAT UR: 67 MG/G (ref 10–107)
PTH-INTACT SERPL-MCNC: 114.9 PG/ML (ref 14–72)
RBC # BLD AUTO: 4.21 M/UL (ref 4.2–5.4)
RBC UR QL AUTO: NEGATIVE
SODIUM SERPL-SCNC: 138 MMOL/L (ref 135–145)
SP GR UR STRIP.AUTO: 1.01
TIBC SERPL-MCNC: 381 UG/DL (ref 250–450)
URATE SERPL-MCNC: 13 MG/DL (ref 1.9–8.2)
UROBILINOGEN UR STRIP.AUTO-MCNC: 0.2 MG/DL
WBC # BLD AUTO: 9.7 K/UL (ref 4.8–10.8)

## 2019-08-07 PROCEDURE — 83540 ASSAY OF IRON: CPT

## 2019-08-07 PROCEDURE — 83883 ASSAY NEPHELOMETRY NOT SPEC: CPT

## 2019-08-07 PROCEDURE — 84156 ASSAY OF PROTEIN URINE: CPT

## 2019-08-07 PROCEDURE — 82784 ASSAY IGA/IGD/IGG/IGM EACH: CPT

## 2019-08-07 PROCEDURE — 36415 COLL VENOUS BLD VENIPUNCTURE: CPT

## 2019-08-07 PROCEDURE — 84160 ASSAY OF PROTEIN ANY SOURCE: CPT

## 2019-08-07 PROCEDURE — 82570 ASSAY OF URINE CREATININE: CPT

## 2019-08-07 PROCEDURE — 86334 IMMUNOFIX E-PHORESIS SERUM: CPT

## 2019-08-07 PROCEDURE — 83516 IMMUNOASSAY NONANTIBODY: CPT

## 2019-08-07 PROCEDURE — 84550 ASSAY OF BLOOD/URIC ACID: CPT

## 2019-08-07 PROCEDURE — 86038 ANTINUCLEAR ANTIBODIES: CPT

## 2019-08-07 PROCEDURE — 86160 COMPLEMENT ANTIGEN: CPT | Mod: 91

## 2019-08-07 PROCEDURE — 86256 FLUORESCENT ANTIBODY TITER: CPT

## 2019-08-07 PROCEDURE — 83550 IRON BINDING TEST: CPT

## 2019-08-07 PROCEDURE — 84165 PROTEIN E-PHORESIS SERUM: CPT

## 2019-08-07 PROCEDURE — 82306 VITAMIN D 25 HYDROXY: CPT

## 2019-08-07 PROCEDURE — 82728 ASSAY OF FERRITIN: CPT

## 2019-08-07 PROCEDURE — 85025 COMPLETE CBC W/AUTO DIFF WBC: CPT

## 2019-08-07 PROCEDURE — 86162 COMPLEMENT TOTAL (CH50): CPT

## 2019-08-07 PROCEDURE — 81003 URINALYSIS AUTO W/O SCOPE: CPT

## 2019-08-07 PROCEDURE — 80069 RENAL FUNCTION PANEL: CPT

## 2019-08-07 PROCEDURE — 86255 FLUORESCENT ANTIBODY SCREEN: CPT

## 2019-08-07 PROCEDURE — 83735 ASSAY OF MAGNESIUM: CPT

## 2019-08-07 PROCEDURE — 80074 ACUTE HEPATITIS PANEL: CPT

## 2019-08-07 PROCEDURE — 86039 ANTINUCLEAR ANTIBODIES (ANA): CPT

## 2019-08-07 PROCEDURE — 83036 HEMOGLOBIN GLYCOSYLATED A1C: CPT

## 2019-08-07 PROCEDURE — 83970 ASSAY OF PARATHORMONE: CPT

## 2019-08-09 LAB
BM IGG SER QL IF: NEGATIVE
DSDNA IGG TITR SER CLIF: NORMAL {TITER}

## 2019-08-10 LAB
ANA INTERPRETIVE COMMENT Q5143: NORMAL
ANTINUCLEAR ANTIBODY (ANA), HEP-2, IGG Q5142: NORMAL
CH50 SERPL-ACNC: 153 CAE UNITS (ref 60–144)

## 2019-08-11 LAB
ALBUMIN SERPL ELPH-MCNC: 4.77 G/DL (ref 3.75–5.01)
ALPHA1 GLOB SERPL ELPH-MCNC: 0.29 G/DL (ref 0.19–0.46)
ALPHA2 GLOB SERPL ELPH-MCNC: 1.08 G/DL (ref 0.48–1.05)
B-GLOBULIN SERPL ELPH-MCNC: 0.78 G/DL (ref 0.48–1.1)
GAMMA GLOB SERPL ELPH-MCNC: 0.78 G/DL (ref 0.62–1.51)
IGA SERPL-MCNC: 98 MG/DL (ref 68–408)
IGG SERPL-MCNC: 639 MG/DL (ref 768–1632)
IGM SERPL-MCNC: 62 MG/DL (ref 35–263)
INTERPRETATION SERPL IFE-IMP: ABNORMAL
INTERPRETATION SERPL IFE-IMP: ABNORMAL
KAPPA LC FREE SER-MCNC: 7.31 MG/DL (ref 0.33–1.94)
KAPPA LC FREE/LAMBDA FREE SER NEPH: 2.61 {RATIO} (ref 0.26–1.65)
LAMBDA LC FREE SERPL-MCNC: 2.8 MG/DL (ref 0.57–2.63)
MYELOPEROXIDASE AB SER-ACNC: 0 AU/ML (ref 0–19)
NUCLEAR IGG SER QL IA: NORMAL
PATHOLOGY STUDY: ABNORMAL
PROT SERPL-MCNC: 7.7 G/DL (ref 6–8.3)
PROTEINASE3 AB SER-ACNC: 1 AU/ML (ref 0–19)

## 2019-09-17 ENCOUNTER — OFFICE VISIT (OUTPATIENT)
Dept: PULMONOLOGY | Facility: HOSPICE | Age: 74
End: 2019-09-17
Payer: COMMERCIAL

## 2019-09-17 VITALS
WEIGHT: 210 LBS | DIASTOLIC BLOOD PRESSURE: 56 MMHG | OXYGEN SATURATION: 98 % | BODY MASS INDEX: 31.83 KG/M2 | HEIGHT: 68 IN | SYSTOLIC BLOOD PRESSURE: 98 MMHG | RESPIRATION RATE: 16 BRPM | HEART RATE: 66 BPM

## 2019-09-17 DIAGNOSIS — J96.11 CHRONIC RESPIRATORY FAILURE WITH HYPOXIA (HCC): ICD-10-CM

## 2019-09-17 DIAGNOSIS — Z87.891 FORMER SMOKER: ICD-10-CM

## 2019-09-17 DIAGNOSIS — E66.9 OBESITY (BMI 30-39.9): ICD-10-CM

## 2019-09-17 DIAGNOSIS — J98.6 ELEVATED HEMIDIAPHRAGM: ICD-10-CM

## 2019-09-17 DIAGNOSIS — J41.0 SIMPLE CHRONIC BRONCHITIS (HCC): ICD-10-CM

## 2019-09-17 PROCEDURE — 99214 OFFICE O/P EST MOD 30 MIN: CPT | Performed by: NURSE PRACTITIONER

## 2019-09-17 ASSESSMENT — ENCOUNTER SYMPTOMS
COUGH: 0
MYALGIAS: 0
NEUROLOGICAL NEGATIVE: 1
GASTROINTESTINAL NEGATIVE: 1
EYE DISCHARGE: 0
CARDIOVASCULAR NEGATIVE: 1
SHORTNESS OF BREATH: 1
HEMOPTYSIS: 0
NECK PAIN: 0
EYE PAIN: 0
BRUISES/BLEEDS EASILY: 0
CONSTITUTIONAL NEGATIVE: 1
PSYCHIATRIC NEGATIVE: 1
WHEEZING: 0
SPUTUM PRODUCTION: 0
BACK PAIN: 1
FALLS: 0

## 2019-09-17 NOTE — PROGRESS NOTES
Chief Complaint   Patient presents with   • COPD     Last seen 03/26/19         HPI: This patient is a 73 y.o. 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   History of CHF, managed by cardiology.     She was hospitalized in May for nausea and vomiting x2 weeks & FLAVIA secondary dehydration versus uremia. She declined dialysis in the hospital. She was treated with Zofran, fluid resuscitation, insulin, Kayexalate to remove potassium and monitor closely in the ICU. Kidney function improved.  She followed up outpatient for renal biopsy.  She follows with nephrology and is undergoing work-up for kidney failure.    She has a history of pulmonary nodules stable over a two-year interval consistent with benign etiology.  Chest x-ray during hospitalization showed left basilar atelectasis.     PFTs show mixed restrictive and obstructive changes with an FEV1 of 1.50 L 62% predicted, FEV1 FVC ratio of 75.  TLC 76% predicted, DLCO 58% predicted.  Consistent with COPD/emphysema and right hemidiaphragm elevation/paralysis.  She has no evidence of ILD on x-ray. She uses Spiriva with subjective benefit.  She discontinued Symbicort due to lack of benefit.  She has never required albuterol and declines prescription. She has declined pulm rehab.   Her breathing's been stable.  She reports shortness of breath is baseline.  She denies other pulmonary complaints.     She was hospitalized in March 2018 for shortness of breath, worsening lower extremity edema.  Admitted for CHF exacerbation with preserved EF LVEF of 75%.  Echocardiogram showed fulminant failure indicating severe pulmonary hypertension (RVSP 95mm Hg) she was aggressively diuresed with symptomatic improvement.  She was seen by Dr Pritchett on outpt basis.  Right heart cath was performed and showed only mild pulmonary pressures with an RVSP of 26  "mmHg.       Patient has presumed sleep apnea but has adamantly declined further workup, she would not tolerate CPAP due to claustrophobia.      Past Medical History:   Diagnosis Date   • Anemia    • Arthritis    • Back pain    • Breath shortness     O2  for paralysis of diaphram right   • CATARACT    • Congestive heart failure (HCC)    • COPD (chronic obstructive pulmonary disease) (HCC)    • Diabetes    • EMPHYSEMA     mild   • Fall    • Hiatus hernia syndrome    • Hyperkalemia 3/23/2018   • Hyperlipidemia    • Hypertension    • Indigestion    • Other specified disorder of intestines     occas diarrhea   • Pain    • Paralysis of diaphragm     right side   • Renal insufficiency 3/23/2018   • Snoring    • Ulcer     \"bleeding stomach ulcer\"       Social History     Tobacco Use   • Smoking status: Former Smoker     Packs/day: 2.00     Years: 20.00     Pack years: 40.00     Types: Cigarettes     Last attempt to quit: 2007     Years since quittin.7   • Smokeless tobacco: Never Used   Substance Use Topics   • Alcohol use: No   • Drug use: No       Family History   Problem Relation Age of Onset   • Breast Cancer Mother    • Cancer Father    • Heart Failure Maternal Grandmother        Immunization History   Administered Date(s) Administered   • Influenza Vaccine Quad Inj (Pf) 10/02/2018   • Pneumococcal Conjugate Vaccine (Prevnar/PCV-13) 2018       Current medications as of today   Current Outpatient Medications   Medication Sig Dispense Refill   • Ergocalciferol (VITAMIN D2 PO) Take  by mouth.     • insulin glargine (LANTUS) 100 UNIT/ML Solution Inject 30 Units as instructed every bedtime.     • metFORMIN (GLUCOPHAGE) 850 MG Tab Take 850 mg by mouth 2 times a day, with meals.     • torsemide (DEMADEX) 20 MG Tab Take 40 mg by mouth 2 Times a Day.     • ezetimibe-simvastatin (VYTORIN) 10-40 MG per tablet Take 1 Tab by mouth every evening.     • omeprazole (PRILOSEC) 20 MG delayed-release capsule Take 20 mg " "by mouth every evening.     • temazepam (RESTORIL) 30 MG capsule Take 30 mg by mouth at bedtime as needed for Sleep.     • tiotropium (SPIRIVA HANDIHALER) 18 MCG Cap Inhale 18 mcg by mouth every evening.     • lisinopril (PRINIVIL) 10 MG Tab Take 10 mg by mouth every day.     • HYDROcodone-acetaminophen (NORCO) 7.5-325 MG per tablet Take 1 Tab by mouth every four hours as needed for Severe Pain.     • magnesium oxide (MAG-OX) 400 MG Tab Take 400 mg by mouth every day.     • Calcium Carb-Cholecalciferol (CALCIUM 1000 + D) 1000-800 MG-UNIT Tab Take 1 Tab by mouth every evening.       No current facility-administered medications for this visit.        Allergies: Zoloft; Amaryl [amaryl]; Amaryl [glimepiride]; Amoxicillin; Byetta; Epinephrine; Hctz; Iodine; Lasix [furosemide]; Latex; Lexapro; Penicillins; and Spironolactone    BP (!) 98/56 (BP Location: Right arm, Patient Position: Sitting, BP Cuff Size: Adult)   Pulse 66   Resp 16   Ht 1.727 m (5' 8\")   Wt 95.3 kg (210 lb)   SpO2 98%       Review of Systems   Constitutional: Negative.    HENT: Negative.    Eyes: Negative for pain and discharge.   Respiratory: Positive for shortness of breath. Negative for cough, hemoptysis, sputum production and wheezing.    Cardiovascular: Negative.    Gastrointestinal: Negative.    Musculoskeletal: Positive for back pain. Negative for falls, joint pain, myalgias and neck pain.   Skin: Negative.    Neurological: Negative.    Endo/Heme/Allergies: Negative for environmental allergies. Does not bruise/bleed easily.   Psychiatric/Behavioral: Negative.        Physical Exam   Constitutional: She is oriented to person, place, and time and well-developed, well-nourished, and in no distress.   HENT:   Head: Normocephalic and atraumatic.   Eyes: Pupils are equal, round, and reactive to light.   Neck: Normal range of motion. Neck supple. No tracheal deviation present.   Cardiovascular: Normal rate, regular rhythm and normal heart sounds. "   Pulmonary/Chest: Breath sounds normal. No respiratory distress. She has no wheezes. She has no rales.   Diminished T/O   Musculoskeletal: Normal range of motion. She exhibits no edema.   Neurological: She is alert and oriented to person, place, and time.   Unsteady gait   Skin: Skin is warm and dry.   Psychiatric: Mood, memory, affect and judgment normal.   Vitals reviewed.      Diagnoses/Plan:    1. Simple chronic bronchitis (HCC)  Stable, continue Spiriva daily.  She declines additional inhalers or use of rescue inhaler.    2. Chronic respiratory failure with hypoxia (HCC)  Stable, continue oxygen 6 L pulse 24/7    3. Elevated hemidiaphragm      4. Former smoker  Permanent smoking cessation advised    5. Obesity (BMI 30-39.9)  She is limited with exercise due to lung disease and back pain.  She tries to walk when possible.  - Patient identified as having weight management issue.  Appropriate orders and counseling given.    She plans to get influenza vaccine with her PCP.    F/U in 6 months, sooner if needed      This dictation was created using voice recognition software. The accuracy of the dictation is limited to the abilities of the software. I expect there may be some errors of grammar and possibly content.

## 2019-09-19 ENCOUNTER — HOSPITAL ENCOUNTER (OUTPATIENT)
Dept: LAB | Facility: MEDICAL CENTER | Age: 74
End: 2019-09-19
Attending: INTERNAL MEDICINE
Payer: COMMERCIAL

## 2019-09-19 LAB
25(OH)D3 SERPL-MCNC: 46 NG/ML (ref 30–100)
ALBUMIN SERPL BCP-MCNC: 4.7 G/DL (ref 3.2–4.9)
ANISOCYTOSIS BLD QL SMEAR: ABNORMAL
APPEARANCE UR: CLEAR
BASOPHILS # BLD AUTO: 0 % (ref 0–1.8)
BASOPHILS # BLD: 0 K/UL (ref 0–0.12)
BILIRUB UR QL STRIP.AUTO: NEGATIVE
BUN SERPL-MCNC: 84 MG/DL (ref 8–22)
CALCIUM SERPL-MCNC: 9.9 MG/DL (ref 8.5–10.5)
CHLORIDE SERPL-SCNC: 98 MMOL/L (ref 96–112)
CO2 SERPL-SCNC: 33 MMOL/L (ref 20–33)
COLOR UR: YELLOW
CREAT SERPL-MCNC: 1.75 MG/DL (ref 0.5–1.4)
CREAT UR-MCNC: 92.2 MG/DL
EOSINOPHIL # BLD AUTO: 0.27 K/UL (ref 0–0.51)
EOSINOPHIL NFR BLD: 3.5 % (ref 0–6.9)
ERYTHROCYTE [DISTWIDTH] IN BLOOD BY AUTOMATED COUNT: 48.9 FL (ref 35.9–50)
GLUCOSE SERPL-MCNC: 151 MG/DL (ref 65–99)
GLUCOSE UR STRIP.AUTO-MCNC: NEGATIVE MG/DL
HCT VFR BLD AUTO: 38.6 % (ref 37–47)
HGB BLD-MCNC: 11.5 G/DL (ref 12–16)
KETONES UR STRIP.AUTO-MCNC: NEGATIVE MG/DL
LEUKOCYTE ESTERASE UR QL STRIP.AUTO: NEGATIVE
LYMPHOCYTES # BLD AUTO: 1.54 K/UL (ref 1–4.8)
LYMPHOCYTES NFR BLD: 20.2 % (ref 22–41)
MAGNESIUM SERPL-MCNC: 2.4 MG/DL (ref 1.5–2.5)
MANUAL DIFF BLD: NORMAL
MCH RBC QN AUTO: 28.7 PG (ref 27–33)
MCHC RBC AUTO-ENTMCNC: 29.8 G/DL (ref 33.6–35)
MCV RBC AUTO: 96.3 FL (ref 81.4–97.8)
MICRO URNS: NORMAL
MICROCYTES BLD QL SMEAR: ABNORMAL
MONOCYTES # BLD AUTO: 0.46 K/UL (ref 0–0.85)
MONOCYTES NFR BLD AUTO: 6.1 % (ref 0–13.4)
MORPHOLOGY BLD-IMP: NORMAL
NEUTROPHILS # BLD AUTO: 5.34 K/UL (ref 2–7.15)
NEUTROPHILS NFR BLD: 70.2 % (ref 44–72)
NITRITE UR QL STRIP.AUTO: NEGATIVE
NRBC # BLD AUTO: 0 K/UL
NRBC BLD-RTO: 0 /100 WBC
PH UR STRIP.AUTO: 5.5 [PH] (ref 5–8)
PHOSPHATE SERPL-MCNC: 3.7 MG/DL (ref 2.5–4.5)
PLATELET # BLD AUTO: 164 K/UL (ref 164–446)
PLATELET BLD QL SMEAR: NORMAL
PMV BLD AUTO: 12.5 FL (ref 9–12.9)
POTASSIUM SERPL-SCNC: 4.5 MMOL/L (ref 3.6–5.5)
PROT UR QL STRIP: NEGATIVE MG/DL
PROT UR-MCNC: 5.8 MG/DL (ref 0–15)
PROT/CREAT UR: 63 MG/G (ref 10–107)
PTH-INTACT SERPL-MCNC: 129.7 PG/ML (ref 14–72)
RBC # BLD AUTO: 4.01 M/UL (ref 4.2–5.4)
RBC BLD AUTO: PRESENT
RBC UR QL AUTO: NEGATIVE
SODIUM SERPL-SCNC: 142 MMOL/L (ref 135–145)
SP GR UR STRIP.AUTO: 1.02
URATE SERPL-MCNC: 12.7 MG/DL (ref 1.9–8.2)
UROBILINOGEN UR STRIP.AUTO-MCNC: 0.2 MG/DL
WBC # BLD AUTO: 7.6 K/UL (ref 4.8–10.8)

## 2019-09-19 PROCEDURE — 82570 ASSAY OF URINE CREATININE: CPT

## 2019-09-19 PROCEDURE — 84156 ASSAY OF PROTEIN URINE: CPT

## 2019-09-19 PROCEDURE — 84550 ASSAY OF BLOOD/URIC ACID: CPT

## 2019-09-19 PROCEDURE — 82306 VITAMIN D 25 HYDROXY: CPT

## 2019-09-19 PROCEDURE — 36415 COLL VENOUS BLD VENIPUNCTURE: CPT

## 2019-09-19 PROCEDURE — 81003 URINALYSIS AUTO W/O SCOPE: CPT

## 2019-09-19 PROCEDURE — 83735 ASSAY OF MAGNESIUM: CPT

## 2019-09-19 PROCEDURE — 80069 RENAL FUNCTION PANEL: CPT

## 2019-09-19 PROCEDURE — 83970 ASSAY OF PARATHORMONE: CPT

## 2019-09-19 PROCEDURE — 85027 COMPLETE CBC AUTOMATED: CPT

## 2019-09-19 PROCEDURE — 85007 BL SMEAR W/DIFF WBC COUNT: CPT

## 2019-09-30 ENCOUNTER — HOSPITAL ENCOUNTER (OUTPATIENT)
Dept: LAB | Facility: MEDICAL CENTER | Age: 74
End: 2019-09-30
Attending: NURSE PRACTITIONER
Payer: COMMERCIAL

## 2019-09-30 LAB
ALBUMIN SERPL BCP-MCNC: 4.6 G/DL (ref 3.2–4.9)
BUN SERPL-MCNC: 67 MG/DL (ref 8–22)
CALCIUM SERPL-MCNC: 10.1 MG/DL (ref 8.5–10.5)
CHLORIDE SERPL-SCNC: 98 MMOL/L (ref 96–112)
CO2 SERPL-SCNC: 32 MMOL/L (ref 20–33)
CREAT SERPL-MCNC: 1.73 MG/DL (ref 0.5–1.4)
GLUCOSE SERPL-MCNC: 163 MG/DL (ref 65–99)
PHOSPHATE SERPL-MCNC: 3.9 MG/DL (ref 2.5–4.5)
POTASSIUM SERPL-SCNC: 4.6 MMOL/L (ref 3.6–5.5)
SODIUM SERPL-SCNC: 140 MMOL/L (ref 135–145)

## 2019-09-30 PROCEDURE — 80069 RENAL FUNCTION PANEL: CPT

## 2019-09-30 PROCEDURE — 36415 COLL VENOUS BLD VENIPUNCTURE: CPT

## 2020-02-11 ENCOUNTER — HOSPITAL ENCOUNTER (OUTPATIENT)
Dept: LAB | Facility: MEDICAL CENTER | Age: 75
End: 2020-02-11
Attending: NURSE PRACTITIONER
Payer: COMMERCIAL

## 2020-02-11 LAB
25(OH)D3 SERPL-MCNC: 21 NG/ML (ref 30–100)
APPEARANCE UR: CLEAR
BASOPHILS # BLD AUTO: 0.4 % (ref 0–1.8)
BASOPHILS # BLD: 0.04 K/UL (ref 0–0.12)
BILIRUB UR QL STRIP.AUTO: NEGATIVE
COLOR UR: YELLOW
EOSINOPHIL # BLD AUTO: 0.47 K/UL (ref 0–0.51)
EOSINOPHIL NFR BLD: 4.9 % (ref 0–6.9)
ERYTHROCYTE [DISTWIDTH] IN BLOOD BY AUTOMATED COUNT: 47 FL (ref 35.9–50)
GLUCOSE UR STRIP.AUTO-MCNC: 500 MG/DL
HCT VFR BLD AUTO: 38.8 % (ref 37–47)
HGB BLD-MCNC: 12 G/DL (ref 12–16)
IMM GRANULOCYTES # BLD AUTO: 0.1 K/UL (ref 0–0.11)
IMM GRANULOCYTES NFR BLD AUTO: 1 % (ref 0–0.9)
KETONES UR STRIP.AUTO-MCNC: NEGATIVE MG/DL
LEUKOCYTE ESTERASE UR QL STRIP.AUTO: NEGATIVE
LYMPHOCYTES # BLD AUTO: 1.37 K/UL (ref 1–4.8)
LYMPHOCYTES NFR BLD: 14.2 % (ref 22–41)
MCH RBC QN AUTO: 29.3 PG (ref 27–33)
MCHC RBC AUTO-ENTMCNC: 30.9 G/DL (ref 33.6–35)
MCV RBC AUTO: 94.9 FL (ref 81.4–97.8)
MICRO URNS: ABNORMAL
MONOCYTES # BLD AUTO: 0.98 K/UL (ref 0–0.85)
MONOCYTES NFR BLD AUTO: 10.1 % (ref 0–13.4)
NEUTROPHILS # BLD AUTO: 6.7 K/UL (ref 2–7.15)
NEUTROPHILS NFR BLD: 69.4 % (ref 44–72)
NITRITE UR QL STRIP.AUTO: NEGATIVE
NRBC # BLD AUTO: 0 K/UL
NRBC BLD-RTO: 0 /100 WBC
PH UR STRIP.AUTO: 5.5 [PH] (ref 5–8)
PLATELET # BLD AUTO: 188 K/UL (ref 164–446)
PMV BLD AUTO: 12.2 FL (ref 9–12.9)
PROT UR QL STRIP: NEGATIVE MG/DL
RBC # BLD AUTO: 4.09 M/UL (ref 4.2–5.4)
RBC UR QL AUTO: NEGATIVE
SP GR UR STRIP.AUTO: 1.01
UROBILINOGEN UR STRIP.AUTO-MCNC: 0.2 MG/DL
WBC # BLD AUTO: 9.7 K/UL (ref 4.8–10.8)

## 2020-02-11 PROCEDURE — 84550 ASSAY OF BLOOD/URIC ACID: CPT

## 2020-02-11 PROCEDURE — 83970 ASSAY OF PARATHORMONE: CPT

## 2020-02-11 PROCEDURE — 84156 ASSAY OF PROTEIN URINE: CPT

## 2020-02-11 PROCEDURE — 82570 ASSAY OF URINE CREATININE: CPT

## 2020-02-11 PROCEDURE — 81003 URINALYSIS AUTO W/O SCOPE: CPT

## 2020-02-11 PROCEDURE — 82306 VITAMIN D 25 HYDROXY: CPT

## 2020-02-11 PROCEDURE — 85025 COMPLETE CBC W/AUTO DIFF WBC: CPT

## 2020-02-11 PROCEDURE — 36415 COLL VENOUS BLD VENIPUNCTURE: CPT

## 2020-02-11 PROCEDURE — 83735 ASSAY OF MAGNESIUM: CPT

## 2020-02-12 LAB
CALCIUM SERPL-MCNC: 9.7 MG/DL (ref 8.5–10.5)
CREAT UR-MCNC: 49.7 MG/DL
MAGNESIUM SERPL-MCNC: 2 MG/DL (ref 1.5–2.5)
PROT UR-MCNC: <4 MG/DL (ref 0–15)
PROT/CREAT UR: NORMAL MG/G (ref 10–107)
PTH-INTACT SERPL-MCNC: 133.4 PG/ML (ref 14–72)
URATE SERPL-MCNC: 13 MG/DL (ref 1.9–8.2)

## 2020-03-23 ENCOUNTER — HOSPITAL ENCOUNTER (OUTPATIENT)
Dept: LAB | Facility: MEDICAL CENTER | Age: 75
End: 2020-03-23
Attending: INTERNAL MEDICINE
Payer: COMMERCIAL

## 2020-03-23 LAB
25(OH)D3 SERPL-MCNC: 27 NG/ML (ref 30–100)
ALBUMIN SERPL BCP-MCNC: 4.9 G/DL (ref 3.2–4.9)
APPEARANCE UR: CLEAR
BASOPHILS # BLD AUTO: 0.2 % (ref 0–1.8)
BASOPHILS # BLD: 0.02 K/UL (ref 0–0.12)
BILIRUB UR QL STRIP.AUTO: NEGATIVE
BUN SERPL-MCNC: 78 MG/DL (ref 8–22)
CALCIUM SERPL-MCNC: 9.7 MG/DL (ref 8.5–10.5)
CHLORIDE SERPL-SCNC: 95 MMOL/L (ref 96–112)
CO2 SERPL-SCNC: 27 MMOL/L (ref 20–33)
COLOR UR: YELLOW
CREAT SERPL-MCNC: 1.86 MG/DL (ref 0.5–1.4)
CREAT UR-MCNC: 141.22 MG/DL
EOSINOPHIL # BLD AUTO: 0.45 K/UL (ref 0–0.51)
EOSINOPHIL NFR BLD: 5.3 % (ref 0–6.9)
ERYTHROCYTE [DISTWIDTH] IN BLOOD BY AUTOMATED COUNT: 45.1 FL (ref 35.9–50)
EST. AVERAGE GLUCOSE BLD GHB EST-MCNC: 189 MG/DL
FASTING STATUS PATIENT QL REPORTED: NORMAL
GLUCOSE SERPL-MCNC: 206 MG/DL (ref 65–99)
GLUCOSE UR STRIP.AUTO-MCNC: NEGATIVE MG/DL
HBA1C MFR BLD: 8.2 % (ref 0–5.6)
HCT VFR BLD AUTO: 37.9 % (ref 37–47)
HGB BLD-MCNC: 11.8 G/DL (ref 12–16)
IMM GRANULOCYTES # BLD AUTO: 0.06 K/UL (ref 0–0.11)
IMM GRANULOCYTES NFR BLD AUTO: 0.7 % (ref 0–0.9)
KETONES UR STRIP.AUTO-MCNC: NEGATIVE MG/DL
LEUKOCYTE ESTERASE UR QL STRIP.AUTO: NEGATIVE
LYMPHOCYTES # BLD AUTO: 1.12 K/UL (ref 1–4.8)
LYMPHOCYTES NFR BLD: 13.2 % (ref 22–41)
MAGNESIUM SERPL-MCNC: 1.7 MG/DL (ref 1.5–2.5)
MCH RBC QN AUTO: 29.3 PG (ref 27–33)
MCHC RBC AUTO-ENTMCNC: 31.1 G/DL (ref 33.6–35)
MCV RBC AUTO: 94 FL (ref 81.4–97.8)
MICRO URNS: NORMAL
MONOCYTES # BLD AUTO: 0.73 K/UL (ref 0–0.85)
MONOCYTES NFR BLD AUTO: 8.6 % (ref 0–13.4)
NEUTROPHILS # BLD AUTO: 6.11 K/UL (ref 2–7.15)
NEUTROPHILS NFR BLD: 72 % (ref 44–72)
NITRITE UR QL STRIP.AUTO: NEGATIVE
NRBC # BLD AUTO: 0 K/UL
NRBC BLD-RTO: 0 /100 WBC
PH UR STRIP.AUTO: 5.5 [PH] (ref 5–8)
PHOSPHATE SERPL-MCNC: 3.9 MG/DL (ref 2.5–4.5)
PLATELET # BLD AUTO: 182 K/UL (ref 164–446)
PMV BLD AUTO: 12.2 FL (ref 9–12.9)
POTASSIUM SERPL-SCNC: 4.6 MMOL/L (ref 3.6–5.5)
PROT UR QL STRIP: NEGATIVE MG/DL
PROT UR-MCNC: 8 MG/DL (ref 0–15)
PROT/CREAT UR: 57 MG/G (ref 10–107)
PTH-INTACT SERPL-MCNC: 171 PG/ML (ref 14–72)
RBC # BLD AUTO: 4.03 M/UL (ref 4.2–5.4)
RBC UR QL AUTO: NEGATIVE
SODIUM SERPL-SCNC: 138 MMOL/L (ref 135–145)
SP GR UR STRIP.AUTO: 1.02
URATE SERPL-MCNC: 11.8 MG/DL (ref 1.9–8.2)
UROBILINOGEN UR STRIP.AUTO-MCNC: 0.2 MG/DL
WBC # BLD AUTO: 8.5 K/UL (ref 4.8–10.8)

## 2020-03-23 PROCEDURE — 85025 COMPLETE CBC W/AUTO DIFF WBC: CPT

## 2020-03-23 PROCEDURE — 84550 ASSAY OF BLOOD/URIC ACID: CPT

## 2020-03-23 PROCEDURE — 83735 ASSAY OF MAGNESIUM: CPT

## 2020-03-23 PROCEDURE — 81003 URINALYSIS AUTO W/O SCOPE: CPT

## 2020-03-23 PROCEDURE — 84156 ASSAY OF PROTEIN URINE: CPT

## 2020-03-23 PROCEDURE — 83036 HEMOGLOBIN GLYCOSYLATED A1C: CPT

## 2020-03-23 PROCEDURE — 83970 ASSAY OF PARATHORMONE: CPT

## 2020-03-23 PROCEDURE — 36415 COLL VENOUS BLD VENIPUNCTURE: CPT

## 2020-03-23 PROCEDURE — 82570 ASSAY OF URINE CREATININE: CPT

## 2020-03-23 PROCEDURE — 80069 RENAL FUNCTION PANEL: CPT

## 2020-03-23 PROCEDURE — 82306 VITAMIN D 25 HYDROXY: CPT

## 2020-07-16 ENCOUNTER — APPOINTMENT (OUTPATIENT)
Dept: PULMONOLOGY | Facility: HOSPICE | Age: 75
End: 2020-07-16
Payer: COMMERCIAL

## 2020-08-25 ENCOUNTER — HOSPITAL ENCOUNTER (EMERGENCY)
Facility: MEDICAL CENTER | Age: 75
End: 2020-08-25
Attending: EMERGENCY MEDICINE | Admitting: EMERGENCY MEDICINE
Payer: COMMERCIAL

## 2020-08-25 VITALS
OXYGEN SATURATION: 97 % | HEIGHT: 69 IN | TEMPERATURE: 97 F | SYSTOLIC BLOOD PRESSURE: 131 MMHG | WEIGHT: 220.46 LBS | BODY MASS INDEX: 32.65 KG/M2 | DIASTOLIC BLOOD PRESSURE: 60 MMHG | HEART RATE: 59 BPM | RESPIRATION RATE: 20 BRPM

## 2020-08-25 DIAGNOSIS — R04.0 EPISTAXIS: ICD-10-CM

## 2020-08-25 DIAGNOSIS — D50.0 IRON DEFICIENCY ANEMIA DUE TO CHRONIC BLOOD LOSS: ICD-10-CM

## 2020-08-25 LAB
BASOPHILS # BLD AUTO: 0.3 % (ref 0–1.8)
BASOPHILS # BLD: 0.03 K/UL (ref 0–0.12)
EOSINOPHIL # BLD AUTO: 0.51 K/UL (ref 0–0.51)
EOSINOPHIL NFR BLD: 5.4 % (ref 0–6.9)
ERYTHROCYTE [DISTWIDTH] IN BLOOD BY AUTOMATED COUNT: 48.3 FL (ref 35.9–50)
HCT VFR BLD AUTO: 36.2 % (ref 37–47)
HGB BLD-MCNC: 11.2 G/DL (ref 12–16)
IMM GRANULOCYTES # BLD AUTO: 0.05 K/UL (ref 0–0.11)
IMM GRANULOCYTES NFR BLD AUTO: 0.5 % (ref 0–0.9)
LYMPHOCYTES # BLD AUTO: 1.37 K/UL (ref 1–4.8)
LYMPHOCYTES NFR BLD: 14.4 % (ref 22–41)
MCH RBC QN AUTO: 29.2 PG (ref 27–33)
MCHC RBC AUTO-ENTMCNC: 30.9 G/DL (ref 33.6–35)
MCV RBC AUTO: 94.5 FL (ref 81.4–97.8)
MONOCYTES # BLD AUTO: 0.92 K/UL (ref 0–0.85)
MONOCYTES NFR BLD AUTO: 9.7 % (ref 0–13.4)
NEUTROPHILS # BLD AUTO: 6.65 K/UL (ref 2–7.15)
NEUTROPHILS NFR BLD: 69.7 % (ref 44–72)
NRBC # BLD AUTO: 0 K/UL
NRBC BLD-RTO: 0 /100 WBC
PLATELET # BLD AUTO: 158 K/UL (ref 164–446)
PMV BLD AUTO: 11.5 FL (ref 9–12.9)
RBC # BLD AUTO: 3.83 M/UL (ref 4.2–5.4)
WBC # BLD AUTO: 9.5 K/UL (ref 4.8–10.8)

## 2020-08-25 PROCEDURE — 99284 EMERGENCY DEPT VISIT MOD MDM: CPT

## 2020-08-25 PROCEDURE — 85025 COMPLETE CBC W/AUTO DIFF WBC: CPT

## 2020-08-25 PROCEDURE — 36415 COLL VENOUS BLD VENIPUNCTURE: CPT

## 2020-08-25 ASSESSMENT — FIBROSIS 4 INDEX: FIB4 SCORE: 1.81

## 2020-08-25 NOTE — ED NOTES
Pt ambulatory to [facility]. Pt placed on monitor. NO active bleeding at this time.   Agree with triage note.   Chart up and ready for ERP now.

## 2020-08-25 NOTE — ED TRIAGE NOTES
Chief Complaint   Patient presents with   • Epistaxis   Pt to triage in [facility].  Pt reports epistaxis since 0700 this morning, history of minor nosebleeds but never for this long.  Pt wears 8L NC home O2 and uses a humidifier.  Denies anticoagulants/ASA.  Bleeding controlled.  Pt educated on triage process and instructed to notify triage RN of any change in status.

## 2020-08-26 NOTE — ED PROVIDER NOTES
"ED Provider Note    CHIEF COMPLAINT  Chief Complaint   Patient presents with   • Epistaxis       HPI  Siri Solis is a 74 y.o. female who presents with nosebleed since 7 AM.  She speculates she is lost 1 to 2 cups of blood.  She is had postnasal drip of blood but no nausea or vomiting.  No blood thinner use.  The patient takes oxygen at 8 L by nasal cannula and her humidifier was empty this morning which is caused nosebleeds in the past.  She does have some dizziness with standing.  No chest pain or syncope.    REVIEW OF SYSTEMS  Pertinent positives include: Nosebleed, dizziness.  Easy bruising  Pertinent negatives include: Syncope, hematuria, bloody stool.  10+ systems reviewed and negative.      PAST MEDICAL HISTORY  Past Medical History:   Diagnosis Date   • Anemia    • Arthritis    • Back pain    • Breath shortness     O2  for paralysis of diaphram right   • CATARACT    • Congestive heart failure (HCC)    • COPD (chronic obstructive pulmonary disease) ([facility])    • Diabetes    • EMPHYSEMA     mild   • Fall    • Hiatus hernia syndrome    • Hyperkalemia 3/23/2018   • Hyperlipidemia    • Hypertension    • Indigestion    • Other specified disorder of intestines     occas diarrhea   • Pain    • Paralysis of diaphragm     right side   • Renal insufficiency 3/23/2018   • Snoring    • Ulcer     \"bleeding stomach ulcer\"       FAMILY HISTORY  Family History   Problem Relation Age of Onset   • Breast Cancer Mother    • Cancer Father    • Heart Failure Maternal Grandmother        SOCIAL HISTORY  Social History     Tobacco Use   • Smoking status: Former Smoker     Packs/day: 2.00     Years: 20.00     Pack years: 40.00     Types: Cigarettes     Quit date: 2007     Years since quittin.6   • Smokeless tobacco: Never Used   Substance Use Topics   • Alcohol use: No   • Drug use: No     Social History     Substance and Sexual Activity   Drug Use No       SURGICAL HISTORY  Past Surgical History:   Procedure " Laterality Date   • RECOVERY  11/6/2013    Performed by [facility] at SURGERY SAME DAY [facility] ORS   • GYN SURGERY      hysterectomy   • OTHER      cataract surgery    • OTHER      carpal tunnel    • OTHER ORTHOPEDIC SURGERY      left knee replacement       CURRENT MEDICATIONS  No current facility-administered medications for this encounter.      Current Outpatient Medications   Medication Sig Dispense Refill   • Ergocalciferol (VITAMIN D2 PO) Take  by mouth.     • insulin glargine (LANTUS) 100 UNIT/ML Solution Inject 30 Units as instructed every bedtime.     • metFORMIN (GLUCOPHAGE) 850 MG Tab Take 850 mg by mouth 2 times a day, with meals.     • torsemide (DEMADEX) 20 MG Tab Take 40 mg by mouth 2 Times a Day.     • ezetimibe-simvastatin (VYTORIN) 10-40 MG per tablet Take 1 Tab by mouth every evening.     • omeprazole (PRILOSEC) 20 MG delayed-release capsule Take 20 mg by mouth every evening.     • temazepam (RESTORIL) 30 MG capsule Take 30 mg by mouth at bedtime as needed for Sleep.     • tiotropium (SPIRIVA HANDIHALER) 18 MCG Cap Inhale 18 mcg by mouth every evening.     • lisinopril (PRINIVIL) 10 MG Tab Take 10 mg by mouth every day.     • HYDROcodone-acetaminophen (NORCO) 7.5-325 MG per tablet Take 1 Tab by mouth every four hours as needed for Severe Pain.     • magnesium oxide (MAG-OX) 400 MG Tab Take 400 mg by mouth every day.     • Calcium Carb-Cholecalciferol (CALCIUM 1000 + D) 1000-800 MG-UNIT Tab Take 1 Tab by mouth every evening.         ALLERGIES  Allergies   Allergen Reactions   • Zoloft Diarrhea   • Amaryl [Amaryl]    • Amaryl [Glimepiride] Shortness of Breath and Rash   • Amoxicillin Rash   • Byetta Rash   • Epinephrine Shortness of Breath     Panic attack, Can't breath   • Hctz      Stopped urinary output   • Iodine Anaphylaxis     contrast   • Lasix [Furosemide]      Leg cramps, stopped urinary output   • Latex    • Lexapro      Leg cramping   • Penicillins Rash   • Spironolactone   "      PHYSICAL EXAM  VITAL SIGNS: /60   Pulse (!) 59   Temp 36.1 °C (97 °F) (Temporal)   Resp 20   Ht 1.753 m (5' 9\")   Wt 100 kg (220 lb 7.4 oz)   SpO2 97%   BMI 32.56 kg/m²   Reviewed and bradycardic, high normal blood pressure  Constitutional: Well developed, Well nourished, well-appearing, moderately overweight.  HENT: Normocephalic, atraumatic, bilateral external ears normal, Wearing mask.  No active bleeding, dried blood in posterior oropharynx, site of bleeding likely anterior left septum without active bleeding.  Eyes: PERRLA, conjunctiva pink, no scleral icterus.   Cardiovascular: Regular S1-S2 without murmur, rub, gallop.  No dependent edema or calf tenderness.  Respiratory: No rales, rhonchi, wheeze or cough.  Gastrointestinal: Soft, nontender, nondistended, no organomegaly.  Skin: No erythema, no rash.   Genitourinary:  No costovertebral angle tenderness.   Neurologic: Alert & oriented x 3, cranial nerves 2-12 intact by passive exam.  No focal deficit noted.  Psychiatric: Affect normal, Judgment normal, Mood normal.     DIFFERENTIAL DIAGNOSIS:  Epistaxis, septum trauma from nasal cannula, dry mucous membranes, anemia.    LABORATORY:  Results for orders placed or performed during the hospital encounter of 08/25/20   CBC WITH DIFFERENTIAL   Result Value Ref Range    WBC 9.5 4.8 - 10.8 K/uL    RBC 3.83 (L) 4.20 - 5.40 M/uL    Hemoglobin 11.2 (L) 12.0 - 16.0 g/dL    Hematocrit 36.2 (L) 37.0 - 47.0 %    MCV 94.5 81.4 - 97.8 fL    MCH 29.2 27.0 - 33.0 pg    MCHC 30.9 (L) 33.6 - 35.0 g/dL    RDW 48.3 35.9 - 50.0 fL    Platelet Count 158 (L) 164 - 446 K/uL    MPV 11.5 9.0 - 12.9 fL    Neutrophils-Polys 69.70 44.00 - 72.00 %    Lymphocytes 14.40 (L) 22.00 - 41.00 %    Monocytes 9.70 0.00 - 13.40 %    Eosinophils 5.40 0.00 - 6.90 %    Basophils 0.30 0.00 - 1.80 %    Immature Granulocytes 0.50 0.00 - 0.90 %    Nucleated RBC 0.00 /100 WBC    Neutrophils (Absolute) 6.65 2.00 - 7.15 K/uL    Lymphs " (Absolute) 1.37 1.00 - 4.80 K/uL    Monos (Absolute) 0.92 (H) 0.00 - 0.85 K/uL    Eos (Absolute) 0.51 0.00 - 0.51 K/uL    Baso (Absolute) 0.03 0.00 - 0.12 K/uL    Immature Granulocytes (abs) 0.05 0.00 - 0.11 K/uL    NRBC (Absolute) 0.00 K/uL   Baseline hemoglobin 11.8.    INTERVENTIONS:  After discussion patient declined cautery or packing    COURSE & MEDICAL DECISION MAKING  Well-appearing patient presents with epistaxis likely caused by dry mucous membranes and irritation from her nasal cannula.  She refuses aggressive packing or cautery.  She was given a nasal clamp and instructions should bleeding resume.  She will not use a nasal cannula for 5 days will use a face max.  She will return for uncontrolled bleeding..    This patient has borderline or elevated blood pressure as recorded above and was instructed to followup with primary physician for comprehensive blood pressure evaluation and yearly fasting cholesterol assessment according to to CMS protocol.    PLAN:    Nosebleed without packing handout given  Return for uncontrolled bleeding    [facility], Emergency Dept  [address] [place] [id number] [phone]    As needed for uncontrolled bleeding      CONDITION: Stable.    FINAL IMPRESSION  1. Epistaxis    2. Iron deficiency anemia due to chronic blood loss          Electronically signed by: Trip Javier M.D., 8/25/2020 8:12 PM

## 2020-08-26 NOTE — DISCHARGE INSTRUCTIONS
Epistaxis (Nosebleed)    Do not blow your nose for 5 days and put nothing in the nose except for antibiotic ointment twice daily for five days if you were cauterized.  If bleeding resumes, blow the nose, use neosynephrine spray in the nose, followed by lidocaine jelly, KY jelly or vaseline and clamp the soft tip of the nose 20 minutes.  Repeat twice if not effective.  Return for uncontrolled bleeding.    You had a borderline or high normal blood pressure reading today.  This does not necessarily mean you have hypertension.  Please followup with your/a primary physician for comprehensive blood pressure evaluation and yearly fasting cholesterol assessment.  BP Readings from Last 3 Encounters:   08/25/20 131/60   09/17/19 (!) 98/56   07/09/19 158/79         Nosebleeds can be caused by many conditions including trauma, infections, polyps, foreign bodies, and dry mucous membranes. These conditions are may also include a dry climate, medications, and air conditioning, among other things. Most nosebleeds occur in the front of the nose.  It is because of this location that most nosebleeds can be controlled by pinching the nostrils gently and continuously. Do this for at least ten to twenty minutes. The reason for this long continuous pressure is that you must hold it long enough for the blood to clot. If during that ten to twenty minute time period, pressure is released, the process may have to be started again. The nosebleed may stop by itself, quit with pressure, need concentrated heating (cautery), or stop with pressure from packing.    HOME CARE INSTRUCTIONS  If your nose was packed, try to maintain the pack inside until your caregiver removes it. If the pack starts to fall out, gently replace or cut the end off. Do not remove unless instructed.  Avoid blowing your nose for 12 hours after treatment. This could dislodge the pack or clot and start bleeding again.  If the bleeding starts again, sit up and bending forward,  gently pinch the front half of your nose continuously for twenty minutes.  If bleeding was caused by dry mucous membranes, cover the inside of your nose every morning with Vaseline or bacitracin ointment. Use your little finger tip as an applicator. Do this as needed during dry weather. This will keep the mucous membranes moist and allow them to heal.  Maintain humidity in your home by using less air conditioning or using a humidifier.  Do not use aspirin or medications which make bleeding more likely. Your caregiver can give you recommendations on this.  Resume normal activities as able but try to avoid straining, lifting, or bending at the waist for several days.   If the nosebleeds become recurrent the cause is unknown, your caregiver may suggest laboratory tests.    SEEK IMMEDIATE MEDICAL CARE IF:  Bleeding recurs and cannot be controlled.  There is unusual bleeding from or bruising on other parts of the body.  An unexplained oral temperature above 102º F (38.9º C) develops.  Nosebleeds continue.  There is any worsening of the condition which originally brought you in.  You become light headed, feel faint, become sweaty, or vomit blood.    Document Released: 09/27/2006  Document Re-Released: 06/30/2008  [employer]® Patient Information ©2008 [employer].

## 2020-11-04 ENCOUNTER — OFFICE VISIT (OUTPATIENT)
Dept: SLEEP MEDICINE | Facility: MEDICAL CENTER | Age: 75
End: 2020-11-04
Payer: MEDICARE

## 2020-11-04 VITALS
HEART RATE: 64 BPM | OXYGEN SATURATION: 92 % | RESPIRATION RATE: 16 BRPM | WEIGHT: 220 LBS | TEMPERATURE: 98.1 F | BODY MASS INDEX: 33.34 KG/M2 | HEIGHT: 68 IN | SYSTOLIC BLOOD PRESSURE: 126 MMHG | DIASTOLIC BLOOD PRESSURE: 72 MMHG

## 2020-11-04 DIAGNOSIS — R09.02 HYPOXEMIA: ICD-10-CM

## 2020-11-04 DIAGNOSIS — J98.6 ELEVATED HEMIDIAPHRAGM: ICD-10-CM

## 2020-11-04 DIAGNOSIS — R06.02 SOB (SHORTNESS OF BREATH): ICD-10-CM

## 2020-11-04 DIAGNOSIS — J44.9 CHRONIC OBSTRUCTIVE PULMONARY DISEASE, UNSPECIFIED COPD TYPE (HCC): ICD-10-CM

## 2020-11-04 PROCEDURE — 99214 OFFICE O/P EST MOD 30 MIN: CPT | Performed by: INTERNAL MEDICINE

## 2020-11-04 ASSESSMENT — FIBROSIS 4 INDEX: FIB4 SCORE: 2.08

## 2020-11-04 ASSESSMENT — PAIN SCALES - GENERAL: PAINLEVEL: NO PAIN

## 2020-11-05 NOTE — PROGRESS NOTES
"Chief Complaint   Patient presents with   • Follow-Up     Hypoxemia       HPI:  The patient is a 74-year-old woman with advanced chronic obstructive pulmonary disease.  She requires significant oxygen supplementation.  At home she says she uses up to 8 L/min 24/7.  Her only inhaled medication is Spiriva.  With her high oxygen flow she has experienced nosebleeds at times.  She does use a mask rather than nasal cannula at night for sleeping to minimize nasal irritation.  She has not been able to tolerate CPAP in the past.  PFTs show mixed restrictive and obstructive changes with an FEV1 of 1.50 L 62% predicted, FEV1 FVC ratio of 75.  TLC 76% predicted, DLCO 58% predicted.  Consistent with COPD/emphysema and right hemidiaphragm elevation/paralysis.  She has no evidence of ILD on x-ray  She has a history of congestive heart failure and renal insufficiency.  Her most recent creatinine is 1.86 L.  She is followed by cardiology and nephrology.  Her most recent echocardiogram however shows normal left ventricular systolic function.  A prior echocardiogram estimated RVSP at 95 mmHg.  A right heart catheterization showed her pulmonary artery pressure to be 26 mmHg.    Past Medical History:   Diagnosis Date   • Anemia    • Arthritis    • Back pain    • Breath shortness     O2 24/7 for paralysis of diaphram right   • CATARACT    • Congestive heart failure (HCC)    • COPD (chronic obstructive pulmonary disease) (HCC)    • Diabetes    • EMPHYSEMA     mild   • Fall    • Hiatus hernia syndrome    • Hyperkalemia 3/23/2018   • Hyperlipidemia    • Hypertension    • Indigestion    • Other specified disorder of intestines     occas diarrhea   • Pain    • Paralysis of diaphragm     right side   • Renal insufficiency 3/23/2018   • Snoring    • Ulcer     \"bleeding stomach ulcer\"       ROS:   Constitutional: Denies fevers, chills, night sweats, fatigue or weight loss  Eyes: Denies vision loss, pain, drainage, double vision  Ears, Nose, " Throat: Denies earache, tinnitus, hoarseness  Cardiovascular: Denies chest pain, tightness, palpitations at this time  Respiratory: See HPI  Sleep: See HPI  GI: Denies abdominal pain, nausea, vomiting, diarrhea  : Denies frequent urination, hematuria, painful urination  Musculoskeletal: Denies back pain, painful joints, sore muscles  Neurological: Denies headaches, seizures  Skin: Denies rashes, color changes  Psychiatric: Denies depression or thoughts of suicide  Hematologic: Denies bleeding tendency or clotting tendency  Allergic/Immunologic: Denies rhinitis, skin sensitivity    Social History     Socioeconomic History   • Marital status:      Spouse name: Not on file   • Number of children: Not on file   • Years of education: Not on file   • Highest education level: Not on file   Occupational History   • Not on file   Social Needs   • Financial resource strain: Not on file   • Food insecurity     Worry: Not on file     Inability: Not on file   • Transportation needs     Medical: Not on file     Non-medical: Not on file   Tobacco Use   • Smoking status: Former Smoker     Packs/day: 2.00     Years: 20.00     Pack years: 40.00     Types: Cigarettes     Quit date: 2007     Years since quittin.8   • Smokeless tobacco: Never Used   Substance and Sexual Activity   • Alcohol use: No   • Drug use: No   • Sexual activity: Not on file   Lifestyle   • Physical activity     Days per week: Not on file     Minutes per session: Not on file   • Stress: Not on file   Relationships   • Social connections     Talks on phone: Not on file     Gets together: Not on file     Attends Anabaptist service: Not on file     Active member of club or organization: Not on file     Attends meetings of clubs or organizations: Not on file     Relationship status: Not on file   • Intimate partner violence     Fear of current or ex partner: Not on file     Emotionally abused: Not on file     Physically abused: Not on file     Forced  "sexual activity: Not on file   Other Topics Concern   • Not on file   Social History Narrative   • Not on file     Iodine, Zoloft, Amaryl, Amaryl [glimepiride], Amoxicillin, Byetta, Epinephrine, Hctz, Lasix [furosemide], Latex, Penicillins, Spironolactone, and Lexapro  Current Outpatient Medications on File Prior to Visit   Medication Sig Dispense Refill   • insulin glargine (LANTUS) 100 UNIT/ML Solution Inject 30 Units as instructed every bedtime.     • metFORMIN (GLUCOPHAGE) 850 MG Tab Take 850 mg by mouth 2 times a day, with meals.     • torsemide (DEMADEX) 20 MG Tab Take 40 mg by mouth 2 Times a Day.     • ezetimibe-simvastatin (VYTORIN) 10-40 MG per tablet Take 1 Tab by mouth every evening.     • omeprazole (PRILOSEC) 20 MG delayed-release capsule Take 20 mg by mouth every evening.     • temazepam (RESTORIL) 30 MG capsule Take 30 mg by mouth at bedtime as needed for Sleep.     • tiotropium (SPIRIVA HANDIHALER) 18 MCG Cap Inhale 18 mcg by mouth every evening.     • lisinopril (PRINIVIL) 10 MG Tab Take 10 mg by mouth every day.     • HYDROcodone-acetaminophen (NORCO) 7.5-325 MG per tablet Take 1 Tab by mouth every four hours as needed for Severe Pain.     • Ergocalciferol (VITAMIN D2 PO) Take  by mouth.     • magnesium oxide (MAG-OX) 400 MG Tab Take 400 mg by mouth every day.     • Calcium Carb-Cholecalciferol (CALCIUM 1000 + D) 1000-800 MG-UNIT Tab Take 1 Tab by mouth every evening.       No current facility-administered medications on file prior to visit.      /72   Pulse 64   Temp 36.7 °C (98.1 °F) (Temporal)   Resp 16   Ht 1.727 m (5' 8\")   Wt 99.8 kg (220 lb)   SpO2 92%   Family History   Problem Relation Age of Onset   • Breast Cancer Mother    • Cancer Father    • Heart Failure Maternal Grandmother        Physical Exam:  Distress at rest on supplemental oxygen  HEENT: PERRLA, EOMI, no scleral icterus, no nasal or oral lesions  Neck: No thyromegaly, no adenopathy, no bruits  Mallampatti: Grade " II  Lungs: Distant breath sounds, no wheezes or crackles  Heart: Regular rate and rhythm, no gallops or murmurs  Abdomen: Soft, benign, no organomegaly  Extremities: No clubbing, cyanosis, or edema  Neurologic: Cranial nerve, motor, and sensory exam are normal    1. Chronic obstructive pulmonary disease, unspecified COPD type (HCC)    2. Hypoxemia    3. Elevated hemidiaphragm    4. SOB (shortness of breath)        She will continue oxygen supplementation and Spiriva.  We have suggested the use of naso gel to help minimize nasal irritation.  We recommend yearly influenza vaccination.  She has received pneumococcal vaccination.  We will see her in 6 months or sooner if there are issues.

## 2021-06-23 PROBLEM — R78.81 BACTEREMIA: Status: ACTIVE | Noted: 2021-06-23

## 2021-10-14 ENCOUNTER — OFFICE VISIT (OUTPATIENT)
Dept: MEDICAL GROUP | Facility: CLINIC | Age: 76
End: 2021-10-14
Payer: COMMERCIAL

## 2021-10-14 VITALS
SYSTOLIC BLOOD PRESSURE: 143 MMHG | OXYGEN SATURATION: 96 % | HEART RATE: 76 BPM | WEIGHT: 214 LBS | DIASTOLIC BLOOD PRESSURE: 71 MMHG | BODY MASS INDEX: 32.43 KG/M2 | RESPIRATION RATE: 16 BRPM | HEIGHT: 68 IN

## 2021-10-14 DIAGNOSIS — F51.04 CHRONIC INSOMNIA: ICD-10-CM

## 2021-10-14 DIAGNOSIS — J96.11 CHRONIC RESPIRATORY FAILURE WITH HYPOXIA (HCC): ICD-10-CM

## 2021-10-14 DIAGNOSIS — M51.36 DEGENERATIVE DISC DISEASE, LUMBAR: ICD-10-CM

## 2021-10-14 DIAGNOSIS — E11.65 TYPE 2 DIABETES MELLITUS WITH HYPERGLYCEMIA, WITH LONG-TERM CURRENT USE OF INSULIN (HCC): ICD-10-CM

## 2021-10-14 DIAGNOSIS — Z79.4 TYPE 2 DIABETES MELLITUS WITH HYPERGLYCEMIA, WITH LONG-TERM CURRENT USE OF INSULIN (HCC): ICD-10-CM

## 2021-10-14 DIAGNOSIS — N18.4 CKD (CHRONIC KIDNEY DISEASE) STAGE 4, GFR 15-29 ML/MIN (HCC): ICD-10-CM

## 2021-10-14 PROBLEM — E11.9 TYPE 2 DIABETES MELLITUS WITHOUT COMPLICATIONS (HCC): Status: ACTIVE | Noted: 2017-07-12

## 2021-10-14 PROBLEM — M10.9 GOUT: Status: ACTIVE | Noted: 2020-06-04

## 2021-10-14 PROBLEM — Z13.31 ENCOUNTER FOR SCREENING FOR DEPRESSION: Status: ACTIVE | Noted: 2020-02-18

## 2021-10-14 PROBLEM — N17.9 ACUTE-ON-CHRONIC RENAL FAILURE (HCC): Status: ACTIVE | Noted: 2021-02-04

## 2021-10-14 PROBLEM — R04.0 EPISTAXIS, RECURRENT: Status: ACTIVE | Noted: 2020-11-13

## 2021-10-14 PROBLEM — N18.9 ACUTE-ON-CHRONIC RENAL FAILURE (HCC): Status: ACTIVE | Noted: 2021-02-04

## 2021-10-14 PROBLEM — E66.9 OBESITY WITH BODY MASS INDEX 30 OR GREATER: Status: ACTIVE | Noted: 2020-02-18

## 2021-10-14 PROBLEM — Z79.899 ENCOUNTER FOR LONG-TERM CURRENT USE OF MEDICATION: Status: ACTIVE | Noted: 2021-05-13

## 2021-10-14 PROBLEM — B15.9 VIRAL HEPATITIS A: Status: ACTIVE | Noted: 2021-07-16

## 2021-10-14 PROBLEM — K85.90 ACUTE RECURRENT PANCREATITIS: Status: ACTIVE | Noted: 2021-07-02

## 2021-10-14 PROBLEM — Z79.891 LONG TERM (CURRENT) USE OF OPIATE ANALGESIC: Status: ACTIVE | Noted: 2018-01-22

## 2021-10-14 PROBLEM — E78.2 MIXED HYPERLIPIDEMIA: Status: ACTIVE | Noted: 2021-05-13

## 2021-10-14 PROBLEM — I50.9 HEART FAILURE, UNSPECIFIED (HCC): Status: ACTIVE | Noted: 2018-04-05

## 2021-10-14 PROBLEM — R74.8 ELEVATED LIVER ENZYMES: Status: ACTIVE | Noted: 2021-06-16

## 2021-10-14 PROBLEM — E11.21 DIABETIC NEPHROPATHY (HCC): Status: ACTIVE | Noted: 2019-06-13

## 2021-10-14 PROBLEM — E83.42 HYPOMAGNESEMIA: Status: ACTIVE | Noted: 2017-11-13

## 2021-10-14 PROBLEM — I27.20 PULMONARY HYPERTENSION (HCC): Status: ACTIVE | Noted: 2018-04-05

## 2021-10-14 PROBLEM — E11.42 DIABETIC PERIPHERAL NEUROPATHY ASSOCIATED WITH TYPE 2 DIABETES MELLITUS (HCC): Status: ACTIVE | Noted: 2020-02-18

## 2021-10-14 PROBLEM — Z00.00 PREVENTATIVE HEALTH CARE: Status: ACTIVE | Noted: 2018-01-08

## 2021-10-14 PROCEDURE — 99214 OFFICE O/P EST MOD 30 MIN: CPT | Performed by: FAMILY MEDICINE

## 2021-10-14 RX ORDER — INSULIN LISPRO 100 [IU]/ML
INJECTION, SOLUTION INTRAVENOUS; SUBCUTANEOUS
COMMUNITY
Start: 2021-07-29 | End: 2022-11-04

## 2021-10-14 RX ORDER — ALBUTEROL SULFATE 90 UG/1
2 AEROSOL, METERED RESPIRATORY (INHALATION) 2 TIMES DAILY
COMMUNITY
Start: 2014-12-11

## 2021-10-14 RX ORDER — HYDROCODONE BITARTRATE AND ACETAMINOPHEN 7.5; 325 MG/1; MG/1
1 TABLET ORAL 3 TIMES DAILY
Qty: 90 TABLET | Refills: 0 | Status: SHIPPED | OUTPATIENT
Start: 2021-10-14 | End: 2021-11-13

## 2021-10-14 RX ORDER — PROCHLORPERAZINE 25 MG/1
1 SUPPOSITORY RECTAL
COMMUNITY
Start: 2021-05-13 | End: 2022-05-13

## 2021-10-14 RX ORDER — ALLOPURINOL 100 MG/1
100 TABLET ORAL DAILY
COMMUNITY
Start: 2021-04-26 | End: 2021-12-20

## 2021-10-14 RX ORDER — HYDROCODONE BITARTRATE AND ACETAMINOPHEN 7.5; 325 MG/1; MG/1
1 TABLET ORAL 3 TIMES DAILY
Qty: 90 TABLET | Refills: 0 | Status: SHIPPED | OUTPATIENT
Start: 2021-12-13 | End: 2021-12-22 | Stop reason: SDUPTHER

## 2021-10-14 RX ORDER — BLOOD-GLUCOSE METER
KIT MISCELLANEOUS
COMMUNITY
Start: 2021-05-13

## 2021-10-14 RX ORDER — TEMAZEPAM 30 MG/1
30 CAPSULE ORAL NIGHTLY PRN
Qty: 30 CAPSULE | Refills: 2 | Status: SHIPPED | OUTPATIENT
Start: 2021-10-14 | End: 2022-01-25 | Stop reason: SDUPTHER

## 2021-10-14 RX ORDER — FLURBIPROFEN SODIUM 0.3 MG/ML
SOLUTION/ DROPS OPHTHALMIC
COMMUNITY
Start: 2018-02-16

## 2021-10-14 RX ORDER — HYDROCODONE BITARTRATE AND ACETAMINOPHEN 7.5; 325 MG/1; MG/1
1 TABLET ORAL 3 TIMES DAILY
Qty: 90 TABLET | Refills: 0 | Status: SHIPPED | OUTPATIENT
Start: 2021-11-13 | End: 2021-12-13

## 2021-10-14 RX ORDER — HYDROCODONE BITARTRATE AND ACETAMINOPHEN 7.5; 325 MG/1; MG/1
1 TABLET ORAL 3 TIMES DAILY
COMMUNITY
Start: 2014-04-17 | End: 2021-10-14

## 2021-10-14 ASSESSMENT — PATIENT HEALTH QUESTIONNAIRE - PHQ9: CLINICAL INTERPRETATION OF PHQ2 SCORE: 0

## 2021-10-14 ASSESSMENT — FIBROSIS 4 INDEX: FIB4 SCORE: 1.67

## 2021-10-14 NOTE — PROGRESS NOTES
[facility]     PATIENT ID:  NAME:  Siri Solis  MRN:               [id number]  YOB: 1945    Date: 11:22 AM      CC:  Refill Norco, Temazepam, other follow-up.     HPI: Siri Solis is a 75 y.o. female who presented with chronic low back pain, insomnia,  diabetes, kidney disease,and is chronically on oxygen now at 8L/min by high flow nasal cannula.      Degenerative disc disease, lumbar   data base is reviewed today.   Urine tox screen is on file.   Informed consent for Controlled Substances is on file for 2021 in old [facility] records from [facility].   Refill Norco 7.5-325mg tid for 3 months today.        Chronic insomnia   data base is reviewed today.   Urine tox screen is on file.   Informed consent for Controlled Substances is on file in old records for 2021.  Temazepam 30 mg refilled for 90 days.        CKD (chronic kidney disease) stage 4, GFR 15-29 ml/min ([facility])  Will follow up with Nephrology.     Type 2 DM: sees Endocrinology now.  Has a subQ glucose sensor in place.  On a long acting insulin and short acting insulin with meals.  Last A1c 7.4% in July.      Has chronic hypoxemic respiratory failure and is on Oxygen 24/7.        REVIEW OF SYSTEMS:   No chest pain and no GI bleeding.                PROBLEM LIST  Patient Active Problem List   Diagnosis   • Palpitations   • Tachycardia   • Fatigue   • Dyspnea   • Tricuspid regurgitation   • HTN (hypertension)   • HLD (hyperlipidemia)   • Obesity   • COPD (chronic obstructive pulmonary disease) (CMS-[facility])   • Abnormal ECG   • Edema   • Anemia   • Microcytic anemia   • DM (diabetes mellitus) ([facility])   • Iron deficiency anemia   • Vitamin B12 deficiency (dietary) anemia   • Hypokalemia   • Adrenal mass ([facility])   • Renal insufficiency   • CHF (congestive heart failure) ([facility])   • Chronic respiratory failure with hypoxia ([facility])   • Elevated hemidiaphragm   • Seasonal allergies   • Acute kidney injury ([facility])   • Nausea and vomiting   • Former  smoker   • Obesity (BMI 30-39.9)   • Bacteremia   • Degeneration of intervertebral disc of lumbar region   • Chronic low back pain   • Chronic insomnia   • Basal cell carcinoma of skin   • Acute recurrent pancreatitis   • Acute-on-chronic renal failure (HCC)   • Diabetic peripheral neuropathy associated with type 2 diabetes mellitus (HCC)   • Elevated liver enzymes   • Encounter for long-term current use of medication   • Diabetic nephropathy (HCC)   • Renal insufficiency syndrome   • Obesity with body mass index 30 or greater   • Pulmonary hypertension (HCC)   • Essential hypertension   • Mixed hyperlipidemia   • Debility   • Paralysis of diaphragm   • Type 2 diabetes mellitus with hyperglycemia, with long-term current use of insulin (HCC)   • Type 2 diabetes mellitus without complications (HCC)   • Chronic obstructive pulmonary disease (HCC)   • Respiratory failure, chronic (HCC)   • Heart failure, unspecified (HCC)   • Viral hepatitis A   • Encounter for screening for depression   • Encounter for long-term (current) use of insulin (HCC)   • Preventative health care   • Epistaxis, recurrent   • Gastroesophageal reflux disease   • Gout   • Hypomagnesemia   • Long term (current) use of opiate analgesic   • Long term (current) use of insulin (HCC)        PAST SURGICAL HISTORY:  Past Surgical History:   Procedure Laterality Date   • RECOVERY  11/6/2013    Performed by [facility] at SURGERY SAME DAY [facility] ORS   • GYN SURGERY      hysterectomy   • OTHER      cataract surgery    • OTHER      carpal tunnel    • OTHER ORTHOPEDIC SURGERY      left knee replacement       FAMILY HISTORY:  Family History   Problem Relation Age of Onset   • Breast Cancer Mother    • Cancer Father    • Heart Failure Maternal Grandmother        SOCIAL HISTORY:   Social History     Tobacco Use   • Smoking status: Former Smoker     Packs/day: 2.00     Years: 20.00     Pack years: 40.00     Types: Cigarettes     Quit date: 1/1/2007      Years since quittin.7   • Smokeless tobacco: Never Used   Substance Use Topics   • Alcohol use: No       ALLERGIES:  Allergies   Allergen Reactions   • Escitalopram Anaphylaxis     Leg cramping   • Iodine Anaphylaxis     contrast   • Levofloxacin Itching   • Sertraline Diarrhea   • Zoloft Diarrhea   • Amaryl    • Amaryl [Glimepiride] Shortness of Breath and Rash     Rash   • Amoxicillin Rash     Rash     • Byetta Rash     rash   • Epinephrine Shortness of Breath     Panic attack, Can't breath   • Hctz      Stopped urinary output   • Lasix [Furosemide]      Leg cramps, stopped urinary output   • Latex    • Penicillins Rash     Rash     • Spironolactone    • Lexapro      Leg cramping       OUTPATIENT MEDICATIONS:    Current Outpatient Medications:   •  HUMALOG KWIKPEN 100 UNIT/ML Solution Pen-injector injection PEN, , Disp: , Rfl:   •  albuterol (VENTOLIN HFA) 108 (90 Base) MCG/ACT Aero Soln inhalation aerosol, Inhale 2 Puffs 2 times a day., Disp: , Rfl:   •  Continuous Blood Gluc  (DEXCOM G6 ) Device, 1 Each., Disp: , Rfl:   •  glucose blood (ONE TOUCH ULTRA TEST) strip, ONETOUCH ULTRA BLUE STRP, Disp: , Rfl:   •  B-D ULTRA-FINE 33 LANCETS [facility], Use as instructed- 4 times a day. Please provide the brand covered by insurance -90 days, Disp: , Rfl:   •  Insulin Pen Needle (B-D UF III MINI PEN NEEDLES) 31G X 5 MM Misc, BD PEN NEEDLE MINI U/F 31G X 5 MM, Disp: , Rfl:   •  HYDROcodone-acetaminophen (NORCO) 7.5-325 MG per tablet, Take 1 Tablet by mouth 3 times a day. Severe chronic pain., Disp: , Rfl:   •  insulin glargine (LANTUS) 100 UNIT/ML Solution, Inject 30 Units as instructed every bedtime., Disp: , Rfl:   •  torsemide (DEMADEX) 20 MG Tab, Take 40 mg by mouth 2 Times a Day., Disp: , Rfl:   •  ezetimibe-simvastatin (VYTORIN) 10-40 MG per tablet, Take 1 Tab by mouth every evening., Disp: , Rfl:   •  omeprazole (PRILOSEC) 20 MG delayed-release capsule, Take 20 mg by mouth every evening., Disp: ,  "Rfl:   •  temazepam (RESTORIL) 30 MG capsule, Take 30 mg by mouth at bedtime as needed for Sleep., Disp: , Rfl:   •  tiotropium (SPIRIVA HANDIHALER) 18 MCG Cap, Inhale 18 mcg by mouth every evening., Disp: , Rfl:   •  lisinopril (PRINIVIL) 10 MG Tab, Take 10 mg by mouth every day., Disp: , Rfl:   •  HYDROcodone-acetaminophen (NORCO) 7.5-325 MG per tablet, Take 1 Tab by mouth every four hours as needed for Severe Pain., Disp: , Rfl:   •  allopurinol (ZYLOPRIM) 100 MG Tab, Take 100 mg by mouth every day., Disp: , Rfl:   •  vitamin D (VITAMIND D3) 1000 UNIT Tab, Take 1,000 Units by mouth every day., Disp: , Rfl:   •  Ergocalciferol (VITAMIN D2 PO), Take  by mouth., Disp: , Rfl:   •  metFORMIN (GLUCOPHAGE) 850 MG Tab, Take 850 mg by mouth 2 times a day, with meals., Disp: , Rfl:   •  magnesium oxide (MAG-OX) 400 MG Tab, Take 400 mg by mouth every day., Disp: , Rfl:   •  Calcium Carb-Cholecalciferol (CALCIUM 1000 + D) 1000-800 MG-UNIT Tab, Take 1 Tab by mouth every evening., Disp: , Rfl:     PHYSICAL EXAM:  Vitals:    10/14/21 1008   BP: 143/71   BP Location: Right arm   Patient Position: Sitting   BP Cuff Size: Adult   Pulse: 76   Resp: 16   SpO2: 96%   Weight: 97.1 kg (214 lb)   Height: 1.727 m (5' 8\")       General: Pt resting in NAD, cooperative   Skin:  Pink, warm and dry.    Lungs:  Symmetrical.  CTAB, good air movement except at right base.    Cardiovascular:  S1/S2 RRR   Abdomen:  Abdomen is soft, nontender  Extremities:  Full range of motion.  CNS:  Muscle tone is normal. No gross focal neurologic deficits      ASSESSMENT/PLAN:   75 y.o. female     1. Degenerative disc disease, lumbar   data base is reviewed today.   Urine tox screen is on file.   Informed consent for Controlled Substances is on file for 2021 in her old records.  Norco 7.5/325mg refilled for 3 months.       2. Chronic insomnia  Temazepam 30 mg refilled for 3 months.    reviewed.      3. Type 2 diabetes mellitus with hyperglycemia, with " long-term current use of insulin ([facility])  Last A1c 7/4% on 7/7/21.  Now seeing Endocrinology in [place].      4. CKD (chronic kidney disease) stage 4, GFR 15-29 ml/min ([facility])  Has had slow renal deterioration,  likely due to her Diabetes.      5. Chronic respiratory failure with hypoxia ([facility])  Continuous Oxygen therapy at 8L/minute.        Lake Ochoa MD  [facility]

## 2021-10-14 NOTE — PATIENT INSTRUCTIONS
[place] 7/325mg refilled for 3 months.       Temazepam refilled for  3 months     Recheck 3 months.

## 2021-10-14 NOTE — ASSESSMENT & PLAN NOTE
[facility] data base is reviewed today.   Urine tox screen is on file.   Informed consent for Controlled Substances is on file for 2021 in old [facility] records from [facility].   Refill Norco 7.5-325mg tid for 3 months today.

## 2021-10-14 NOTE — ASSESSMENT & PLAN NOTE
[facility] data base is reviewed today.   Urine tox screen is on file.   Informed consent for Controlled Substances is on file in old records for 2021.  Temazepam 30 mg refilled for 90 days.

## 2021-10-27 NOTE — TELEPHONE ENCOUNTER
Last seen: 10/14/21 by Dr. Ochoa  Next appt: 11/11/21 with Dr. Ochoa     Was the patient seen in the last year in this department? Yes   Does patient have an active prescription for medications requested? No   Received Request Via: Pharmacy

## 2021-10-28 RX ORDER — EZETIMIBE AND SIMVASTATIN 10; 40 MG/1; MG/1
1 TABLET ORAL DAILY
Qty: 90 TABLET | Refills: 3 | Status: ON HOLD | OUTPATIENT
Start: 2021-10-28 | End: 2023-06-20

## 2021-11-09 NOTE — TELEPHONE ENCOUNTER
Received request via: Pharmacy    Was the patient seen in the last year in this department? Yes- 07/16/2021    Does the patient have an active prescription (recently filled or refills available) for medication(s) requested? No

## 2021-11-11 ENCOUNTER — OFFICE VISIT (OUTPATIENT)
Dept: MEDICAL GROUP | Facility: CLINIC | Age: 76
End: 2021-11-11
Payer: COMMERCIAL

## 2021-11-11 VITALS
OXYGEN SATURATION: 94 % | HEART RATE: 74 BPM | DIASTOLIC BLOOD PRESSURE: 74 MMHG | SYSTOLIC BLOOD PRESSURE: 136 MMHG | WEIGHT: 219 LBS | HEIGHT: 68 IN | RESPIRATION RATE: 16 BRPM | BODY MASS INDEX: 33.19 KG/M2

## 2021-11-11 DIAGNOSIS — Z79.4 TYPE 2 DIABETES MELLITUS WITH HYPERGLYCEMIA, WITH LONG-TERM CURRENT USE OF INSULIN (HCC): ICD-10-CM

## 2021-11-11 DIAGNOSIS — M51.36 DEGENERATION OF INTERVERTEBRAL DISC OF LUMBAR REGION: ICD-10-CM

## 2021-11-11 DIAGNOSIS — E11.65 TYPE 2 DIABETES MELLITUS WITH HYPERGLYCEMIA, WITH LONG-TERM CURRENT USE OF INSULIN (HCC): ICD-10-CM

## 2021-11-11 PROBLEM — N18.4 STAGE 4 CHRONIC KIDNEY DISEASE (HCC): Status: ACTIVE | Noted: 2021-02-19

## 2021-11-11 PROCEDURE — 99213 OFFICE O/P EST LOW 20 MIN: CPT | Performed by: FAMILY MEDICINE

## 2021-11-11 RX ORDER — OMEPRAZOLE 20 MG/1
20 CAPSULE, DELAYED RELEASE ORAL EVERY EVENING
Qty: 90 CAPSULE | Refills: 3 | Status: SHIPPED | OUTPATIENT
Start: 2021-11-11 | End: 2022-10-20

## 2021-11-11 RX ORDER — TORSEMIDE 20 MG/1
40 TABLET ORAL 2 TIMES DAILY
Qty: 30 TABLET | Refills: 0 | Status: SHIPPED | OUTPATIENT
Start: 2021-11-11 | End: 2021-11-29

## 2021-11-11 RX ORDER — LISINOPRIL 10 MG/1
10 TABLET ORAL DAILY
Qty: 90 TABLET | Refills: 3 | Status: SHIPPED | OUTPATIENT
Start: 2021-11-11 | End: 2022-10-20

## 2021-11-11 RX ORDER — INSULIN GLARGINE 100 [IU]/ML
26 INJECTION, SOLUTION SUBCUTANEOUS EVERY EVENING
Refills: 0 | OUTPATIENT
Start: 2021-11-11 | End: 2022-11-11

## 2021-11-11 ASSESSMENT — FIBROSIS 4 INDEX: FIB4 SCORE: 1.67

## 2021-11-11 NOTE — ASSESSMENT & PLAN NOTE
Needs DMV form filled out today for driving.  I feel she is fine to drive as needed, and can avoid opiates at those times. .

## 2021-11-11 NOTE — PROGRESS NOTES
[facility] MEDICINE     PATIENT ID:  NAME:  Siri Solis  MRN:               [id number]  YOB: 1945    Date: 10:44 AM      CC:  Diabetes,  DMV form      HPI: Siri Solis is a 75 y.o. female who presented with     1.  Her diabetes has been fine.  Last A1c 7.4%.. Needs some meds refilled.  Her Lantus Is filled by Endocrinology.      2. Needs DMV clearance ro drive and has a form to be filled out today.    3. Taking her BP meds daily.  Follows with Nephrology also.     4. Takes Norco most days for her back pain but can avoid it if driving.       REVIEW OF SYSTEMS:   Ten systems reviewed and were negative except as noted in the HPI.                PROBLEM LIST  Patient Active Problem List   Diagnosis   • Palpitations   • Tachycardia   • Fatigue   • Dyspnea   • Tricuspid regurgitation   • Hypertension   • HLD (hyperlipidemia)   • Obesity   • COPD (chronic obstructive pulmonary disease) (CMS-[facility])   • Abnormal ECG   • Edema   • Anemia   • Microcytic anemia   • DM (diabetes mellitus) ([facility])   • Iron deficiency anemia   • Vitamin B12 deficiency (dietary) anemia   • Hypokalemia   • Adrenal mass ([facility])   • Hyperkalemia   • Renal insufficiency   • CHF (congestive heart failure) ([facility])   • Chronic respiratory failure with hypoxia ([facility])   • Elevated hemidiaphragm   • Seasonal allergies   • Acute kidney injury (HCC)   • Nausea and vomiting   • Former smoker   • Obesity (BMI 30-39.9)   • Bacteremia   • Degeneration of intervertebral disc of lumbar region   • Chronic low back pain   • Chronic insomnia   • Basal cell carcinoma of skin   • Acute pancreatitis without necrosis or infection, unspecified   • Acute renal failure (HCC)   • Diabetic peripheral neuropathy associated with type 2 diabetes mellitus (HCC)   • Elevated liver enzymes   • Encounter for long-term current use of medication   • Diabetic nephropathy (HCC)   • Renal insufficiency syndrome   • Obesity with body mass index 30 or greater   • Pulmonary  hypertension (HCC)   • Essential hypertension   • Mixed hyperlipidemia   • Debility   • Paralysis of diaphragm   • Type 2 diabetes mellitus with hyperglycemia, with long-term current use of insulin (HCC)   • Type 2 diabetes mellitus without complications (HCC)   • Chronic obstructive pulmonary disease (HCC)   • Respiratory failure, chronic (HCC)   • Heart failure, unspecified (HCC)   • Viral hepatitis A   • Encounter for screening for depression   • Encounter for long-term (current) use of insulin (HCC)   • Preventative health care   • Epistaxis, recurrent   • Gastroesophageal reflux disease   • Gout   • Hypomagnesemia   • Long term (current) use of opiate analgesic   • Long term (current) use of insulin (HCC)   • Stage 4 chronic kidney disease (HCC)        PAST SURGICAL HISTORY:  Past Surgical History:   Procedure Laterality Date   • RECOVERY  2013    Performed by [facility] at SURGERY SAME DAY [facility]   • GYN SURGERY      hysterectomy   • OTHER      cataract surgery    • OTHER      carpal tunnel    • OTHER ORTHOPEDIC SURGERY      left knee replacement       FAMILY HISTORY:  Family History   Problem Relation Age of Onset   • Breast Cancer Mother    • Cancer Father    • Heart Failure Maternal Grandmother        SOCIAL HISTORY:   Social History     Tobacco Use   • Smoking status: Former Smoker     Packs/day: 2.00     Years: 20.00     Pack years: 40.00     Types: Cigarettes     Quit date: 2007     Years since quittin.8   • Smokeless tobacco: Never Used   Substance Use Topics   • Alcohol use: No       ALLERGIES:  Allergies   Allergen Reactions   • Byetta Rash     rash   • Epinephrine    • Escitalopram Anaphylaxis     Leg cramping   • Furosemide Unspecified     Leg cramps, stopped urinary output  Leg cramps, stopped urinary output     • Iodine Anaphylaxis and Rash     contrast  contrast     • Latex Anaphylaxis and Rash   • Levofloxacin Itching   • Lexapro      Leg cramping   • Penicillins  Rash     Rash    Rash  Rash     • Sertraline Diarrhea   • Zoloft Diarrhea   • Amaryl    • Amaryl [Glimepiride] Shortness of Breath and Rash     Rash   • Amoxicillin Rash     Rash     • Epinephrine Shortness of Breath     Panic attack, Can't breath   • Exenatide Rash     rash   • Hctz      Stopped urinary output   • Metformin Hives, Rash and Itching     Rash    • Spironolactone Unspecified       OUTPATIENT MEDICATIONS:    Current Outpatient Medications:   •  ezetimibe-simvastatin (VYTORIN) 10-40 MG per tablet, Take 1 Tablet by mouth every day., Disp: 90 Tablet, Rfl: 3  •  HUMALOG KWIKPEN 100 UNIT/ML Solution Pen-injector injection PEN, , Disp: , Rfl:   •  allopurinol (ZYLOPRIM) 100 MG Tab, Take 100 mg by mouth every day., Disp: , Rfl:   •  Continuous Blood Gluc  (DEXCOM G6 ) Device, 1 Each., Disp: , Rfl:   •  vitamin D (VITAMIND D3) 1000 UNIT Tab, Take 1,000 Units by mouth every day., Disp: , Rfl:   •  B-D ULTRA-FINE 33 LANCETS [facility], Use as instructed- 4 times a day. Please provide the brand covered by insurance -90 days, Disp: , Rfl:   •  Insulin Pen Needle (B-D UF III MINI PEN NEEDLES) 31G X 5 MM Misc, BD PEN NEEDLE MINI U/F 31G X 5 MM, Disp: , Rfl:   •  HYDROcodone-acetaminophen (NORCO) 7.5-325 MG per tablet, Take 1 Tablet by mouth 3 times a day for 30 days., Disp: 90 Tablet, Rfl: 0  •  temazepam (RESTORIL) 30 MG capsule, Take 1 Capsule by mouth at bedtime as needed for Sleep for up to 92 days., Disp: 30 Capsule, Rfl: 2  •  torsemide (DEMADEX) 20 MG Tab, Take 40 mg by mouth 2 Times a Day., Disp: , Rfl:   •  omeprazole (PRILOSEC) 20 MG delayed-release capsule, Take 20 mg by mouth every evening., Disp: , Rfl:   •  tiotropium (SPIRIVA HANDIHALER) 18 MCG Cap, Inhale 18 mcg by mouth every evening., Disp: , Rfl:   •  lisinopril (PRINIVIL) 10 MG Tab, Take 10 mg by mouth every day., Disp: , Rfl:   •  albuterol (VENTOLIN HFA) 108 (90 Base) MCG/ACT Aero Soln inhalation aerosol, Inhale 2 Puffs 2 times a day.,  "Disp: , Rfl:   •  glucose blood (ONE TOUCH ULTRA TEST) strip, ONETOUCH ULTRA BLUE STRP, Disp: , Rfl:   •  [START ON 11/13/2021] HYDROcodone-acetaminophen (NORCO) 7.5-325 MG per tablet, Take 1 Tablet by mouth 3 times a day for 30 days., Disp: 90 Tablet, Rfl: 0  •  [START ON 12/13/2021] HYDROcodone-acetaminophen (NORCO) 7.5-325 MG per tablet, Take 1 Tablet by mouth 3 times a day for 30 days., Disp: 90 Tablet, Rfl: 0  •  Ergocalciferol (VITAMIN D2 PO), Take  by mouth. (Patient not taking: Reported on 11/11/2021), Disp: , Rfl:   •  metFORMIN (GLUCOPHAGE) 850 MG Tab, Take 850 mg by mouth 2 times a day, with meals. (Patient not taking: Reported on 11/11/2021), Disp: , Rfl:   •  magnesium oxide (MAG-OX) 400 MG Tab, Take 400 mg by mouth every day. (Patient not taking: Reported on 11/11/2021), Disp: , Rfl:   •  Calcium Carb-Cholecalciferol (CALCIUM 1000 + D) 1000-800 MG-UNIT Tab, Take 1 Tab by mouth every evening. (Patient not taking: Reported on 11/11/2021), Disp: , Rfl:     PHYSICAL EXAM:  Vitals:    11/11/21 0947   BP: 136/74   BP Location: Right arm   Patient Position: Sitting   BP Cuff Size: Large adult   Pulse: 74   Resp: 16   SpO2: 94%   Weight: 99.3 kg (219 lb)   Height: 1.727 m (5' 8\")       General: Pt resting in NAD, cooperative   Skin:  Pink, warm and dry.  HEENT: NC/AT. EOMI.  Lungs:  Symmetrical.  CTAB, good air movement   Cardiovascular:  S1/S2 RRR   Abdomen:  Abdomen is soft, nontender  Extremities:  Full range of motion.  CNS:  Muscle tone is normal. No gross focal neurologic deficits      ASSESSMENT/PLAN:   75 y.o. female     Problem List Items Addressed This Visit     Degeneration of intervertebral disc of lumbar region     Needs DMV form filled out today for driving.  I feel she is fine to drive as needed, and can avoid opiates at those times. .           Type 2 diabetes mellitus with hyperglycemia, with long-term current use of insulin (HCC)     Continue current insulin regimen and endocrinology " follow-up.              3. HTN --- stable on current medication regimen.  May refill these maintenance medications for one year as needed.        Lake Ochoa MD  [facility]

## 2021-11-28 DIAGNOSIS — I50.23 ACUTE ON CHRONIC SYSTOLIC CONGESTIVE HEART FAILURE (HCC): ICD-10-CM

## 2021-11-28 DIAGNOSIS — I10 PRIMARY HYPERTENSION: ICD-10-CM

## 2021-11-29 RX ORDER — TORSEMIDE 20 MG/1
20 TABLET ORAL 2 TIMES DAILY
Qty: 180 TABLET | Refills: 3 | Status: SHIPPED | OUTPATIENT
Start: 2021-11-29 | End: 2022-10-20

## 2021-12-20 ENCOUNTER — TELEPHONE (OUTPATIENT)
Dept: MEDICAL GROUP | Facility: CLINIC | Age: 76
End: 2021-12-20

## 2021-12-20 ENCOUNTER — OFFICE VISIT (OUTPATIENT)
Dept: MEDICAL GROUP | Facility: CLINIC | Age: 76
End: 2021-12-20
Payer: COMMERCIAL

## 2021-12-20 VITALS
RESPIRATION RATE: 16 BRPM | SYSTOLIC BLOOD PRESSURE: 108 MMHG | DIASTOLIC BLOOD PRESSURE: 79 MMHG | BODY MASS INDEX: 33.65 KG/M2 | HEIGHT: 68 IN | HEART RATE: 68 BPM | TEMPERATURE: 97 F | WEIGHT: 222 LBS | OXYGEN SATURATION: 94 %

## 2021-12-20 DIAGNOSIS — J96.11 CHRONIC RESPIRATORY FAILURE WITH HYPOXIA (HCC): ICD-10-CM

## 2021-12-20 DIAGNOSIS — Z79.4 TYPE 2 DIABETES MELLITUS WITH HYPERGLYCEMIA, WITH LONG-TERM CURRENT USE OF INSULIN (HCC): ICD-10-CM

## 2021-12-20 DIAGNOSIS — M51.36 DEGENERATIVE DISC DISEASE, LUMBAR: ICD-10-CM

## 2021-12-20 DIAGNOSIS — E11.65 TYPE 2 DIABETES MELLITUS WITH HYPERGLYCEMIA, WITH LONG-TERM CURRENT USE OF INSULIN (HCC): ICD-10-CM

## 2021-12-20 DIAGNOSIS — J40 BRONCHITIS, COMPLICATED: ICD-10-CM

## 2021-12-20 DIAGNOSIS — M51.36 DEGENERATION OF INTERVERTEBRAL DISC OF LUMBAR REGION: ICD-10-CM

## 2021-12-20 PROCEDURE — 99214 OFFICE O/P EST MOD 30 MIN: CPT | Performed by: FAMILY MEDICINE

## 2021-12-20 RX ORDER — CALCITRIOL 0.25 UG/1
1 CAPSULE, LIQUID FILLED ORAL DAILY
COMMUNITY
Start: 2021-11-15 | End: 2023-09-19

## 2021-12-20 RX ORDER — AZITHROMYCIN 250 MG/1
TABLET, FILM COATED ORAL
Qty: 5 TABLET | Refills: 0 | Status: SHIPPED
Start: 2021-12-20 | End: 2022-04-28

## 2021-12-20 RX ORDER — HYDROCODONE BITARTRATE AND ACETAMINOPHEN 7.5; 325 MG/1; MG/1
1 TABLET ORAL 3 TIMES DAILY
Qty: 90 TABLET | Refills: 0 | Status: CANCELLED | OUTPATIENT
Start: 2021-12-20 | End: 2022-01-19

## 2021-12-20 ASSESSMENT — FIBROSIS 4 INDEX: FIB4 SCORE: 1.69

## 2021-12-20 NOTE — PROGRESS NOTES
[facility] MEDICINE     PATIENT ID:  NAME:  Siri Solis  MRN:               [id number]  YOB: 1945    Date: 10:35 AM      CC:  DMV paper work, cough      HPI: Siri Solis is a 76 y.o. female who presented with     Problem   Bronchitis, Complicated    Sick for about ten days with initial 3 days of sore throat and now has a bronchitis and feels it in her lungs.  On supplemental Oxygen 24/7.  Has COPD also.         Type 2 Diabetes Mellitus With Hyperglycemia, With Long-Term Current Use of Insulin ([facility])    On insulin and has a subcutaneous glucose sensor.  Now followed by Endocrinology in [place]. Her last A1c was 7.4% on 7/7/21.  Had a low sugar earlier in our waiting room and she took a glucose tab then.  Feels OK now.      Chronic Respiratory Failure With Hypoxia ([facility])    Has needed Oxygen now for many years.  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   Has seen Pulmonary Medicine.  Gets short of breath with almost any activity.  Mostly is home bound now.      Degeneration of Intervertebral Disc of Lumbar Region    Has had chronic low back pain for many years due to degenerative disc disease.  Unable to take NSAIDs due to GI bleeding in past and CKD.  Has been well controlled on Norco 7.5/325mg for a while.  Needs additional DMV paper work filled out today to drive.          REVIEW OF SYSTEMS:   Ten systems reviewed and were negative except as noted in the HPI.                PROBLEM LIST  Patient Active Problem List   Diagnosis   • Palpitations   • Tachycardia   • Fatigue   • Dyspnea   • Tricuspid regurgitation   • Hypertension   • HLD (hyperlipidemia)   • Obesity   • COPD (chronic obstructive pulmonary disease) (CMS-[facility])   • Abnormal ECG   • Edema   • Anemia   • Microcytic anemia   • DM (diabetes mellitus) ([facility])   • Iron deficiency anemia   • Vitamin B12 deficiency (dietary) anemia   • Hypokalemia   • Adrenal mass ([facility])   • Hyperkalemia   • Renal insufficiency   • CHF (congestive  heart failure) (HCC)   • Chronic respiratory failure with hypoxia (HCC)   • Elevated hemidiaphragm   • Seasonal allergies   • Acute kidney injury (HCC)   • Nausea and vomiting   • Former smoker   • Obesity (BMI 30-39.9)   • Bacteremia   • Degeneration of intervertebral disc of lumbar region   • Chronic low back pain   • Chronic insomnia   • Basal cell carcinoma of skin   • Acute pancreatitis without necrosis or infection, unspecified   • Acute renal failure (HCC)   • Diabetic peripheral neuropathy associated with type 2 diabetes mellitus (HCC)   • Elevated liver enzymes   • Encounter for long-term current use of medication   • Diabetic nephropathy (HCC)   • Renal insufficiency syndrome   • Obesity with body mass index 30 or greater   • Pulmonary hypertension (HCC)   • Essential hypertension   • Mixed hyperlipidemia   • Debility   • Paralysis of diaphragm   • Type 2 diabetes mellitus with hyperglycemia, with long-term current use of insulin (HCC)   • Type 2 diabetes mellitus without complications (HCC)   • Chronic obstructive pulmonary disease (HCC)   • Respiratory failure, chronic (HCC)   • Heart failure, unspecified (HCC)   • Viral hepatitis A   • Encounter for screening for depression   • Encounter for long-term (current) use of insulin (HCC)   • Preventative health care   • Epistaxis, recurrent   • Gastroesophageal reflux disease   • Gout   • Hypomagnesemia   • Long term (current) use of opiate analgesic   • Long term (current) use of insulin (HCC)   • Stage 4 chronic kidney disease (HCC)   • Bronchitis, complicated        PAST SURGICAL HISTORY:  Past Surgical History:   Procedure Laterality Date   • RECOVERY  11/6/2013    Performed by [facility] at SURGERY SAME DAY [facility]   • GYN SURGERY      hysterectomy   • OTHER      cataract surgery    • OTHER      carpal tunnel    • OTHER ORTHOPEDIC SURGERY      left knee replacement       FAMILY HISTORY:  Family History   Problem Relation Age of Onset   •  Breast Cancer Mother    • Cancer Father    • Heart Failure Maternal Grandmother        SOCIAL HISTORY:   Social History     Tobacco Use   • Smoking status: Former Smoker     Packs/day: 2.00     Years: 20.00     Pack years: 40.00     Types: Cigarettes     Quit date: 2007     Years since quittin.9   • Smokeless tobacco: Never Used   Substance Use Topics   • Alcohol use: No       ALLERGIES:  Allergies   Allergen Reactions   • Byetta Rash     rash   • Epinephrine    • Escitalopram Anaphylaxis     Leg cramping   • Furosemide Unspecified     Leg cramps, stopped urinary output  Leg cramps, stopped urinary output     • Iodine Anaphylaxis and Rash     contrast  contrast     • Latex Anaphylaxis and Rash   • Levofloxacin Itching   • Lexapro      Leg cramping   • Penicillins Rash     Rash    Rash  Rash     • Sertraline Diarrhea   • Zoloft Diarrhea   • Amaryl    • Amaryl [Glimepiride] Shortness of Breath and Rash     Rash   • Amoxicillin Rash     Rash     • Epinephrine Shortness of Breath     Panic attack, Can't breath   • Exenatide Rash     rash   • Hctz      Stopped urinary output   • Metformin Hives, Rash and Itching     Rash    • Spironolactone Unspecified       OUTPATIENT MEDICATIONS:    Current Outpatient Medications:   •  calcitRIOL (ROCALTROL) 0.25 MCG Cap, Take 1 Capsule by mouth every day., Disp: , Rfl:   •  azithromycin (ZITHROMAX) 250 MG Tab, 2 tabs by mouth once on first day, then 1 tab daily for four more days (antibiotic)., Disp: 5 Tablet, Rfl: 0  •  torsemide (DEMADEX) 20 MG Tab, Take 1 Tablet by mouth 2 times a day., Disp: 180 Tablet, Rfl: 3  •  omeprazole (PRILOSEC) 20 MG delayed-release capsule, Take 1 Capsule by mouth every evening., Disp: 90 Capsule, Rfl: 3  •  lisinopril (PRINIVIL) 10 MG Tab, Take 1 Tablet by mouth every day., Disp: 90 Tablet, Rfl: 3  •  ezetimibe-simvastatin (VYTORIN) 10-40 MG per tablet, Take 1 Tablet by mouth every day., Disp: 90 Tablet, Rfl: 3  •  HUMALOG KWIKPEN 100 UNIT/ML  "Solution Pen-injector injection PEN, , Disp: , Rfl:   •  albuterol (VENTOLIN HFA) 108 (90 Base) MCG/ACT Aero Soln inhalation aerosol, Inhale 2 Puffs 2 times a day., Disp: , Rfl:   •  Continuous Blood Gluc  (DEXCOM G6 ) Device, 1 Each., Disp: , Rfl:   •  glucose blood (ONE TOUCH ULTRA TEST) strip, ONETOUCH ULTRA BLUE STRP, Disp: , Rfl:   •  B-D ULTRA-FINE 33 LANCETS [facility], Use as instructed- 4 times a day. Please provide the brand covered by insurance -90 days, Disp: , Rfl:   •  Insulin Pen Needle (B-D UF III MINI PEN NEEDLES) 31G X 5 MM Misc, BD PEN NEEDLE MINI U/F 31G X 5 MM, Disp: , Rfl:   •  HYDROcodone-acetaminophen (NORCO) 7.5-325 MG per tablet, Take 1 Tablet by mouth 3 times a day for 30 days., Disp: 90 Tablet, Rfl: 0  •  temazepam (RESTORIL) 30 MG capsule, Take 1 Capsule by mouth at bedtime as needed for Sleep for up to 92 days., Disp: 30 Capsule, Rfl: 2  •  tiotropium (SPIRIVA HANDIHALER) 18 MCG Cap, Inhale 18 mcg by mouth every evening., Disp: , Rfl:     PHYSICAL EXAM:  Vitals:    12/20/21 0947   BP: 108/79   BP Location: Right arm   Patient Position: Sitting   BP Cuff Size: Adult   Pulse: 68   Resp: 16   Temp: 36.1 °C (97 °F)   TempSrc: Tympanic   SpO2: 94%   Weight: 101 kg (222 lb)   Height: 1.727 m (5' 8\")       General: Pt resting in NAD, cooperative   Skin:  Pink, warm and dry.  HEENT: NC/AT. EOMI.  Lungs:  Symmetrical.  Few crackles in lower fields with good air movement.   Cardiovascular:  S1/S2 RRR   Extremities:  Full range of motion.  CNS:  Muscle tone is normal. No gross focal neurologic deficits      ASSESSMENT/PLAN:   76 y.o. female     Problem List Items Addressed This Visit     Bronchitis, complicated     Azithromycin for 5 day course.   Declines course of steroids.          Relevant Medications    azithromycin (ZITHROMAX) 250 MG Tab    Chronic respiratory failure with hypoxia (HCC)     Filled out DMV paper work and detailed her need for Oxygen 24/7 while driving.          " Degeneration of intervertebral disc of lumbar region     DMV form is filled out today and copied of our records.   She will follow-up in February, 2022.         Type 2 diabetes mellitus with hyperglycemia, with long-term current use of insulin (HCC)     Will continue to follow with Endocrinology.   DMV paper work was filed out and mentioned her Diabetes on it.                Lake Ochoa MD  [facility]

## 2021-12-20 NOTE — ASSESSMENT & PLAN NOTE
Will continue to follow with Endocrinology.   DMV paper work was filed out and mentioned her Diabetes on it.

## 2021-12-20 NOTE — LETTER
December 20, 2021        Siri Vines Will  [address] [place] [address]        To Whom It May Concern:    Siri Covarrubias has many medication and vaccine allergies and elects not to have the Covid-19 vaccine due to health risks it poses for her.     If you have any questions or concerns, please don't hesitate to call.        Sincerely,        Lake Ochoa M.D.    Electronically Signed

## 2021-12-21 NOTE — TELEPHONE ENCOUNTER
I called Siri and she confirmed Norco was not prescribed yesterday. She was trying to get a refill at [facility]. I let her know that a controlled substance medication would require an appointment, she forgot to mention that at yesterdays appointment. I let her know I would forward to MD.

## 2021-12-21 NOTE — TELEPHONE ENCOUNTER
I did not order Norco today. I prescribed azithromycin.  Jean Baptiste she need that sent to [facility]?

## 2021-12-21 NOTE — TELEPHONE ENCOUNTER
VOICEMAIL  1. Caller Name: Siri                      Call Back Number: [phone]    2. Message: Patient wasn't able to  [place] medication at [facility]- they won't have medication available until 01/04/2021- she was wondering if the medication can be sent to [facility].     3. Patient approves office to leave a detailed voicemail/MyChart message: N\A

## 2021-12-22 DIAGNOSIS — M51.36 DEGENERATIVE DISC DISEASE, LUMBAR: ICD-10-CM

## 2021-12-22 RX ORDER — HYDROCODONE BITARTRATE AND ACETAMINOPHEN 7.5; 325 MG/1; MG/1
1 TABLET ORAL 3 TIMES DAILY
Qty: 90 TABLET | Refills: 0 | Status: SHIPPED | OUTPATIENT
Start: 2021-12-22 | End: 2022-01-26 | Stop reason: SDUPTHER

## 2021-12-22 NOTE — PROGRESS NOTES
Norco refilled at [facility] in [place], [place], as her [facility] is out of the opiate till 1/4/22.

## 2022-01-25 DIAGNOSIS — F51.04 CHRONIC INSOMNIA: ICD-10-CM

## 2022-01-25 RX ORDER — TEMAZEPAM 30 MG/1
30 CAPSULE ORAL NIGHTLY PRN
Qty: 30 CAPSULE | Refills: 0 | Status: SHIPPED | OUTPATIENT
Start: 2022-01-25 | End: 2022-03-02 | Stop reason: SDUPTHER

## 2022-01-26 DIAGNOSIS — M51.36 DEGENERATIVE DISC DISEASE, LUMBAR: ICD-10-CM

## 2022-01-26 RX ORDER — HYDROCODONE BITARTRATE AND ACETAMINOPHEN 7.5; 325 MG/1; MG/1
1 TABLET ORAL 3 TIMES DAILY
Qty: 90 TABLET | Refills: 0 | Status: SHIPPED | OUTPATIENT
Start: 2022-01-26 | End: 2022-03-02 | Stop reason: SDUPTHER

## 2022-02-16 RX ORDER — INSULIN LISPRO 200 [IU]/ML
INJECTION, SOLUTION SUBCUTANEOUS
COMMUNITY
Start: 2021-12-23 | End: 2022-02-17 | Stop reason: SDUPTHER

## 2022-02-17 DIAGNOSIS — E11.65 TYPE 2 DIABETES MELLITUS WITH HYPERGLYCEMIA, WITH LONG-TERM CURRENT USE OF INSULIN (HCC): ICD-10-CM

## 2022-02-17 DIAGNOSIS — Z79.4 TYPE 2 DIABETES MELLITUS WITH HYPERGLYCEMIA, WITH LONG-TERM CURRENT USE OF INSULIN (HCC): ICD-10-CM

## 2022-02-17 RX ORDER — INSULIN LISPRO 200 [IU]/ML
INJECTION, SOLUTION SUBCUTANEOUS
Qty: 30 ML | Refills: 4 | Status: SHIPPED | OUTPATIENT
Start: 2022-02-17 | End: 2023-02-17

## 2022-03-02 ENCOUNTER — TELEPHONE (OUTPATIENT)
Dept: MEDICAL GROUP | Facility: CLINIC | Age: 77
End: 2022-03-02
Payer: COMMERCIAL

## 2022-03-02 DIAGNOSIS — F51.04 CHRONIC INSOMNIA: ICD-10-CM

## 2022-03-02 DIAGNOSIS — M51.36 DEGENERATIVE DISC DISEASE, LUMBAR: ICD-10-CM

## 2022-03-02 RX ORDER — HYDROCODONE BITARTRATE AND ACETAMINOPHEN 7.5; 325 MG/1; MG/1
1 TABLET ORAL 3 TIMES DAILY
Qty: 90 TABLET | Refills: 0 | Status: SHIPPED | OUTPATIENT
Start: 2022-03-02 | End: 2022-04-28 | Stop reason: SDUPTHER

## 2022-03-02 RX ORDER — TEMAZEPAM 30 MG/1
30 CAPSULE ORAL NIGHTLY PRN
Qty: 30 CAPSULE | Refills: 0 | Status: SHIPPED | OUTPATIENT
Start: 2022-03-02 | End: 2022-04-05 | Stop reason: SDUPTHER

## 2022-03-02 NOTE — TELEPHONE ENCOUNTER
Had to reschedule her appt from 3/4/22 to 4/28/22. We did have to cancel her 3/4 morning appt. Will go ahead and refill her Norco and Temazepam for another month.

## 2022-03-04 ENCOUNTER — APPOINTMENT (OUTPATIENT)
Dept: MEDICAL GROUP | Facility: CLINIC | Age: 77
End: 2022-03-04
Payer: COMMERCIAL

## 2022-04-05 DIAGNOSIS — F51.04 CHRONIC INSOMNIA: ICD-10-CM

## 2022-04-05 RX ORDER — TEMAZEPAM 30 MG/1
30 CAPSULE ORAL NIGHTLY PRN
Qty: 30 CAPSULE | Refills: 2 | Status: SHIPPED | OUTPATIENT
Start: 2022-04-05 | End: 2022-07-06

## 2022-04-28 ENCOUNTER — OFFICE VISIT (OUTPATIENT)
Dept: MEDICAL GROUP | Facility: CLINIC | Age: 77
End: 2022-04-28
Payer: COMMERCIAL

## 2022-04-28 VITALS
HEIGHT: 68 IN | BODY MASS INDEX: 34.1 KG/M2 | WEIGHT: 225 LBS | RESPIRATION RATE: 16 BRPM | DIASTOLIC BLOOD PRESSURE: 63 MMHG | OXYGEN SATURATION: 98 % | HEART RATE: 97 BPM | SYSTOLIC BLOOD PRESSURE: 108 MMHG | TEMPERATURE: 97.8 F

## 2022-04-28 DIAGNOSIS — Z79.4 TYPE 2 DIABETES MELLITUS WITH HYPERGLYCEMIA, WITH LONG-TERM CURRENT USE OF INSULIN (HCC): ICD-10-CM

## 2022-04-28 DIAGNOSIS — M51.36 DEGENERATION OF INTERVERTEBRAL DISC OF LUMBAR REGION: ICD-10-CM

## 2022-04-28 DIAGNOSIS — Z79.899 ENCOUNTER FOR LONG-TERM CURRENT USE OF HIGH RISK MEDICATION: ICD-10-CM

## 2022-04-28 DIAGNOSIS — E11.65 TYPE 2 DIABETES MELLITUS WITH HYPERGLYCEMIA, WITH LONG-TERM CURRENT USE OF INSULIN (HCC): ICD-10-CM

## 2022-04-28 DIAGNOSIS — N18.4 STAGE 4 CHRONIC KIDNEY DISEASE (HCC): ICD-10-CM

## 2022-04-28 DIAGNOSIS — L30.9 ECZEMA, UNSPECIFIED TYPE: ICD-10-CM

## 2022-04-28 DIAGNOSIS — M51.36 DEGENERATIVE DISC DISEASE, LUMBAR: ICD-10-CM

## 2022-04-28 PROCEDURE — 99214 OFFICE O/P EST MOD 30 MIN: CPT | Performed by: FAMILY MEDICINE

## 2022-04-28 RX ORDER — HYDROCODONE BITARTRATE AND ACETAMINOPHEN 7.5; 325 MG/1; MG/1
1 TABLET ORAL EVERY 6 HOURS PRN
Qty: 120 TABLET | Refills: 0 | Status: SHIPPED | OUTPATIENT
Start: 2022-04-28 | End: 2022-05-28

## 2022-04-28 RX ORDER — HYDROCODONE BITARTRATE AND ACETAMINOPHEN 7.5; 325 MG/1; MG/1
1 TABLET ORAL EVERY 6 HOURS PRN
Qty: 120 TABLET | Refills: 0 | Status: SHIPPED | OUTPATIENT
Start: 2022-04-28 | End: 2022-07-20 | Stop reason: SDUPTHER

## 2022-04-28 RX ORDER — ALLOPURINOL 100 MG/1
100 TABLET ORAL DAILY
Status: ON HOLD | COMMUNITY
Start: 2022-03-30 | End: 2023-06-28

## 2022-04-28 RX ORDER — OLOPATADINE HYDROCHLORIDE 7 MG/ML
SOLUTION OPHTHALMIC
COMMUNITY
End: 2024-01-18

## 2022-04-28 ASSESSMENT — FIBROSIS 4 INDEX: FIB4 SCORE: 1.69

## 2022-04-28 NOTE — PROGRESS NOTES
[facility] MEDICINE     PATIENT ID:  NAME:  Siri Solis  MRN:               [id number]  YOB: 1945    Date: 10:56 AM      CC:  Pain follow-up, other problmes      HPI: Siri Solis is a 76 y.o. female who presented with multiple concerns.    Problem   Type 2 Diabetes Mellitus With Hyperglycemia, With Long-Term Current Use of Insulin ([facility])    On insulin and has a subcutaneous glucose sensor.  Now followed by Endocrinology in [place]. Her last A1c was 8% a couple weeks ago.  Had a low sugar last night.      Stage 4 Chronic Kidney Disease ([facility])    Has diabetic kidney disease.  Her renal function has declined over last few years.  Currently has bene stable.  She is followed by Nephrology.  Has many comorbidities.  She is not interested in dialysis at this time.      Degeneration of Intervertebral Disc of Lumbar Region    Has had chronic low back pain for many years due to degenerative disc disease.  Unable to take NSAIDs due to GI bleeding in past and CKD.  Has been well controlled on Norco 7.5/325mg for a while.  Seems that she may need it four times a day more often now.  Takes a lot of Tylenol sometimes in addition.        Has also a pruritic skin rash on face, behind ears.  Has only used moisturizers and emollients.     REVIEW OF SYSTEMS:   Ten systems reviewed and were negative except as noted in the HPI.                PROBLEM LIST  Patient Active Problem List   Diagnosis   • Palpitations   • Tachycardia   • Fatigue   • Dyspnea   • Tricuspid regurgitation   • Hypertension   • HLD (hyperlipidemia)   • Obesity   • COPD (chronic obstructive pulmonary disease) ([facility])   • Abnormal ECG   • Edema   • Anemia   • Microcytic anemia   • DM (diabetes mellitus) ([facility])   • Iron deficiency anemia   • Vitamin B12 deficiency (dietary) anemia   • Hypokalemia   • Adrenal mass ([facility])   • Hyperkalemia   • Renal insufficiency   • CHF (congestive heart failure) ([facility])   • Chronic respiratory failure with hypoxia  (HCC)   • Elevated hemidiaphragm   • Seasonal allergies   • Acute kidney injury (HCC)   • Nausea and vomiting   • Former smoker   • Obesity (BMI 30-39.9)   • Bacteremia   • Degeneration of intervertebral disc of lumbar region   • Chronic low back pain   • Chronic insomnia   • Basal cell carcinoma of skin   • Acute pancreatitis without necrosis or infection, unspecified   • Acute renal failure (HCC)   • Diabetic peripheral neuropathy associated with type 2 diabetes mellitus (HCC)   • Elevated liver enzymes   • Encounter for long-term current use of medication   • Diabetic nephropathy (HCC)   • Renal insufficiency syndrome   • Obesity with body mass index 30 or greater   • Pulmonary hypertension (HCC)   • Essential hypertension   • Mixed hyperlipidemia   • Debility   • Paralysis of diaphragm   • Type 2 diabetes mellitus with hyperglycemia, with long-term current use of insulin (HCC)   • Type 2 diabetes mellitus without complications (HCC)   • Chronic obstructive pulmonary disease (HCC)   • Respiratory failure, chronic (HCC)   • Heart failure, unspecified (HCC)   • Viral hepatitis A   • Encounter for screening for depression   • Encounter for long-term (current) use of insulin (HCC)   • Preventative health care   • Epistaxis, recurrent   • Gastroesophageal reflux disease   • Gout   • Hypomagnesemia   • Long term (current) use of opiate analgesic   • Long term (current) use of insulin (HCC)   • Stage 4 chronic kidney disease (HCC)   • Bronchitis, complicated        PAST SURGICAL HISTORY:  Past Surgical History:   Procedure Laterality Date   • RECOVERY  11/6/2013    Performed by [facility]overy Surgery at SURGERY SAME DAY [facility] ORS   • GYN SURGERY      hysterectomy   • OTHER      cataract surgery    • OTHER      carpal tunnel    • OTHER ORTHOPEDIC SURGERY      left knee replacement       FAMILY HISTORY:  Family History   Problem Relation Age of Onset   • Breast Cancer Mother    • Cancer Father    • Heart Failure Maternal  Grandmother        SOCIAL HISTORY:   Social History     Tobacco Use   • Smoking status: Former Smoker     Packs/day: 2.00     Years: 20.00     Pack years: 40.00     Types: Cigarettes     Quit date: 1/1/2007     Years since quitting: 15.3   • Smokeless tobacco: Never Used   Substance Use Topics   • Alcohol use: No       ALLERGIES:  Allergies   Allergen Reactions   • Byetta Rash     rash   • Epinephrine    • Escitalopram Anaphylaxis     Leg cramping   • Furosemide Unspecified     Leg cramps, stopped urinary output  Leg cramps, stopped urinary output     • Iodine Anaphylaxis and Rash     contrast  contrast     • Latex Anaphylaxis and Rash   • Levofloxacin Itching   • Lexapro      Leg cramping   • Penicillins Rash     Rash    Rash  Rash     • Sertraline Diarrhea   • Zoloft Diarrhea   • Amaryl    • Amaryl [Glimepiride] Shortness of Breath and Rash     Rash   • Amoxicillin Rash     Rash     • Epinephrine Shortness of Breath     Panic attack, Can't breath   • Exenatide Rash     rash   • Hctz      Stopped urinary output   • Metformin Hives, Rash and Itching     Rash    • Spironolactone Unspecified       OUTPATIENT MEDICATIONS:    Current Outpatient Medications:   •  allopurinol (ZYLOPRIM) 100 MG Tab, Take 100 mg by mouth every day., Disp: , Rfl:   •  Olopatadine HCl (PATADAY) 0.7 % Solution, Administer  into affected eye(s). 1 drop each eye at night, Disp: , Rfl:   •  diphenhydrAMINE HCl (BENADRYL ALLERGY PO), Take  by mouth., Disp: , Rfl:   •  Loperamide HCl (IMODIUM PO), Take  by mouth., Disp: , Rfl:   •  HYDROcodone-acetaminophen (NORCO) 7.5-325 MG tab, Take 1 Tablet by mouth every 6 hours as needed for Severe Pain for up to 30 days., Disp: 120 Tablet, Rfl: 0  •  HYDROcodone-acetaminophen (NORCO) 7.5-325 MG tab, Take 1 Tablet by mouth every 6 hours as needed for Severe Pain for up to 30 days., Disp: 120 Tablet, Rfl: 0  •  HYDROcodone-acetaminophen (NORCO) 7.5-325 MG tab, Take 1 Tablet by mouth every 6 hours as needed for  Severe Pain for up to 30 days., Disp: 120 Tablet, Rfl: 0  •  temazepam (RESTORIL) 30 MG capsule, Take 1 Capsule by mouth at bedtime as needed for Sleep for up to 30 days., Disp: 30 Capsule, Rfl: 2  •  insulin glargine (LANTUS) 100 UNIT/ML Solution Pen-injector injection, Inject 30 Units under the skin every evening. 2*15ml=30m total, Disp: 30 mL, Rfl: 4  •  calcitRIOL (ROCALTROL) 0.25 MCG Cap, Take 1 Capsule by mouth every day., Disp: , Rfl:   •  torsemide (DEMADEX) 20 MG Tab, Take 1 Tablet by mouth 2 times a day., Disp: 180 Tablet, Rfl: 3  •  omeprazole (PRILOSEC) 20 MG delayed-release capsule, Take 1 Capsule by mouth every evening., Disp: 90 Capsule, Rfl: 3  •  lisinopril (PRINIVIL) 10 MG Tab, Take 1 Tablet by mouth every day., Disp: 90 Tablet, Rfl: 3  •  ezetimibe-simvastatin (VYTORIN) 10-40 MG per tablet, Take 1 Tablet by mouth every day., Disp: 90 Tablet, Rfl: 3  •  HUMALOG KWIKPEN 100 UNIT/ML Solution Pen-injector injection PEN, , Disp: , Rfl:   •  albuterol 108 (90 Base) MCG/ACT Aero Soln inhalation aerosol, Inhale 2 Puffs 2 times a day., Disp: , Rfl:   •  Continuous Blood Gluc  (DEXCOM G6 ) Device, 1 Each., Disp: , Rfl:   •  glucose blood strip, ONETOUCH ULTRA BLUE STRP, Disp: , Rfl:   •  B-D ULTRA-FINE 33 LANCETS [facility], Use as instructed- 4 times a day. Please provide the brand covered by insurance -90 days, Disp: , Rfl:   •  Insulin Pen Needle (B-D UF III MINI PEN NEEDLES) 31G X 5 MM Misc, BD PEN NEEDLE MINI U/F 31G X 5 MM, Disp: , Rfl:   •  tiotropium (SPIRIVA) 18 MCG Cap, Inhale 18 mcg by mouth every evening., Disp: , Rfl:   •  Insulin Lispro (HUMALOG KWIKPEN) 200 UNIT/ML Solution Pen-injector, Inject subcutaneously insulin 24 units before breakfast, 14 units before lunch, 16 units before dinner + sliding scale (Max Dose 90 units)- 90 days supply, Disp: 30 mL, Rfl: 4    PHYSICAL EXAM:  Vitals:    04/28/22 1003   BP: 108/63   BP Location: Left arm   Patient Position: Sitting   BP Cuff Size:  "Large adult   Pulse: 97   Resp: 16   Temp: 36.6 °C (97.8 °F)   TempSrc: Temporal   SpO2: 98%   Weight: 102 kg (225 lb)   Height: 1.727 m (5' 8\")       General: Pt resting in NAD, cooperative   Skin:  Pink, warm and dry.  HEENT: NC/AT. EOMI.  Lungs:  Symmetrical.  CTAB, good air movement   Cardiovascular:  S1/S2 RRR   Abdomen:  Abdomen is soft, nontender  Extremities:  Full range of motion.  CNS:  Muscle tone is normal. No gross focal neurologic deficits      ASSESSMENT/PLAN:   76 y.o. female     Problem List Items Addressed This Visit     Degeneration of intervertebral disc of lumbar region     Will increase Norco 7.5/325mg to 4 times a day.          Relevant Medications    allopurinol (ZYLOPRIM) 100 MG Tab    HYDROcodone-acetaminophen (NORCO) 7.5-325 MG tab    HYDROcodone-acetaminophen (NORCO) 7.5-325 MG tab    HYDROcodone-acetaminophen (NORCO) 7.5-325 MG tab    Other Relevant Orders    Consent for Opiate Prescription    Controlled Substance Treatment Agreement    Stage 4 chronic kidney disease (HCC)    Type 2 diabetes mellitus with hyperglycemia, with long-term current use of insulin (HCC)     Doing OK and sees Endocrinology now.             Other Visit Diagnoses     Degenerative disc disease, lumbar        Relevant Medications    allopurinol (ZYLOPRIM) 100 MG Tab    HYDROcodone-acetaminophen (NORCO) 7.5-325 MG tab    HYDROcodone-acetaminophen (NORCO) 7.5-325 MG tab    HYDROcodone-acetaminophen (NORCO) 7.5-325 MG tab    Other Relevant Orders    Consent for Opiate Prescription    Controlled Substance Treatment Agreement    Encounter for long-term current use of high risk medication        Relevant Orders    Urine Drugs Of Abuse,9 Drug        Hytone 2.5% cream prescribed for eczema.     Lake Ochoa MD  [facility]     "

## 2022-04-28 NOTE — PATIENT INSTRUCTIONS
Will increase Norco 7.5/325mg to 4 times a day when needed.     Hydrocortisone 2.5% cream for eczema rash, itching.

## 2022-07-05 DIAGNOSIS — F51.04 CHRONIC INSOMNIA: ICD-10-CM

## 2022-07-05 RX ORDER — TEMAZEPAM 30 MG/1
30 CAPSULE ORAL NIGHTLY PRN
Qty: 30 CAPSULE | Refills: 2 | Status: SHIPPED | OUTPATIENT
Start: 2022-07-05 | End: 2022-09-26

## 2022-07-06 RX ORDER — TEMAZEPAM 30 MG/1
30 CAPSULE ORAL
Qty: 30 CAPSULE | Refills: 2 | Status: SHIPPED | OUTPATIENT
Start: 2022-07-06 | End: 2022-11-04 | Stop reason: SDUPTHER

## 2022-07-20 DIAGNOSIS — M51.36 DEGENERATIVE DISC DISEASE, LUMBAR: ICD-10-CM

## 2022-07-20 DIAGNOSIS — M51.36 DEGENERATION OF INTERVERTEBRAL DISC OF LUMBAR REGION: ICD-10-CM

## 2022-07-20 RX ORDER — HYDROCODONE BITARTRATE AND ACETAMINOPHEN 7.5; 325 MG/1; MG/1
1 TABLET ORAL EVERY 6 HOURS PRN
Qty: 120 TABLET | Refills: 0 | Status: SHIPPED | OUTPATIENT
Start: 2022-07-20 | End: 2022-08-31 | Stop reason: SDUPTHER

## 2022-08-31 DIAGNOSIS — M51.36 DEGENERATION OF INTERVERTEBRAL DISC OF LUMBAR REGION: ICD-10-CM

## 2022-08-31 DIAGNOSIS — M51.36 DEGENERATIVE DISC DISEASE, LUMBAR: ICD-10-CM

## 2022-08-31 RX ORDER — HYDROCODONE BITARTRATE AND ACETAMINOPHEN 7.5; 325 MG/1; MG/1
1 TABLET ORAL EVERY 6 HOURS PRN
Qty: 120 TABLET | Refills: 0 | Status: SHIPPED | OUTPATIENT
Start: 2022-08-31 | End: 2022-10-14 | Stop reason: SDUPTHER

## 2022-09-24 DIAGNOSIS — F51.04 CHRONIC INSOMNIA: ICD-10-CM

## 2022-09-26 RX ORDER — TEMAZEPAM 30 MG/1
30 CAPSULE ORAL
Qty: 30 CAPSULE | Refills: 2 | Status: SHIPPED | OUTPATIENT
Start: 2022-09-26 | End: 2022-10-26

## 2022-10-14 DIAGNOSIS — M51.36 DEGENERATIVE DISC DISEASE, LUMBAR: ICD-10-CM

## 2022-10-14 DIAGNOSIS — M51.36 DEGENERATION OF INTERVERTEBRAL DISC OF LUMBAR REGION: ICD-10-CM

## 2022-10-14 RX ORDER — HYDROCODONE BITARTRATE AND ACETAMINOPHEN 7.5; 325 MG/1; MG/1
1 TABLET ORAL EVERY 6 HOURS PRN
Qty: 120 TABLET | Refills: 0 | Status: SHIPPED | OUTPATIENT
Start: 2022-10-14 | End: 2022-11-04 | Stop reason: SDUPTHER

## 2022-10-20 DIAGNOSIS — I10 PRIMARY HYPERTENSION: ICD-10-CM

## 2022-10-20 DIAGNOSIS — I50.23 ACUTE ON CHRONIC SYSTOLIC CONGESTIVE HEART FAILURE (HCC): ICD-10-CM

## 2022-10-20 RX ORDER — TORSEMIDE 20 MG/1
TABLET ORAL
Qty: 180 TABLET | Refills: 3 | Status: SHIPPED | OUTPATIENT
Start: 2022-10-20 | End: 2023-08-10

## 2022-10-20 RX ORDER — LISINOPRIL 10 MG/1
TABLET ORAL
Qty: 90 TABLET | Refills: 3 | Status: SHIPPED | OUTPATIENT
Start: 2022-10-20 | End: 2023-09-19

## 2022-10-20 RX ORDER — OMEPRAZOLE 20 MG/1
CAPSULE, DELAYED RELEASE ORAL
Qty: 90 CAPSULE | Refills: 3 | Status: SHIPPED | OUTPATIENT
Start: 2022-10-20 | End: 2023-11-07 | Stop reason: SDUPTHER

## 2022-10-31 DIAGNOSIS — J40 BRONCHITIS, COMPLICATED: ICD-10-CM

## 2022-10-31 DIAGNOSIS — J41.0 SIMPLE CHRONIC BRONCHITIS (HCC): ICD-10-CM

## 2022-10-31 RX ORDER — TIOTROPIUM BROMIDE 18 UG/1
18 CAPSULE ORAL; RESPIRATORY (INHALATION) EVERY EVENING
Qty: 90 CAPSULE | Refills: 3 | Status: SHIPPED | OUTPATIENT
Start: 2022-10-31 | End: 2023-09-19 | Stop reason: SDUPTHER

## 2022-11-04 ENCOUNTER — OFFICE VISIT (OUTPATIENT)
Dept: MEDICAL GROUP | Facility: OTHER | Age: 77
End: 2022-11-04
Payer: COMMERCIAL

## 2022-11-04 VITALS
SYSTOLIC BLOOD PRESSURE: 130 MMHG | OXYGEN SATURATION: 99 % | HEIGHT: 69 IN | BODY MASS INDEX: 34.27 KG/M2 | DIASTOLIC BLOOD PRESSURE: 66 MMHG | WEIGHT: 231.4 LBS | RESPIRATION RATE: 24 BRPM | HEART RATE: 106 BPM | TEMPERATURE: 97.4 F

## 2022-11-04 DIAGNOSIS — C44.91 BASAL CELL CARCINOMA (BCC), UNSPECIFIED SITE: ICD-10-CM

## 2022-11-04 DIAGNOSIS — M51.36 DEGENERATION OF INTERVERTEBRAL DISC OF LUMBAR REGION: ICD-10-CM

## 2022-11-04 DIAGNOSIS — Z23 ENCOUNTER FOR ADMINISTRATION OF VACCINE: Primary | ICD-10-CM

## 2022-11-04 DIAGNOSIS — J96.11 CHRONIC RESPIRATORY FAILURE WITH HYPOXIA (HCC): ICD-10-CM

## 2022-11-04 DIAGNOSIS — M51.36 DEGENERATIVE DISC DISEASE, LUMBAR: ICD-10-CM

## 2022-11-04 DIAGNOSIS — F51.04 CHRONIC INSOMNIA: ICD-10-CM

## 2022-11-04 PROCEDURE — G0008 ADMIN INFLUENZA VIRUS VAC: HCPCS | Performed by: FAMILY MEDICINE

## 2022-11-04 PROCEDURE — 90662 IIV NO PRSV INCREASED AG IM: CPT | Performed by: FAMILY MEDICINE

## 2022-11-04 PROCEDURE — 99214 OFFICE O/P EST MOD 30 MIN: CPT | Mod: 25 | Performed by: FAMILY MEDICINE

## 2022-11-04 RX ORDER — HYDROCODONE BITARTRATE AND ACETAMINOPHEN 7.5; 325 MG/1; MG/1
1 TABLET ORAL EVERY 6 HOURS PRN
Qty: 120 TABLET | Refills: 0 | Status: SHIPPED | OUTPATIENT
Start: 2022-11-14 | End: 2022-12-14

## 2022-11-04 RX ORDER — FEBUXOSTAT 40 MG/1
TABLET, FILM COATED ORAL
COMMUNITY
Start: 2022-10-21 | End: 2023-11-07 | Stop reason: SDUPTHER

## 2022-11-04 RX ORDER — HYDROCODONE BITARTRATE AND ACETAMINOPHEN 7.5; 325 MG/1; MG/1
1 TABLET ORAL EVERY 6 HOURS PRN
Qty: 120 TABLET | Refills: 0 | Status: SHIPPED | OUTPATIENT
Start: 2023-01-14 | End: 2023-02-13

## 2022-11-04 RX ORDER — TEMAZEPAM 30 MG/1
30 CAPSULE ORAL
Qty: 30 CAPSULE | Refills: 2 | Status: SHIPPED | OUTPATIENT
Start: 2022-11-04 | End: 2023-04-04 | Stop reason: SDUPTHER

## 2022-11-04 RX ORDER — HYDROCODONE BITARTRATE AND ACETAMINOPHEN 7.5; 325 MG/1; MG/1
1 TABLET ORAL EVERY 6 HOURS PRN
Qty: 120 TABLET | Refills: 0 | Status: SHIPPED | OUTPATIENT
Start: 2022-12-14 | End: 2022-12-27 | Stop reason: SDUPTHER

## 2022-11-04 ASSESSMENT — PATIENT HEALTH QUESTIONNAIRE - PHQ9: CLINICAL INTERPRETATION OF PHQ2 SCORE: 0

## 2022-11-04 ASSESSMENT — FIBROSIS 4 INDEX: FIB4 SCORE: 1.54

## 2022-11-04 NOTE — PROGRESS NOTES
[facility] MEDICINE     PATIENT ID:  NAME:  Siri Solis  MRN:               [id number]  YOB: 1945    Date: 1:43 PM      CC:  Needs her opiate pain meds, Temazepam for  sleep, skin cancer referral.       HPI: Siri Solis is a 76 y.o. female who presented with multiple concerns.     Problem   Chronic Respiratory Failure With Hypoxia (Hcc)    Has needed Oxygen now for many years.  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   Has seen Pulmonary Medicine.  Gets short of breath with almost any activity.  Mostly is home bound now.      Degeneration of Intervertebral Disc of Lumbar Region    Has had chronic low back pain for many years due to degenerative disc disease.  Unable to take NSAIDs due to GI bleeding in past and CKD.  Has been well controlled on Norco 7.5/325mg for a while.  Seems that she may need it four times a day more often now.  Takes a lot of Tylenol sometimes in addition.      Basal Cell Carcinoma of Skin    Has had a recurrence of her chest skin cancer lesion.  Wants to see a dermatologist down in [place] if possible.      Chronic Insomnia    Has had chronic insomnia for many years and has been on sleep medication for quite a long time.  Has done well with Temazepam 30 mg nightly for many years.  She is aware of the increased fall risk.           REVIEW OF SYSTEMS:   Ten systems reviewed and were negative except as noted in the HPI.                PROBLEM LIST  Patient Active Problem List   Diagnosis    Palpitations    Tachycardia    Fatigue    Dyspnea    Tricuspid regurgitation    Hypertension    HLD (hyperlipidemia)    Obesity    COPD (chronic obstructive pulmonary disease) (CMS-[facility])    Abnormal ECG    Edema    Anemia    Microcytic anemia    DM (diabetes mellitus) ([facility])    Iron deficiency anemia    Vitamin B12 deficiency (dietary) anemia    Hypokalemia    Adrenal mass ([facility])    Hyperkalemia    Renal insufficiency    CHF (congestive heart failure) ([facility])    Chronic  respiratory failure with hypoxia (HCC)    Elevated hemidiaphragm    Seasonal allergies    Acute kidney injury (HCC)    Nausea and vomiting    Former smoker    Obesity (BMI 30-39.9)    Bacteremia    Degeneration of intervertebral disc of lumbar region    Chronic low back pain    Chronic insomnia    Basal cell carcinoma of skin    Acute pancreatitis without necrosis or infection, unspecified    Acute renal failure (HCC)    Diabetic peripheral neuropathy associated with type 2 diabetes mellitus (HCC)    Elevated liver enzymes    Encounter for long-term current use of medication    Diabetic nephropathy (HCC)    Renal insufficiency syndrome    Obesity with body mass index 30 or greater    Pulmonary hypertension (HCC)    Essential hypertension    Mixed hyperlipidemia    Debility    Paralysis of diaphragm    Type 2 diabetes mellitus with hyperglycemia, with long-term current use of insulin (HCC)    Type 2 diabetes mellitus without complications (HCC)    Chronic obstructive pulmonary disease (HCC)    Respiratory failure, chronic (HCC)    Heart failure, unspecified (HCC)    Viral hepatitis A    Encounter for screening for depression    Encounter for long-term (current) use of insulin (HCC)    Preventative health care    Epistaxis, recurrent    Gastroesophageal reflux disease    Gout    Hypomagnesemia    Long term (current) use of opiate analgesic    Long term (current) use of insulin (HCC)    Stage 4 chronic kidney disease (HCC)    Bronchitis, complicated        PAST SURGICAL HISTORY:  Past Surgical History:   Procedure Laterality Date    RECOVERY  11/6/2013    Performed by [facility]Recovery Surgery at SURGERY SAME DAY [facility]    GYN SURGERY      hysterectomy    OTHER      cataract surgery     OTHER      carpal tunnel     OTHER ORTHOPEDIC SURGERY      left knee replacement       FAMILY HISTORY:  Family History   Problem Relation Age of Onset    Breast Cancer Mother     Cancer Father     Heart Failure Maternal Grandmother         SOCIAL HISTORY:   Social History     Tobacco Use    Smoking status: Former     Packs/day: 2.00     Years: 20.00     Pack years: 40.00     Types: Cigarettes     Quit date: 1/1/2007     Years since quitting: 15.8    Smokeless tobacco: Never   Substance Use Topics    Alcohol use: No       ALLERGIES:  Allergies   Allergen Reactions    Byetta Rash     rash    Epinephrine     Escitalopram Anaphylaxis     Leg cramping    Furosemide Unspecified     Leg cramps, stopped urinary output  Leg cramps, stopped urinary output      Iodine Anaphylaxis and Rash     contrast  contrast      Latex Anaphylaxis and Rash    Levofloxacin Itching    Lexapro      Leg cramping    Penicillins Rash     Rash    Rash  Rash      Sertraline Diarrhea    Zoloft Diarrhea    Amaryl     Amaryl [Glimepiride] Shortness of Breath and Rash     Rash    Amoxicillin Rash     Rash      Epinephrine Shortness of Breath     Panic attack, Can't breath    Exenatide Rash     rash    Hctz      Stopped urinary output    Metformin Hives, Rash and Itching     Rash     Spironolactone Unspecified       OUTPATIENT MEDICATIONS:    Current Outpatient Medications:     febuxostat (ULORIC) 40 MG Tab, , Disp: , Rfl:     temazepam (RESTORIL) 30 MG capsule, Take 1 Capsule by mouth at bedtime as needed for Sleep for up to 30 days., Disp: 30 Capsule, Rfl: 2    [START ON 11/14/2022] HYDROcodone-acetaminophen (NORCO) 7.5-325 MG tab, Take 1 Tablet by mouth every 6 hours as needed for Severe Pain for up to 30 days., Disp: 120 Tablet, Rfl: 0    [START ON 12/14/2022] HYDROcodone-acetaminophen (NORCO) 7.5-325 MG tab, Take 1 Tablet by mouth every 6 hours as needed for Severe Pain for up to 30 days., Disp: 120 Tablet, Rfl: 0    [START ON 1/14/2023] HYDROcodone-acetaminophen (NORCO) 7.5-325 MG tab, Take 1 Tablet by mouth every 6 hours as needed for Severe Pain for up to 30 days., Disp: 120 Tablet, Rfl: 0    tiotropium (SPIRIVA) 18 MCG Cap, Place 1 Capsule into inhaler and inhale every  evening., Disp: 90 Capsule, Rfl: 3    lisinopril (PRINIVIL) 10 MG Tab, TAKE 1 TABLET BY MOUTH  DAILY, Disp: 90 Tablet, Rfl: 3    omeprazole (PRILOSEC) 20 MG delayed-release capsule, TAKE 1 CAPSULE BY MOUTH IN  THE EVENING, Disp: 90 Capsule, Rfl: 3    torsemide (DEMADEX) 20 MG Tab, TAKE 1 TABLET BY MOUTH  TWICE DAILY, Disp: 180 Tablet, Rfl: 3    Olopatadine HCl (PATADAY) 0.7 % Solution, Administer  into affected eye(s). 1 drop each eye at night, Disp: , Rfl:     diphenhydrAMINE HCl (BENADRYL ALLERGY PO), Take  by mouth., Disp: , Rfl:     Loperamide HCl (IMODIUM PO), Take  by mouth., Disp: , Rfl:     hydrocortisone 2.5 % Cream topical cream, Apply 1 Application topically 2 times a day., Disp: 30 g, Rfl: 2    insulin glargine (LANTUS) 100 UNIT/ML Solution Pen-injector injection, Inject 30 Units under the skin every evening. 2*15ml=30m total, Disp: 30 mL, Rfl: 4    Insulin Lispro (HUMALOG KWIKPEN) 200 UNIT/ML Solution Pen-injector, Inject subcutaneously insulin 24 units before breakfast, 14 units before lunch, 16 units before dinner + sliding scale (Max Dose 90 units)- 90 days supply, Disp: 30 mL, Rfl: 4    calcitRIOL (ROCALTROL) 0.25 MCG Cap, Take 1 Capsule by mouth every day., Disp: , Rfl:     ezetimibe-simvastatin (VYTORIN) 10-40 MG per tablet, Take 1 Tablet by mouth every day., Disp: 90 Tablet, Rfl: 3    albuterol 108 (90 Base) MCG/ACT Aero Soln inhalation aerosol, Inhale 2 Puffs 2 times a day., Disp: , Rfl:     glucose blood strip, ONETOUCH ULTRA BLUE STRP, Disp: , Rfl:     B-D ULTRA-FINE 33 LANCETS [facility], Use as instructed- 4 times a day. Please provide the brand covered by insurance -90 days, Disp: , Rfl:     Insulin Pen Needle (B-D UF III MINI PEN NEEDLES) 31G X 5 MM Misc, BD PEN NEEDLE MINI U/F 31G X 5 MM, Disp: , Rfl:     allopurinol (ZYLOPRIM) 100 MG Tab, Take 100 mg by mouth every day. (Patient not taking: Reported on 11/4/2022), Disp: , Rfl:     PHYSICAL EXAM:  Vitals:    11/04/22 1103   BP: 130/66   BP  "Location: Left arm   Patient Position: Sitting   BP Cuff Size: Large adult   Pulse: (!) 106   Resp: (!) 24   Temp: 36.3 °C (97.4 °F)   TempSrc: Temporal   SpO2: 99%   Weight: 105 kg (231 lb 6.4 oz)   Height: 1.753 m (5' 9\")       General: Older female resting in NAD, cooperative.  Skin:  Pink, warm and dry.  Lungs:  Symmetrical.  CTAB, good air movement.  On supplemental Oxygen.  Cardiovascular:  S1/S2, RRR without murmur.   Extremities:  Full range of motion.  CNS:  Muscle tone is normal. No gross focal neurologic deficits      ASSESSMENT/PLAN:   76 y.o. female     Problem List Items Addressed This Visit       Basal cell carcinoma of skin     Dermatology referral placed to [facility].          Relevant Orders    Referral to Dermatology    Chronic insomnia      data base is reviewed today.   Temazepam refilled for 3 months.          Relevant Medications    temazepam (RESTORIL) 30 MG capsule    Chronic respiratory failure with hypoxia (HCC)     Oxygen to be used 24/7 hours.   May continue her Oxygen orders any time as needed.          Degeneration of intervertebral disc of lumbar region      data base is reviewed today.   Informed consent for Controlled Substances is on file for 2022.   Norco refilled for 3 months.            Relevant Medications    febuxostat (ULORIC) 40 MG Tab    HYDROcodone-acetaminophen (NORCO) 7.5-325 MG tab (Start on 11/14/2022)    HYDROcodone-acetaminophen (NORCO) 7.5-325 MG tab (Start on 12/14/2022)    HYDROcodone-acetaminophen (NORCO) 7.5-325 MG tab (Start on 1/14/2023)     Other Visit Diagnoses       Encounter for administration of vaccine    -  Primary    Relevant Orders    INFLUENZA VACCINE, HIGH DOSE (65+ ONLY) (Completed)    Degenerative disc disease, lumbar        Relevant Medications    febuxostat (ULORIC) 40 MG Tab    HYDROcodone-acetaminophen (NORCO) 7.5-325 MG tab (Start on 11/14/2022)    HYDROcodone-acetaminophen (NORCO) 7.5-325 MG tab (Start on 12/14/2022)    " HYDROcodone-acetaminophen (NORCO) 7.5-325 MG tab (Start on 1/14/2023)            Lake Ochoa MD  [facility]

## 2022-11-04 NOTE — ASSESSMENT & PLAN NOTE
[facility] data base is reviewed today.   Informed consent for Controlled Substances is on file for 2022.   Norco refilled for 3 months.      no concerns

## 2022-12-27 DIAGNOSIS — M51.36 DEGENERATION OF INTERVERTEBRAL DISC OF LUMBAR REGION: ICD-10-CM

## 2022-12-27 DIAGNOSIS — M51.36 DEGENERATIVE DISC DISEASE, LUMBAR: ICD-10-CM

## 2022-12-27 RX ORDER — HYDROCODONE BITARTRATE AND ACETAMINOPHEN 7.5; 325 MG/1; MG/1
1 TABLET ORAL EVERY 6 HOURS PRN
Qty: 120 TABLET | Refills: 0 | Status: SHIPPED | OUTPATIENT
Start: 2022-12-27 | End: 2023-03-15 | Stop reason: SDUPTHER

## 2023-02-15 DIAGNOSIS — J43.8 OTHER EMPHYSEMA (HCC): ICD-10-CM

## 2023-02-15 DIAGNOSIS — J42 CHRONIC BRONCHITIS, UNSPECIFIED CHRONIC BRONCHITIS TYPE (HCC): ICD-10-CM

## 2023-02-15 DIAGNOSIS — J96.11 CHRONIC RESPIRATORY FAILURE WITH HYPOXIA (HCC): ICD-10-CM

## 2023-02-15 NOTE — PROGRESS NOTES
Pulmonology consult placed wit [facility], Pulmonology.  New Oxygen DME order placed, needs new DME company per insurance.

## 2023-03-15 DIAGNOSIS — M51.36 DEGENERATIVE DISC DISEASE, LUMBAR: ICD-10-CM

## 2023-03-15 DIAGNOSIS — M51.36 DEGENERATION OF INTERVERTEBRAL DISC OF LUMBAR REGION: ICD-10-CM

## 2023-03-15 RX ORDER — HYDROCODONE BITARTRATE AND ACETAMINOPHEN 7.5; 325 MG/1; MG/1
1 TABLET ORAL EVERY 6 HOURS PRN
Qty: 120 TABLET | Refills: 0 | Status: SHIPPED | OUTPATIENT
Start: 2023-03-15 | End: 2023-04-27 | Stop reason: SDUPTHER

## 2023-04-04 DIAGNOSIS — F51.04 CHRONIC INSOMNIA: ICD-10-CM

## 2023-04-04 RX ORDER — TEMAZEPAM 30 MG/1
30 CAPSULE ORAL
Qty: 30 CAPSULE | Refills: 2 | Status: SHIPPED | OUTPATIENT
Start: 2023-04-04 | End: 2023-05-04

## 2023-04-11 ENCOUNTER — TELEPHONE (OUTPATIENT)
Dept: HEALTH INFORMATION MANAGEMENT | Facility: OTHER | Age: 78
End: 2023-04-11
Payer: COMMERCIAL

## 2023-04-27 DIAGNOSIS — M51.36 DEGENERATIVE DISC DISEASE, LUMBAR: ICD-10-CM

## 2023-04-27 DIAGNOSIS — M51.36 DEGENERATION OF INTERVERTEBRAL DISC OF LUMBAR REGION: ICD-10-CM

## 2023-04-27 RX ORDER — HYDROCODONE BITARTRATE AND ACETAMINOPHEN 7.5; 325 MG/1; MG/1
1 TABLET ORAL EVERY 6 HOURS PRN
Qty: 120 TABLET | Refills: 0 | Status: SHIPPED | OUTPATIENT
Start: 2023-04-27 | End: 2023-06-29 | Stop reason: SDUPTHER

## 2023-06-13 ENCOUNTER — HOSPITAL ENCOUNTER (OUTPATIENT)
Dept: RADIOLOGY | Facility: MEDICAL CENTER | Age: 78
End: 2023-06-13

## 2023-06-13 ENCOUNTER — APPOINTMENT (OUTPATIENT)
Dept: RADIOLOGY | Facility: MEDICAL CENTER | Age: 78
DRG: 417 | End: 2023-06-13
Attending: HOSPITALIST
Payer: COMMERCIAL

## 2023-06-13 ENCOUNTER — HOSPITAL ENCOUNTER (INPATIENT)
Facility: MEDICAL CENTER | Age: 78
LOS: 15 days | DRG: 417 | End: 2023-06-28
Attending: HOSPITALIST | Admitting: FAMILY MEDICINE
Payer: COMMERCIAL

## 2023-06-13 PROBLEM — R10.9 ABDOMINAL PAIN: Status: ACTIVE | Noted: 2023-06-13

## 2023-06-13 LAB
EKG IMPRESSION: NORMAL
GLUCOSE BLD STRIP.AUTO-MCNC: 264 MG/DL (ref 65–99)

## 2023-06-13 PROCEDURE — 700105 HCHG RX REV CODE 258: Performed by: HOSPITALIST

## 2023-06-13 PROCEDURE — 770006 HCHG ROOM/CARE - MED/SURG/GYN SEMI*

## 2023-06-13 PROCEDURE — 99223 1ST HOSP IP/OBS HIGH 75: CPT | Mod: AI | Performed by: HOSPITALIST

## 2023-06-13 PROCEDURE — 71045 X-RAY EXAM CHEST 1 VIEW: CPT

## 2023-06-13 PROCEDURE — 82962 GLUCOSE BLOOD TEST: CPT

## 2023-06-13 PROCEDURE — 93010 ELECTROCARDIOGRAM REPORT: CPT | Performed by: INTERNAL MEDICINE

## 2023-06-13 PROCEDURE — 93005 ELECTROCARDIOGRAM TRACING: CPT | Performed by: HOSPITALIST

## 2023-06-13 PROCEDURE — 700102 HCHG RX REV CODE 250 W/ 637 OVERRIDE(OP): Performed by: HOSPITALIST

## 2023-06-13 RX ORDER — ACETAMINOPHEN 500 MG
500-1000 TABLET ORAL NIGHTLY PRN
COMMUNITY

## 2023-06-13 RX ORDER — FEBUXOSTAT 40 MG/1
40 TABLET, FILM COATED ORAL DAILY
Status: DISCONTINUED | OUTPATIENT
Start: 2023-06-14 | End: 2023-06-28 | Stop reason: HOSPADM

## 2023-06-13 RX ORDER — LISINOPRIL 10 MG/1
10 TABLET ORAL DAILY
Status: DISCONTINUED | OUTPATIENT
Start: 2023-06-14 | End: 2023-06-28 | Stop reason: HOSPADM

## 2023-06-13 RX ORDER — METRONIDAZOLE 500 MG/100ML
500 INJECTION, SOLUTION INTRAVENOUS EVERY 12 HOURS
Status: DISPENSED | OUTPATIENT
Start: 2023-06-13 | End: 2023-06-18

## 2023-06-13 RX ORDER — TEMAZEPAM 15 MG/1
30 CAPSULE ORAL NIGHTLY PRN
COMMUNITY
End: 2023-07-18 | Stop reason: SDUPTHER

## 2023-06-13 RX ORDER — MORPHINE SULFATE 4 MG/ML
2 INJECTION INTRAVENOUS
Status: DISCONTINUED | OUTPATIENT
Start: 2023-06-13 | End: 2023-06-14

## 2023-06-13 RX ORDER — ONDANSETRON 2 MG/ML
4 INJECTION INTRAMUSCULAR; INTRAVENOUS EVERY 4 HOURS PRN
Status: DISCONTINUED | OUTPATIENT
Start: 2023-06-13 | End: 2023-06-28 | Stop reason: HOSPADM

## 2023-06-13 RX ORDER — TIOTROPIUM BROMIDE 18 UG/1
1 CAPSULE ORAL; RESPIRATORY (INHALATION) EVERY EVENING
Status: DISCONTINUED | OUTPATIENT
Start: 2023-06-13 | End: 2023-06-13

## 2023-06-13 RX ORDER — OMEPRAZOLE 20 MG/1
20 CAPSULE, DELAYED RELEASE ORAL EVERY EVENING
Status: DISCONTINUED | OUTPATIENT
Start: 2023-06-13 | End: 2023-06-28 | Stop reason: HOSPADM

## 2023-06-13 RX ORDER — ALBUTEROL SULFATE 90 UG/1
2 AEROSOL, METERED RESPIRATORY (INHALATION) 2 TIMES DAILY
Status: DISCONTINUED | OUTPATIENT
Start: 2023-06-13 | End: 2023-06-28 | Stop reason: HOSPADM

## 2023-06-13 RX ORDER — OXYCODONE HYDROCHLORIDE 5 MG/1
5 TABLET ORAL
Status: DISCONTINUED | OUTPATIENT
Start: 2023-06-13 | End: 2023-06-14

## 2023-06-13 RX ORDER — OXYCODONE HYDROCHLORIDE 5 MG/1
2.5 TABLET ORAL
Status: DISCONTINUED | OUTPATIENT
Start: 2023-06-13 | End: 2023-06-14

## 2023-06-13 RX ORDER — SODIUM CHLORIDE 9 MG/ML
INJECTION, SOLUTION INTRAVENOUS CONTINUOUS
Status: DISCONTINUED | OUTPATIENT
Start: 2023-06-13 | End: 2023-06-14

## 2023-06-13 RX ADMIN — SODIUM CHLORIDE: 9 INJECTION, SOLUTION INTRAVENOUS at 22:37

## 2023-06-13 ASSESSMENT — PATIENT HEALTH QUESTIONNAIRE - PHQ9
1. LITTLE INTEREST OR PLEASURE IN DOING THINGS: NOT AT ALL
SUM OF ALL RESPONSES TO PHQ9 QUESTIONS 1 AND 2: 0
2. FEELING DOWN, DEPRESSED, IRRITABLE, OR HOPELESS: NOT AT ALL

## 2023-06-13 ASSESSMENT — LIFESTYLE VARIABLES
TOTAL SCORE: 0
ALCOHOL_USE: NO
TOTAL SCORE: 0
EVER HAD A DRINK FIRST THING IN THE MORNING TO STEADY YOUR NERVES TO GET RID OF A HANGOVER: NO
TOTAL SCORE: 0
HOW MANY TIMES IN THE PAST YEAR HAVE YOU HAD 5 OR MORE DRINKS IN A DAY: 0
HAVE YOU EVER FELT YOU SHOULD CUT DOWN ON YOUR DRINKING: NO
ON A TYPICAL DAY WHEN YOU DRINK ALCOHOL HOW MANY DRINKS DO YOU HAVE: 0
AVERAGE NUMBER OF DAYS PER WEEK YOU HAVE A DRINK CONTAINING ALCOHOL: 0
EVER FELT BAD OR GUILTY ABOUT YOUR DRINKING: NO
HAVE PEOPLE ANNOYED YOU BY CRITICIZING YOUR DRINKING: NO
CONSUMPTION TOTAL: NEGATIVE

## 2023-06-13 ASSESSMENT — COGNITIVE AND FUNCTIONAL STATUS - GENERAL
MOBILITY SCORE: 19
MOVING FROM LYING ON BACK TO SITTING ON SIDE OF FLAT BED: A LITTLE
STANDING UP FROM CHAIR USING ARMS: A LITTLE
CLIMB 3 TO 5 STEPS WITH RAILING: A LITTLE
WALKING IN HOSPITAL ROOM: A LITTLE
TOILETING: A LITTLE
DRESSING REGULAR LOWER BODY CLOTHING: A LITTLE
SUGGESTED CMS G CODE MODIFIER DAILY ACTIVITY: CJ
MOVING TO AND FROM BED TO CHAIR: A LITTLE
DAILY ACTIVITIY SCORE: 21
HELP NEEDED FOR BATHING: A LITTLE
SUGGESTED CMS G CODE MODIFIER MOBILITY: CK

## 2023-06-13 ASSESSMENT — FIBROSIS 4 INDEX: FIB4 SCORE: 17.05

## 2023-06-13 ASSESSMENT — PAIN DESCRIPTION - PAIN TYPE: TYPE: ACUTE PAIN

## 2023-06-13 NOTE — PROGRESS NOTES
77 years old female with past medical history of chronic respiratory failure on 8 L of oxygen presented to local hospital complaining of nausea vomiting, the beginning they thought it was related to food poisoning since patient ate some Chinese food, patient has history of gallstones, lipase was higher than 4000, liver function test elevated bilirubin 2.5, white blood cell count 13, platelet count 156, patient again has history of COPD on 8 L of oxygen chronically, history of atrial fibrillation on Eliquis, patient had MRCP that showed common bile duct dilatation and choledocholithiasis, there is no GI available, patient as per referring physician is hemodynamically stable vital signs are stable afebrile, blood pressure 170/70 heart rate within normal limits, patient is going to be started on IV antibiotics, patient will remain n.p.o., patient will require GI evaluation in a.m., I have asked transfer center to make sure that we are getting MRCP imaging.

## 2023-06-14 ENCOUNTER — APPOINTMENT (OUTPATIENT)
Dept: RADIOLOGY | Facility: MEDICAL CENTER | Age: 78
DRG: 417 | End: 2023-06-14
Attending: INTERNAL MEDICINE
Payer: COMMERCIAL

## 2023-06-14 PROBLEM — K80.50 CHOLEDOCHOLITHIASIS: Status: ACTIVE | Noted: 2023-06-14

## 2023-06-14 PROBLEM — I48.91 ATRIAL FIBRILLATION (HCC): Status: ACTIVE | Noted: 2023-06-14

## 2023-06-14 PROBLEM — K80.50 CHOLEDOCHOLITHIASIS: Status: RESOLVED | Noted: 2023-06-14 | Resolved: 2023-06-14

## 2023-06-14 LAB
ALBUMIN SERPL BCP-MCNC: 3.8 G/DL (ref 3.2–4.9)
ALBUMIN/GLOB SERPL: 1.4 G/DL
ALP SERPL-CCNC: 296 U/L (ref 30–99)
ALT SERPL-CCNC: 613 U/L (ref 2–50)
ANION GAP SERPL CALC-SCNC: 12 MMOL/L (ref 7–16)
AST SERPL-CCNC: 485 U/L (ref 12–45)
BILIRUB SERPL-MCNC: 1.7 MG/DL (ref 0.1–1.5)
BUN SERPL-MCNC: 45 MG/DL (ref 8–22)
CALCIUM ALBUM COR SERPL-MCNC: 9.3 MG/DL (ref 8.5–10.5)
CALCIUM SERPL-MCNC: 9.1 MG/DL (ref 8.5–10.5)
CHLORIDE SERPL-SCNC: 99 MMOL/L (ref 96–112)
CO2 SERPL-SCNC: 29 MMOL/L (ref 20–33)
CREAT SERPL-MCNC: 1.85 MG/DL (ref 0.5–1.4)
ERYTHROCYTE [DISTWIDTH] IN BLOOD BY AUTOMATED COUNT: 54.2 FL (ref 35.9–50)
FERRITIN SERPL-MCNC: 53.8 NG/ML (ref 10–291)
GFR SERPLBLD CREATININE-BSD FMLA CKD-EPI: 27 ML/MIN/1.73 M 2
GLOBULIN SER CALC-MCNC: 2.7 G/DL (ref 1.9–3.5)
GLUCOSE BLD STRIP.AUTO-MCNC: 205 MG/DL (ref 65–99)
GLUCOSE BLD STRIP.AUTO-MCNC: 242 MG/DL (ref 65–99)
GLUCOSE BLD STRIP.AUTO-MCNC: 281 MG/DL (ref 65–99)
GLUCOSE SERPL-MCNC: 267 MG/DL (ref 65–99)
HCT VFR BLD AUTO: 36.9 % (ref 37–47)
HGB BLD-MCNC: 11.5 G/DL (ref 12–16)
IRON SATN MFR SERPL: 13 % (ref 15–55)
IRON SERPL-MCNC: 46 UG/DL (ref 40–170)
LIPASE SERPL-CCNC: 1071 U/L (ref 11–82)
LIPASE SERPL-CCNC: 1550 U/L (ref 11–82)
MCH RBC QN AUTO: 27.9 PG (ref 27–33)
MCHC RBC AUTO-ENTMCNC: 31.2 G/DL (ref 32.2–35.5)
MCV RBC AUTO: 89.6 FL (ref 81.4–97.8)
PLATELET # BLD AUTO: 140 K/UL (ref 164–446)
PMV BLD AUTO: 11.9 FL (ref 9–12.9)
POTASSIUM SERPL-SCNC: 4.1 MMOL/L (ref 3.6–5.5)
PROT SERPL-MCNC: 6.5 G/DL (ref 6–8.2)
RBC # BLD AUTO: 4.12 M/UL (ref 4.2–5.4)
SODIUM SERPL-SCNC: 140 MMOL/L (ref 135–145)
TIBC SERPL-MCNC: 356 UG/DL (ref 250–450)
UIBC SERPL-MCNC: 310 UG/DL (ref 110–370)
WBC # BLD AUTO: 8.9 K/UL (ref 4.8–10.8)

## 2023-06-14 PROCEDURE — 700102 HCHG RX REV CODE 250 W/ 637 OVERRIDE(OP): Performed by: INTERNAL MEDICINE

## 2023-06-14 PROCEDURE — 82728 ASSAY OF FERRITIN: CPT

## 2023-06-14 PROCEDURE — 83550 IRON BINDING TEST: CPT

## 2023-06-14 PROCEDURE — 80053 COMPREHEN METABOLIC PANEL: CPT

## 2023-06-14 PROCEDURE — 99222 1ST HOSP IP/OBS MODERATE 55: CPT | Performed by: SPECIALIST

## 2023-06-14 PROCEDURE — 700105 HCHG RX REV CODE 258: Performed by: INTERNAL MEDICINE

## 2023-06-14 PROCEDURE — 770006 HCHG ROOM/CARE - MED/SURG/GYN SEMI*

## 2023-06-14 PROCEDURE — 83690 ASSAY OF LIPASE: CPT | Mod: 91

## 2023-06-14 PROCEDURE — A9270 NON-COVERED ITEM OR SERVICE: HCPCS | Performed by: HOSPITALIST

## 2023-06-14 PROCEDURE — 36415 COLL VENOUS BLD VENIPUNCTURE: CPT

## 2023-06-14 PROCEDURE — 97602 WOUND(S) CARE NON-SELECTIVE: CPT

## 2023-06-14 PROCEDURE — 83540 ASSAY OF IRON: CPT

## 2023-06-14 PROCEDURE — 74176 CT ABD & PELVIS W/O CONTRAST: CPT

## 2023-06-14 PROCEDURE — A9270 NON-COVERED ITEM OR SERVICE: HCPCS | Performed by: INTERNAL MEDICINE

## 2023-06-14 PROCEDURE — 71045 X-RAY EXAM CHEST 1 VIEW: CPT

## 2023-06-14 PROCEDURE — 85027 COMPLETE CBC AUTOMATED: CPT

## 2023-06-14 PROCEDURE — 700101 HCHG RX REV CODE 250: Performed by: HOSPITALIST

## 2023-06-14 PROCEDURE — 700102 HCHG RX REV CODE 250 W/ 637 OVERRIDE(OP): Performed by: HOSPITALIST

## 2023-06-14 PROCEDURE — 82962 GLUCOSE BLOOD TEST: CPT

## 2023-06-14 PROCEDURE — 99223 1ST HOSP IP/OBS HIGH 75: CPT | Mod: AI | Performed by: SURGERY

## 2023-06-14 PROCEDURE — 700111 HCHG RX REV CODE 636 W/ 250 OVERRIDE (IP): Performed by: INTERNAL MEDICINE

## 2023-06-14 PROCEDURE — 99232 SBSQ HOSP IP/OBS MODERATE 35: CPT | Performed by: INTERNAL MEDICINE

## 2023-06-14 PROCEDURE — 700111 HCHG RX REV CODE 636 W/ 250 OVERRIDE (IP): Performed by: HOSPITALIST

## 2023-06-14 RX ORDER — MORPHINE SULFATE 4 MG/ML
2 INJECTION INTRAVENOUS
Status: DISCONTINUED | OUTPATIENT
Start: 2023-06-14 | End: 2023-06-28 | Stop reason: HOSPADM

## 2023-06-14 RX ORDER — AMLODIPINE BESYLATE 5 MG/1
5 TABLET ORAL
Status: DISCONTINUED | OUTPATIENT
Start: 2023-06-14 | End: 2023-06-18

## 2023-06-14 RX ORDER — OXYCODONE HYDROCHLORIDE 5 MG/1
5 TABLET ORAL
Status: DISCONTINUED | OUTPATIENT
Start: 2023-06-14 | End: 2023-06-28 | Stop reason: HOSPADM

## 2023-06-14 RX ORDER — SODIUM CHLORIDE, SODIUM LACTATE, POTASSIUM CHLORIDE, CALCIUM CHLORIDE 600; 310; 30; 20 MG/100ML; MG/100ML; MG/100ML; MG/100ML
INJECTION, SOLUTION INTRAVENOUS CONTINUOUS
Status: DISCONTINUED | OUTPATIENT
Start: 2023-06-14 | End: 2023-06-19

## 2023-06-14 RX ORDER — OXYCODONE HYDROCHLORIDE 5 MG/1
2.5 TABLET ORAL
Status: DISCONTINUED | OUTPATIENT
Start: 2023-06-14 | End: 2023-06-28 | Stop reason: HOSPADM

## 2023-06-14 RX ORDER — HYDRALAZINE HYDROCHLORIDE 20 MG/ML
20 INJECTION INTRAMUSCULAR; INTRAVENOUS EVERY 6 HOURS PRN
Status: DISCONTINUED | OUTPATIENT
Start: 2023-06-14 | End: 2023-06-28 | Stop reason: HOSPADM

## 2023-06-14 RX ADMIN — INSULIN HUMAN 3 UNITS: 100 INJECTION, SOLUTION PARENTERAL at 12:35

## 2023-06-14 RX ADMIN — METRONIDAZOLE 500 MG: 5 INJECTION, SOLUTION INTRAVENOUS at 12:03

## 2023-06-14 RX ADMIN — OMEPRAZOLE 20 MG: 20 CAPSULE, DELAYED RELEASE ORAL at 17:04

## 2023-06-14 RX ADMIN — TIOTROPIUM BROMIDE INHALATION SPRAY 5 MCG: 3.12 SPRAY, METERED RESPIRATORY (INHALATION) at 00:05

## 2023-06-14 RX ADMIN — LISINOPRIL 10 MG: 10 TABLET ORAL at 05:16

## 2023-06-14 RX ADMIN — INSULIN HUMAN 2 UNITS: 100 INJECTION, SOLUTION PARENTERAL at 08:58

## 2023-06-14 RX ADMIN — OMEPRAZOLE 20 MG: 20 CAPSULE, DELAYED RELEASE ORAL at 00:04

## 2023-06-14 RX ADMIN — METRONIDAZOLE 500 MG: 5 INJECTION, SOLUTION INTRAVENOUS at 22:09

## 2023-06-14 RX ADMIN — ONDANSETRON 4 MG: 2 INJECTION INTRAMUSCULAR; INTRAVENOUS at 17:04

## 2023-06-14 RX ADMIN — SODIUM CHLORIDE, POTASSIUM CHLORIDE, SODIUM LACTATE AND CALCIUM CHLORIDE: 600; 310; 30; 20 INJECTION, SOLUTION INTRAVENOUS at 13:11

## 2023-06-14 RX ADMIN — INSULIN GLARGINE-YFGN 30 UNITS: 100 INJECTION, SOLUTION SUBCUTANEOUS at 17:05

## 2023-06-14 RX ADMIN — OXYCODONE HYDROCHLORIDE 2.5 MG: 5 TABLET ORAL at 20:51

## 2023-06-14 RX ADMIN — INSULIN HUMAN 3 UNITS: 100 INJECTION, SOLUTION PARENTERAL at 17:04

## 2023-06-14 RX ADMIN — INSULIN HUMAN 2 UNITS: 100 INJECTION, SOLUTION PARENTERAL at 20:42

## 2023-06-14 RX ADMIN — FEBUXOSTAT 40 MG: 40 TABLET, FILM COATED ORAL at 05:15

## 2023-06-14 RX ADMIN — OXYCODONE HYDROCHLORIDE 5 MG: 5 TABLET ORAL at 14:18

## 2023-06-14 RX ADMIN — METRONIDAZOLE 500 MG: 5 INJECTION, SOLUTION INTRAVENOUS at 00:05

## 2023-06-14 RX ADMIN — CEFTRIAXONE SODIUM 1000 MG: 10 INJECTION, POWDER, FOR SOLUTION INTRAVENOUS at 05:15

## 2023-06-14 RX ADMIN — TIOTROPIUM BROMIDE INHALATION SPRAY 5 MCG: 3.12 SPRAY, METERED RESPIRATORY (INHALATION) at 17:06

## 2023-06-14 RX ADMIN — MORPHINE SULFATE 2 MG: 4 INJECTION, SOLUTION INTRAMUSCULAR; INTRAVENOUS at 16:23

## 2023-06-14 RX ADMIN — INSULIN GLARGINE-YFGN 30 UNITS: 100 INJECTION, SOLUTION SUBCUTANEOUS at 00:24

## 2023-06-14 RX ADMIN — HYDRALAZINE HYDROCHLORIDE 20 MG: 20 INJECTION INTRAMUSCULAR; INTRAVENOUS at 16:22

## 2023-06-14 RX ADMIN — ONDANSETRON 4 MG: 2 INJECTION INTRAMUSCULAR; INTRAVENOUS at 06:29

## 2023-06-14 RX ADMIN — HYDRALAZINE HYDROCHLORIDE 20 MG: 20 INJECTION INTRAMUSCULAR; INTRAVENOUS at 22:09

## 2023-06-14 RX ADMIN — ONDANSETRON 4 MG: 2 INJECTION INTRAMUSCULAR; INTRAVENOUS at 11:19

## 2023-06-14 ASSESSMENT — ENCOUNTER SYMPTOMS
MUSCULOSKELETAL NEGATIVE: 1
RESPIRATORY NEGATIVE: 1
PSYCHIATRIC NEGATIVE: 1
PND: 0
COUGH: 0
BACK PAIN: 0
HEMOPTYSIS: 0
CARDIOVASCULAR NEGATIVE: 1
DIARRHEA: 0
ABDOMINAL PAIN: 1
CHILLS: 0
CLAUDICATION: 0
HEADACHES: 0
DEPRESSION: 0
FEVER: 0
EYES NEGATIVE: 1
HEARTBURN: 0
DOUBLE VISION: 0
CONSTIPATION: 0
NAUSEA: 1
BLOOD IN STOOL: 0
DIZZINESS: 0
NAUSEA: 0
VOMITING: 0
MYALGIAS: 0
WHEEZING: 0
NEUROLOGICAL NEGATIVE: 1
BLURRED VISION: 0
BRUISES/BLEEDS EASILY: 0
PALPITATIONS: 0

## 2023-06-14 ASSESSMENT — PAIN DESCRIPTION - PAIN TYPE
TYPE: ACUTE PAIN

## 2023-06-14 NOTE — CARE PLAN
The patient is Stable - Low risk of patient condition declining or worsening    Shift Goals  Clinical Goals: GI CO\onsult, NPO, Control N/V  Patient Goals: Comfort, Rset    Progress made toward(s) clinical / shift goals:  Patients pain managed with PRN Oxy, NPO for GI consult.       Problem: Knowledge Deficit - Standard  Goal: Patient and family/care givers will demonstrate understanding of plan of care, disease process/condition, diagnostic tests and medications  Outcome: Progressing     Problem: Pain - Standard  Goal: Alleviation of pain or a reduction in pain to the patient’s comfort goal  Outcome: Progressing       Patient is not progressing towards the following goals:

## 2023-06-14 NOTE — ASSESSMENT & PLAN NOTE
Diastolic CHF.  LVEF on 9/14/2023 showed LVEF of about 60-65%.  On IVFs for acute pancreatitis, monitor for fluid overload.

## 2023-06-14 NOTE — WOUND TEAM
[facility]  Inpatient Services  Wound and Skin Care Brief Evaluation    Admission Date: 6/13/2023     Last order of IP CONSULT TO WOUND CARE was found on 6/14/2023 from Hospital Encounter on 6/13/2023     HPI, PMH, SH: Reviewed    No chief complaint on file.    Diagnosis: Abdominal pain [R10.9]    Unit where seen by Wound Team: S531/01     Wound consult placed regarding L chest. Chart and images reviewed. This discussed with bedside RN Belkis. This RN in to assess patient. Pt pleasant and agreeable. . Non-selectively debrided with NS/wound cleanser and gauze OR moist warm washcloth and no rinse foam soap. No pressure injuries or advanced wound care needs identified. Per patient it is cancer, and it occasionally drains, and a dressing is nice.  Patient wearing a silicone adhesive foam patient brought additional supplies Wound consult completed. No further follow up unless indicated and consulted.           RSKIN:   CURRENTLY IN PLACE (X), APPLIED THIS VISIT (A), ORDERED (O): x  Q shift Jackson:  X  Q shift pressure point assessments:  X    Surface/Positioning   Standard/Trauma Bed      x    Low Airloss          Bariatric JEAN     ICU JEAN                              Waffle cushion        Waffle Overlay          Reposition q 2 hours      TAPs Turning system     Z Chong Pillow     Offloading/Redistribution o  Sacral Offloading Dressing (Silicone dressing)     Heel Offloading Dressing (Silicone dressing)     o    Heel float boots (Prevalon boot)             Float Heels off Bed with Pillows           Respiratory oxy mask  Silicone O2 tubing         Gray Foam Ear protectors     Cannula fixation Device (Tender )          High flow offloading Clip    Elastic head band offloading device      Anchorfast                                                         Trach with Optifoam split foam             Containment/Moisture Prevention continent  Rectal tube or BMS    Purwick/Condom Cath        Jovel Catheter     Barrier wipes           Barrier paste       Antifungal tx      Interdry        Mobilization stand by assist      Up to chair        Ambulate      PT/OT      Nutrition PO      Dietician        Diabetes Education      PO     TF     TPN     NPO   # days     Other

## 2023-06-14 NOTE — PROGRESS NOTES
"Hospital Medicine Daily Progress Note    Date of Service  6/14/2023    Chief Complaint  Siri Solis is a 77 y.o. female transferred from an outside hospital on 6/13/2023 where she presented with nausea and vomiting which started after eating Chinese food the day before.  She was noted to have lipase of greater than 4000, with elevated LFTs.  She had a fever of 101.3, leukocytosis of 13 K.      Abdominal ultrasound showed several calcified gallstones, no wall thickening, CBD was normal. MRCP reported \"Choledocholithiasis is suspected with mild common bile duct dilation\".    She has a history of CKD-4, emphysema with chronic respiratory failure (on 8.5 L/min nasal cannula oxygen at home), diastolic CHF, atrial fibrillation (on Eliquis),     Hospital Course  On admission, creatinine was 2.45 AST was 853, ALT was 650, total bilirubin was 2.5, alk phos was 264.  Saturating 95% on 10 L/min oxygen mask.  Chest x-ray from 6/13/2023 showed possible edema versus pleural effusion. She was afebrile and hemodynamically stable.    Ceftriaxone and Flagyl were empirically started.    Interval Problem Update  Lipase is 1550, AST is 485, ALT is 613, Total bili is 1.7. Afebrile and hemodynamically stable. GI and General Surgery have been consulted.     I have discussed this patient's plan of care and discharge plan at IDT rounds today with Case Management, Nursing, Nursing leadership, and other members of the IDT team.    Consultants/Specialty  general surgery and GI    Code Status  DNAR/DNI    Disposition  The patient is not medically cleared for discharge to home or a post-acute facility.  Anticipate discharge to: home with close outpatient follow-up    I have placed the appropriate orders for post-discharge needs.    Review of Systems  Review of Systems   Constitutional:  Positive for malaise/fatigue.   HENT: Negative.     Eyes: Negative.    Respiratory: Negative.     Cardiovascular: Negative.    Gastrointestinal:  Positive " for abdominal pain and nausea.   Genitourinary: Negative.    Musculoskeletal: Negative.    Skin: Negative.    Neurological: Negative.    Endo/Heme/Allergies: Negative.    Psychiatric/Behavioral: Negative.          Physical Exam  Temp:  [36.2 °C (97.2 °F)-36.8 °C (98.2 °F)] 36.4 °C (97.6 °F)  Pulse:  [72-94] 72  Resp:  [19-20] 20  BP: (117-167)/(70-77) 167/77  SpO2:  [94 %-95 %] 95 %    Physical Exam  Constitutional:       Appearance: She is obese. She is ill-appearing.   HENT:      Head: Normocephalic.      Nose: Nose normal.      Mouth/Throat:      Mouth: Mucous membranes are moist.   Eyes:      Pupils: Pupils are equal, round, and reactive to light.   Cardiovascular:      Rate and Rhythm: Normal rate.   Pulmonary:      Effort: Pulmonary effort is normal.   Abdominal:      Tenderness: There is abdominal tenderness. There is guarding.   Musculoskeletal:      Cervical back: Normal range of motion.      Right lower leg: No edema.      Left lower leg: No edema.   Skin:     General: Skin is warm.   Neurological:      General: No focal deficit present.   Psychiatric:         Mood and Affect: Mood normal.         Fluids    Intake/Output Summary (Last 24 hours) at 6/14/2023 1336  Last data filed at 6/14/2023 0005  Gross per 24 hour   Intake 360 ml   Output --   Net 360 ml       Laboratory  Recent Labs     06/13/23  0846 06/14/23  0053   WBC  --  8.9   RBC 4.37 4.12*   HEMOGLOBIN 11.9* 11.5*   HEMATOCRIT 38.3 36.9*   MCV 87.6 89.6   MCH 27.3* 27.9   MCHC 31.1* 31.2*   RDW 16.9* 54.2*   PLATELETCT 155 140*   MPV 9.7 11.9     Recent Labs     06/13/23  0846 06/14/23  0053   SODIUM 137 140   POTASSIUM 3.8 4.1   CHLORIDE 98* 99   CO2 30 29   GLUCOSE 373* 267*   BUN 55* 45*   CREATININE 2.45* 1.85*   CALCIUM 8.9 9.1                   Imaging  IR-US GUIDED PIV   Final Result    Ultrasound-guided PERIPHERAL IV INSERTION performed by    qualified nursing staff as above.      DX-CHEST-LIMITED (1 VIEW)   Final Result      New minor  "fissure thickening could be from edema favored over pleural fluid      Stable cardiac silhouette and hilar enlargement with right hemidiaphragm elevation           Assessment/Plan  * Abdominal pain- (present on admission)  Assessment & Plan  Likely secondary to gallstone pancreatitis.  Lipase is 1550.  NPO.  IV fluids.    Elevated liver enzymes- (present on admission)  Assessment & Plan  Lipase is 1550, AST is 485,  ALT is 613, total bilirubin is 1.7. GI consulted. (Patient has a history of elevated LFTs. AST and ALT have been elevated in the past few years, were 4020 and 1565 respectively in June 2021, Total bili was 3.1, and hepatitis panel in 7/2021 was non-reactive). No previous history of pancreatitis.    Choledocholithiasis  Assessment & Plan  MRCP from outside facility reported \"choledocholithiasis is suspected with mild common bile duct dilation\".  Lipase is elevated (1550), total bilirubin is 2.7 -> 1.7.  AST and ALT are elevated.  Ceftriaxone and Flagyl were empirically started.  GI and general surgery was consulted    Chronic obstructive pulmonary disease (HCC)- (present on admission)  Assessment & Plan  History of emphysema, on about 8.5 L/min nasal cannula oxygen at home. Continue RT per protocol, and home inhalers    Diabetic peripheral neuropathy associated with type 2 diabetes mellitus (HCC)- (present on admission)  Assessment & Plan  Continue insulin and sliding scale insulin    Acute pancreatitis without necrosis or infection, unspecified- (present on admission)  Assessment & Plan  Lipase was 1550 this morning. Continue to keep n.p.o. with IV fluids.    Chronic respiratory failure with hypoxia (HCC)- (present on admission)  Assessment & Plan  History of tobacco use, quit 20 years ago.  Diagnosed with emphysema.  On 8.5 L of oxygen at home. Saturating 95% on 10 L/min oxygen mask.    CHF (congestive heart failure) (HCC)- (present on admission)  Assessment & Plan  Diastolic CHF.  LVEF on 9/14/2023 " showed LVEF of about 60-65%.  On IVFs for acute pancreatitis, monitor for fluid overload.    Anemia- (present on admission)  Assessment & Plan  Likely due to CKD-4. At baseline. Monitor.     Hypertension- (present on admission)  Assessment & Plan  Continue home lisinopril. Norvasc added    Atrial fibrillation (HCC)  Assessment & Plan  Continue metoprolol.  Eliquis was held on admission    Stage 4 chronic kidney disease (HCC)- (present on admission)  Assessment & Plan  Creatinine is at baseline. Avoid nephrotoxins.  Monitor         VTE prophylaxis: SCDs/TEDs

## 2023-06-14 NOTE — ASSESSMENT & PLAN NOTE
Likely secondary to gallstone pancreatitis.  Underwent laparoscopic cholecystectomy on 6/18/2023.  Complains of postop pain, continue pain management  Overall improving   Liver enzymes improving   Tolerating diet without issues, I have advanced diet to CHO

## 2023-06-14 NOTE — CONSULTS
DATE OF CONSULTATION:  6/14/2023     REFERRING PHYSICIAN:   Nasra Carranza M.D.     CONSULTING PHYSICIAN:  Fidel Moran M.D.     REASON FOR CONSULTATION:  I have been asked by  to see the patient in surgical consultation for evaluation of gallstone pancreatitis.      HISTORY OF PRESENT ILLNESS: The patient is a 77 year-old elderly woman who was transferred from [facility] for evaluation management of gallstone pancreatitis.  She describes the acute onset of severe epigastric and right subcostal abdominal pain following a meal of Chinese food. The pain is associated with nausea and vomiting. She denies any prior episodes of biliary colic. denies any food intolerance or temporal relationship between symptoms and eating. She denies a family history of gallstones. The patient denies any recent or intercurrent illness. She denies any history of ulcer, gastritis, cholangitis, pancreatitis, hepatitis, or previous abdominal ailments. The patient denies any history of previous abdominal surgery. MRCP from the referring institution demonstrated moderate biliary ductal dilation and a distal filling duct consistent distant with persistent choledocholithiasis.    PAST MEDICAL HISTORY:  has a past medical history of Anemia, Arthritis, Back pain, Breath shortness, CATARACT, Congestive heart failure (HCC), COPD (chronic obstructive pulmonary disease) (HCC), Diabetes, EMPHYSEMA, Fall, Hiatus hernia syndrome, Hyperkalemia (3/23/2018), Hyperlipidemia, Hypertension, Indigestion, Other specified disorder of intestines, Pain, Paralysis of diaphragm, Renal insufficiency (3/23/2018), Snoring, and Ulcer.    PAST SURGICAL HISTORY:  has a past surgical history that includes other orthopedic surgery; gyn surgery; other; other; and recovery (11/6/2013).    ALLERGIES:   Allergies   Allergen Reactions    Byetta Rash     rash    Epinephrine     Escitalopram Anaphylaxis     Leg cramping    Furosemide Unspecified     Leg  cramps, stopped urinary output  Leg cramps, stopped urinary output      Iodine Anaphylaxis and Rash     contrast  contrast      Latex Anaphylaxis and Rash    Levofloxacin Itching    Lexapro      Leg cramping    Penicillins Rash     Rash    Rash  Rash      Sertraline Diarrhea    Zoloft Diarrhea    Amaryl     Amaryl [Glimepiride] Shortness of Breath and Rash     Rash    Amoxicillin Rash     Rash      Epinephrine Shortness of Breath     Panic attack, Can't breath    Exenatide Rash     rash    Hctz      Stopped urinary output    Metformin Hives, Rash and Itching     Rash     Spironolactone Unspecified     CURRENT MEDICATIONS:    Home Medications       Reviewed by Bri Ortiz R.N. (Registered Nurse) on 06/13/23 at 2247  Med List Status: Complete     Medication Last Dose Status   acetaminophen (TYLENOL) 500 MG Tab  Active   albuterol 108 (90 Base) MCG/ACT Aero Soln inhalation aerosol  Active   allopurinol (ZYLOPRIM) 100 MG Tab  Active   B-D ULTRA-FINE 33 LANCETS Misc  Active   calcitRIOL (ROCALTROL) 0.25 MCG Cap  Active   diphenhydrAMINE HCl (BENADRYL ALLERGY PO)  Active   ezetimibe-simvastatin (VYTORIN) 10-40 MG per tablet  Active   febuxostat (ULORIC) 40 MG Tab  Active   glucose blood strip  Active   hydrocortisone 2.5 % Cream topical cream  Active   insulin glargine (LANTUS) 100 UNIT/ML Solution Pen-injector injection  Active   Insulin Pen Needle (B-D UF III MINI PEN NEEDLES) 31G X 5 MM Misc  Active   lisinopril (PRINIVIL) 10 MG Tab  Active   Loperamide HCl (IMODIUM PO)  Active   metoprolol tartrate (LOPRESSOR) 25 MG Tab  Active   Olopatadine HCl (PATADAY) 0.7 % Solution  Active   omeprazole (PRILOSEC) 20 MG delayed-release capsule  Active   temazepam (RESTORIL) 15 MG Cap  Active   tiotropium (SPIRIVA) 18 MCG Cap  Active   torsemide (DEMADEX) 20 MG Tab  Active                  FAMILY HISTORY: family history includes Breast Cancer in her mother; Cancer in her father; Heart Failure in her maternal  grandmother.    SOCIAL HISTORY:  reports that she quit smoking about 16 years ago. Her smoking use included cigarettes. She has a 40.00 pack-year smoking history. She has never used smokeless tobacco. She reports that she does not drink alcohol and does not use drugs.    REVIEW OF SYSTEMS: Comprehensive review of systems is negative with the exception of the aforementioned HPI, PMH, and PSH bullets in accordance with CMS guidelines.    PHYSICAL EXAMINATION:      Constitutional:     Vital Signs: BP (!) 167/77   Pulse 72   Temp 36.4 °C (97.6 °F) (Temporal)   Resp 20   Wt 111 kg (244 lb 11.4 oz)   SpO2 95%    General Appearance: appears stated age.  HEENT: The pupils are equal, round, and reactive to light bilaterally. The extraocular muscles are intact bilaterally.. The sclera are mildly icteric. Nares and oropharynx are clear.   Neck: Supple. No adenopathy.  Respiratory:   Inspection: Labored respirations and accessory muscle use.   Auscultation: clear to auscultation.  Cardiovascular:   Inspection: The skin is warm and dry.  Auscultation: Regular rate and rhythm.   Peripheral Pulses: Normal.   Abdomen:  Inspection: Abdominal inspection reveals  no abdominal distension.   Palpation: Palpation is remarkable for moderate tenderness in the epigastric and right subcostal region.   Extremities:   Examination of the upper and lower extremities demonstrates no cyanosis edema or clubbing.  Neurologic:   Alert & oriented to person, time and place. Normal motor function. Normal sensory function. No focal deficits noted.    LABORATORY VALUES:   Recent Labs     06/13/23  0846 06/14/23 0053   WBC  --  8.9   RBC 4.37 4.12*   HEMOGLOBIN 11.9* 11.5*   HEMATOCRIT 38.3 36.9*   MCV 87.6 89.6   MCH 27.3* 27.9   MCHC 31.1* 31.2*   RDW 16.9* 54.2*   PLATELETCT 155 140*   MPV 9.7 11.9     Recent Labs     06/13/23  0846 06/14/23 0053   SODIUM 137 140   POTASSIUM 3.8 4.1   CHLORIDE 98* 99   CO2 30 29   GLUCOSE 373* 267*   BUN 55* 45*    CREATININE 2.45* 1.85*   CALCIUM 8.9 9.1     Recent Labs     06/13/23  0846 06/14/23  0053   ASTSGOT 853* 485*   ALTSGPT 650* 613*   TBILIRUBIN 2.5* 1.7*   ALKPHOSPHAT 264* 296*   GLOBULIN 2.7 2.7      ASSESSMENT AND PLAN:   Gallstone pancreatitis and mild hyperbilirubinemia.  Recommend broad-spectrum antibiotic therapy gastroenterology consultation for persistent hyperbilirubinemia and consideration for ERCP.  Laparoscopic cholecystectomy on this index admission.    DISPOSITION: Medical evaluation and admission. The patient was admitted to the Medical Service prior to surgical consultation. [facility] [place] [facility] will follow.     ____________________________________     Fidel Moran M.D.    DD: 6/14/2023  1:04 PM

## 2023-06-14 NOTE — H&P
Hospital Medicine History & Physical Note    Date of Service  6/13/2023    Primary Care Physician  Lake Ochoa M.D.    Consultants  None    Code Status  DNAR/DNI    Chief Complaint  No chief complaint on file.      History of Presenting Illness  Siri Solis is a 77 y.o. female who presented 6/13/2023 with past medical history of atrial fibrillation, COPD, chronic respiratory failure on 8 L of oxygen at home, is coming today complaining of nausea and vomiting, patient presented to local ER initially, patient apparently had some Chinese food the day before and then she started having nausea and vomiting, no diarrhea, patient complaining of abdominal pain, patient denies any fever chills, no headache no neck pain no focal weakness, patient at outside facility was found to have elevated liver function test and lipase higher than 4000, patient was febrile with temperature 101.3 °F, patient had leukocytosis of 13,000, platelet count 155, patient with creatinine 2.45, patient had MRCP done that showed possible choledocho lithiasis with increased CBD, at outside facility there is no GI/ERCP available, transfer to [facility] was requested, when I saw patient she was in her room she is alert oriented follows commands still complaining of right upper quadrant pain it is improved, patient at this time is going to be admitted, will continue supportive treatment, will continue with IV antibiotics, will require GI consult in a.m., I have discussed case and plan of care with patient, nurse staff, referring physician, all questions been answered.    \    Review of Systems  Review of Systems   Constitutional:  Negative for chills and fever.   HENT:  Negative for ear discharge and nosebleeds.    Eyes:  Negative for blurred vision and double vision.   Respiratory:  Negative for cough, hemoptysis and wheezing.    Cardiovascular:  Negative for chest pain, palpitations, claudication, leg swelling and PND.    Gastrointestinal:  Positive for abdominal pain. Negative for blood in stool, constipation, diarrhea, heartburn, nausea and vomiting.   Genitourinary:  Negative for hematuria and urgency.   Musculoskeletal:  Negative for back pain and myalgias.   Skin:  Negative for rash.   Neurological:  Negative for dizziness and headaches.   Endo/Heme/Allergies:  Does not bruise/bleed easily.   Psychiatric/Behavioral:  Negative for depression.        Past Medical History   has a past medical history of Anemia, Arthritis, Back pain, Breath shortness, CATARACT, Congestive heart failure (HCC), COPD (chronic obstructive pulmonary disease) ([facility]), Diabetes, EMPHYSEMA, Fall, Hiatus hernia syndrome, Hyperkalemia (3/23/2018), Hyperlipidemia, Hypertension, Indigestion, Other specified disorder of intestines, Pain, Paralysis of diaphragm, Renal insufficiency (3/23/2018), Snoring, and Ulcer.    Surgical History   has a past surgical history that includes other orthopedic surgery; gyn surgery; other; other; and recovery (11/6/2013).     Family History  family history includes Breast Cancer in her mother; Cancer in her father; Heart Failure in her maternal grandmother.       Social History   reports that she quit smoking about 16 years ago. Her smoking use included cigarettes. She has a 40.00 pack-year smoking history. She has never used smokeless tobacco. She reports that she does not drink alcohol and does not use drugs.    Allergies  Allergies   Allergen Reactions    Byetta Rash     rash    Epinephrine     Escitalopram Anaphylaxis     Leg cramping    Furosemide Unspecified     Leg cramps, stopped urinary output  Leg cramps, stopped urinary output      Iodine Anaphylaxis and Rash     contrast  contrast      Latex Anaphylaxis and Rash    Levofloxacin Itching    Lexapro      Leg cramping    Penicillins Rash     Rash    Rash  Rash      Sertraline Diarrhea    Zoloft Diarrhea    Amaryl     Amaryl [Glimepiride] Shortness of Breath and Rash      Rash    Amoxicillin Rash     Rash      Epinephrine Shortness of Breath     Panic attack, Can't breath    Exenatide Rash     rash    Hctz      Stopped urinary output    Metformin Hives, Rash and Itching     Rash     Spironolactone Unspecified       Medications  Prior to Admission Medications   Prescriptions Last Dose Informant Patient Reported? Taking?   B-D ULTRA-FINE 33 LANCETS Misc   Yes No   Sig: Use as instructed- 4 times a day. Please provide the brand covered by insurance -90 days   Insulin Pen Needle (B-D UF III MINI PEN NEEDLES) 31G X 5 MM Misc   Yes No   Sig: BD PEN NEEDLE MINI U/F 31G X 5 MM   Loperamide HCl (IMODIUM PO)   Yes No   Sig: Take  by mouth.   Olopatadine HCl (PATADAY) 0.7 % Solution   Yes No   Sig: Administer  into affected eye(s). 1 drop each eye at night   albuterol 108 (90 Base) MCG/ACT Aero Soln inhalation aerosol   Yes No   Sig: Inhale 2 Puffs 2 times a day.   allopurinol (ZYLOPRIM) 100 MG Tab   Yes No   Sig: Take 100 mg by mouth every day.   Patient not taking: Reported on 11/4/2022   calcitRIOL (ROCALTROL) 0.25 MCG Cap   Yes No   Sig: Take 1 Capsule by mouth every day.   diphenhydrAMINE HCl (BENADRYL ALLERGY PO)   Yes No   Sig: Take  by mouth.   ezetimibe-simvastatin (VYTORIN) 10-40 MG per tablet   No No   Sig: Take 1 Tablet by mouth every day.   febuxostat (ULORIC) 40 MG Tab   Yes No   glucose blood strip   Yes No   Sig: ONETOUCH ULTRA BLUE STRP   hydrocortisone 2.5 % Cream topical cream   No No   Sig: Apply 1 Application topically 2 times a day.   insulin glargine (LANTUS) 100 UNIT/ML Solution Pen-injector injection   No No   Sig: Inject 30 Units under the skin every evening. 2*15ml=30m total   lisinopril (PRINIVIL) 10 MG Tab   No No   Sig: TAKE 1 TABLET BY MOUTH  DAILY   omeprazole (PRILOSEC) 20 MG delayed-release capsule   No No   Sig: TAKE 1 CAPSULE BY MOUTH IN  THE EVENING   tiotropium (SPIRIVA) 18 MCG Cap   No No   Sig: Place 1 Capsule into inhaler and inhale every evening.    torsemide (DEMADEX) 20 MG Tab   No No   Sig: TAKE 1 TABLET BY MOUTH  TWICE DAILY      Facility-Administered Medications: None       Physical Exam  Temp:  [36.8 °C (98.2 °F)] 36.8 °C (98.2 °F)  Pulse:  [89] 89  Resp:  [19] 19  BP: (117)/(70) 117/70  Blood Pressure : 117/70   Temperature: 36.8 °C (98.2 °F)   Pulse: 89   Respiration: 19           Physical Exam  Vitals and nursing note reviewed.   Constitutional:       Appearance: Normal appearance. She is ill-appearing (Chronic).   HENT:      Head: Normocephalic and atraumatic.      Nose: Nose normal.      Mouth/Throat:      Mouth: Mucous membranes are dry.      Pharynx: No oropharyngeal exudate or posterior oropharyngeal erythema.   Eyes:      General: No scleral icterus.        Right eye: No discharge.         Left eye: No discharge.      Conjunctiva/sclera: Conjunctivae normal.   Cardiovascular:      Rate and Rhythm: Normal rate and regular rhythm.      Pulses: Normal pulses.      Heart sounds: Normal heart sounds.   Pulmonary:      Effort: Pulmonary effort is normal. No respiratory distress.      Breath sounds: Normal breath sounds. No wheezing.   Abdominal:      General: Bowel sounds are normal. There is no distension.      Palpations: Abdomen is soft.      Tenderness: There is no abdominal tenderness (Right upper quadrant). There is no guarding.   Musculoskeletal:      Cervical back: Normal range of motion and neck supple.      Right lower leg: No edema.      Left lower leg: Edema present.   Skin:     General: Skin is warm and dry.      Capillary Refill: Capillary refill takes less than 2 seconds.      Coloration: Skin is not jaundiced.   Neurological:      General: No focal deficit present.      Mental Status: She is alert and oriented to person, place, and time.      Cranial Nerves: No cranial nerve deficit.      Motor: No weakness.   Psychiatric:         Mood and Affect: Mood normal.         Behavior: Behavior normal.         Laboratory:  Recent Labs      06/13/23  0846   RBC 4.37   HEMOGLOBIN 11.9*   HEMATOCRIT 38.3   MCV 87.6   MCH 27.3*   MCHC 31.1*   RDW 16.9*   PLATELETCT 155   MPV 9.7     Recent Labs     06/13/23  0846   SODIUM 137   POTASSIUM 3.8   CHLORIDE 98*   CO2 30   GLUCOSE 373*   BUN 55*   CREATININE 2.45*   CALCIUM 8.9     Recent Labs     06/13/23  0846   ALTSGPT 650*   ASTSGOT 853*   ALKPHOSPHAT 264*   TBILIRUBIN 2.5*   GLUCOSE 373*                   Imaging:      X-Ray:  My impression is: Cephalization, no pleural effusion  EKG:  My impression is: Sinus rhythm no acute ischemic changes    Assessment/Plan:  Justification for Admission Status  I anticipate this patient will require at least two midnights for appropriate medical management, necessitating inpatient admission because will probably require ERCP with GI, repeat in lipase and liver function test        * Abdominal pain- (present on admission)  Assessment & Plan  Patient had MRCP at outside facility showing choledocholithiasis with increased common bile duct as well as acute pancreatitis with lipase more than 4000  Patient will start on supportive treatment  Patient will require GI evaluation in a.m.  N.p.o. at midnight  Repeat lipase in a.m.  Repeat CMP in a.m.    Atrial fibrillation (HCC)  Assessment & Plan  Chronic  Continue metoprolol  Holding Eliquis    Choledocholithiasis- (present on admission)  Assessment & Plan  Seen on MRCP from outside facility  Will require GI evaluation in a.m.  N.p.o. at midnight  Continue IV antibiotics  Gentle IV fluids  CBC CMP in a.m.    Stage 4 chronic kidney disease (HCC)- (present on admission)  Assessment & Plan  Acute on chronic  Continue gentle IV fluid  Holding diuretics  Follow-up BMP in a.m.    Chronic obstructive pulmonary disease (HCC)- (present on admission)  Assessment & Plan  Not in acute exacerbation continue RT per protocol continue home inhalers    Elevated liver enzymes- (present on admission)  Assessment & Plan  Likely due to acute  pancreatitis acute choledocholithiasis.  Continue gentle IV fluids  Continue IV antibiotics  GI in a.m.    Diabetic peripheral neuropathy associated with type 2 diabetes mellitus (HCC)- (present on admission)  Assessment & Plan  Continue insulin and sliding scale insulin    Acute pancreatitis without necrosis or infection, unspecified- (present on admission)  Assessment & Plan  Follow-up lipase in a.m.  Clear liquid diet  N.p.o. at midnight    Chronic respiratory failure with hypoxia (HCC)- (present on admission)  Assessment & Plan  Patient is on 8 L of oxygen at home.  Continue close monitoring    CHF (congestive heart failure) (HCC)- (present on admission)  Assessment & Plan  Not in acute exacerbation  Continue close monitoring  Watching for signs of fluid overload  Gentle IV fluids    Anemia- (present on admission)  Assessment & Plan  Follow-up ferritin and iron levels    Hypertension- (present on admission)  Assessment & Plan  Continue monitoring  Continue lisinopril        VTE prophylaxis: SCDs/TEDs      I have reviewed patient's records from an outside facility  I have reviewed CBC  I have reviewed CMP  I have reviewed lipase level I have reviewed and interpreted EKG and chest x-ray  I have discussed with referring physician  Admit patient to medical floor.  I have ordered IV antibiotics ceftriaxone and Flagyl.

## 2023-06-14 NOTE — PROGRESS NOTES
Received patient.  Assisted to transfer from [facility] to hospitals bed, tolerated without problems. Oriented to floor and use of call light. Patient assisted to ambulate to bathroom with use of walker and 1 person assist. Back to bed. Bed in low position, wheels locked, side rails up x2 and call light within reach.

## 2023-06-14 NOTE — PROGRESS NOTES
Assumed care of patient 0700. Received Report from [facility] nurse. Patient A&O 4, on 8 L oxy mask Baseline, no reports of pain.. Call light within reach, belongings within reach, Fall precautions in place, bed in lowest position. Patient does not have any other needs at this time.     POC was discussed with patient. Plan for GI consult, monitor for N/V. Zofran PRN.     All questions were answered. Patient verbalized understanding.

## 2023-06-14 NOTE — ASSESSMENT & PLAN NOTE
FLAVIA on admission with bump in Cr 1.5 x baseline   This is now improved and she is at baseline.   Avoid nephrotoxins  Monitor renal function while giving IV lasix with daily BMP   Repeat in AM

## 2023-06-14 NOTE — CARE PLAN
The patient is Stable - Low risk of patient condition declining or worsening  Instructed on plan of care , meds, and tests. Pain controlled.    Problem: Knowledge Deficit - Standard  Goal: Patient and family/care givers will demonstrate understanding of plan of care, disease process/condition, diagnostic tests and medications  Outcome: Progressing     Problem: Pain - Standard  Goal: Alleviation of pain or a reduction in pain to the patient’s comfort goal  Outcome: Progressing     Shift Goals  Clinical Goals: IV flagyl, hydration fs  Patient Goals: rest/comfort    Progress made toward(s) clinical / shift goals:  progressing    Patient is not progressing towards the following goals:

## 2023-06-14 NOTE — ASSESSMENT & PLAN NOTE
History of emphysema, on about 8.5 L/min nasal cannula oxygen at home. Continue RT per protocol, and home inhalers

## 2023-06-14 NOTE — ASSESSMENT & PLAN NOTE
History of tobacco use, quit 20 years ago.  Diagnosed with emphysema.  On 8-9 L of oxygen at home  Post operatively pt has had increased O2 needs, on 15L this AM, I have transitioned to her high flow oxygen this evening due to persistently low O2 sats and high O2 needs   CXR from 6/19 reviewed, pt has low lung volumes, vascular congestion   I have started IV Lasix, tolerated previous doses despite documented allergy   Encourage IS and mobility, I do believe much of this may be due to atelectasis and poor effort from abdominal discomfort. Pt encourage to be proactive   If no improvement may need to evaluate further with CT

## 2023-06-14 NOTE — PROGRESS NOTES
4 Eyes Skin Assessment Completed by XAVIER Gregory and XAVIER España.    Head WDL  Ears WDL  Nose WDL  Mouth WDL  Neck WDL  Breast/Chest : skin cancer lesion to left chest w/ bandaids in place. Has right breast brown flat skin lesion, no drainage to right breast/nipple area  Shoulder Blades WDL  Spine WDL  (R) Arm/Elbow/Hand WDL  (L) Arm/Elbow/Hand WDL  Abdomen WDL  Groin WDL  Scrotum/Coccyx/Buttocks WDL  (R) Leg WDL  (L) Leg WDL  (R) Heel/Foot/Toe dry skin  (L) Heel/Foot/Toe dry skin          Devices In Places Oxy Mask      Interventions In Place Pillows    Possible Skin Injury No    Pictures Uploaded Into Epic No, needs to be completed  Wound Consult Placed Yes  RN Wound Prevention Protocol Ordered No

## 2023-06-14 NOTE — ASSESSMENT & PLAN NOTE
"MRCP from outside facility reported \"choledocholithiasis is suspected with mild common bile duct dilation\".  LFTs, total bilirubin, and lipase were elevated.   Ceftriaxone and Flagyl were empirically started.  GI and general surgery were consulted. Underwent laparoscopic cholecystectomy on 6/18/2023.  General surgery signed off, and recommended follow up with Dr. Ricks or [facility] clinic, low-fat diet for 3 weeks, ok to shower, keeping incisions as dry as possible, do not submerge.  "

## 2023-06-14 NOTE — ASSESSMENT & PLAN NOTE
Continue home lisinopril. Norvasc was added, dose was increased. Home Metoprolol resumed, dose increased.  PRN Labetalol added due to episodes of elevated blood pressure.  Still with some intermittent elevated BP but overall trend is improved   Continue current regiment and monitor

## 2023-06-15 ENCOUNTER — APPOINTMENT (OUTPATIENT)
Dept: MEDICAL GROUP | Facility: CLINIC | Age: 78
End: 2023-06-15
Payer: COMMERCIAL

## 2023-06-15 ENCOUNTER — APPOINTMENT (OUTPATIENT)
Dept: RADIOLOGY | Facility: MEDICAL CENTER | Age: 78
DRG: 417 | End: 2023-06-15
Attending: INTERNAL MEDICINE
Payer: COMMERCIAL

## 2023-06-15 LAB
ALBUMIN SERPL BCP-MCNC: 3.7 G/DL (ref 3.2–4.9)
ALBUMIN/GLOB SERPL: 1.3 G/DL
ALP SERPL-CCNC: 221 U/L (ref 30–99)
ALT SERPL-CCNC: 318 U/L (ref 2–50)
ANION GAP SERPL CALC-SCNC: 14 MMOL/L (ref 7–16)
AST SERPL-CCNC: 93 U/L (ref 12–45)
BASOPHILS # BLD AUTO: 0.2 % (ref 0–1.8)
BASOPHILS # BLD: 0.02 K/UL (ref 0–0.12)
BILIRUB SERPL-MCNC: 0.5 MG/DL (ref 0.1–1.5)
BUN SERPL-MCNC: 29 MG/DL (ref 8–22)
CALCIUM ALBUM COR SERPL-MCNC: 9.7 MG/DL (ref 8.5–10.5)
CALCIUM SERPL-MCNC: 9.5 MG/DL (ref 8.5–10.5)
CHLORIDE SERPL-SCNC: 101 MMOL/L (ref 96–112)
CO2 SERPL-SCNC: 25 MMOL/L (ref 20–33)
CREAT SERPL-MCNC: 1.17 MG/DL (ref 0.5–1.4)
EOSINOPHIL # BLD AUTO: 0.02 K/UL (ref 0–0.51)
EOSINOPHIL NFR BLD: 0.2 % (ref 0–6.9)
ERYTHROCYTE [DISTWIDTH] IN BLOOD BY AUTOMATED COUNT: 56.6 FL (ref 35.9–50)
GFR SERPLBLD CREATININE-BSD FMLA CKD-EPI: 47 ML/MIN/1.73 M 2
GLOBULIN SER CALC-MCNC: 2.8 G/DL (ref 1.9–3.5)
GLUCOSE SERPL-MCNC: 241 MG/DL (ref 65–99)
HCT VFR BLD AUTO: 36.7 % (ref 37–47)
HGB BLD-MCNC: 11.1 G/DL (ref 12–16)
IMM GRANULOCYTES # BLD AUTO: 0.08 K/UL (ref 0–0.11)
IMM GRANULOCYTES NFR BLD AUTO: 0.7 % (ref 0–0.9)
LIPASE SERPL-CCNC: 469 U/L (ref 11–82)
LYMPHOCYTES # BLD AUTO: 0.68 K/UL (ref 1–4.8)
LYMPHOCYTES NFR BLD: 5.7 % (ref 22–41)
MCH RBC QN AUTO: 27.8 PG (ref 27–33)
MCHC RBC AUTO-ENTMCNC: 30.2 G/DL (ref 32.2–35.5)
MCV RBC AUTO: 91.8 FL (ref 81.4–97.8)
MONOCYTES # BLD AUTO: 0.97 K/UL (ref 0–0.85)
MONOCYTES NFR BLD AUTO: 8.2 % (ref 0–13.4)
NEUTROPHILS # BLD AUTO: 10.1 K/UL (ref 1.82–7.42)
NEUTROPHILS NFR BLD: 85 % (ref 44–72)
NRBC # BLD AUTO: 0 K/UL
NRBC BLD-RTO: 0 /100 WBC (ref 0–0.2)
PLATELET # BLD AUTO: 160 K/UL (ref 164–446)
PMV BLD AUTO: 11.9 FL (ref 9–12.9)
POTASSIUM SERPL-SCNC: 4.3 MMOL/L (ref 3.6–5.5)
PROT SERPL-MCNC: 6.5 G/DL (ref 6–8.2)
RBC # BLD AUTO: 4 M/UL (ref 4.2–5.4)
SODIUM SERPL-SCNC: 140 MMOL/L (ref 135–145)
WBC # BLD AUTO: 11.9 K/UL (ref 4.8–10.8)

## 2023-06-15 PROCEDURE — 770006 HCHG ROOM/CARE - MED/SURG/GYN SEMI*

## 2023-06-15 PROCEDURE — 82962 GLUCOSE BLOOD TEST: CPT

## 2023-06-15 PROCEDURE — 80053 COMPREHEN METABOLIC PANEL: CPT

## 2023-06-15 PROCEDURE — 700105 HCHG RX REV CODE 258: Performed by: INTERNAL MEDICINE

## 2023-06-15 PROCEDURE — 700102 HCHG RX REV CODE 250 W/ 637 OVERRIDE(OP): Performed by: HOSPITALIST

## 2023-06-15 PROCEDURE — 99232 SBSQ HOSP IP/OBS MODERATE 35: CPT | Performed by: NURSE PRACTITIONER

## 2023-06-15 PROCEDURE — 700102 HCHG RX REV CODE 250 W/ 637 OVERRIDE(OP): Performed by: INTERNAL MEDICINE

## 2023-06-15 PROCEDURE — 85025 COMPLETE CBC W/AUTO DIFF WBC: CPT

## 2023-06-15 PROCEDURE — 36415 COLL VENOUS BLD VENIPUNCTURE: CPT

## 2023-06-15 PROCEDURE — 700111 HCHG RX REV CODE 636 W/ 250 OVERRIDE (IP): Performed by: HOSPITALIST

## 2023-06-15 PROCEDURE — A9270 NON-COVERED ITEM OR SERVICE: HCPCS | Performed by: INTERNAL MEDICINE

## 2023-06-15 PROCEDURE — 99233 SBSQ HOSP IP/OBS HIGH 50: CPT | Performed by: INTERNAL MEDICINE

## 2023-06-15 PROCEDURE — 700101 HCHG RX REV CODE 250: Performed by: HOSPITALIST

## 2023-06-15 PROCEDURE — 94664 DEMO&/EVAL PT USE INHALER: CPT

## 2023-06-15 PROCEDURE — 700111 HCHG RX REV CODE 636 W/ 250 OVERRIDE (IP): Performed by: INTERNAL MEDICINE

## 2023-06-15 PROCEDURE — 83690 ASSAY OF LIPASE: CPT

## 2023-06-15 PROCEDURE — A9270 NON-COVERED ITEM OR SERVICE: HCPCS | Performed by: HOSPITALIST

## 2023-06-15 RX ORDER — FUROSEMIDE 10 MG/ML
20 INJECTION INTRAMUSCULAR; INTRAVENOUS ONCE
Status: COMPLETED | OUTPATIENT
Start: 2023-06-15 | End: 2023-06-15

## 2023-06-15 RX ADMIN — SODIUM CHLORIDE, POTASSIUM CHLORIDE, SODIUM LACTATE AND CALCIUM CHLORIDE: 600; 310; 30; 20 INJECTION, SOLUTION INTRAVENOUS at 00:13

## 2023-06-15 RX ADMIN — HYDRALAZINE HYDROCHLORIDE 20 MG: 20 INJECTION INTRAMUSCULAR; INTRAVENOUS at 05:07

## 2023-06-15 RX ADMIN — INSULIN HUMAN 1 UNITS: 100 INJECTION, SOLUTION PARENTERAL at 21:43

## 2023-06-15 RX ADMIN — INSULIN HUMAN 3 UNITS: 100 INJECTION, SOLUTION PARENTERAL at 08:38

## 2023-06-15 RX ADMIN — AMLODIPINE BESYLATE 5 MG: 5 TABLET ORAL at 04:57

## 2023-06-15 RX ADMIN — SODIUM CHLORIDE, POTASSIUM CHLORIDE, SODIUM LACTATE AND CALCIUM CHLORIDE: 600; 310; 30; 20 INJECTION, SOLUTION INTRAVENOUS at 04:15

## 2023-06-15 RX ADMIN — FUROSEMIDE 20 MG: 10 INJECTION INTRAMUSCULAR; INTRAVENOUS at 17:07

## 2023-06-15 RX ADMIN — SODIUM CHLORIDE, POTASSIUM CHLORIDE, SODIUM LACTATE AND CALCIUM CHLORIDE: 600; 310; 30; 20 INJECTION, SOLUTION INTRAVENOUS at 21:42

## 2023-06-15 RX ADMIN — TIOTROPIUM BROMIDE INHALATION SPRAY 5 MCG: 3.12 SPRAY, METERED RESPIRATORY (INHALATION) at 17:07

## 2023-06-15 RX ADMIN — INSULIN HUMAN 2 UNITS: 100 INJECTION, SOLUTION PARENTERAL at 12:01

## 2023-06-15 RX ADMIN — ALBUTEROL SULFATE 2 PUFF: 90 AEROSOL, METERED RESPIRATORY (INHALATION) at 17:06

## 2023-06-15 RX ADMIN — METRONIDAZOLE 500 MG: 5 INJECTION, SOLUTION INTRAVENOUS at 21:48

## 2023-06-15 RX ADMIN — METRONIDAZOLE 500 MG: 5 INJECTION, SOLUTION INTRAVENOUS at 11:57

## 2023-06-15 RX ADMIN — OXYCODONE HYDROCHLORIDE 5 MG: 5 TABLET ORAL at 07:41

## 2023-06-15 RX ADMIN — OXYCODONE HYDROCHLORIDE 2.5 MG: 5 TABLET ORAL at 23:10

## 2023-06-15 RX ADMIN — SODIUM CHLORIDE, POTASSIUM CHLORIDE, SODIUM LACTATE AND CALCIUM CHLORIDE: 600; 310; 30; 20 INJECTION, SOLUTION INTRAVENOUS at 08:33

## 2023-06-15 RX ADMIN — LISINOPRIL 10 MG: 10 TABLET ORAL at 04:57

## 2023-06-15 RX ADMIN — OMEPRAZOLE 20 MG: 20 CAPSULE, DELAYED RELEASE ORAL at 17:06

## 2023-06-15 RX ADMIN — OXYCODONE HYDROCHLORIDE 5 MG: 5 TABLET ORAL at 17:05

## 2023-06-15 RX ADMIN — CEFTRIAXONE SODIUM 1000 MG: 10 INJECTION, POWDER, FOR SOLUTION INTRAVENOUS at 04:57

## 2023-06-15 RX ADMIN — FEBUXOSTAT 40 MG: 40 TABLET, FILM COATED ORAL at 04:58

## 2023-06-15 RX ADMIN — INSULIN GLARGINE-YFGN 30 UNITS: 100 INJECTION, SOLUTION SUBCUTANEOUS at 17:19

## 2023-06-15 RX ADMIN — ALBUTEROL SULFATE 2 PUFF: 90 AEROSOL, METERED RESPIRATORY (INHALATION) at 04:58

## 2023-06-15 RX ADMIN — INSULIN HUMAN 2 UNITS: 100 INJECTION, SOLUTION PARENTERAL at 17:21

## 2023-06-15 ASSESSMENT — PAIN DESCRIPTION - PAIN TYPE
TYPE: ACUTE PAIN

## 2023-06-15 ASSESSMENT — ENCOUNTER SYMPTOMS
COUGH: 0
NEUROLOGICAL NEGATIVE: 1
DIARRHEA: 0
FOCAL WEAKNESS: 0
EYES NEGATIVE: 1
RESPIRATORY NEGATIVE: 1
HEARTBURN: 0
PSYCHIATRIC NEGATIVE: 1
NAUSEA: 0
MUSCULOSKELETAL NEGATIVE: 1
SORE THROAT: 0
FLANK PAIN: 0
FEVER: 0
NAUSEA: 1
MYALGIAS: 0
BLOOD IN STOOL: 0
HEADACHES: 0
CARDIOVASCULAR NEGATIVE: 1
VOMITING: 0
CONSTIPATION: 0
CHILLS: 0
ABDOMINAL PAIN: 1
NERVOUS/ANXIOUS: 0
SHORTNESS OF BREATH: 0
WEAKNESS: 1
FALLS: 0

## 2023-06-15 ASSESSMENT — LIFESTYLE VARIABLES: SUBSTANCE_ABUSE: 0

## 2023-06-15 NOTE — RESPIRATORY CARE
"  COPD EDUCATION by COPD CLINICAL EDUCATOR  6/15/2023  at  11:48 AM by Pawan Juarez, RRT     Patient interviewed by COPD education team.  Patient unable to participate in full program.  A short intervention has been conducted.  A comprehensive packet including information about COPD, types of treatments to manage their disease and safe home Oxygen usage was provided and reviewed with patient at the bedside.  Reviewed RT medication, patient reports no longer takes Albuterol.        COPD Assessment  COPD Clinical Specialists ONLY  COPD Education Initiated: Yes--Short Intervention  Is this a COPD exacerbation patient?: No (Abdominal pain)  DME Company: [employer] Equipment Type: oxygen 8 lpm  Physician Name: Lake Ochoa M.D.-wants to make her own appointments  Pulmonologist Name: Dr Kayli [place]-wants to make her own appointments  $ Demo/Eval of SVN's, MDI's and Aerosols: Yes (Reviewed RT medication)  (OP) Pulmonary Function Testing: Yes ()  Interdisciplinary Rounds: Attendance at Rounds (30 Min)    PFT Results    2018 PFT FEV1 68%; FEV1/FVC 69.    Meds to Beds  Would the patient like to opt in for Bedside Medication Delivery at Discharge?: Yes, interested     MY COPD ACTION PLAN     It is recommended that patients and physicians /healthcare providers complete this action plan together. This plan should be discussed at each physician visit and updated as needed.    The green, yellow and red zones show groups of symptoms of COPD. This list of symptoms is not comprehensive, and you may experience other symptoms. In the \"Actions\" column, your healthcare provider has recommended actions for you to take based on your symptoms.    Patient Name: Siri Solis   YOB: 1945   Last Updated on:     Green Zone:  I am doing well today Actions     Usual activitiy and exercise level   Take daily medications     Usual amounts of cough and phlegm/mucus   Use oxygen as prescribed     Sleep well " "at night   Continue regular exercise/diet plan     Appetite is good   At all times avoid cigarette smoke, inhaled irritants     Daily Medications (these medications are taken every day):                Yellow Zone:  I am having a bad day or a COPD flare Actions     More breathless than usual   Continue daily medications     I have less energy for my daily activities   Use quick relief inhaler as ordered     Increased or thicker phlegm/mucus   Use oxygen as prescribed     Using quick relief inhaler/nebulizer more often   Get plenty of rest     Swelling of ankles more than usual   Use pursed lip breathing     More coughing than usual   At all times avoid cigarette smoke, inhaled irritants     I feel like I have a \"chest cold\"     Poor sleep and my symptoms woke me up     My appetite is not good     My medicine is not helping      Call provider immediately if symptoms don’t improve     Continue daily medications, add rescue medications:               Medications to be used during a flare up, (as Discussed with Provider):              Red Zone:  I need urgent medical care Actions     Severe shortness of breath even at rest   Call 911 or seek medical care immediately     Not able to do any activity because of breathing      Fever or shaking chills      Feeling confused or very drowsy       Chest pains      Coughing up blood                  "

## 2023-06-15 NOTE — PROGRESS NOTES
Assumed care of patient at 1900 from [facility]. Patient is A&O x 4, states pain level is 3 /10.Bed locked in the lowest position, 2 side rails up, Call light within reach, belongings at bedside. Patient expresses no needs at this time and hourly rounding is in place.Pt scheduled for CT and xray of chest this shift

## 2023-06-15 NOTE — CONSULTS
"GASTROENTEROLOGY CONSULTATION    PATIENT NAME: Siri Solis  : 1945  CSN: [id number]  MRN:  [id number]     CONSULTATION DATE:  2023    PRIMARY CARE PROVIDER:  Lake Ochoa M.D.      REASON FOR CONSULT:  Elevated LFT's, pain and pancreatitis    HISTORY OF PRESENT ILLNESS:  Siri Solis is a 77 y.o. female who complains of abdominal pain in the middle of her abdomen. This occurred after eating chinese food and she thought it was food poisoning because she started to vomiting profusely. The pain and the vomiting did not stop so she went to the hospital. She has never had this pain before. She does not have UGI symptoms. She has not had endoscopy. She had an MRCP that moderate biliary ductal dilation and a distal 5 mm filling duct consistent distant with choledocholithiasis. She has increased LFT's and a lipase of 1550. On discussion at this time she says her pain is increasing. She has DM which she states is poorly controlled. She does Insulin at home. She has FLAVIA. She has severe emphysema and she in on 8 liters of oxygen at home. She is on 8 liters at this time as well. She does not have lower GI symptoms She had a colonoscopy 5 years ago and reports that it was normal. She does not have history of liver disease. She does not have f/c, NS or weight loss.    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    CATARACT     Congestive heart failure (HCC)     COPD (chronic obstructive pulmonary disease) (HCC)     Diabetes     EMPHYSEMA     mild    Fall     Hiatus hernia syndrome     Hyperkalemia 3/23/2018    Hyperlipidemia     Hypertension     Indigestion     Other specified disorder of intestines     occas diarrhea    Pain     Paralysis of diaphragm     right side    Renal insufficiency 3/23/2018    Snoring     Ulcer     \"bleeding stomach ulcer\"       PAST SURGICAL " HISTORY:  Past Surgical History:   Procedure Laterality Date    RECOVERY  11/6/2013    Performed by [facility] at SURGERY SAME DAY [facility]    GYN SURGERY      hysterectomy    OTHER      cataract surgery     OTHER      carpal tunnel     OTHER ORTHOPEDIC SURGERY      left knee replacement        CURRENT MEDS:  Current Facility-Administered Medications   Medication Dose Route Frequency Provider Last Rate Last Admin    amLODIPine (NORVASC) tablet 5 mg  5 mg Oral Q DAY Nasra Carranza M.D.        lactated ringers infusion   Intravenous Continuous Nasra Carranza M.D. 250 mL/hr at 06/14/23 1624 Rate Change at 06/14/23 1624    oxyCODONE immediate-release (ROXICODONE) tablet 2.5 mg  2.5 mg Oral Q3HRS PRN Nasra Carranza M.D.        Or    oxyCODONE immediate-release (ROXICODONE) tablet 5 mg  5 mg Oral Q3HRS PRN Nasra Carranza M.D.        Or    morphine 4 MG/ML injection 2 mg  2 mg Intravenous Q2HRS PRN Nasra Carranza M.D.   2 mg at 06/14/23 1623    hydrALAZINE (APRESOLINE) injection 20 mg  20 mg Intravenous Q6HRS PRN Nasra Carranza M.D.   20 mg at 06/14/23 1622    albuterol inhaler 2 Puff  2 Puff Inhalation BID Polo Hoang M.D.        febuxostat (ULORIC) tablet 40 mg  40 mg Oral DAILY Polo Hoang M.D.   40 mg at 06/14/23 0515    insulin GLARGINE (Lantus,Semglee) injection  30 Units Subcutaneous Q EVENING Polo Hoang M.D.   30 Units at 06/14/23 1705    lisinopril (PRINIVIL) tablet 10 mg  10 mg Oral DAILY Polo Hoang M.D.   10 mg at 06/14/23 0516    omeprazole (PRILOSEC) capsule 20 mg  20 mg Oral Q EVENING Polo Hoang M.D.   20 mg at 06/14/23 1704    ondansetron (ZOFRAN) syringe/vial injection 4 mg  4 mg Intravenous Q4HRS PRN Polo Hoang M.D.   4 mg at 06/14/23 1704    insulin regular (HumuLIN R,NovoLIN R) injection  1-6 Units Subcutaneous 4X/DAY PHILOMENA Hoang M.D.   3 Units at 06/14/23 1704    And    dextrose 10 % BOLUS 25 g  25 g Intravenous Q15  MIN PRN Polo Hoang M.D.        cefTRIAXone (Rocephin) syringe 1,000 mg  1,000 mg Intravenous Q24HRS Polo Hoang M.D.   1,000 mg at 23 0515    metroNIDAZOLE (Flagyl) IVPB 500 mg  500 mg Intravenous Q12HR Polo Hoang M.D.   Stopped at 23 1303    Pharmacy Consult Request ...Pain Management Review 1 Each  1 Each Other PHARMACY TO DOSE Polo Hoang M.D.        tiotropium (Spiriva Respimat) 2.5 mcg/Act inhalation spray 5 mcg  5 mcg Inhalation DAILY Polo Hoang M.D.   5 mcg at 23 1706        ALLERGIES:  Allergies   Allergen Reactions    Byetta Rash     rash    Epinephrine     Escitalopram Anaphylaxis     Leg cramping    Furosemide Unspecified     Leg cramps, stopped urinary output  Leg cramps, stopped urinary output      Iodine Anaphylaxis and Rash     contrast  contrast      Latex Anaphylaxis and Rash    Levofloxacin Itching    Lexapro      Leg cramping    Penicillins Rash     Rash    Rash  Rash      Sertraline Diarrhea    Zoloft Diarrhea    Amaryl     Amaryl [Glimepiride] Shortness of Breath and Rash     Rash    Amoxicillin Rash     Rash      Epinephrine Shortness of Breath     Panic attack, Can't breath    Exenatide Rash     rash    Hctz      Stopped urinary output    Metformin Hives, Rash and Itching     Rash     Spironolactone Unspecified       SOCIAL HISTORY:  Social History     Socioeconomic History    Marital status:      Spouse name: Not on file    Number of children: Not on file    Years of education: Not on file    Highest education level: Not on file   Occupational History    Not on file   Tobacco Use    Smoking status: Former     Packs/day: 2.00     Years: 20.00     Pack years: 40.00     Types: Cigarettes     Quit date: 2007     Years since quittin.4    Smokeless tobacco: Never   Vaping Use    Vaping Use: Never used   Substance and Sexual Activity    Alcohol use: No    Drug use: No    Sexual activity: Not on file   Other Topics  Concern    Not on file   Social History Narrative    Not on file     Social Determinants of Health     Financial Resource Strain: Not on file   Food Insecurity: Not on file   Transportation Needs: Not on file   Physical Activity: Not on file   Stress: Not on file   Social Connections: Not on file   Intimate Partner Violence: Not on file   Housing Stability: Not on file       FAMILY HISTORY:  Family History   Problem Relation Age of Onset    Breast Cancer Mother     Cancer Father     Heart Failure Maternal Grandmother         REVIEW OF SYSTEMS:  General ROS: Negative for - chills, fever, night sweats or weight loss.  HEENT ROS: Negative  Respiratory ROS: Negative for - cough or shortness of breath.  Cardiovascular ROS:  Negative for - chest pain or palpitations.  Gastrointestinal ROS: As per the history of present illness.  Genito-Urinary ROS: Negative  Musculoskeletal ROS: Negative.  Neurological ROS: Negative  Skin ROS: negative  Hematology ROS: negative  Endocrinology ROS: Negative        PHYSICAL EXAM:  VITALS: BP (!) 184/86   Pulse 72   Temp 36.1 °C (96.9 °F) (Temporal)   Resp 20   Wt 111 kg (244 lb 11.4 oz)   SpO2 96%   BMI 37.21 kg/m²   GEN:  Siri Solis is a 77 y.o. female in no acute distress.  HEENT: Mucous membranes pink and moist.  Sclera anicteric.    NECK:    Neck supple without lymphadenopathy or thyromegaly.  LUNGS: Clear to auscultation posteriorly.  HEART: Regular rate and rhythm. S1 and S2 normal. No murmurs, gallops  ABD:  + BS obeses. Very tender on exam on rid side and middle of abdomen. No rebound. Mild guarding  RECTAL: Not done at this time.  EXT:  Without cyanosis, deformity or pitting edema.  SKIN:  Pink, warm, dry.  NEURO: Grossly intact, A/OR.    LABS:  Recent Labs     06/13/23  0846 06/14/23  0053   WBC  --  8.9   MCV 87.6 89.6     Recent Labs     06/13/23  0846 06/14/23  0053   GLUCOSE 373* 267*   BUN 55* 45*   CO2 30 29     Lab Results   Component Value Date    INR 0.92  07/09/2019    INR 1.06 05/02/2019    INR 0.92 05/18/2018     No components found for: ALT, AST, GGT, ALKPHOS  No results found for: RENÉ      @[facility]([id number])@     @[facility]([email or web address])@                     IMPRESSION/PLAN:  Abdominal pain - diffuse  Choledocholithiasis  Pancreatitis  Cholecystitis  Emphysema - 8 liters oxygen  DM - poorly controlled    Patient will need ERCP to remove stone in CBD but she is a high risk patient  due to lung status. We will discuss with Dr. Fisher and let her pancreas calm down. Ct scan due to increase pain   Lipase  Antibiotics    ERCP Friday       Carolyn Henley M.D.  Gastroenterology

## 2023-06-15 NOTE — CARE PLAN
The patient is Stable - Low risk of patient condition declining or worsening    Shift Goals  Clinical Goals: pt will remain NPO and her pain will be controlled  Patient Goals: rest    Progress made toward(s) clinical / shift goals:  Pt has remained NPO this shift. Pt's pain has been controlled with medications, see MAR    Problem: Knowledge Deficit - Standard  Goal: Patient and family/care givers will demonstrate understanding of plan of care, disease process/condition, diagnostic tests and medications  Outcome: Progressing     Problem: Pain - Standard  Goal: Alleviation of pain or a reduction in pain to the patient’s comfort goal  Outcome: Progressing     Patient is not progressing towards the following goals:

## 2023-06-15 NOTE — PROGRESS NOTES
Gastroenterology Progress Note               Author:  TIM Almaraz Date & Time Created: 6/15/2023 10:15 AM       Patient ID:  Name:             Siri Solis  YOB: 1945  Age:                 77 y.o.  female  MRN:               [id number]        Medical Decision Making, by Problem:  Active Hospital Problems    Diagnosis     Choledocholithiasis [K80.50]     Atrial fibrillation (HCC) [I48.91]     Abdominal pain [R10.9]     Acute pancreatitis without necrosis or infection, unspecified [K85.90]     Elevated liver enzymes [R74.8]     Stage 4 chronic kidney disease (HCC) [N18.4]     Diabetic peripheral neuropathy associated with type 2 diabetes mellitus (HCC) [E11.42]     Chronic respiratory failure with hypoxia (HCC) [J96.11]     CHF (congestive heart failure) (HCC) [I50.9]     Chronic obstructive pulmonary disease (HCC) [J44.9]     Anemia [D64.9]     Hypertension [I10]            Presenting Chief Complaint:  Elevated LFT's, pain and pancreatitis     HISTORY OF PRESENT ILLNESS:  Siri Solis is a 77 y.o. female who complains of abdominal pain in the middle of her abdomen. This occurred after eating chinese food and she thought it was food poisoning because she started to vomiting profusely. The pain and the vomiting did not stop so she went to the hospital. She has never had this pain before. She does not have UGI symptoms. She has not had endoscopy. She had an MRCP that moderate biliary ductal dilation and a distal 5 mm filling duct consistent distant with choledocholithiasis. She has increased LFT's and a lipase of 1550. On discussion at this time she says her pain is increasing. She has DM which she states is poorly controlled. She does Insulin at home. She has FLAVIA. She has severe emphysema and she in on 8 liters of oxygen at home. She is on 8 liters at this time as well. She does not have lower GI symptoms She had a colonoscopy 5 years ago and reports that it was normal. She does  not have history of liver disease. She does not have f/c, NS or weight loss.      Interval History:  6/15/2023: Patient seen at bedside with her , Anthony.  Patient reports no changes in her abdominal pain but that prescribed pain medications provide relief.  CT abdomen yesterday showed small hiatal hernia, cholelithiasis, and atherosclerotic changes.  Total bilirubin normal, LFTs downtrending but remain elevated, lipase downtrending.        Hospital Medications:  Current Facility-Administered Medications   Medication Dose Frequency Provider Last Rate Last Admin    amLODIPine (NORVASC) tablet 5 mg  5 mg Q DAY Nasra Carranza M.D.   5 mg at 06/15/23 0457    lactated ringers infusion   Continuous Nasra Carranza M.D. 250 mL/hr at 06/15/23 0833 New Bag at 06/15/23 0833    oxyCODONE immediate-release (ROXICODONE) tablet 2.5 mg  2.5 mg Q3HRS PRN JOSE HaqDMegan   2.5 mg at 06/14/23 2051    Or    oxyCODONE immediate-release (ROXICODONE) tablet 5 mg  5 mg Q3HRS PRN JOSE HaqDMegan   5 mg at 06/15/23 0741    Or    morphine 4 MG/ML injection 2 mg  2 mg Q2HRS PRN JOSE HaqDMegan   2 mg at 06/14/23 1623    hydrALAZINE (APRESOLINE) injection 20 mg  20 mg Q6HRS PRN JOSE HaqDMegan   20 mg at 06/15/23 0507    albuterol inhaler 2 Puff  2 Puff BID Polo Hoang M.D.   2 Puff at 06/15/23 0458    febuxostat (ULORIC) tablet 40 mg  40 mg DAILY Polo Hoang M.D.   40 mg at 06/15/23 0458    insulin GLARGINE (Lantus,Semglee) injection  30 Units Q EVENING Polo Hoang M.D.   30 Units at 06/14/23 1705    lisinopril (PRINIVIL) tablet 10 mg  10 mg DAILY Polo Hoang M.D.   10 mg at 06/15/23 0457    omeprazole (PRILOSEC) capsule 20 mg  20 mg Q EVENING Polo Hoang M.D.   20 mg at 06/14/23 1704    ondansetron (ZOFRAN) syringe/vial injection 4 mg  4 mg Q4HRS PRN Polo Hoang M.D.   4 mg at 06/14/23 1704    insulin regular (HumuLIN R,NovoLIN R) injection  1-6 Units 4X/DAY PHILOMENA Cuellar  CELSO Hoang M.D.   3 Units at 06/15/23 0838    And    dextrose 10 % BOLUS 25 g  25 g Q15 MIN PRN Polo Hoang M.D.        cefTRIAXone (Rocephin) syringe 1,000 mg  1,000 mg Q24HRS Polo Hoang M.D.   1,000 mg at 06/15/23 0457    metroNIDAZOLE (Flagyl) IVPB 500 mg  500 mg Q12HR Polo Hoang M.D.   Stopped at 06/14/23 2309    Pharmacy Consult Request ...Pain Management Review 1 Each  1 Each PHARMACY TO DOSE Polo Hoang M.D.        tiotropium (Spiriva Respimat) 2.5 mcg/Act inhalation spray 5 mcg  5 mcg DAILY Polo Hoang M.D.   5 mcg at 06/14/23 1706   Last reviewed on 6/13/2023 10:47 PM by Bri Ortiz R.N.       Review of Systems:  Review of Systems   Constitutional:  Positive for malaise/fatigue. Negative for chills and fever.   HENT:  Negative for congestion and sore throat.    Respiratory:  Negative for cough and shortness of breath.    Cardiovascular:  Negative for chest pain and leg swelling.   Gastrointestinal:  Positive for abdominal pain. Negative for blood in stool, constipation, diarrhea, heartburn, melena, nausea and vomiting.   Genitourinary:  Negative for dysuria and flank pain.   Musculoskeletal:  Negative for falls and myalgias.   Neurological:  Positive for weakness. Negative for focal weakness and headaches.   Psychiatric/Behavioral:  Negative for substance abuse. The patient is not nervous/anxious.    All other systems reviewed and are negative.        Vital signs:  Weight/BMI: Body mass index is 37.21 kg/m².  BP (!) 143/83   Pulse 94   Temp 36.4 °C (97.5 °F) (Temporal)   Resp 20   Wt 111 kg (244 lb 11.4 oz)   SpO2 91%   Vitals:    06/14/23 1559 06/14/23 1918 06/15/23 0400 06/15/23 0720   BP: (!) 184/86 (!) 167/73 (!) 169/86 (!) 143/83   Pulse: 72 78 90 94   Resp: 20 18 19 20   Temp: 36.1 °C (96.9 °F) 36.1 °C (97 °F) 37.2 °C (99 °F) 36.4 °C (97.5 °F)   TempSrc: Temporal Temporal Temporal Temporal   SpO2: 96%  93% 91%   Weight:         Oxygen  Therapy:  Pulse Oximetry: 91 %, O2 (LPM): 10, O2 Delivery Device: Oxymask    Intake/Output Summary (Last 24 hours) at 6/15/2023 1015  Last data filed at 6/15/2023 1000  Gross per 24 hour   Intake 2753.88 ml   Output --   Net 2753.88 ml         Physical Exam:  Physical Exam  Vitals and nursing note reviewed.   Constitutional:       Appearance: She is morbidly obese. She is ill-appearing. She is not toxic-appearing.   HENT:      Head: Normocephalic.      Nose: Nose normal.      Mouth/Throat:      Mouth: Mucous membranes are moist.      Pharynx: Oropharynx is clear.   Eyes:      General: No scleral icterus.     Extraocular Movements: Extraocular movements intact.      Conjunctiva/sclera: Conjunctivae normal.   Cardiovascular:      Rate and Rhythm: Normal rate. Rhythm irregular.      Pulses: Normal pulses.      Heart sounds: Normal heart sounds. No murmur heard.  Pulmonary:      Effort: Pulmonary effort is normal. No respiratory distress.      Breath sounds: Wheezing present.      Comments: oxymask in place  Chest:      Chest wall: No tenderness.   Abdominal:      General: Abdomen is flat. There is no distension.      Palpations: Abdomen is soft.      Tenderness: There is abdominal tenderness.      Comments: Hypoactive bowel sounds   Skin:     General: Skin is warm and dry.      Capillary Refill: Capillary refill takes less than 2 seconds.   Neurological:      General: No focal deficit present.      Mental Status: She is alert and oriented to person, place, and time. Mental status is at baseline.   Psychiatric:         Mood and Affect: Mood normal.         Behavior: Behavior normal.         Thought Content: Thought content normal.         Judgment: Judgment normal.             Labs:  Recent Labs     06/13/23  0846 06/14/23  0053 06/15/23  0910   SODIUM 137 140 140   POTASSIUM 3.8 4.1 4.3   CHLORIDE 98* 99 101   CO2 30 29 25   BUN 55* 45* 29*   CREATININE 2.45* 1.85* 1.17   CALCIUM 8.9 9.1 9.5     Recent Labs      06/13/23  0846 06/14/23  0053 06/14/23  1627 06/15/23  0910   ALTSGPT 650* 613*  --  318*   ASTSGOT 853* 485*  --  93*   ALKPHOSPHAT 264* 296*  --  221*   TBILIRUBIN 2.5* 1.7*  --  0.5   LIPASE  --  1550* 1071*  --    GLUCOSE 373* 267*  --  241*     Recent Labs     06/13/23  0846 06/14/23 0053 06/15/23  0910   WBC  --  8.9 11.9*   NEUTSPOLYS 91.3*  --  85.00*   LYMPHOCYTES 3.3*  --  5.70*   MONOCYTES 4.5*  --  8.20   EOSINOPHILS 0.5  --  0.20   BASOPHILS 0.4  --  0.20   ASTSGOT 853* 485* 93*   ALTSGPT 650* 613* 318*   ALKPHOSPHAT 264* 296* 221*   TBILIRUBIN 2.5* 1.7* 0.5     Recent Labs     06/13/23  0846 06/14/23 0053 06/15/23  0910   RBC 4.37 4.12* 4.00*   HEMOGLOBIN 11.9* 11.5* 11.1*   HEMATOCRIT 38.3 36.9* 36.7*   PLATELETCT 155 140* 160*   IRON  --  46  --    FERRITIN  --  53.8  --    TOTIRONBC  --  356  --      Recent Results (from the past 24 hour(s))   POCT glucose device results    Collection Time: 06/14/23 12:16 PM   Result Value Ref Range    POC Glucose, Blood 281 (H) 65 - 99 mg/dL   LIPASE    Collection Time: 06/14/23  4:27 PM   Result Value Ref Range    Lipase 1071 (H) 11 - 82 U/L   POCT glucose device results    Collection Time: 06/14/23  8:41 PM   Result Value Ref Range    POC Glucose, Blood 205 (H) 65 - 99 mg/dL   Comp Metabolic Panel    Collection Time: 06/15/23  9:10 AM   Result Value Ref Range    Sodium 140 135 - 145 mmol/L    Potassium 4.3 3.6 - 5.5 mmol/L    Chloride 101 96 - 112 mmol/L    Co2 25 20 - 33 mmol/L    Anion Gap 14.0 7.0 - 16.0    Glucose 241 (H) 65 - 99 mg/dL    Bun 29 (H) 8 - 22 mg/dL    Creatinine 1.17 0.50 - 1.40 mg/dL    Calcium 9.5 8.5 - 10.5 mg/dL    AST(SGOT) 93 (H) 12 - 45 U/L    ALT(SGPT) 318 (H) 2 - 50 U/L    Alkaline Phosphatase 221 (H) 30 - 99 U/L    Total Bilirubin 0.5 0.1 - 1.5 mg/dL    Albumin 3.7 3.2 - 4.9 g/dL    Total Protein 6.5 6.0 - 8.2 g/dL    Globulin 2.8 1.9 - 3.5 g/dL    A-G Ratio 1.3 g/dL   CBC WITH DIFFERENTIAL    Collection Time: 06/15/23  9:10 AM    Result Value Ref Range    WBC 11.9 (H) 4.8 - 10.8 K/uL    RBC 4.00 (L) 4.20 - 5.40 M/uL    Hemoglobin 11.1 (L) 12.0 - 16.0 g/dL    Hematocrit 36.7 (L) 37.0 - 47.0 %    MCV 91.8 81.4 - 97.8 fL    MCH 27.8 27.0 - 33.0 pg    MCHC 30.2 (L) 32.2 - 35.5 g/dL    RDW 56.6 (H) 35.9 - 50.0 fL    Platelet Count 160 (L) 164 - 446 K/uL    MPV 11.9 9.0 - 12.9 fL    Neutrophils-Polys 85.00 (H) 44.00 - 72.00 %    Lymphocytes 5.70 (L) 22.00 - 41.00 %    Monocytes 8.20 0.00 - 13.40 %    Eosinophils 0.20 0.00 - 6.90 %    Basophils 0.20 0.00 - 1.80 %    Immature Granulocytes 0.70 0.00 - 0.90 %    Nucleated RBC 0.00 0.00 - 0.20 /100 WBC    Neutrophils (Absolute) 10.10 (H) 1.82 - 7.42 K/uL    Lymphs (Absolute) 0.68 (L) 1.00 - 4.80 K/uL    Monos (Absolute) 0.97 (H) 0.00 - 0.85 K/uL    Eos (Absolute) 0.02 0.00 - 0.51 K/uL    Baso (Absolute) 0.02 0.00 - 0.12 K/uL    Immature Granulocytes (abs) 0.08 0.00 - 0.11 K/uL    NRBC (Absolute) 0.00 K/uL   CORRECTED CALCIUM    Collection Time: 06/15/23  9:10 AM   Result Value Ref Range    Correct Calcium 9.7 8.5 - 10.5 mg/dL   ESTIMATED GFR    Collection Time: 06/15/23  9:10 AM   Result Value Ref Range    GFR (CKD-EPI) 47 (A) >60 mL/min/1.73 m 2       Radiology Review:  CT-ABDOMEN-PELVIS W/O   Final Result         1.  Small hiatal hernia   2.  Cholelithiasis   3.  Atherosclerosis and atherosclerotic coronary artery disease      DX-CHEST-LIMITED (1 VIEW)   Final Result         1.  Mild pulmonary edema and/or infiltrates.   2.  Cardiomegaly   3.  Atherosclerosis      IR-US GUIDED PIV   Final Result    Ultrasound-guided PERIPHERAL IV INSERTION performed by    qualified nursing staff as above.      DX-CHEST-LIMITED (1 VIEW)   Final Result      New minor fissure thickening could be from edema favored over pleural fluid      Stable cardiac silhouette and hilar enlargement with right hemidiaphragm elevation            [facility] (Data Review):   -Records reviewed and summarized in current documentation  -I  personally reviewed and interpreted the laboratory results  -I personally reviewed the radiology images    Assessment/Recommendations:     IMPRESSION/PLAN:  Abdominal pain - diffuse  Choledocholithiasis  Pancreatitis  Cholecystitis  Emphysema - 8.5L liters oxygen at baseline. On 8-10L during admission  DM - poorly controlled     Patient will need ERCP to remove stone in CBD but she is a high risk patient due to high oxygen needs.  Notify Dr. Fisher aware of patient as well as her increased BMI and high oxygen needs.  Continue to let her pancreas calm down. Ct scan with no significant acute changes.  Lipase 8224-8242  Antibiotics-Per primary team  N.p.o. midnight, sips with meds okay.  ERCP Friday tentatively 1630.  However pending assessment/review by anesthesiology, procedure has a possibility of being canceled due to her BMI and more so high oxygen needs.  Patient and  are aware of this.      UPDATE 1330: Scheduling available for ERCP this afternoon. Patient had water/ice chips at 1200. Strict NPO at this time. Updated primary and nursing and requested, if possible, to optimize respiratory status. Upon updating patient on plan for ERCP today, patient on 6LPM oxymask with O2 sats low to mid 90s. She verbalized understanding and agreeable to proceed.     Discussed with patient,  at bedside, Dr. Carranza, Armando RN, Dr. Luis, and Dr. Fisher      Core Quality Measures   Reviewed items::  Labs, Medications and Radiology reports reviewed

## 2023-06-15 NOTE — PROGRESS NOTES
Case discussed with anesthesology.  Patient to have ERCP rescheduled for tomorrow at 1445 due to increased oxygen needs/high risk for sedation.  May resume ice chips tonight, n.p.o. at midnight.    Discussed with patient, nursing, Dr. Carranza, Dr. Luis, Dr. Fisher

## 2023-06-15 NOTE — PROGRESS NOTES
Assumed pt care at 070 . Pt is A&Ox 4 . Pt rates pain 8/10, medicated per MAR. Pt is requiring 10L of O2 Oxymask  Pt's belongings are nearby, bed is in the lowest position and locked.

## 2023-06-15 NOTE — PROGRESS NOTES
"Hospital Medicine Daily Progress Note    Date of Service  6/15/2023    Chief Complaint  Siri Solis is a 77 y.o. female transferred from an outside hospital on 6/13/2023 where she presented with nausea and vomiting which started after eating Chinese food the day before.  She was noted to have lipase of greater than 4000, with elevated LFTs.  She had a fever of 101.3, leukocytosis of 13 K.      Abdominal ultrasound showed several calcified gallstones, no wall thickening, CBD was normal. MRCP reported \"Choledocholithiasis is suspected with mild common bile duct dilation\".    She has a history of CKD-4, emphysema with chronic respiratory failure (on 8.5 L/min nasal cannula oxygen at home), diastolic CHF, atrial fibrillation (on Eliquis),     Hospital Course  On admission, creatinine was 2.45 AST was 853, ALT was 650, total bilirubin was 2.5, alk phos was 264.  Saturating 95% on 10 L/min oxygen mask.  Chest x-ray from 6/13/2023 showed possible edema versus pleural effusion. She was afebrile and hemodynamically stable.    Ceftriaxone and Flagyl were empirically started. GI and General Surgery have been consulted.     Interval Problem Update  WBCs are 11.9 K,  AST is 93, ALT is 318, Total bili is 0.5.  Chest x-ray from 6/14/2023 shows mild pulmonary edema and/or infiltrates.Creatinine improved to 1.17. Dose of IV Lasix ordered. The abdomen and pelvis showed cholelithiasis.    Afebrile and hemodynamically stable. Saturating 91-93% on 10 L/min OxyMask.    I have discussed this patient's plan of care and discharge plan at IDT rounds today with Case Management, Nursing, Nursing leadership, and other members of the IDT team.    Consultants/Specialty  general surgery and GI    Code Status  DNAR/DNI    Disposition  The patient is not medically cleared for discharge to home or a post-acute facility.  Anticipate discharge to: home with close outpatient follow-up    I have placed the appropriate orders for post-discharge " needs.    Review of Systems  Review of Systems   Constitutional:  Positive for malaise/fatigue.   HENT: Negative.     Eyes: Negative.    Respiratory: Negative.     Cardiovascular: Negative.    Gastrointestinal:  Positive for abdominal pain and nausea.   Genitourinary: Negative.    Musculoskeletal: Negative.    Skin: Negative.    Neurological: Negative.    Endo/Heme/Allergies: Negative.    Psychiatric/Behavioral: Negative.          Physical Exam  Temp:  [36.1 °C (97 °F)-37.2 °C (99 °F)] 36.4 °C (97.5 °F)  Pulse:  [78-94] 94  Resp:  [18-20] 20  BP: (143-169)/(73-86) 143/83  SpO2:  [91 %-93 %] 91 %    Physical Exam  Constitutional:       Appearance: She is obese. She is ill-appearing.   HENT:      Head: Normocephalic.      Nose: Nose normal.      Mouth/Throat:      Mouth: Mucous membranes are moist.   Eyes:      Pupils: Pupils are equal, round, and reactive to light.   Cardiovascular:      Rate and Rhythm: Normal rate.   Pulmonary:      Effort: Pulmonary effort is normal.   Abdominal:      Tenderness: There is abdominal tenderness. There is guarding.   Musculoskeletal:      Cervical back: Normal range of motion.      Right lower leg: No edema.      Left lower leg: No edema.   Skin:     General: Skin is warm.   Neurological:      General: No focal deficit present.   Psychiatric:         Mood and Affect: Mood normal.         Fluids    Intake/Output Summary (Last 24 hours) at 6/15/2023 1645  Last data filed at 6/15/2023 1400  Gross per 24 hour   Intake 2873.88 ml   Output --   Net 2873.88 ml       Laboratory  Recent Labs     06/13/23  0846 06/14/23  0053 06/15/23  0910   WBC  --  8.9 11.9*   RBC 4.37 4.12* 4.00*   HEMOGLOBIN 11.9* 11.5* 11.1*   HEMATOCRIT 38.3 36.9* 36.7*   MCV 87.6 89.6 91.8   MCH 27.3* 27.9 27.8   MCHC 31.1* 31.2* 30.2*   RDW 16.9* 54.2* 56.6*   PLATELETCT 155 140* 160*   MPV 9.7 11.9 11.9     Recent Labs     06/13/23  0846 06/14/23  0053 06/15/23  0910   SODIUM 137 140 140   POTASSIUM 3.8 4.1 4.3  "  CHLORIDE 98* 99 101   CO2 30 29 25   GLUCOSE 373* 267* 241*   BUN 55* 45* 29*   CREATININE 2.45* 1.85* 1.17   CALCIUM 8.9 9.1 9.5                   Imaging  CT-ABDOMEN-PELVIS W/O   Final Result         1.  Small hiatal hernia   2.  Cholelithiasis   3.  Atherosclerosis and atherosclerotic coronary artery disease      DX-CHEST-LIMITED (1 VIEW)   Final Result         1.  Mild pulmonary edema and/or infiltrates.   2.  Cardiomegaly   3.  Atherosclerosis      IR-US GUIDED PIV   Final Result    Ultrasound-guided PERIPHERAL IV INSERTION performed by    qualified nursing staff as above.      DX-CHEST-LIMITED (1 VIEW)   Final Result      New minor fissure thickening could be from edema favored over pleural fluid      Stable cardiac silhouette and hilar enlargement with right hemidiaphragm elevation           Assessment/Plan  * Abdominal pain- (present on admission)  Assessment & Plan  Likely secondary to gallstone pancreatitis.  Plan for ERCP.  NPO.  IV fluids.    Elevated liver enzymes- (present on admission)  Assessment & Plan  GI consulted. (Patient has a history of elevated LFTs. AST and ALT have been elevated in the past few years, were 4020 and 1565 respectively in June 2021, Total bili was 3.1, and hepatitis panel in 7/2021 was non-reactive). No previous history of pancreatitis.  Planned for ERCP today or tomorrow.    Choledocholithiasis  Assessment & Plan  MRCP from outside facility reported \"choledocholithiasis is suspected with mild common bile duct dilation\".  Lipase and total bilirubin are elevated. Total bilirubin is 2.7 -> 1.7 ->0.5.  AST and ALT are elevated.  Ceftriaxone and Flagyl were empirically started.  GI and general surgery was consulted. Planned for ERCP.    Chronic obstructive pulmonary disease (HCC)- (present on admission)  Assessment & Plan  History of emphysema, on about 8.5 L/min nasal cannula oxygen at home. Continue RT per protocol, and home inhalers    Diabetic peripheral neuropathy " associated with type 2 diabetes mellitus (HCC)- (present on admission)  Assessment & Plan  Continue insulin and sliding scale insulin    Acute pancreatitis without necrosis or infection, unspecified- (present on admission)  Assessment & Plan  Continue to keep n.p.o. with IV fluids.    Chronic respiratory failure with hypoxia (HCC)- (present on admission)  Assessment & Plan  History of tobacco use, quit 20 years ago.  Diagnosed with emphysema.  On 8.5 L of oxygen at home. Saturating 91-93% on 10 L/min oxygen mask.    CHF (congestive heart failure) (HCC)- (present on admission)  Assessment & Plan  Diastolic CHF.  LVEF on 9/14/2023 showed LVEF of about 60-65%.  On IVFs for acute pancreatitis, monitor for fluid overload.    Anemia- (present on admission)  Assessment & Plan  Likely due to CKD-4. At baseline. Monitor.     Hypertension- (present on admission)  Assessment & Plan  Continue home lisinopril. Norvasc added    Atrial fibrillation (HCC)  Assessment & Plan  Continue metoprolol.  Eliquis was held on admission    Stage 4 chronic kidney disease (HCC)- (present on admission)  Assessment & Plan  Creatinine is at baseline. Avoid nephrotoxins.  Monitor         VTE prophylaxis: SCDs/TEDs

## 2023-06-16 ENCOUNTER — APPOINTMENT (OUTPATIENT)
Dept: RADIOLOGY | Facility: MEDICAL CENTER | Age: 78
DRG: 417 | End: 2023-06-16
Attending: INTERNAL MEDICINE
Payer: COMMERCIAL

## 2023-06-16 ENCOUNTER — APPOINTMENT (OUTPATIENT)
Dept: RADIOLOGY | Facility: MEDICAL CENTER | Age: 78
DRG: 417 | End: 2023-06-16
Attending: SPECIALIST
Payer: COMMERCIAL

## 2023-06-16 ENCOUNTER — APPOINTMENT (OUTPATIENT)
Dept: CARDIOLOGY | Facility: MEDICAL CENTER | Age: 78
DRG: 417 | End: 2023-06-16
Attending: STUDENT IN AN ORGANIZED HEALTH CARE EDUCATION/TRAINING PROGRAM
Payer: COMMERCIAL

## 2023-06-16 PROBLEM — R07.89 OTHER CHEST PAIN: Status: ACTIVE | Noted: 2023-06-16

## 2023-06-16 LAB
ALBUMIN SERPL BCP-MCNC: 3.8 G/DL (ref 3.2–4.9)
ALBUMIN/GLOB SERPL: 1.4 G/DL
ALP SERPL-CCNC: 202 U/L (ref 30–99)
ALT SERPL-CCNC: 244 U/L (ref 2–50)
ANION GAP SERPL CALC-SCNC: 9 MMOL/L (ref 7–16)
AST SERPL-CCNC: 52 U/L (ref 12–45)
BASOPHILS # BLD AUTO: 0.8 % (ref 0–1.8)
BASOPHILS # BLD: 0.1 K/UL (ref 0–0.12)
BILIRUB SERPL-MCNC: 0.6 MG/DL (ref 0.1–1.5)
BUN SERPL-MCNC: 24 MG/DL (ref 8–22)
CALCIUM ALBUM COR SERPL-MCNC: 9.8 MG/DL (ref 8.5–10.5)
CALCIUM SERPL-MCNC: 9.6 MG/DL (ref 8.5–10.5)
CHLORIDE SERPL-SCNC: 101 MMOL/L (ref 96–112)
CO2 SERPL-SCNC: 31 MMOL/L (ref 20–33)
CREAT SERPL-MCNC: 1.14 MG/DL (ref 0.5–1.4)
EKG IMPRESSION: NORMAL
EOSINOPHIL # BLD AUTO: 0 K/UL (ref 0–0.51)
EOSINOPHIL NFR BLD: 0 % (ref 0–6.9)
ERYTHROCYTE [DISTWIDTH] IN BLOOD BY AUTOMATED COUNT: 57.4 FL (ref 35.9–50)
GFR SERPLBLD CREATININE-BSD FMLA CKD-EPI: 49 ML/MIN/1.73 M 2
GLOBULIN SER CALC-MCNC: 2.7 G/DL (ref 1.9–3.5)
GLUCOSE BLD STRIP.AUTO-MCNC: 150 MG/DL (ref 65–99)
GLUCOSE BLD STRIP.AUTO-MCNC: 161 MG/DL (ref 65–99)
GLUCOSE BLD STRIP.AUTO-MCNC: 170 MG/DL (ref 65–99)
GLUCOSE BLD STRIP.AUTO-MCNC: 190 MG/DL (ref 65–99)
GLUCOSE BLD STRIP.AUTO-MCNC: 190 MG/DL (ref 65–99)
GLUCOSE SERPL-MCNC: 154 MG/DL (ref 65–99)
HCT VFR BLD AUTO: 37.2 % (ref 37–47)
HGB BLD-MCNC: 11.1 G/DL (ref 12–16)
LV EJECT FRACT  99904: 60
LV EJECT FRACT MOD 2C 99903: 48.49
LV EJECT FRACT MOD 4C 99902: 68.21
LV EJECT FRACT MOD BP 99901: 62.52
LYMPHOCYTES # BLD AUTO: 0.63 K/UL (ref 1–4.8)
LYMPHOCYTES NFR BLD: 5.2 % (ref 22–41)
MANUAL DIFF BLD: NORMAL
MCH RBC QN AUTO: 27.8 PG (ref 27–33)
MCHC RBC AUTO-ENTMCNC: 29.8 G/DL (ref 32.2–35.5)
MCV RBC AUTO: 93.2 FL (ref 81.4–97.8)
MICROCYTES BLD QL SMEAR: ABNORMAL
MONOCYTES # BLD AUTO: 0.84 K/UL (ref 0–0.85)
MONOCYTES NFR BLD AUTO: 6.9 % (ref 0–13.4)
MORPHOLOGY BLD-IMP: NORMAL
NEUTROPHILS # BLD AUTO: 10.63 K/UL (ref 1.82–7.42)
NEUTROPHILS NFR BLD: 87.1 % (ref 44–72)
NRBC # BLD AUTO: 0 K/UL
NRBC BLD-RTO: 0 /100 WBC (ref 0–0.2)
OVALOCYTES BLD QL SMEAR: NORMAL
PLATELET # BLD AUTO: 154 K/UL (ref 164–446)
PLATELET BLD QL SMEAR: NORMAL
PMV BLD AUTO: 11.5 FL (ref 9–12.9)
POTASSIUM SERPL-SCNC: 4.2 MMOL/L (ref 3.6–5.5)
PROT SERPL-MCNC: 6.5 G/DL (ref 6–8.2)
RBC # BLD AUTO: 3.99 M/UL (ref 4.2–5.4)
RBC BLD AUTO: PRESENT
SODIUM SERPL-SCNC: 141 MMOL/L (ref 135–145)
STOMATOCYTES BLD QL SMEAR: NORMAL
TROPONIN T SERPL-MCNC: 30 NG/L (ref 6–19)
WBC # BLD AUTO: 12.2 K/UL (ref 4.8–10.8)

## 2023-06-16 PROCEDURE — 700102 HCHG RX REV CODE 250 W/ 637 OVERRIDE(OP): Performed by: HOSPITALIST

## 2023-06-16 PROCEDURE — 99233 SBSQ HOSP IP/OBS HIGH 50: CPT | Performed by: SURGERY

## 2023-06-16 PROCEDURE — 99233 SBSQ HOSP IP/OBS HIGH 50: CPT | Performed by: INTERNAL MEDICINE

## 2023-06-16 PROCEDURE — 82962 GLUCOSE BLOOD TEST: CPT | Mod: 91

## 2023-06-16 PROCEDURE — 700111 HCHG RX REV CODE 636 W/ 250 OVERRIDE (IP): Performed by: HOSPITALIST

## 2023-06-16 PROCEDURE — A9270 NON-COVERED ITEM OR SERVICE: HCPCS | Performed by: HOSPITALIST

## 2023-06-16 PROCEDURE — 93010 ELECTROCARDIOGRAM REPORT: CPT | Performed by: INTERNAL MEDICINE

## 2023-06-16 PROCEDURE — 700111 HCHG RX REV CODE 636 W/ 250 OVERRIDE (IP): Performed by: INTERNAL MEDICINE

## 2023-06-16 PROCEDURE — 93306 TTE W/DOPPLER COMPLETE: CPT

## 2023-06-16 PROCEDURE — 99232 SBSQ HOSP IP/OBS MODERATE 35: CPT | Performed by: NURSE PRACTITIONER

## 2023-06-16 PROCEDURE — 700101 HCHG RX REV CODE 250: Performed by: HOSPITALIST

## 2023-06-16 PROCEDURE — 93005 ELECTROCARDIOGRAM TRACING: CPT | Performed by: INTERNAL MEDICINE

## 2023-06-16 PROCEDURE — 74181 MRI ABDOMEN W/O CONTRAST: CPT

## 2023-06-16 PROCEDURE — 85025 COMPLETE CBC W/AUTO DIFF WBC: CPT

## 2023-06-16 PROCEDURE — 36415 COLL VENOUS BLD VENIPUNCTURE: CPT

## 2023-06-16 PROCEDURE — 700102 HCHG RX REV CODE 250 W/ 637 OVERRIDE(OP): Performed by: INTERNAL MEDICINE

## 2023-06-16 PROCEDURE — 85007 BL SMEAR W/DIFF WBC COUNT: CPT

## 2023-06-16 PROCEDURE — 93306 TTE W/DOPPLER COMPLETE: CPT | Mod: 26 | Performed by: INTERNAL MEDICINE

## 2023-06-16 PROCEDURE — 770006 HCHG ROOM/CARE - MED/SURG/GYN SEMI*

## 2023-06-16 PROCEDURE — A9270 NON-COVERED ITEM OR SERVICE: HCPCS | Performed by: INTERNAL MEDICINE

## 2023-06-16 PROCEDURE — 80053 COMPREHEN METABOLIC PANEL: CPT

## 2023-06-16 PROCEDURE — 700105 HCHG RX REV CODE 258: Performed by: INTERNAL MEDICINE

## 2023-06-16 PROCEDURE — 84484 ASSAY OF TROPONIN QUANT: CPT

## 2023-06-16 RX ADMIN — AMLODIPINE BESYLATE 5 MG: 5 TABLET ORAL at 04:37

## 2023-06-16 RX ADMIN — HYDRALAZINE HYDROCHLORIDE 20 MG: 20 INJECTION INTRAMUSCULAR; INTRAVENOUS at 08:23

## 2023-06-16 RX ADMIN — CEFTRIAXONE SODIUM 1000 MG: 10 INJECTION, POWDER, FOR SOLUTION INTRAVENOUS at 04:37

## 2023-06-16 RX ADMIN — METOPROLOL TARTRATE 25 MG: 25 TABLET, FILM COATED ORAL at 15:15

## 2023-06-16 RX ADMIN — INSULIN HUMAN 1 UNITS: 100 INJECTION, SOLUTION PARENTERAL at 20:18

## 2023-06-16 RX ADMIN — HYDRALAZINE HYDROCHLORIDE 20 MG: 20 INJECTION INTRAMUSCULAR; INTRAVENOUS at 13:52

## 2023-06-16 RX ADMIN — TIOTROPIUM BROMIDE INHALATION SPRAY 5 MCG: 3.12 SPRAY, METERED RESPIRATORY (INHALATION) at 17:15

## 2023-06-16 RX ADMIN — HYDRALAZINE HYDROCHLORIDE 20 MG: 20 INJECTION INTRAMUSCULAR; INTRAVENOUS at 20:01

## 2023-06-16 RX ADMIN — FEBUXOSTAT 40 MG: 40 TABLET, FILM COATED ORAL at 04:36

## 2023-06-16 RX ADMIN — OXYCODONE HYDROCHLORIDE 5 MG: 5 TABLET ORAL at 15:17

## 2023-06-16 RX ADMIN — OXYCODONE HYDROCHLORIDE 5 MG: 5 TABLET ORAL at 10:09

## 2023-06-16 RX ADMIN — OXYCODONE HYDROCHLORIDE 5 MG: 5 TABLET ORAL at 06:19

## 2023-06-16 RX ADMIN — METRONIDAZOLE 500 MG: 5 INJECTION, SOLUTION INTRAVENOUS at 10:21

## 2023-06-16 RX ADMIN — LISINOPRIL 10 MG: 10 TABLET ORAL at 04:37

## 2023-06-16 RX ADMIN — INSULIN GLARGINE-YFGN 30 UNITS: 100 INJECTION, SOLUTION SUBCUTANEOUS at 17:16

## 2023-06-16 RX ADMIN — METRONIDAZOLE 500 MG: 5 INJECTION, SOLUTION INTRAVENOUS at 22:39

## 2023-06-16 RX ADMIN — ALBUTEROL SULFATE 2 PUFF: 90 AEROSOL, METERED RESPIRATORY (INHALATION) at 04:36

## 2023-06-16 RX ADMIN — SODIUM CHLORIDE, POTASSIUM CHLORIDE, SODIUM LACTATE AND CALCIUM CHLORIDE: 600; 310; 30; 20 INJECTION, SOLUTION INTRAVENOUS at 06:22

## 2023-06-16 RX ADMIN — ALBUTEROL SULFATE 2 PUFF: 90 AEROSOL, METERED RESPIRATORY (INHALATION) at 17:15

## 2023-06-16 RX ADMIN — SODIUM CHLORIDE, POTASSIUM CHLORIDE, SODIUM LACTATE AND CALCIUM CHLORIDE: 600; 310; 30; 20 INJECTION, SOLUTION INTRAVENOUS at 02:00

## 2023-06-16 RX ADMIN — INSULIN HUMAN 1 UNITS: 100 INJECTION, SOLUTION PARENTERAL at 17:17

## 2023-06-16 RX ADMIN — OMEPRAZOLE 20 MG: 20 CAPSULE, DELAYED RELEASE ORAL at 17:15

## 2023-06-16 RX ADMIN — OXYCODONE HYDROCHLORIDE 5 MG: 5 TABLET ORAL at 20:47

## 2023-06-16 ASSESSMENT — ENCOUNTER SYMPTOMS
VOMITING: 0
MYALGIAS: 0
NAUSEA: 0
NEUROLOGICAL NEGATIVE: 1
SHORTNESS OF BREATH: 1
RESPIRATORY NEGATIVE: 1
COUGH: 0
CHILLS: 0
EYES NEGATIVE: 1
FLANK PAIN: 0
FOCAL WEAKNESS: 0
NAUSEA: 1
ABDOMINAL PAIN: 1
HEADACHES: 0
FEVER: 0
BLOOD IN STOOL: 0
CONSTIPATION: 0
CARDIOVASCULAR NEGATIVE: 1
DIARRHEA: 0
FALLS: 0
PSYCHIATRIC NEGATIVE: 1
WEAKNESS: 1
SORE THROAT: 0
MUSCULOSKELETAL NEGATIVE: 1
HEARTBURN: 0
NERVOUS/ANXIOUS: 1

## 2023-06-16 ASSESSMENT — LIFESTYLE VARIABLES: SUBSTANCE_ABUSE: 0

## 2023-06-16 ASSESSMENT — PAIN DESCRIPTION - PAIN TYPE
TYPE: ACUTE PAIN

## 2023-06-16 NOTE — PROGRESS NOTES
Surgical Progress Note        HPI:  77-year-old female admitted with choledocholithiasis admitted to the hospital on 2023 with choledocholithiasis and gallstone pancreatitis.  She was transferred here from [facility] for ERCP and subsequent cholecystectomy    Interval Events:  Patient had ERCP scheduled yesterday, canceled due to increased oxygen needs and high risk for sedation.  Patient evaluated once again today and found to have some chest pain and so ERCP was again deferred.  Cardiac ultrasound is pending, troponin was 30        ROS  Hemodynamics:  Temp (24hrs), Av.6 °C (97.8 °F), Min:36.2 °C (97.2 °F), Max:36.9 °C (98.4 °F)  Temperature: 36.2 °C (97.2 °F)  Pulse  Av  Min: 72  Max: 118   Blood Pressure : (!) 179/101     Respiratory:    Respiration: 19, Pulse Oximetry: 92 %        RUL Breath Sounds: Diminished, RML Breath Sounds: Diminished, RLL Breath Sounds: Diminished, ALEA Breath Sounds: Diminished, LLL Breath Sounds: Diminished  Neuro:  GCS       Fluids:    Intake/Output Summary (Last 24 hours) at 2023 1432  Last data filed at 6/15/2023 2300  Gross per 24 hour   Intake 30 ml   Output --   Net 30 ml        Current Diet Order   Procedures    Diet NPO Restrict to: Sips with Medications     Physical Exam  General: NAD, appears well  Neuro: AOx3, no focal defecits  HEENT: MMM, neck supple  Card: regular rate  Pulm: normal respiratory excursion and effort  Abdomen: Protuberant, mild tenderness in the epigastrium, no right upper quadrant pain  Ext: ROM grossly intact. 2+ distal pulses x4  Skin: pink, warm and dry        Labs:  Recent Results (from the past 24 hour(s))   POCT glucose device results    Collection Time: 06/15/23  9:39 PM   Result Value Ref Range    POC Glucose, Blood 161 (H) 65 - 99 mg/dL   CBC WITH DIFFERENTIAL    Collection Time: 23 12:36 AM   Result Value Ref Range    WBC 12.2 (H) 4.8 - 10.8 K/uL    RBC 3.99 (L) 4.20 - 5.40 M/uL    Hemoglobin 11.1 (L) 12.0 - 16.0  g/dL    Hematocrit 37.2 37.0 - 47.0 %    MCV 93.2 81.4 - 97.8 fL    MCH 27.8 27.0 - 33.0 pg    MCHC 29.8 (L) 32.2 - 35.5 g/dL    RDW 57.4 (H) 35.9 - 50.0 fL    Platelet Count 154 (L) 164 - 446 K/uL    MPV 11.5 9.0 - 12.9 fL    Neutrophils-Polys 87.10 (H) 44.00 - 72.00 %    Lymphocytes 5.20 (L) 22.00 - 41.00 %    Monocytes 6.90 0.00 - 13.40 %    Eosinophils 0.00 0.00 - 6.90 %    Basophils 0.80 0.00 - 1.80 %    Nucleated RBC 0.00 0.00 - 0.20 /100 WBC    Neutrophils (Absolute) 10.63 (H) 1.82 - 7.42 K/uL    Lymphs (Absolute) 0.63 (L) 1.00 - 4.80 K/uL    Monos (Absolute) 0.84 0.00 - 0.85 K/uL    Eos (Absolute) 0.00 0.00 - 0.51 K/uL    Baso (Absolute) 0.10 0.00 - 0.12 K/uL    NRBC (Absolute) 0.00 K/uL    Microcytosis 1+    Comp Metabolic Panel    Collection Time: 06/16/23 12:36 AM   Result Value Ref Range    Sodium 141 135 - 145 mmol/L    Potassium 4.2 3.6 - 5.5 mmol/L    Chloride 101 96 - 112 mmol/L    Co2 31 20 - 33 mmol/L    Anion Gap 9.0 7.0 - 16.0    Glucose 154 (H) 65 - 99 mg/dL    Bun 24 (H) 8 - 22 mg/dL    Creatinine 1.14 0.50 - 1.40 mg/dL    Calcium 9.6 8.5 - 10.5 mg/dL    AST(SGOT) 52 (H) 12 - 45 U/L    ALT(SGPT) 244 (H) 2 - 50 U/L    Alkaline Phosphatase 202 (H) 30 - 99 U/L    Total Bilirubin 0.6 0.1 - 1.5 mg/dL    Albumin 3.8 3.2 - 4.9 g/dL    Total Protein 6.5 6.0 - 8.2 g/dL    Globulin 2.7 1.9 - 3.5 g/dL    A-G Ratio 1.4 g/dL   CORRECTED CALCIUM    Collection Time: 06/16/23 12:36 AM   Result Value Ref Range    Correct Calcium 9.8 8.5 - 10.5 mg/dL   ESTIMATED GFR    Collection Time: 06/16/23 12:36 AM   Result Value Ref Range    GFR (CKD-EPI) 49 (A) >60 mL/min/1.73 m 2   DIFFERENTIAL MANUAL    Collection Time: 06/16/23 12:36 AM   Result Value Ref Range    Manual Diff Status PERFORMED    PERIPHERAL SMEAR REVIEW    Collection Time: 06/16/23 12:36 AM   Result Value Ref Range    Peripheral Smear Review see below    PLATELET ESTIMATE    Collection Time: 06/16/23 12:36 AM   Result Value Ref Range    Plt Estimation  Decreased    MORPHOLOGY    Collection Time: 23 12:36 AM   Result Value Ref Range    RBC Morphology Present     Ovalocytes 1+     Stomatocytes 1+    POCT glucose device results    Collection Time: 23  7:57 AM   Result Value Ref Range    POC Glucose, Blood 150 (H) 65 - 99 mg/dL   TROPONIN    Collection Time: 23 10:56 AM   Result Value Ref Range    Troponin T 30 (H) 6 - 19 ng/L   EKG    Collection Time: 23 11:08 AM   Result Value Ref Range    Report       [facility]    Test Date:  2023  Pt Name:    PEG XIONG                Department: [facility]  MRN:        [id number]                      Room:       [facility]  Gender:     Female                       Technician: [facility]  :        1945                   Requested By:NEHA ANDREWS  Order #:    [id number]                    Reading MD: Miguel Cuevas MD    Measurements  Intervals                                Axis  Rate:       112                          P:          91  WY:         174                          QRS:        114  QRSD:       83                           T:          23  QT:         353  QTc:        482    Interpretive Statements  Sinus tachycardia  Right axis deviation  Abnormal R-wave progression, late transition  Compared to ECG 2023 22:22:04  Sinus rhythm no longer present  Electronically Signed On 2023 13:21:34 PDT by Miguel Cuevas MD     POCT glucose device results    Collection Time: 23 12:03 PM   Result Value Ref Range    POC Glucose, Blood 190 (H) 65 - 99 mg/dL   EC-ECHOCARDIOGRAM COMPLETE W/O CONT    Collection Time: 23  1:52 PM   Result Value Ref Range    Eject.Frac. MOD BP 62.52     Eject.Frac. MOD 4C 68.21     Eject.Frac. MOD 2C 48.49     Left Ventrical Ejection Fraction 60      Medical Decision Making, by Problem:  Active Hospital Problems    Diagnosis     Hypertension [I10]      Priority: High    CHF (congestive heart failure) (HCC) [I50.9]      Priority: Medium     Choledocholithiasis [K80.50]     Atrial fibrillation (HCC) [I48.91]     Abdominal pain [R10.9]     Acute pancreatitis without necrosis or infection, unspecified [K85.90]     Elevated liver enzymes [R74.8]     Stage 4 chronic kidney disease (HCC) [N18.4]      Has diabetic kidney disease.  Her renal function has declined over last few years.  Currently has bene stable.  She is followed by Nephrology.  Has many comorbidities.  She is not interested in dialysis at this time.       Diabetic peripheral neuropathy associated with type 2 diabetes mellitus (HCC) [E11.42]     Chronic respiratory failure with hypoxia (HCC) [J96.11]      Has needed Oxygen now for many years.  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   Has seen Pulmonary Medicine.  Gets short of breath with almost any activity.  Mostly is home bound now.       Chronic obstructive pulmonary disease (HCC) [J44.9]     Anemia [D64.9]      Plan:  77-year-old morbidly obese female CHF, diabetes, afib on home oxygen now with gallstone pancreatitis and choledocholithiasis, ERCP canceled last 2 consecutive days  1.  Appreciate medical care  2.  Wonder if ongoing hypoventilation and chest pain secondary to choledocholithiasis  3.  DVT prophylaxis okay, hold full anticoagulation  4.  Empiric IV antibiotics  5.  Proceed with ERCP whenever able  6.  Plan for cholecystectomy when appropriate  7.  We will follow along with you    Quality Measures:  Quality-Core Measures

## 2023-06-16 NOTE — ASSESSMENT & PLAN NOTE
Patient had a brief episode of chest pain.  EKG showed sinus tachycardia.  Troponin was unremarkable (30).  Echocardiogram showed  LVEF ~ 60%, normal left and right ventricular function, normal regional wall motion. Metoprolol added.  She had no further episodes of chest pain

## 2023-06-16 NOTE — PROGRESS NOTES
"Hospital Medicine Daily Progress Note    Date of Service  6/16/2023    Chief Complaint  Siri Solis is a 77 y.o. female transferred from an outside hospital on 6/13/2023 where she presented with nausea and vomiting which started after eating Chinese food the day before.  She was noted to have lipase of greater than 4000, with elevated LFTs.  She had a fever of 101.3, leukocytosis of 13 K.      Abdominal ultrasound showed several calcified gallstones, no wall thickening, CBD was normal. MRCP reported \"Choledocholithiasis is suspected with mild common bile duct dilation\".    She has a history of CKD-4, emphysema with chronic respiratory failure (on 8.5 L/min nasal cannula oxygen at home), diastolic CHF, atrial fibrillation (on Eliquis),     Hospital Course  On admission, creatinine was 2.45 AST was 853, ALT was 650, total bilirubin was 2.5, alk phos was 264.  Saturating 95% on 10 L/min oxygen mask.  Chest x-ray from 6/13/2023 showed possible edema versus pleural effusion. She was afebrile and hemodynamically stable.    Ceftriaxone and Flagyl were empirically started. GI and General Surgery have been consulted.     Chest x-ray from 6/14/2023 shows mild pulmonary edema and/or infiltrates.Creatinine improved to 1.17. Dose of IV Lasix ordered. CT abdomen and pelvis showed cholelithiasis.    Interval Problem Update  WBCs are 12.2 K,  AST is 52, ALT is 244, Total bili is 0.6. Lipase improved to 469 on 6/15/2023. Complained of vague chest pain, which resolved within a few minutes. Troponin was 30, EKG showed tachycardia with no acute ischemic changes. BP is elevated. Echocardiogram showed LVEF ~ 60%, normal left and right ventricular function, normal regional wall motion. Metoprolol added. ERCP was held due to chest pain. MRCP pending.       Afebrile and hemodynamically stable. Saturating 92 % on 7 L/min OxyMask.    I have discussed this patient's plan of care and discharge plan at IDT rounds today with Case " Management, Nursing, Nursing leadership, and other members of the IDT team.    Consultants/Specialty  general surgery and GI    Code Status  DNAR/DNI    Disposition  The patient is not medically cleared for discharge to home or a post-acute facility.  Anticipate discharge to: home with close outpatient follow-up    I have placed the appropriate orders for post-discharge needs.    Review of Systems  Review of Systems   Constitutional:  Positive for malaise/fatigue.   HENT: Negative.     Eyes: Negative.    Respiratory: Negative.     Cardiovascular: Negative.    Gastrointestinal:  Positive for abdominal pain and nausea.   Genitourinary: Negative.    Musculoskeletal: Negative.    Skin: Negative.    Neurological: Negative.    Endo/Heme/Allergies: Negative.    Psychiatric/Behavioral: Negative.          Physical Exam  Temp:  [36.2 °C (97.2 °F)-36.9 °C (98.4 °F)] 36.9 °C (98.4 °F)  Pulse:  [] 115  Resp:  [18-19] 19  BP: (128-179)/() 155/88  SpO2:  [91 %-94 %] 92 %    Physical Exam  Constitutional:       Appearance: She is obese. She is ill-appearing.   HENT:      Head: Normocephalic.      Nose: Nose normal.      Mouth/Throat:      Mouth: Mucous membranes are moist.   Eyes:      Pupils: Pupils are equal, round, and reactive to light.   Cardiovascular:      Rate and Rhythm: Normal rate.   Pulmonary:      Effort: Pulmonary effort is normal.   Abdominal:      Tenderness: There is abdominal tenderness. There is guarding.   Musculoskeletal:      Cervical back: Normal range of motion.      Right lower leg: No edema.      Left lower leg: No edema.   Skin:     General: Skin is warm.   Neurological:      General: No focal deficit present.   Psychiatric:         Mood and Affect: Mood normal.         Fluids    Intake/Output Summary (Last 24 hours) at 6/16/2023 1615  Last data filed at 6/15/2023 2300  Gross per 24 hour   Intake 30 ml   Output --   Net 30 ml       Laboratory  Recent Labs     06/14/23  0053 06/15/23  0910  06/16/23  0036   WBC 8.9 11.9* 12.2*   RBC 4.12* 4.00* 3.99*   HEMOGLOBIN 11.5* 11.1* 11.1*   HEMATOCRIT 36.9* 36.7* 37.2   MCV 89.6 91.8 93.2   MCH 27.9 27.8 27.8   MCHC 31.2* 30.2* 29.8*   RDW 54.2* 56.6* 57.4*   PLATELETCT 140* 160* 154*   MPV 11.9 11.9 11.5     Recent Labs     06/14/23  0053 06/15/23  0910 06/16/23  0036   SODIUM 140 140 141   POTASSIUM 4.1 4.3 4.2   CHLORIDE 99 101 101   CO2 29 25 31   GLUCOSE 267* 241* 154*   BUN 45* 29* 24*   CREATININE 1.85* 1.17 1.14   CALCIUM 9.1 9.5 9.6                   Imaging  EC-ECHOCARDIOGRAM COMPLETE W/O CONT   Final Result      CT-ABDOMEN-PELVIS W/O   Final Result         1.  Small hiatal hernia   2.  Cholelithiasis   3.  Atherosclerosis and atherosclerotic coronary artery disease      DX-CHEST-LIMITED (1 VIEW)   Final Result         1.  Mild pulmonary edema and/or infiltrates.   2.  Cardiomegaly   3.  Atherosclerosis      IR-US GUIDED PIV   Final Result    Ultrasound-guided PERIPHERAL IV INSERTION performed by    qualified nursing staff as above.      DX-CHEST-LIMITED (1 VIEW)   Final Result      New minor fissure thickening could be from edema favored over pleural fluid      Stable cardiac silhouette and hilar enlargement with right hemidiaphragm elevation      [id number]    (Results Pending)        Assessment/Plan  * Abdominal pain- (present on admission)  Assessment & Plan  Likely secondary to gallstone pancreatitis.  Plan for ERCP.  NPO.  IV fluids.    Elevated liver enzymes- (present on admission)  Assessment & Plan  GI consulted. (Patient has a history of elevated LFTs. AST and ALT have been elevated in the past few years, were 4020 and 1565 respectively in June 2021, Total bili was 3.1, and hepatitis panel in 7/2021 was non-reactive). No previous history of pancreatitis.  ERCP was postponed due to chest pain.  MRCP ordered, pending.    Other chest pain  Assessment & Plan  Patient had a brief episode of chest pain.  EKG showed sinus tachycardia.   "Troponin was unremarkable (30).  Echocardiogram showed  LVEF ~ 60%, normal left and right ventricular function, normal regional wall motion. Metoprolol added.  She had no further episodes of chest pain    Choledocholithiasis  Assessment & Plan  MRCP from outside facility reported \"choledocholithiasis is suspected with mild common bile duct dilation\".  Lipase and total bilirubin are elevated. Total bilirubin is 2.7 -> 1.7 ->0.5 -> 0.6.  AST and ALT are elevated.  Ceftriaxone and Flagyl were empirically started.  GI and general surgery was consulted. Was planned for ERCP, held due to chest pain.    Chronic obstructive pulmonary disease (HCC)- (present on admission)  Assessment & Plan  History of emphysema, on about 8.5 L/min nasal cannula oxygen at home. Continue RT per protocol, and home inhalers    Diabetic peripheral neuropathy associated with type 2 diabetes mellitus (HCC)- (present on admission)  Assessment & Plan  Continue insulin and sliding scale insulin    Acute pancreatitis without necrosis or infection, unspecified- (present on admission)  Assessment & Plan  Continue to keep n.p.o. with IV fluids.    Chronic respiratory failure with hypoxia (HCC)- (present on admission)  Assessment & Plan  History of tobacco use, quit 20 years ago.  Diagnosed with emphysema.  On 8.5 L of oxygen at home. Saturating 97% on 7 L/min oxygen mask.    CHF (congestive heart failure) (HCC)- (present on admission)  Assessment & Plan  Diastolic CHF.  LVEF on 9/14/2023 showed LVEF of about 60-65%.  On IVFs for acute pancreatitis, monitor for fluid overload.    Anemia- (present on admission)  Assessment & Plan  Likely due to CKD-4. At baseline. Monitor.     Hypertension- (present on admission)  Assessment & Plan  Continue home lisinopril. Norvasc was added. Home Metoprolol resumed.     Atrial fibrillation (HCC)  Assessment & Plan  Continue metoprolol.  Eliquis was held on admission    Stage 4 chronic kidney disease (HCC)- (present on " admission)  Assessment & Plan  Creatinine is at baseline. Avoid nephrotoxins.  Monitor         VTE prophylaxis: SCDs/TEDs

## 2023-06-16 NOTE — PROGRESS NOTES
Gastroenterology Progress Note               Author:  TIM Almaraz Date & Time Created: 6/16/2023 12:39 PM       Patient ID:  Name:             Siri Solis  YOB: 1945  Age:                 77 y.o.  female  MRN:               [id number]        Medical Decision Making, by Problem:  Active Hospital Problems    Diagnosis     Choledocholithiasis [K80.50]     Atrial fibrillation (HCC) [I48.91]     Abdominal pain [R10.9]     Acute pancreatitis without necrosis or infection, unspecified [K85.90]     Elevated liver enzymes [R74.8]     Stage 4 chronic kidney disease (HCC) [N18.4]     Diabetic peripheral neuropathy associated with type 2 diabetes mellitus (HCC) [E11.42]     Chronic respiratory failure with hypoxia (HCC) [J96.11]     CHF (congestive heart failure) (HCC) [I50.9]     Chronic obstructive pulmonary disease (HCC) [J44.9]     Anemia [D64.9]     Hypertension [I10]            Presenting Chief Complaint:  Elevated LFT's, pain and pancreatitis         HISTORY OF PRESENT ILLNESS:  Siri Solis is a 77 y.o. female who complains of abdominal pain in the middle of her abdomen. This occurred after eating chinese food and she thought it was food poisoning because she started to vomiting profusely. The pain and the vomiting did not stop so she went to the hospital. She has never had this pain before. She does not have UGI symptoms. She has not had endoscopy. She had an MRCP that moderate biliary ductal dilation and a distal 5 mm filling duct consistent distant with choledocholithiasis. She has increased LFT's and a lipase of 1550. On discussion at this time she says her pain is increasing. She has DM which she states is poorly controlled. She does Insulin at home. She has FLAVIA. She has severe emphysema and she in on 8 liters of oxygen at home. She is on 8 liters at this time as well. She does not have lower GI symptoms She had a colonoscopy 5 years ago and reports that it was normal. She  does not have history of liver disease. She does not have f/c, NS or weight loss.      Interval History:  6/15/2023: Patient seen at bedside with her , Anthony.  Patient reports no changes in her abdominal pain but that prescribed pain medications provide relief.  CT abdomen yesterday showed small hiatal hernia, cholelithiasis, and atherosclerotic changes.  Total bilirubin normal, LFTs downtrending but remain elevated, lipase downtrending.    6/16/2023: patient seen at bedside with . On 8LPM mask. Patient reports epigastric pressure and left sided chest pain. Updated primary to evaluate troponin and EKG stat. Echo and repeat MRCP pending- ERCP today cancelled.         Hospital Medications:  Current Facility-Administered Medications   Medication Dose Frequency Provider Last Rate Last Admin    amLODIPine (NORVASC) tablet 5 mg  5 mg Q DAY Nasra Carranza M.D.   5 mg at 06/16/23 0437    lactated ringers infusion   Continuous Nasra Carranza M.D. 250 mL/hr at 06/16/23 0622 New Bag at 06/16/23 0622    oxyCODONE immediate-release (ROXICODONE) tablet 2.5 mg  2.5 mg Q3HRS PRN KARINA Haq.DMegan   2.5 mg at 06/15/23 2310    Or    oxyCODONE immediate-release (ROXICODONE) tablet 5 mg  5 mg Q3HRS PRN JOSE HaqDMegan   5 mg at 06/16/23 1009    Or    morphine 4 MG/ML injection 2 mg  2 mg Q2HRS PRN JOSE aHqDMegan   2 mg at 06/14/23 1623    hydrALAZINE (APRESOLINE) injection 20 mg  20 mg Q6HRS PRN JOSE HaqDMegan   20 mg at 06/16/23 0823    albuterol inhaler 2 Puff  2 Puff BID Polo Hoang M.D.   2 Puff at 06/16/23 0436    febuxostat (ULORIC) tablet 40 mg  40 mg DAILY Polo Hoang M.D.   40 mg at 06/16/23 0436    insulin GLARGINE (Lantus,Semglee) injection  30 Units Q EVENING Polo Hoang M.D.   30 Units at 06/15/23 1719    lisinopril (PRINIVIL) tablet 10 mg  10 mg DAILY Polo Hoang M.D.   10 mg at 06/16/23 0437    omeprazole (PRILOSEC) capsule 20 mg  20 mg Q EVENING Polo HOUSTON  PARK Hoang   20 mg at 06/15/23 1706    ondansetron (ZOFRAN) syringe/vial injection 4 mg  4 mg Q4HRS PRN Polo Hoang M.D.   4 mg at 06/14/23 1704    insulin regular (HumuLIN R,NovoLIN R) injection  1-6 Units 4X/DAY ACHCAROLINA Hoang M.D.   1 Units at 06/15/23 2143    And    dextrose 10 % BOLUS 25 g  25 g Q15 MIN PRN Polo Hoang M.D.        cefTRIAXone (Rocephin) syringe 1,000 mg  1,000 mg Q24HRS Polo Hoang M.D.   1,000 mg at 06/16/23 0437    metroNIDAZOLE (Flagyl) IVPB 500 mg  500 mg Q12HR Polo Hoang M.D.   Stopped at 06/16/23 1121    Pharmacy Consult Request ...Pain Management Review 1 Each  1 Each PHARMACY TO DOSE Polo Hoang M.D.        tiotropium (Spiriva Respimat) 2.5 mcg/Act inhalation spray 5 mcg  5 mcg DAILY Polo Hoang M.D.   5 mcg at 06/15/23 1707   Last reviewed on 6/13/2023 10:47 PM by Bri Ortiz R.N.       Review of Systems:  Review of Systems   Constitutional:  Positive for malaise/fatigue. Negative for chills and fever.   HENT:  Negative for congestion and sore throat.    Respiratory:  Positive for shortness of breath. Negative for cough.    Cardiovascular:  Positive for chest pain. Negative for leg swelling.   Gastrointestinal:  Positive for abdominal pain (epigastric pressure). Negative for blood in stool, constipation, diarrhea, heartburn, melena, nausea and vomiting.   Genitourinary:  Negative for dysuria and flank pain.   Musculoskeletal:  Negative for falls and myalgias.   Neurological:  Positive for weakness. Negative for focal weakness and headaches.   Psychiatric/Behavioral:  Negative for substance abuse. The patient is nervous/anxious.    All other systems reviewed and are negative.        Vital signs:  Weight/BMI: Body mass index is 37.21 kg/m².  BP (!) 177/86   Pulse 99   Temp 36.2 °C (97.2 °F) (Temporal)   Resp 19   Wt 111 kg (244 lb 11.4 oz)   SpO2 92%   Vitals:    06/15/23 2000 06/16/23 0405 06/16/23  0421 06/16/23 0803   BP: (!) 167/78  128/74 (!) 177/86   Pulse: 79 100  99   Resp: 18 19  19   Temp: 36.4 °C (97.6 °F) 36.7 °C (98 °F)  36.2 °C (97.2 °F)   TempSrc: Temporal Temporal  Temporal   SpO2:  91%  92%   Weight:         Oxygen Therapy:  Pulse Oximetry: 92 %, O2 (LPM): 7, O2 Delivery Device: Oxymask    Intake/Output Summary (Last 24 hours) at 6/16/2023 1239  Last data filed at 6/15/2023 2300  Gross per 24 hour   Intake 150 ml   Output --   Net 150 ml           Physical Exam:  Physical Exam  Vitals and nursing note reviewed.   Constitutional:       General: She is in acute distress.      Appearance: She is morbidly obese. She is ill-appearing.   HENT:      Head: Normocephalic.      Nose: Nose normal.      Mouth/Throat:      Mouth: Mucous membranes are moist.      Pharynx: Oropharynx is clear.   Eyes:      General: No scleral icterus.     Extraocular Movements: Extraocular movements intact.      Conjunctiva/sclera: Conjunctivae normal.   Cardiovascular:      Rate and Rhythm: Tachycardia present. Rhythm irregular.      Pulses: Normal pulses.      Heart sounds: Normal heart sounds. No murmur heard.  Pulmonary:      Effort: Pulmonary effort is normal. No respiratory distress.      Breath sounds: Wheezing present.      Comments: oxymask in place  Chest:      Chest wall: No tenderness.   Abdominal:      General: Abdomen is flat. Bowel sounds are normal. There is no distension.      Palpations: Abdomen is soft.      Tenderness: There is abdominal tenderness. There is no guarding.      Comments: Hypoactive bowel sounds   Skin:     General: Skin is warm and dry.      Capillary Refill: Capillary refill takes less than 2 seconds.      Coloration: Skin is not jaundiced or pale.   Neurological:      General: No focal deficit present.      Mental Status: She is alert and oriented to person, place, and time. Mental status is at baseline.   Psychiatric:         Mood and Affect: Mood normal.         Behavior: Behavior normal.          Thought Content: Thought content normal.         Judgment: Judgment normal.             Labs:  Recent Labs     06/14/23  0053 06/15/23  0910 06/16/23  0036   SODIUM 140 140 141   POTASSIUM 4.1 4.3 4.2   CHLORIDE 99 101 101   CO2 29 25 31   BUN 45* 29* 24*   CREATININE 1.85* 1.17 1.14   CALCIUM 9.1 9.5 9.6       Recent Labs     06/14/23 0053 06/14/23  1627 06/15/23  0910 06/16/23  0036   ALTSGPT 613*  --  318* 244*   ASTSGOT 485*  --  93* 52*   ALKPHOSPHAT 296*  --  221* 202*   TBILIRUBIN 1.7*  --  0.5 0.6   LIPASE 1550* 1071* 469*  --    GLUCOSE 267*  --  241* 154*       Recent Labs     06/14/23  0053 06/15/23  0910 06/16/23 0036   WBC 8.9 11.9* 12.2*   NEUTSPOLYS  --  85.00* 87.10*   LYMPHOCYTES  --  5.70* 5.20*   MONOCYTES  --  8.20 6.90   EOSINOPHILS  --  0.20 0.00   BASOPHILS  --  0.20 0.80   ASTSGOT 485* 93* 52*   ALTSGPT 613* 318* 244*   ALKPHOSPHAT 296* 221* 202*   TBILIRUBIN 1.7* 0.5 0.6       Recent Labs     06/14/23  0053 06/15/23  0910 06/16/23  0036   RBC 4.12* 4.00* 3.99*   HEMOGLOBIN 11.5* 11.1* 11.1*   HEMATOCRIT 36.9* 36.7* 37.2   PLATELETCT 140* 160* 154*   IRON 46  --   --    FERRITIN 53.8  --   --    TOTIRONBC 356  --   --        Recent Results (from the past 24 hour(s))   POCT glucose device results    Collection Time: 06/15/23  9:39 PM   Result Value Ref Range    POC Glucose, Blood 161 (H) 65 - 99 mg/dL   CBC WITH DIFFERENTIAL    Collection Time: 06/16/23 12:36 AM   Result Value Ref Range    WBC 12.2 (H) 4.8 - 10.8 K/uL    RBC 3.99 (L) 4.20 - 5.40 M/uL    Hemoglobin 11.1 (L) 12.0 - 16.0 g/dL    Hematocrit 37.2 37.0 - 47.0 %    MCV 93.2 81.4 - 97.8 fL    MCH 27.8 27.0 - 33.0 pg    MCHC 29.8 (L) 32.2 - 35.5 g/dL    RDW 57.4 (H) 35.9 - 50.0 fL    Platelet Count 154 (L) 164 - 446 K/uL    MPV 11.5 9.0 - 12.9 fL    Neutrophils-Polys 87.10 (H) 44.00 - 72.00 %    Lymphocytes 5.20 (L) 22.00 - 41.00 %    Monocytes 6.90 0.00 - 13.40 %    Eosinophils 0.00 0.00 - 6.90 %    Basophils 0.80 0.00 -  1.80 %    Nucleated RBC 0.00 0.00 - 0.20 /100 WBC    Neutrophils (Absolute) 10.63 (H) 1.82 - 7.42 K/uL    Lymphs (Absolute) 0.63 (L) 1.00 - 4.80 K/uL    Monos (Absolute) 0.84 0.00 - 0.85 K/uL    Eos (Absolute) 0.00 0.00 - 0.51 K/uL    Baso (Absolute) 0.10 0.00 - 0.12 K/uL    NRBC (Absolute) 0.00 K/uL    Microcytosis 1+    Comp Metabolic Panel    Collection Time: 06/16/23 12:36 AM   Result Value Ref Range    Sodium 141 135 - 145 mmol/L    Potassium 4.2 3.6 - 5.5 mmol/L    Chloride 101 96 - 112 mmol/L    Co2 31 20 - 33 mmol/L    Anion Gap 9.0 7.0 - 16.0    Glucose 154 (H) 65 - 99 mg/dL    Bun 24 (H) 8 - 22 mg/dL    Creatinine 1.14 0.50 - 1.40 mg/dL    Calcium 9.6 8.5 - 10.5 mg/dL    AST(SGOT) 52 (H) 12 - 45 U/L    ALT(SGPT) 244 (H) 2 - 50 U/L    Alkaline Phosphatase 202 (H) 30 - 99 U/L    Total Bilirubin 0.6 0.1 - 1.5 mg/dL    Albumin 3.8 3.2 - 4.9 g/dL    Total Protein 6.5 6.0 - 8.2 g/dL    Globulin 2.7 1.9 - 3.5 g/dL    A-G Ratio 1.4 g/dL   CORRECTED CALCIUM    Collection Time: 06/16/23 12:36 AM   Result Value Ref Range    Correct Calcium 9.8 8.5 - 10.5 mg/dL   ESTIMATED GFR    Collection Time: 06/16/23 12:36 AM   Result Value Ref Range    GFR (CKD-EPI) 49 (A) >60 mL/min/1.73 m 2   DIFFERENTIAL MANUAL    Collection Time: 06/16/23 12:36 AM   Result Value Ref Range    Manual Diff Status PERFORMED    PERIPHERAL SMEAR REVIEW    Collection Time: 06/16/23 12:36 AM   Result Value Ref Range    Peripheral Smear Review see below    PLATELET ESTIMATE    Collection Time: 06/16/23 12:36 AM   Result Value Ref Range    Plt Estimation Decreased    MORPHOLOGY    Collection Time: 06/16/23 12:36 AM   Result Value Ref Range    RBC Morphology Present     Ovalocytes 1+     Stomatocytes 1+    POCT glucose device results    Collection Time: 06/16/23  7:57 AM   Result Value Ref Range    POC Glucose, Blood 150 (H) 65 - 99 mg/dL   TROPONIN    Collection Time: 06/16/23 10:56 AM   Result Value Ref Range    Troponin T 30 (H) 6 - 19 ng/L   EKG     Collection Time: 23 11:08 AM   Result Value Ref Range    Report       [facility]    Test Date:  2023  Pt Name:    PEG XIONG                Department: [facility]  MRN:        [id number]                      Room:       S531  Gender:     Female                       Technician: RADHA  :        1945                   Requested By:NEHA ANDREWS  Order #:    [id number]                    Reading MD:    Measurements  Intervals                                Axis  Rate:       112                          P:          91  PA:         174                          QRS:        114  QRSD:       83                           T:          23  QT:         353  QTc:        482    Interpretive Statements  Sinus tachycardia  Right axis deviation  Abnormal R-wave progression, late transition  Compared to ECG 2023 22:22:04  Sinus rhythm no longer present     POCT glucose device results    Collection Time: 23 12:03 PM   Result Value Ref Range    POC Glucose, Blood 190 (H) 65 - 99 mg/dL       Radiology Review:  CT-ABDOMEN-PELVIS W/O   Final Result         1.  Small hiatal hernia   2.  Cholelithiasis   3.  Atherosclerosis and atherosclerotic coronary artery disease      DX-CHEST-LIMITED (1 VIEW)   Final Result         1.  Mild pulmonary edema and/or infiltrates.   2.  Cardiomegaly   3.  Atherosclerosis      IR-US GUIDED PIV   Final Result    Ultrasound-guided PERIPHERAL IV INSERTION performed by    qualified nursing staff as above.      DX-CHEST-LIMITED (1 VIEW)   Final Result      New minor fissure thickening could be from edema favored over pleural fluid      Stable cardiac silhouette and hilar enlargement with right hemidiaphragm elevation      EC-ECHOCARDIOGRAM COMPLETE W/O CONT    (Results Pending)   DX-PORTABLE FLUOROSCOPY < 1 HOUR    (Results Pending)   [id number]    (Results Pending)         MDM (Data Review):   -Records reviewed and summarized in current documentation  -I personally reviewed and  "interpreted the laboratory results  -I personally reviewed the radiology images    Assessment/Recommendations:     IMPRESSION/PLAN:  Abdominal pain - diffuse  Choledocholithiasis  Chest pain-has been intermittent per patient.  Updated primary ordered EKG and troponins.  Echocardiogram previously ordered and is pending.    Pancreatitis  Cholecystitis  Emphysema - 8.5L liters oxygen at baseline. On 8-10L during admission  DM - poorly controlled  Try to carry on glucose copying a copy forwarded is not myD    MDM:  This is a pleasant 77-year-old female with a past medical history as listed above.  Patient will need ERCP to remove stone in CBD but she is a high risk patient due to high oxygen needs.  Notify Dr. Fisher aware of patient as well as her increased BMI and high oxygen needs.  Ct scan with no significant acute changes.  Her lipase and LFTs continues to downtrend.  Given her significant risk with anesthesia and decreasing LFTs/lipase, repeat MRCP has been urgently ordered for reevaluation of necessity of procedure/presence of stone.  Upon encounter on 6/16/2023, patient reports epigastric pressure and left-sided chest pain.  She is tachycardic, appears in distress, and on 8 LPM mask.  Primary aware and ordered EKG and troponins.  Previous echocardiogram remains pending. Per interview with patient/ and chart review, patient seen by Dr. Hansen at [facility].  Last visit was last month.  Dr. Hansen concern with patient's intermittent chest pain and recommended cardiac stress test for which patient subsequently refused.  Upon clarification patient/ at bedside, they state patient was diagnosed with congestive heart failure several years ago but was later told she did not have it and previous charting was \"a lie\".   ERCP today canceled pending further cardiac evaluation and repeat MRCP.    Recommendations:  EKG, troponins-Per primary team  Echocardiogram pending.  Repeat MRCP " pending.  Antibiotics-Per primary team      Discussed with patient,  at bedside, Dr. Carranza, Erica Puga RN, Dr. Henley, Dr. Delgadillo (anesthesiology) and Dr. Fisher      Core Quality Measures   Reviewed items::  Labs, Medications and Radiology reports reviewed

## 2023-06-16 NOTE — PROGRESS NOTES
Assumed care of patient at 1900 from Armando PARK. Patient is A&O x 4, states pain level is 0 /10.Bed locked in the lowest position, 2 side rails up, Call light within reach, belongings at bedside. Patient expresses no needs at this time and hourly rounding is in place. Pt allergic to latex.

## 2023-06-16 NOTE — CARE PLAN
The patient is Watcher - Medium risk of patient condition declining or worsening    Shift Goals  Clinical Goals: Will continue to toelrate IV ABX's and patient will remain free of falls  Patient Goals: rest  Family Goals: n/a    Progress made toward(s) clinical / shift goals:      Patient is not progressing towards the following goals:      Problem: Knowledge Deficit - Standard  Goal: Patient and family/care givers will demonstrate understanding of plan of care, disease process/condition, diagnostic tests and medications  Outcome: Progressing     Problem: Pain - Standard  Goal: Alleviation of pain or a reduction in pain to the patient’s comfort goal  Outcome: Progressing     Problem: Fall Risk  Goal: Patient will remain free from falls  Outcome: Progressing

## 2023-06-16 NOTE — CARE PLAN
The patient is Stable - Low risk of patient condition declining or worsening    Shift Goals  Clinical Goals: iv abx, pain controll, safety  Patient Goals: rest, pain management  Family Goals: n/a    Progress made toward(s) clinical / shift goals:  pt has received IV abx and IV LR per MAR this shift.    Problem: Fall Risk  Goal: Patient will remain free from falls  Outcome: Progressing     Problem: Knowledge Deficit - Standard  Goal: Patient and family/care givers will demonstrate understanding of plan of care, disease process/condition, diagnostic tests and medications  Outcome: Progressing     Patient is not progressing towards the following goals:

## 2023-06-17 ENCOUNTER — APPOINTMENT (OUTPATIENT)
Dept: RADIOLOGY | Facility: MEDICAL CENTER | Age: 78
DRG: 417 | End: 2023-06-17
Attending: INTERNAL MEDICINE
Payer: COMMERCIAL

## 2023-06-17 LAB
ALBUMIN SERPL BCP-MCNC: 3.5 G/DL (ref 3.2–4.9)
ALBUMIN/GLOB SERPL: 1.2 G/DL
ALP SERPL-CCNC: 173 U/L (ref 30–99)
ALT SERPL-CCNC: 155 U/L (ref 2–50)
ANION GAP SERPL CALC-SCNC: 12 MMOL/L (ref 7–16)
AST SERPL-CCNC: 26 U/L (ref 12–45)
BASOPHILS # BLD AUTO: 0.4 % (ref 0–1.8)
BASOPHILS # BLD: 0.04 K/UL (ref 0–0.12)
BILIRUB SERPL-MCNC: 0.6 MG/DL (ref 0.1–1.5)
BUN SERPL-MCNC: 22 MG/DL (ref 8–22)
CALCIUM ALBUM COR SERPL-MCNC: 9.9 MG/DL (ref 8.5–10.5)
CALCIUM SERPL-MCNC: 9.5 MG/DL (ref 8.5–10.5)
CHLORIDE SERPL-SCNC: 102 MMOL/L (ref 96–112)
CO2 SERPL-SCNC: 27 MMOL/L (ref 20–33)
CREAT SERPL-MCNC: 1.02 MG/DL (ref 0.5–1.4)
EOSINOPHIL # BLD AUTO: 0.02 K/UL (ref 0–0.51)
EOSINOPHIL NFR BLD: 0.2 % (ref 0–6.9)
ERYTHROCYTE [DISTWIDTH] IN BLOOD BY AUTOMATED COUNT: 56.7 FL (ref 35.9–50)
GFR SERPLBLD CREATININE-BSD FMLA CKD-EPI: 56 ML/MIN/1.73 M 2
GLOBULIN SER CALC-MCNC: 2.9 G/DL (ref 1.9–3.5)
GLUCOSE BLD STRIP.AUTO-MCNC: 168 MG/DL (ref 65–99)
GLUCOSE BLD STRIP.AUTO-MCNC: 200 MG/DL (ref 65–99)
GLUCOSE BLD STRIP.AUTO-MCNC: 204 MG/DL (ref 65–99)
GLUCOSE BLD STRIP.AUTO-MCNC: 223 MG/DL (ref 65–99)
GLUCOSE SERPL-MCNC: 166 MG/DL (ref 65–99)
HCT VFR BLD AUTO: 37.8 % (ref 37–47)
HGB BLD-MCNC: 11.3 G/DL (ref 12–16)
IMM GRANULOCYTES # BLD AUTO: 0.1 K/UL (ref 0–0.11)
IMM GRANULOCYTES NFR BLD AUTO: 0.9 % (ref 0–0.9)
LIPASE SERPL-CCNC: 74 U/L (ref 11–82)
LYMPHOCYTES # BLD AUTO: 0.86 K/UL (ref 1–4.8)
LYMPHOCYTES NFR BLD: 7.8 % (ref 22–41)
MCH RBC QN AUTO: 27.6 PG (ref 27–33)
MCHC RBC AUTO-ENTMCNC: 29.9 G/DL (ref 32.2–35.5)
MCV RBC AUTO: 92.4 FL (ref 81.4–97.8)
MONOCYTES # BLD AUTO: 1.18 K/UL (ref 0–0.85)
MONOCYTES NFR BLD AUTO: 10.7 % (ref 0–13.4)
NEUTROPHILS # BLD AUTO: 8.78 K/UL (ref 1.82–7.42)
NEUTROPHILS NFR BLD: 80 % (ref 44–72)
NRBC # BLD AUTO: 0 K/UL
NRBC BLD-RTO: 0 /100 WBC (ref 0–0.2)
PLATELET # BLD AUTO: 174 K/UL (ref 164–446)
PMV BLD AUTO: 11.3 FL (ref 9–12.9)
POTASSIUM SERPL-SCNC: 4 MMOL/L (ref 3.6–5.5)
PROT SERPL-MCNC: 6.4 G/DL (ref 6–8.2)
RBC # BLD AUTO: 4.09 M/UL (ref 4.2–5.4)
SODIUM SERPL-SCNC: 141 MMOL/L (ref 135–145)
WBC # BLD AUTO: 11 K/UL (ref 4.8–10.8)

## 2023-06-17 PROCEDURE — 36415 COLL VENOUS BLD VENIPUNCTURE: CPT

## 2023-06-17 PROCEDURE — 80053 COMPREHEN METABOLIC PANEL: CPT

## 2023-06-17 PROCEDURE — 700111 HCHG RX REV CODE 636 W/ 250 OVERRIDE (IP): Performed by: HOSPITALIST

## 2023-06-17 PROCEDURE — 82962 GLUCOSE BLOOD TEST: CPT

## 2023-06-17 PROCEDURE — 770006 HCHG ROOM/CARE - MED/SURG/GYN SEMI*

## 2023-06-17 PROCEDURE — 99232 SBSQ HOSP IP/OBS MODERATE 35: CPT | Performed by: NURSE PRACTITIONER

## 2023-06-17 PROCEDURE — A9270 NON-COVERED ITEM OR SERVICE: HCPCS | Performed by: INTERNAL MEDICINE

## 2023-06-17 PROCEDURE — 85025 COMPLETE CBC W/AUTO DIFF WBC: CPT

## 2023-06-17 PROCEDURE — 99233 SBSQ HOSP IP/OBS HIGH 50: CPT | Performed by: INTERNAL MEDICINE

## 2023-06-17 PROCEDURE — 700101 HCHG RX REV CODE 250: Performed by: INTERNAL MEDICINE

## 2023-06-17 PROCEDURE — 700102 HCHG RX REV CODE 250 W/ 637 OVERRIDE(OP): Performed by: INTERNAL MEDICINE

## 2023-06-17 PROCEDURE — 700101 HCHG RX REV CODE 250: Performed by: HOSPITALIST

## 2023-06-17 PROCEDURE — 700102 HCHG RX REV CODE 250 W/ 637 OVERRIDE(OP): Performed by: HOSPITALIST

## 2023-06-17 PROCEDURE — 700111 HCHG RX REV CODE 636 W/ 250 OVERRIDE (IP): Performed by: INTERNAL MEDICINE

## 2023-06-17 PROCEDURE — 99232 SBSQ HOSP IP/OBS MODERATE 35: CPT | Mod: 24 | Performed by: SURGERY

## 2023-06-17 PROCEDURE — A9270 NON-COVERED ITEM OR SERVICE: HCPCS | Performed by: HOSPITALIST

## 2023-06-17 PROCEDURE — 83690 ASSAY OF LIPASE: CPT

## 2023-06-17 RX ORDER — LABETALOL HYDROCHLORIDE 5 MG/ML
10 INJECTION, SOLUTION INTRAVENOUS ONCE
Status: COMPLETED | OUTPATIENT
Start: 2023-06-17 | End: 2023-06-17

## 2023-06-17 RX ADMIN — INSULIN HUMAN 2 UNITS: 100 INJECTION, SOLUTION PARENTERAL at 17:55

## 2023-06-17 RX ADMIN — OXYCODONE HYDROCHLORIDE 5 MG: 5 TABLET ORAL at 16:44

## 2023-06-17 RX ADMIN — HYDRALAZINE HYDROCHLORIDE 20 MG: 20 INJECTION INTRAMUSCULAR; INTRAVENOUS at 04:32

## 2023-06-17 RX ADMIN — INSULIN HUMAN 1 UNITS: 100 INJECTION, SOLUTION PARENTERAL at 08:35

## 2023-06-17 RX ADMIN — LABETALOL HYDROCHLORIDE 10 MG: 5 INJECTION INTRAVENOUS at 08:57

## 2023-06-17 RX ADMIN — METRONIDAZOLE 500 MG: 5 INJECTION, SOLUTION INTRAVENOUS at 10:02

## 2023-06-17 RX ADMIN — METOPROLOL TARTRATE 50 MG: 50 TABLET, FILM COATED ORAL at 17:53

## 2023-06-17 RX ADMIN — OXYCODONE HYDROCHLORIDE 5 MG: 5 TABLET ORAL at 00:16

## 2023-06-17 RX ADMIN — CEFTRIAXONE SODIUM 1000 MG: 10 INJECTION, POWDER, FOR SOLUTION INTRAVENOUS at 04:33

## 2023-06-17 RX ADMIN — AMLODIPINE BESYLATE 5 MG: 5 TABLET ORAL at 04:32

## 2023-06-17 RX ADMIN — INSULIN HUMAN 2 UNITS: 100 INJECTION, SOLUTION PARENTERAL at 20:23

## 2023-06-17 RX ADMIN — OMEPRAZOLE 20 MG: 20 CAPSULE, DELAYED RELEASE ORAL at 17:53

## 2023-06-17 RX ADMIN — METRONIDAZOLE 500 MG: 5 INJECTION, SOLUTION INTRAVENOUS at 20:59

## 2023-06-17 RX ADMIN — LISINOPRIL 10 MG: 10 TABLET ORAL at 04:32

## 2023-06-17 RX ADMIN — METOPROLOL TARTRATE 25 MG: 25 TABLET, FILM COATED ORAL at 04:32

## 2023-06-17 RX ADMIN — TIOTROPIUM BROMIDE INHALATION SPRAY 5 MCG: 3.12 SPRAY, METERED RESPIRATORY (INHALATION) at 17:53

## 2023-06-17 RX ADMIN — FEBUXOSTAT 40 MG: 40 TABLET, FILM COATED ORAL at 04:33

## 2023-06-17 RX ADMIN — ALBUTEROL SULFATE 2 PUFF: 90 AEROSOL, METERED RESPIRATORY (INHALATION) at 17:53

## 2023-06-17 RX ADMIN — OXYCODONE HYDROCHLORIDE 5 MG: 5 TABLET ORAL at 08:37

## 2023-06-17 RX ADMIN — INSULIN GLARGINE-YFGN 30 UNITS: 100 INJECTION, SOLUTION SUBCUTANEOUS at 17:56

## 2023-06-17 ASSESSMENT — ENCOUNTER SYMPTOMS
WEAKNESS: 1
FLANK PAIN: 0
NEUROLOGICAL NEGATIVE: 1
NAUSEA: 1
CONSTIPATION: 0
FOCAL WEAKNESS: 0
MUSCULOSKELETAL NEGATIVE: 1
VOMITING: 0
HEADACHES: 0
PSYCHIATRIC NEGATIVE: 1
FEVER: 0
CHILLS: 0
DIARRHEA: 0
HEARTBURN: 0
FALLS: 0
BLOOD IN STOOL: 0
MYALGIAS: 0
CARDIOVASCULAR NEGATIVE: 1
SORE THROAT: 0
NERVOUS/ANXIOUS: 1
SHORTNESS OF BREATH: 1
RESPIRATORY NEGATIVE: 1
EYES NEGATIVE: 1
ABDOMINAL PAIN: 1
NAUSEA: 0
COUGH: 0

## 2023-06-17 ASSESSMENT — FIBROSIS 4 INDEX: FIB4 SCORE: 0.95

## 2023-06-17 ASSESSMENT — PAIN DESCRIPTION - PAIN TYPE
TYPE: ACUTE PAIN
TYPE: ACUTE PAIN

## 2023-06-17 ASSESSMENT — LIFESTYLE VARIABLES: SUBSTANCE_ABUSE: 0

## 2023-06-17 NOTE — PROGRESS NOTES
"Hospital Medicine Daily Progress Note    Date of Service  6/17/2023    Chief Complaint  Siri Solis is a 77 y.o. female transferred from an outside hospital on 6/13/2023 where she presented with nausea and vomiting which started after eating Chinese food the day before.  She was noted to have lipase of greater than 4000, with elevated LFTs.  She had a fever of 101.3, leukocytosis of 13 K.      Abdominal ultrasound showed several calcified gallstones, no wall thickening, CBD was normal. MRCP reported \"Choledocholithiasis is suspected with mild common bile duct dilation\".    She has a history of CKD-4, emphysema with chronic respiratory failure (on 8.5 L/min nasal cannula oxygen at home), diastolic CHF, atrial fibrillation (on Eliquis),     Hospital Course  On admission, creatinine was 2.45 AST was 853, ALT was 650, total bilirubin was 2.5, alk phos was 264.  Saturating 95% on 10 L/min oxygen mask.  Chest x-ray from 6/13/2023 showed possible edema versus pleural effusion. She was afebrile and hemodynamically stable.    Ceftriaxone and Flagyl were empirically started. GI and General Surgery have been consulted.     Chest x-ray from 6/14/2023 shows mild pulmonary edema and/or infiltrates.Creatinine improved to 1.17. Dose of IV Lasix ordered. CT abdomen and pelvis showed cholelithiasis.    Echocardiogram showed LVEF ~ 60%, normal left and right ventricular function, normal regional wall motion. Metoprolol added.    Interval Problem Update  WBCs are 11.0 K,  AST is 26, ALT is 173, Total bili is 0.6. Lipase improved to 74. BP is elevated, as of labetalol given metoprolol, has been increased.     MRCP from 6/16/2023 showed cholelithiasis with gallbladder sludge, no gallbladder wall thickening or significant pericholecystic fluid. Dilation of the common bile duct and central intrahepatic biliary tree with probable sludge (versus stone)    Afebrile and hemodynamically stable. Saturating 92 % on 7 L/min " OxyMask.    Discussed with GI.  ERCP will not be done at this time as patient's LFTs have improved, lipase is normal, abdominal pain has improved, and patient is high risk.  Clear liquid diet has been started.    I have discussed this patient's plan of care and discharge plan at IDT rounds today with Case Management, Nursing, Nursing leadership, and other members of the IDT team.    Consultants/Specialty  general surgery and GI    Code Status  DNAR/DNI    Disposition  The patient is not medically cleared for discharge to home or a post-acute facility.  Anticipate discharge to: home with close outpatient follow-up    I have placed the appropriate orders for post-discharge needs.    Review of Systems  Review of Systems   Constitutional:  Positive for malaise/fatigue.   HENT: Negative.     Eyes: Negative.    Respiratory: Negative.     Cardiovascular: Negative.    Gastrointestinal:  Positive for abdominal pain and nausea.   Genitourinary: Negative.    Musculoskeletal: Negative.    Skin: Negative.    Neurological: Negative.    Endo/Heme/Allergies: Negative.    Psychiatric/Behavioral: Negative.          Physical Exam  Temp:  [36.2 °C (97.2 °F)-37.1 °C (98.7 °F)] 36.2 °C (97.2 °F)  Pulse:  [] 100  Resp:  [19-20] 19  BP: (154-179)/() 160/94  SpO2:  [91 %-95 %] 92 %    Physical Exam  Constitutional:       Appearance: She is obese. She is ill-appearing.   HENT:      Head: Normocephalic.      Nose: Nose normal.      Mouth/Throat:      Mouth: Mucous membranes are moist.   Eyes:      Pupils: Pupils are equal, round, and reactive to light.   Cardiovascular:      Rate and Rhythm: Normal rate.   Pulmonary:      Effort: Pulmonary effort is normal.   Abdominal:      Tenderness: There is abdominal tenderness. There is guarding.   Musculoskeletal:      Cervical back: Normal range of motion.      Right lower leg: No edema.      Left lower leg: No edema.   Skin:     General: Skin is warm.   Neurological:      General: No  focal deficit present.   Psychiatric:         Mood and Affect: Mood normal.         Fluids  No intake or output data in the 24 hours ending 06/17/23 1223      Laboratory  Recent Labs     06/15/23  0910 06/16/23 0036 06/17/23  0237   WBC 11.9* 12.2* 11.0*   RBC 4.00* 3.99* 4.09*   HEMOGLOBIN 11.1* 11.1* 11.3*   HEMATOCRIT 36.7* 37.2 37.8   MCV 91.8 93.2 92.4   MCH 27.8 27.8 27.6   MCHC 30.2* 29.8* 29.9*   RDW 56.6* 57.4* 56.7*   PLATELETCT 160* 154* 174   MPV 11.9 11.5 11.3     Recent Labs     06/15/23  0910 06/16/23 0036 06/17/23  0237   SODIUM 140 141 141   POTASSIUM 4.3 4.2 4.0   CHLORIDE 101 101 102   CO2 25 31 27   GLUCOSE 241* 154* 166*   BUN 29* 24* 22   CREATININE 1.17 1.14 1.02   CALCIUM 9.5 9.6 9.5                   Imaging  IR-US GUIDED PIV   Final Result    Ultrasound-guided PERIPHERAL IV INSERTION performed by    qualified nursing staff as above.      [id number]   Final Result      1.  There is cholelithiasis with gallbladder sludge but no gallbladder wall thickening or significant pericholecystic fluid.   2.  There is dilatation of the common bile duct and central intrahepatic biliary tree with probable tumefactive sludge in the distal duct favored over an obstructing stone.   3.  There is a trace of peripancreatic fluid and perirenal fluid possibly related to volume status. Recommend correlation with lipase level in regards to any potential pancreatitis.   4.  There are several cystic areas seen in the pancreatic head and uncinate process, possibly side branch IPMN's.   5.  Previously noted right adrenal gland mass not well characterized on this exam.   6.  Exam somewhat limited by breathing motion artifact.      EC-ECHOCARDIOGRAM COMPLETE W/O CONT   Final Result      CT-ABDOMEN-PELVIS W/O   Final Result         1.  Small hiatal hernia   2.  Cholelithiasis   3.  Atherosclerosis and atherosclerotic coronary artery disease      DX-CHEST-LIMITED (1 VIEW)   Final Result         1.  Mild pulmonary  "edema and/or infiltrates.   2.  Cardiomegaly   3.  Atherosclerosis      IR-US GUIDED PIV   Final Result    Ultrasound-guided PERIPHERAL IV INSERTION performed by    qualified nursing staff as above.      DX-CHEST-LIMITED (1 VIEW)   Final Result      New minor fissure thickening could be from edema favored over pleural fluid      Stable cardiac silhouette and hilar enlargement with right hemidiaphragm elevation           Assessment/Plan  * Abdominal pain- (present on admission)  Assessment & Plan  Likely secondary to gallstone pancreatitis.  Plan for ERCP.  NPO.  IV fluids.    Elevated liver enzymes- (present on admission)  Assessment & Plan  GI consulted. (Patient has a history of elevated LFTs. AST and ALT have been elevated in the past few years, were 4020 and 1565 respectively in June 2021, Total bili was 3.1, and hepatitis panel in 7/2021 was non-reactive). No previous history of pancreatitis.  ERCP was postponed due to chest pain.  MRCP from 6/16/2023 showed cholelithiasis with gallbladder sludge, no gallbladder wall thickening or significant pericholecystic fluid. Dilation of the common bile duct and central intrahepatic biliary tree with probable sludge (versus stone).  AST is 26, ALT is 173, total bilirubin is 0.6.    Other chest pain  Assessment & Plan  Patient had a brief episode of chest pain.  EKG showed sinus tachycardia.  Troponin was unremarkable (30).  Echocardiogram showed  LVEF ~ 60%, normal left and right ventricular function, normal regional wall motion. Metoprolol added.  She had no further episodes of chest pain    Choledocholithiasis  Assessment & Plan  MRCP from outside facility reported \"choledocholithiasis is suspected with mild common bile duct dilation\".  LFTs, total bilirubin, and lipase were elevated.   Ceftriaxone and Flagyl were empirically started.  GI and general surgery were consulted. Total bilirubin has normalized.  AST and ALT have improved.  Lipase was normal today.  Abdominal " pain has improved.  We will start clear liquid diet.  Ursodiol started. Will discharge home when tolerating clear liquid diet, with follow-up with outpatient GI.    Chronic obstructive pulmonary disease (HCC)- (present on admission)  Assessment & Plan  History of emphysema, on about 8.5 L/min nasal cannula oxygen at home. Continue RT per protocol, and home inhalers    Diabetic peripheral neuropathy associated with type 2 diabetes mellitus (HCC)- (present on admission)  Assessment & Plan  Continue insulin and sliding scale insulin    Acute pancreatitis without necrosis or infection, unspecified- (present on admission)  Assessment & Plan  Lipase is normal, abdominal pain has improved.  We will start clear liquid diet.    Chronic respiratory failure with hypoxia (HCC)- (present on admission)  Assessment & Plan  History of tobacco use, quit 20 years ago.  Diagnosed with emphysema.  On 8.5 L of oxygen at home. Saturating 97% on 7 L/min oxygen mask.    CHF (congestive heart failure) (HCC)- (present on admission)  Assessment & Plan  Diastolic CHF.  LVEF on 9/14/2023 showed LVEF of about 60-65%.  On IVFs for acute pancreatitis, monitor for fluid overload.    Anemia- (present on admission)  Assessment & Plan  Likely due to CKD-4. At baseline. Monitor.     Hypertension- (present on admission)  Assessment & Plan  Continue home lisinopril. Norvasc was added. Home Metoprolol resumed, dose increased.  PRN Labetalol added due to episodes of elevated blood pressure.    Atrial fibrillation (HCC)  Assessment & Plan  Continue metoprolol.  Eliquis was held on admission    Stage 4 chronic kidney disease (HCC)- (present on admission)  Assessment & Plan  Creatinine is at baseline. Avoid nephrotoxins.  Monitor         VTE prophylaxis: SCDs/TEDs

## 2023-06-17 NOTE — PROGRESS NOTES
Reached out to Dr. Carranza patient bp 160/94  and cannot give Apresoline and it was already given at less than 6 hours ago too early to administer.    Physician adding Labatolol.

## 2023-06-17 NOTE — CARE PLAN
The patient is Stable - Low risk of patient condition declining or worsening    Shift Goals  Clinical Goals: iv abx, pain control  Patient Goals: rest  Family Goals: n/a    Progress made toward(s) clinical / shift goals:      Problem: Knowledge Deficit - Standard  Goal: Patient and family/care givers will demonstrate understanding of plan of care, disease process/condition, diagnostic tests and medications  Outcome: Progressing     Problem: Fall Risk  Goal: Patient will remain free from falls  Outcome: Progressing

## 2023-06-17 NOTE — PROGRESS NOTES
Gastroenterology Progress Note               Author:  TIM Almaraz Date & Time Created: 6/17/2023 2:29 PM       Patient ID:  Name:             Siri Solis  YOB: 1945  Age:                 77 y.o.  female  MRN:               [id number]        Medical Decision Making, by Problem:  Active Hospital Problems    Diagnosis     Other chest pain [R07.89]     Choledocholithiasis [K80.50]     Atrial fibrillation (HCC) [I48.91]     Abdominal pain [R10.9]     Acute pancreatitis without necrosis or infection, unspecified [K85.90]     Elevated liver enzymes [R74.8]     Stage 4 chronic kidney disease (HCC) [N18.4]     Diabetic peripheral neuropathy associated with type 2 diabetes mellitus (HCC) [E11.42]     Chronic respiratory failure with hypoxia (HCC) [J96.11]     CHF (congestive heart failure) (HCC) [I50.9]     Chronic obstructive pulmonary disease (HCC) [J44.9]     Anemia [D64.9]     Hypertension [I10]            Presenting Chief Complaint:  Elevated LFT's, pain and pancreatitis         HISTORY OF PRESENT ILLNESS:  Siri Solis is a 77 y.o. female who complains of abdominal pain in the middle of her abdomen. This occurred after eating chinese food and she thought it was food poisoning because she started to vomiting profusely. The pain and the vomiting did not stop so she went to the hospital. She has never had this pain before. She does not have UGI symptoms. She has not had endoscopy. She had an MRCP that moderate biliary ductal dilation and a distal 5 mm filling duct consistent distant with choledocholithiasis. She has increased LFT's and a lipase of 1550. On discussion at this time she says her pain is increasing. She has DM which she states is poorly controlled. She does Insulin at home. She has FLAVIA. She has severe emphysema and she in on 8 liters of oxygen at home. She is on 8 liters at this time as well. She does not have lower GI symptoms She had a colonoscopy 5 years ago and  reports that it was normal. She does not have history of liver disease. She does not have f/c, NS or weight loss.      Interval History:  6/15/2023: Patient seen at bedside with her , Anthony.  Patient reports no changes in her abdominal pain but that prescribed pain medications provide relief.  CT abdomen yesterday showed small hiatal hernia, cholelithiasis, and atherosclerotic changes.  Total bilirubin normal, LFTs downtrending but remain elevated, lipase downtrending.    6/16/2023: patient seen at bedside with . On 8LPM mask. Patient reports epigastric pressure and left sided chest pain. Updated primary to evaluate troponin and EKG stat. Echo and repeat MRCP pending- ERCP today cancelled.     6/17/2023: Patient seen at bedside.  Reports abdominal pain significantly improved.  She remains on 7-8 LPM oxy mask.  LFTs downtrending, hyperbilirubinemia resolved and remains normal x3 days.  Lipase normal.  Cholelithiasis with gallbladder sludge but no gallbladder wall thickening or significant pericholecystic fluid was seen.  There is also dilation of the CBD with probable sludge in the distal duct (favored over obstructing stone).  Patient started on clear liquid diet and tolerating thus far.    Hospital Medications:  Current Facility-Administered Medications   Medication Dose Frequency Provider Last Rate Last Admin    metoprolol tartrate (LOPRESSOR) tablet 50 mg  50 mg TWICE DAILY Nasra Carranza M.D.        amLODIPine (NORVASC) tablet 5 mg  5 mg Q DAY Nasra Carranza M.D.   5 mg at 06/17/23 0432    lactated ringers infusion   Continuous Nasra Carranza M.D. 250 mL/hr at 06/16/23 0622 New Bag at 06/16/23 0622    oxyCODONE immediate-release (ROXICODONE) tablet 2.5 mg  2.5 mg Q3HRS PRN Nasra Carranza M.D.   2.5 mg at 06/15/23 2310    Or    oxyCODONE immediate-release (ROXICODONE) tablet 5 mg  5 mg Q3HRS PRN Nasra Carranza M.D.   5 mg at 06/17/23 0837    Or    morphine 4 MG/ML injection 2 mg  2 mg Q2HRS PRN Nasra  PARK Carranza   2 mg at 06/14/23 1623    hydrALAZINE (APRESOLINE) injection 20 mg  20 mg Q6HRS PRN Nasra Carranza M.D.   20 mg at 06/17/23 0432    albuterol inhaler 2 Puff  2 Puff BID Polo Hoang M.D.   2 Puff at 06/16/23 1715    febuxostat (ULORIC) tablet 40 mg  40 mg DAILY Polo Hoang M.D.   40 mg at 06/17/23 0433    insulin GLARGINE (Lantus,Semglee) injection  30 Units Q EVENING Polo Hoang M.D.   30 Units at 06/16/23 1716    lisinopril (PRINIVIL) tablet 10 mg  10 mg DAILY Polo Hoang M.D.   10 mg at 06/17/23 0432    omeprazole (PRILOSEC) capsule 20 mg  20 mg Q EVENING Polo Hoang M.D.   20 mg at 06/16/23 1715    ondansetron (ZOFRAN) syringe/vial injection 4 mg  4 mg Q4HRS PRN Polo Hoang M.D.   4 mg at 06/14/23 1704    insulin regular (HumuLIN R,NovoLIN R) injection  1-6 Units 4X/DAY ACHCAROLINA Hoang M.D.   1 Units at 06/17/23 0835    And    dextrose 10 % BOLUS 25 g  25 g Q15 MIN PRN Polo Hoang M.D.        cefTRIAXone (Rocephin) syringe 1,000 mg  1,000 mg Q24HRS Polo Hoang M.D.   1,000 mg at 06/17/23 0433    metroNIDAZOLE (Flagyl) IVPB 500 mg  500 mg Q12HR Polo Hoang M.D.   Stopped at 06/17/23 1102    Pharmacy Consult Request ...Pain Management Review 1 Each  1 Each PHARMACY TO DOSE Polo Hoang M.D.        tiotropium (Spiriva Respimat) 2.5 mcg/Act inhalation spray 5 mcg  5 mcg DAILY Polo Hoang M.D.   5 mcg at 06/16/23 1715   Last reviewed on 6/13/2023 10:47 PM by Bri Ortiz R.N.       Review of Systems:  Review of Systems   Constitutional:  Positive for malaise/fatigue. Negative for chills and fever.   HENT:  Negative for congestion and sore throat.    Respiratory:  Positive for shortness of breath. Negative for cough.    Cardiovascular:  Negative for chest pain and leg swelling.   Gastrointestinal:  Positive for abdominal pain (improved). Negative for blood in stool, constipation,  diarrhea, heartburn, melena, nausea and vomiting.   Genitourinary:  Negative for dysuria and flank pain.   Musculoskeletal:  Negative for falls and myalgias.   Neurological:  Positive for weakness. Negative for focal weakness and headaches.   Psychiatric/Behavioral:  Negative for substance abuse. The patient is nervous/anxious.    All other systems reviewed and are negative.        Vital signs:  Weight/BMI: Body mass index is 36.94 kg/m².  BP (!) 160/94   Pulse 100   Temp 36.2 °C (97.2 °F) (Temporal)   Resp 19   Wt 110 kg (242 lb 15.2 oz)   SpO2 92%   Vitals:    06/16/23 2047 06/17/23 0405 06/17/23 0500 06/17/23 0715   BP: (!) 154/80 (!) 159/86  (!) 160/94   Pulse:  75  100   Resp:  20  19   Temp:  36.8 °C (98.2 °F)  36.2 °C (97.2 °F)   TempSrc:  Temporal  Temporal   SpO2:  95%  92%   Weight:   110 kg (242 lb 15.2 oz)      Oxygen Therapy:  Pulse Oximetry: 92 %, O2 (LPM): 7, O2 Delivery Device: Oxymask  No intake or output data in the 24 hours ending 06/17/23 1429        Physical Exam:  Physical Exam  Vitals and nursing note reviewed.   Constitutional:       General: She is not in acute distress.     Appearance: She is morbidly obese. She is ill-appearing.   HENT:      Head: Normocephalic.      Nose: Nose normal.      Mouth/Throat:      Mouth: Mucous membranes are moist.      Pharynx: Oropharynx is clear.   Eyes:      General: No scleral icterus.     Extraocular Movements: Extraocular movements intact.      Conjunctiva/sclera: Conjunctivae normal.   Cardiovascular:      Rate and Rhythm: Tachycardia present. Rhythm irregular.      Pulses: Normal pulses.      Heart sounds: Normal heart sounds. No murmur heard.  Pulmonary:      Effort: Pulmonary effort is normal. No respiratory distress.      Breath sounds: Wheezing present.      Comments: oxymask in place  Chest:      Chest wall: No tenderness.   Abdominal:      General: Abdomen is flat. Bowel sounds are normal. There is no distension.      Palpations: Abdomen is  soft.      Tenderness: There is no abdominal tenderness. There is no guarding.      Comments: Hypoactive bowel sounds   Skin:     General: Skin is warm and dry.      Capillary Refill: Capillary refill takes less than 2 seconds.      Coloration: Skin is not jaundiced or pale.   Neurological:      General: No focal deficit present.      Mental Status: She is alert and oriented to person, place, and time. Mental status is at baseline.   Psychiatric:         Mood and Affect: Mood normal.         Behavior: Behavior normal.         Thought Content: Thought content normal.         Judgment: Judgment normal.             Labs:  Recent Labs     06/15/23  0910 06/16/23  0036 06/17/23  0237   SODIUM 140 141 141   POTASSIUM 4.3 4.2 4.0   CHLORIDE 101 101 102   CO2 25 31 27   BUN 29* 24* 22   CREATININE 1.17 1.14 1.02   CALCIUM 9.5 9.6 9.5       Recent Labs     06/14/23  1627 06/15/23  0910 06/16/23 0036 06/17/23  0237   ALTSGPT  --  318* 244* 155*   ASTSGOT  --  93* 52* 26   ALKPHOSPHAT  --  221* 202* 173*   TBILIRUBIN  --  0.5 0.6 0.6   LIPASE 1071* 469*  --  74   GLUCOSE  --  241* 154* 166*       Recent Labs     06/15/23  0910 06/16/23 0036 06/17/23  0237   WBC 11.9* 12.2* 11.0*   NEUTSPOLYS 85.00* 87.10* 80.00*   LYMPHOCYTES 5.70* 5.20* 7.80*   MONOCYTES 8.20 6.90 10.70   EOSINOPHILS 0.20 0.00 0.20   BASOPHILS 0.20 0.80 0.40   ASTSGOT 93* 52* 26   ALTSGPT 318* 244* 155*   ALKPHOSPHAT 221* 202* 173*   TBILIRUBIN 0.5 0.6 0.6       Recent Labs     06/15/23  0910 06/16/23 0036 06/17/23  0237   RBC 4.00* 3.99* 4.09*   HEMOGLOBIN 11.1* 11.1* 11.3*   HEMATOCRIT 36.7* 37.2 37.8   PLATELETCT 160* 154* 174       Recent Results (from the past 24 hour(s))   POCT glucose device results    Collection Time: 06/16/23  4:44 PM   Result Value Ref Range    POC Glucose, Blood 190 (H) 65 - 99 mg/dL   POCT glucose device results    Collection Time: 06/16/23  8:11 PM   Result Value Ref Range    POC Glucose, Blood 170 (H) 65 - 99 mg/dL   CBC WITH  DIFFERENTIAL    Collection Time: 06/17/23  2:37 AM   Result Value Ref Range    WBC 11.0 (H) 4.8 - 10.8 K/uL    RBC 4.09 (L) 4.20 - 5.40 M/uL    Hemoglobin 11.3 (L) 12.0 - 16.0 g/dL    Hematocrit 37.8 37.0 - 47.0 %    MCV 92.4 81.4 - 97.8 fL    MCH 27.6 27.0 - 33.0 pg    MCHC 29.9 (L) 32.2 - 35.5 g/dL    RDW 56.7 (H) 35.9 - 50.0 fL    Platelet Count 174 164 - 446 K/uL    MPV 11.3 9.0 - 12.9 fL    Neutrophils-Polys 80.00 (H) 44.00 - 72.00 %    Lymphocytes 7.80 (L) 22.00 - 41.00 %    Monocytes 10.70 0.00 - 13.40 %    Eosinophils 0.20 0.00 - 6.90 %    Basophils 0.40 0.00 - 1.80 %    Immature Granulocytes 0.90 0.00 - 0.90 %    Nucleated RBC 0.00 0.00 - 0.20 /100 WBC    Neutrophils (Absolute) 8.78 (H) 1.82 - 7.42 K/uL    Lymphs (Absolute) 0.86 (L) 1.00 - 4.80 K/uL    Monos (Absolute) 1.18 (H) 0.00 - 0.85 K/uL    Eos (Absolute) 0.02 0.00 - 0.51 K/uL    Baso (Absolute) 0.04 0.00 - 0.12 K/uL    Immature Granulocytes (abs) 0.10 0.00 - 0.11 K/uL    NRBC (Absolute) 0.00 K/uL   Comp Metabolic Panel    Collection Time: 06/17/23  2:37 AM   Result Value Ref Range    Sodium 141 135 - 145 mmol/L    Potassium 4.0 3.6 - 5.5 mmol/L    Chloride 102 96 - 112 mmol/L    Co2 27 20 - 33 mmol/L    Anion Gap 12.0 7.0 - 16.0    Glucose 166 (H) 65 - 99 mg/dL    Bun 22 8 - 22 mg/dL    Creatinine 1.02 0.50 - 1.40 mg/dL    Calcium 9.5 8.5 - 10.5 mg/dL    AST(SGOT) 26 12 - 45 U/L    ALT(SGPT) 155 (H) 2 - 50 U/L    Alkaline Phosphatase 173 (H) 30 - 99 U/L    Total Bilirubin 0.6 0.1 - 1.5 mg/dL    Albumin 3.5 3.2 - 4.9 g/dL    Total Protein 6.4 6.0 - 8.2 g/dL    Globulin 2.9 1.9 - 3.5 g/dL    A-G Ratio 1.2 g/dL   LIPASE    Collection Time: 06/17/23  2:37 AM   Result Value Ref Range    Lipase 74 11 - 82 U/L   CORRECTED CALCIUM    Collection Time: 06/17/23  2:37 AM   Result Value Ref Range    Correct Calcium 9.9 8.5 - 10.5 mg/dL   ESTIMATED GFR    Collection Time: 06/17/23  2:37 AM   Result Value Ref Range    GFR (CKD-EPI) 56 (A) >60 mL/min/1.73 m 2    POCT glucose device results    Collection Time: 06/17/23  7:37 AM   Result Value Ref Range    POC Glucose, Blood 168 (H) 65 - 99 mg/dL   POCT glucose device results    Collection Time: 06/17/23 11:14 AM   Result Value Ref Range    POC Glucose, Blood 200 (H) 65 - 99 mg/dL       Radiology Review:  IR-US GUIDED PIV   Final Result    Ultrasound-guided PERIPHERAL IV INSERTION performed by    qualified nursing staff as above.      [id number]   Final Result      1.  There is cholelithiasis with gallbladder sludge but no gallbladder wall thickening or significant pericholecystic fluid.   2.  There is dilatation of the common bile duct and central intrahepatic biliary tree with probable tumefactive sludge in the distal duct favored over an obstructing stone.   3.  There is a trace of peripancreatic fluid and perirenal fluid possibly related to volume status. Recommend correlation with lipase level in regards to any potential pancreatitis.   4.  There are several cystic areas seen in the pancreatic head and uncinate process, possibly side branch IPMN's.   5.  Previously noted right adrenal gland mass not well characterized on this exam.   6.  Exam somewhat limited by breathing motion artifact.      EC-ECHOCARDIOGRAM COMPLETE W/O CONT   Final Result      CT-ABDOMEN-PELVIS W/O   Final Result         1.  Small hiatal hernia   2.  Cholelithiasis   3.  Atherosclerosis and atherosclerotic coronary artery disease      DX-CHEST-LIMITED (1 VIEW)   Final Result         1.  Mild pulmonary edema and/or infiltrates.   2.  Cardiomegaly   3.  Atherosclerosis      IR-US GUIDED PIV   Final Result    Ultrasound-guided PERIPHERAL IV INSERTION performed by    qualified nursing staff as above.      DX-CHEST-LIMITED (1 VIEW)   Final Result      New minor fissure thickening could be from edema favored over pleural fluid      Stable cardiac silhouette and hilar enlargement with right hemidiaphragm elevation            MDM (Data Review):  "  -Records reviewed and summarized in current documentation  -I personally reviewed and interpreted the laboratory results  -I personally reviewed the radiology images    Assessment/Recommendations:     IMPRESSION/PLAN:  Abdominal pain - diffuse  Choledocholithiasis  Chest pain-has been intermittent per patient.    Pancreatitis  Cholecystitis  Emphysema - 8.5L liters oxygen at baseline. On 8-10L during admission  DM - poorly controlled  Try to carry on glucose copying a copy forwarded is not myD    MDM:  This is a pleasant 77-year-old female with a past medical history as listed above.  Patient will need ERCP to remove stone in CBD but she is a high risk patient due to high oxygen needs.  Notify Dr. Fisher aware of patient as well as her increased BMI and high oxygen needs.  Ct scan with no significant acute changes.  Her lipase and LFTs continues to downtrend.  Given her significant risk with anesthesia and decreasing LFTs/lipase, repeat MRCP has been urgently ordered for reevaluation of necessity of procedure/presence of stone.  Upon encounter on 6/16/2023, patient reports epigastric pressure and left-sided chest pain.  She is tachycardic, appears in distress, and on 8 LPM mask.  Primary aware and ordered EKG and troponins.  Previous echocardiogram remains pending. Per interview with patient/ and chart review, patient seen by Dr. Hansen at [facility] cardiology.  Last visit was last month.  Dr. Hansen concern with patient's intermittent chest pain and recommended cardiac stress test for which patient subsequently refused.  Upon clarification patient/ at bedside, they state patient was diagnosed with congestive heart failure several years ago but was later told she did not have it and previous charting was \"a lie\".   ERCP today canceled pending further cardiac evaluation and repeat MRCP.    6/17/2023: Patient reports feeling better compared to yesterday.  Chest pain resolved.  Echo unremarkable, EF 60%. "  Troponin mildly elevated likely due to ischemic demand.  EKG sinus tach with no signs of ischemia.  Repeat MRCP showed cholelithiasis with gallbladder sludge but no gallbladder wall thickening or significant pericholecystic fluid.  Additionally, there is dilation of the CBD and central intrahepatic biliary tree with probable sludge in the distal duct favored over an obstructing stone.  Patient's LFTs continue to downtrend, hyperbilirubinemia has been resolved x3 days.  Case was discussed with Dr. Benton who agrees patient high risk but states given patient's sludge on MRCP, would recommend ursodiol rather than proceed with ERCP.  Given patient's previous choledocholithiasis, there is a possibility that patient likely passed stone especially given her downtrend of LFTs, lipase, and improvement in abdominal pain.    Recommendations:  Ursodiol 500 mg p.o. 3 times daily.  Patient will need this for several months and will require outpatient GI for ongoing management/evaluation.  Diet per primary team.  Patient is pending laparoscopic cholecystectomy with surgical team.    No further images from acute GI team.  GI to sign off.  Please reconsult for any further questions or concerns.      Discussed with patient, Dr. Carranza, Charge RN, Dr. Luis, Dr. Benton    Core Quality Measures   Reviewed items::  Labs, Medications and Radiology reports reviewed

## 2023-06-17 NOTE — PROGRESS NOTES
Surgical Progress Note        HPI:  77-year-old female admitted with choledocholithiasis admitted to the hospital on 2023 with choledocholithiasis and gallstone pancreatitis.  She was transferred here from [facility] for ERCP and subsequent cholecystectomy    Interval Events:  Patient had ERCP scheduled yesterday, canceled due to increased chest pain.  Cardiac echo done, EF 60. Repeat Mri shows sludges and stones in CBD, GI declines to attempt ERCP for third time, recommend trending labs        ROS  Hemodynamics:  Temp (24hrs), Av.7 °C (98.1 °F), Min:36.2 °C (97.2 °F), Max:37.1 °C (98.7 °F)  Temperature: 36.2 °C (97.2 °F)  Pulse  Av.6  Min: 72  Max: 118   Blood Pressure : (!) 160/94     Respiratory:    Respiration: 19, Pulse Oximetry: 92 %        RUL Breath Sounds: Diminished, RML Breath Sounds: Diminished, RLL Breath Sounds: Diminished, ALEA Breath Sounds: Diminished, LLL Breath Sounds: Diminished  Neuro:  GCS       Fluids:    Intake/Output Summary (Last 24 hours) at 2023 1432  Last data filed at 6/15/2023 2300  Gross per 24 hour   Intake 30 ml   Output --   Net 30 ml     Weight: 110 kg (242 lb 15.2 oz)  Current Diet Order   Procedures    Diet Order Diet: Clear Liquid     Physical Exam  General: NAD, appears well  Neuro: AOx3, no focal defecits  HEENT: MMM, neck supple  Card: regular rate  Pulm: normal respiratory excursion and effort  Abdomen: Protuberant, mild tenderness in the epigastrium, no right upper quadrant pain  Ext: ROM grossly intact. 2+ distal pulses x4  Skin: pink, warm and dry        Labs:  Recent Results (from the past 24 hour(s))   EC-ECHOCARDIOGRAM COMPLETE W/O CONT    Collection Time: 23  1:52 PM   Result Value Ref Range    Eject.Frac. MOD BP 62.52     Eject.Frac. MOD 4C 68.21     Eject.Frac. MOD 2C 48.49     Left Ventrical Ejection Fraction 60    POCT glucose device results    Collection Time: 23  4:44 PM   Result Value Ref Range    POC Glucose, Blood 190  (H) 65 - 99 mg/dL   POCT glucose device results    Collection Time: 06/16/23  8:11 PM   Result Value Ref Range    POC Glucose, Blood 170 (H) 65 - 99 mg/dL   CBC WITH DIFFERENTIAL    Collection Time: 06/17/23  2:37 AM   Result Value Ref Range    WBC 11.0 (H) 4.8 - 10.8 K/uL    RBC 4.09 (L) 4.20 - 5.40 M/uL    Hemoglobin 11.3 (L) 12.0 - 16.0 g/dL    Hematocrit 37.8 37.0 - 47.0 %    MCV 92.4 81.4 - 97.8 fL    MCH 27.6 27.0 - 33.0 pg    MCHC 29.9 (L) 32.2 - 35.5 g/dL    RDW 56.7 (H) 35.9 - 50.0 fL    Platelet Count 174 164 - 446 K/uL    MPV 11.3 9.0 - 12.9 fL    Neutrophils-Polys 80.00 (H) 44.00 - 72.00 %    Lymphocytes 7.80 (L) 22.00 - 41.00 %    Monocytes 10.70 0.00 - 13.40 %    Eosinophils 0.20 0.00 - 6.90 %    Basophils 0.40 0.00 - 1.80 %    Immature Granulocytes 0.90 0.00 - 0.90 %    Nucleated RBC 0.00 0.00 - 0.20 /100 WBC    Neutrophils (Absolute) 8.78 (H) 1.82 - 7.42 K/uL    Lymphs (Absolute) 0.86 (L) 1.00 - 4.80 K/uL    Monos (Absolute) 1.18 (H) 0.00 - 0.85 K/uL    Eos (Absolute) 0.02 0.00 - 0.51 K/uL    Baso (Absolute) 0.04 0.00 - 0.12 K/uL    Immature Granulocytes (abs) 0.10 0.00 - 0.11 K/uL    NRBC (Absolute) 0.00 K/uL   Comp Metabolic Panel    Collection Time: 06/17/23  2:37 AM   Result Value Ref Range    Sodium 141 135 - 145 mmol/L    Potassium 4.0 3.6 - 5.5 mmol/L    Chloride 102 96 - 112 mmol/L    Co2 27 20 - 33 mmol/L    Anion Gap 12.0 7.0 - 16.0    Glucose 166 (H) 65 - 99 mg/dL    Bun 22 8 - 22 mg/dL    Creatinine 1.02 0.50 - 1.40 mg/dL    Calcium 9.5 8.5 - 10.5 mg/dL    AST(SGOT) 26 12 - 45 U/L    ALT(SGPT) 155 (H) 2 - 50 U/L    Alkaline Phosphatase 173 (H) 30 - 99 U/L    Total Bilirubin 0.6 0.1 - 1.5 mg/dL    Albumin 3.5 3.2 - 4.9 g/dL    Total Protein 6.4 6.0 - 8.2 g/dL    Globulin 2.9 1.9 - 3.5 g/dL    A-G Ratio 1.2 g/dL   LIPASE    Collection Time: 06/17/23  2:37 AM   Result Value Ref Range    Lipase 74 11 - 82 U/L   CORRECTED CALCIUM    Collection Time: 06/17/23  2:37 AM   Result Value Ref Range     Correct Calcium 9.9 8.5 - 10.5 mg/dL   ESTIMATED GFR    Collection Time: 06/17/23  2:37 AM   Result Value Ref Range    GFR (CKD-EPI) 56 (A) >60 mL/min/1.73 m 2   POCT glucose device results    Collection Time: 06/17/23  7:37 AM   Result Value Ref Range    POC Glucose, Blood 168 (H) 65 - 99 mg/dL   POCT glucose device results    Collection Time: 06/17/23 11:14 AM   Result Value Ref Range    POC Glucose, Blood 200 (H) 65 - 99 mg/dL     Medical Decision Making, by Problem:  Active Hospital Problems    Diagnosis     Hypertension [I10]      Priority: High    CHF (congestive heart failure) (HCC) [I50.9]      Priority: Medium    Other chest pain [R07.89]     Choledocholithiasis [K80.50]     Atrial fibrillation (HCC) [I48.91]     Abdominal pain [R10.9]     Acute pancreatitis without necrosis or infection, unspecified [K85.90]     Elevated liver enzymes [R74.8]     Stage 4 chronic kidney disease (HCC) [N18.4]      Has diabetic kidney disease.  Her renal function has declined over last few years.  Currently has bene stable.  She is followed by Nephrology.  Has many comorbidities.  She is not interested in dialysis at this time.       Diabetic peripheral neuropathy associated with type 2 diabetes mellitus (HCC) [E11.42]     Chronic respiratory failure with hypoxia (HCC) [J96.11]      Has needed Oxygen now for many years.  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   Has seen Pulmonary Medicine.  Gets short of breath with almost any activity.  Mostly is home bound now.       Chronic obstructive pulmonary disease (HCC) [J44.9]     Anemia [D64.9]      Plan:  77-year-old morbidly obese female CHF, diabetes, afib on home oxygen now with gallstone pancreatitis and choledocholithiasis, ERCP canceled last 2 consecutive days  1.  Appreciate medical care  2.  NPOpMN  3.  DVT prophylaxis okay, hold full anticoagulation  4.  Empiric IV antibiotics  5.  Daily CBC, CMP  6.  Plan for cholecystectomy tomorrow  7.  We will follow  along with you    Quality Measures:  Quality-Core Measures

## 2023-06-17 NOTE — CARE PLAN
The patient is Watcher - Medium risk of patient condition declining or worsening    Shift Goals  Clinical Goals: Will continue IV ABX's  Patient Goals: rest and comfort  Family Goals: n/a    Progress made toward(s) clinical / shift goals:      Patient is not progressing towards the following goals:      Problem: Knowledge Deficit - Standard  Goal: Patient and family/care givers will demonstrate understanding of plan of care, disease process/condition, diagnostic tests and medications  Outcome: Progressing     Problem: Pain - Standard  Goal: Alleviation of pain or a reduction in pain to the patient’s comfort goal  Outcome: Progressing     Problem: Fall Risk  Goal: Patient will remain free from falls  Outcome: Progressing

## 2023-06-18 ENCOUNTER — ANESTHESIA (OUTPATIENT)
Dept: SURGERY | Facility: MEDICAL CENTER | Age: 78
DRG: 417 | End: 2023-06-18
Payer: COMMERCIAL

## 2023-06-18 ENCOUNTER — ANESTHESIA EVENT (OUTPATIENT)
Dept: SURGERY | Facility: MEDICAL CENTER | Age: 78
DRG: 417 | End: 2023-06-18
Payer: COMMERCIAL

## 2023-06-18 LAB
ALBUMIN SERPL BCP-MCNC: 3.6 G/DL (ref 3.2–4.9)
ALBUMIN/GLOB SERPL: 1.4 G/DL
ALP SERPL-CCNC: 152 U/L (ref 30–99)
ALT SERPL-CCNC: 109 U/L (ref 2–50)
ANION GAP SERPL CALC-SCNC: 9 MMOL/L (ref 7–16)
AST SERPL-CCNC: 31 U/L (ref 12–45)
BASOPHILS # BLD AUTO: 0.3 % (ref 0–1.8)
BASOPHILS # BLD: 0.03 K/UL (ref 0–0.12)
BILIRUB SERPL-MCNC: 0.6 MG/DL (ref 0.1–1.5)
BUN SERPL-MCNC: 24 MG/DL (ref 8–22)
CALCIUM ALBUM COR SERPL-MCNC: 9.7 MG/DL (ref 8.5–10.5)
CALCIUM SERPL-MCNC: 9.4 MG/DL (ref 8.5–10.5)
CHLORIDE SERPL-SCNC: 100 MMOL/L (ref 96–112)
CO2 SERPL-SCNC: 29 MMOL/L (ref 20–33)
CREAT SERPL-MCNC: 1 MG/DL (ref 0.5–1.4)
EOSINOPHIL # BLD AUTO: 0.15 K/UL (ref 0–0.51)
EOSINOPHIL NFR BLD: 1.4 % (ref 0–6.9)
ERYTHROCYTE [DISTWIDTH] IN BLOOD BY AUTOMATED COUNT: 56.1 FL (ref 35.9–50)
GFR SERPLBLD CREATININE-BSD FMLA CKD-EPI: 57 ML/MIN/1.73 M 2
GLOBULIN SER CALC-MCNC: 2.5 G/DL (ref 1.9–3.5)
GLUCOSE BLD STRIP.AUTO-MCNC: 150 MG/DL (ref 65–99)
GLUCOSE BLD STRIP.AUTO-MCNC: 242 MG/DL (ref 65–99)
GLUCOSE BLD STRIP.AUTO-MCNC: 293 MG/DL (ref 65–99)
GLUCOSE SERPL-MCNC: 133 MG/DL (ref 65–99)
HCT VFR BLD AUTO: 36.2 % (ref 37–47)
HGB BLD-MCNC: 11 G/DL (ref 12–16)
IMM GRANULOCYTES # BLD AUTO: 0.08 K/UL (ref 0–0.11)
IMM GRANULOCYTES NFR BLD AUTO: 0.8 % (ref 0–0.9)
LYMPHOCYTES # BLD AUTO: 0.95 K/UL (ref 1–4.8)
LYMPHOCYTES NFR BLD: 9 % (ref 22–41)
MCH RBC QN AUTO: 28.1 PG (ref 27–33)
MCHC RBC AUTO-ENTMCNC: 30.4 G/DL (ref 32.2–35.5)
MCV RBC AUTO: 92.3 FL (ref 81.4–97.8)
MONOCYTES # BLD AUTO: 1.14 K/UL (ref 0–0.85)
MONOCYTES NFR BLD AUTO: 10.7 % (ref 0–13.4)
NEUTROPHILS # BLD AUTO: 8.26 K/UL (ref 1.82–7.42)
NEUTROPHILS NFR BLD: 77.8 % (ref 44–72)
NRBC # BLD AUTO: 0 K/UL
NRBC BLD-RTO: 0 /100 WBC (ref 0–0.2)
PLATELET # BLD AUTO: 181 K/UL (ref 164–446)
PMV BLD AUTO: 11.5 FL (ref 9–12.9)
POTASSIUM SERPL-SCNC: 3.8 MMOL/L (ref 3.6–5.5)
PROT SERPL-MCNC: 6.1 G/DL (ref 6–8.2)
RBC # BLD AUTO: 3.92 M/UL (ref 4.2–5.4)
SODIUM SERPL-SCNC: 138 MMOL/L (ref 135–145)
WBC # BLD AUTO: 10.6 K/UL (ref 4.8–10.8)

## 2023-06-18 PROCEDURE — 160035 HCHG PACU - 1ST 60 MINS PHASE I: Performed by: SURGERY

## 2023-06-18 PROCEDURE — A9270 NON-COVERED ITEM OR SERVICE: HCPCS | Performed by: INTERNAL MEDICINE

## 2023-06-18 PROCEDURE — 99232 SBSQ HOSP IP/OBS MODERATE 35: CPT | Performed by: INTERNAL MEDICINE

## 2023-06-18 PROCEDURE — 99100 ANES PT EXTEME AGE<1 YR&>70: CPT | Performed by: STUDENT IN AN ORGANIZED HEALTH CARE EDUCATION/TRAINING PROGRAM

## 2023-06-18 PROCEDURE — 80053 COMPREHEN METABOLIC PANEL: CPT

## 2023-06-18 PROCEDURE — 160048 HCHG OR STATISTICAL LEVEL 1-5: Performed by: SURGERY

## 2023-06-18 PROCEDURE — 700102 HCHG RX REV CODE 250 W/ 637 OVERRIDE(OP): Performed by: HOSPITALIST

## 2023-06-18 PROCEDURE — 47562 LAPAROSCOPIC CHOLECYSTECTOMY: CPT | Performed by: SURGERY

## 2023-06-18 PROCEDURE — 00790 ANES IPER UPR ABD NOS: CPT | Performed by: STUDENT IN AN ORGANIZED HEALTH CARE EDUCATION/TRAINING PROGRAM

## 2023-06-18 PROCEDURE — 700105 HCHG RX REV CODE 258: Performed by: INTERNAL MEDICINE

## 2023-06-18 PROCEDURE — 85025 COMPLETE CBC W/AUTO DIFF WBC: CPT

## 2023-06-18 PROCEDURE — 8E0W4CZ ROBOTIC ASSISTED PROCEDURE OF TRUNK REGION, PERCUTANEOUS ENDOSCOPIC APPROACH: ICD-10-PCS | Performed by: SURGERY

## 2023-06-18 PROCEDURE — 0FT44ZZ RESECTION OF GALLBLADDER, PERCUTANEOUS ENDOSCOPIC APPROACH: ICD-10-PCS | Performed by: SURGERY

## 2023-06-18 PROCEDURE — A9270 NON-COVERED ITEM OR SERVICE: HCPCS | Performed by: STUDENT IN AN ORGANIZED HEALTH CARE EDUCATION/TRAINING PROGRAM

## 2023-06-18 PROCEDURE — 160031 HCHG SURGERY MINUTES - 1ST 30 MINS LEVEL 5: Performed by: SURGERY

## 2023-06-18 PROCEDURE — 700101 HCHG RX REV CODE 250: Performed by: STUDENT IN AN ORGANIZED HEALTH CARE EDUCATION/TRAINING PROGRAM

## 2023-06-18 PROCEDURE — 88304 TISSUE EXAM BY PATHOLOGIST: CPT

## 2023-06-18 PROCEDURE — 700111 HCHG RX REV CODE 636 W/ 250 OVERRIDE (IP): Performed by: STUDENT IN AN ORGANIZED HEALTH CARE EDUCATION/TRAINING PROGRAM

## 2023-06-18 PROCEDURE — 160009 HCHG ANES TIME/MIN: Performed by: SURGERY

## 2023-06-18 PROCEDURE — 502714 HCHG ROBOTIC SURGERY SERVICES: Performed by: SURGERY

## 2023-06-18 PROCEDURE — 700111 HCHG RX REV CODE 636 W/ 250 OVERRIDE (IP): Performed by: HOSPITALIST

## 2023-06-18 PROCEDURE — 770006 HCHG ROOM/CARE - MED/SURG/GYN SEMI*

## 2023-06-18 PROCEDURE — 99232 SBSQ HOSP IP/OBS MODERATE 35: CPT | Mod: 57 | Performed by: SURGERY

## 2023-06-18 PROCEDURE — 700111 HCHG RX REV CODE 636 W/ 250 OVERRIDE (IP): Performed by: INTERNAL MEDICINE

## 2023-06-18 PROCEDURE — 36415 COLL VENOUS BLD VENIPUNCTURE: CPT

## 2023-06-18 PROCEDURE — 700101 HCHG RX REV CODE 250: Performed by: SURGERY

## 2023-06-18 PROCEDURE — 700102 HCHG RX REV CODE 250 W/ 637 OVERRIDE(OP): Performed by: STUDENT IN AN ORGANIZED HEALTH CARE EDUCATION/TRAINING PROGRAM

## 2023-06-18 PROCEDURE — 160002 HCHG RECOVERY MINUTES (STAT): Performed by: SURGERY

## 2023-06-18 PROCEDURE — 82962 GLUCOSE BLOOD TEST: CPT | Mod: 91

## 2023-06-18 PROCEDURE — 160042 HCHG SURGERY MINUTES - EA ADDL 1 MIN LEVEL 5: Performed by: SURGERY

## 2023-06-18 PROCEDURE — A9270 NON-COVERED ITEM OR SERVICE: HCPCS | Performed by: HOSPITALIST

## 2023-06-18 PROCEDURE — 700102 HCHG RX REV CODE 250 W/ 637 OVERRIDE(OP): Performed by: INTERNAL MEDICINE

## 2023-06-18 RX ORDER — DEXAMETHASONE SODIUM PHOSPHATE 4 MG/ML
INJECTION, SOLUTION INTRA-ARTICULAR; INTRALESIONAL; INTRAMUSCULAR; INTRAVENOUS; SOFT TISSUE PRN
Status: DISCONTINUED | OUTPATIENT
Start: 2023-06-18 | End: 2023-06-18 | Stop reason: SURG

## 2023-06-18 RX ORDER — LABETALOL HYDROCHLORIDE 5 MG/ML
5 INJECTION, SOLUTION INTRAVENOUS
Status: DISCONTINUED | OUTPATIENT
Start: 2023-06-18 | End: 2023-06-18 | Stop reason: HOSPADM

## 2023-06-18 RX ORDER — HALOPERIDOL 5 MG/ML
1 INJECTION INTRAMUSCULAR
Status: DISCONTINUED | OUTPATIENT
Start: 2023-06-18 | End: 2023-06-18 | Stop reason: HOSPADM

## 2023-06-18 RX ORDER — EPHEDRINE SULFATE 50 MG/ML
5 INJECTION, SOLUTION INTRAVENOUS
Status: DISCONTINUED | OUTPATIENT
Start: 2023-06-18 | End: 2023-06-18 | Stop reason: HOSPADM

## 2023-06-18 RX ORDER — OXYCODONE HCL 5 MG/5 ML
10 SOLUTION, ORAL ORAL
Status: COMPLETED | OUTPATIENT
Start: 2023-06-18 | End: 2023-06-18

## 2023-06-18 RX ORDER — HYDROMORPHONE HYDROCHLORIDE 1 MG/ML
0.1 INJECTION, SOLUTION INTRAMUSCULAR; INTRAVENOUS; SUBCUTANEOUS
Status: DISCONTINUED | OUTPATIENT
Start: 2023-06-18 | End: 2023-06-18 | Stop reason: HOSPADM

## 2023-06-18 RX ORDER — CEFAZOLIN SODIUM 1 G/3ML
INJECTION, POWDER, FOR SOLUTION INTRAMUSCULAR; INTRAVENOUS PRN
Status: DISCONTINUED | OUTPATIENT
Start: 2023-06-18 | End: 2023-06-18 | Stop reason: SURG

## 2023-06-18 RX ORDER — SODIUM CHLORIDE, SODIUM LACTATE, POTASSIUM CHLORIDE, CALCIUM CHLORIDE 600; 310; 30; 20 MG/100ML; MG/100ML; MG/100ML; MG/100ML
INJECTION, SOLUTION INTRAVENOUS CONTINUOUS
Status: DISCONTINUED | OUTPATIENT
Start: 2023-06-18 | End: 2023-06-18 | Stop reason: HOSPADM

## 2023-06-18 RX ORDER — PHENYLEPHRINE HYDROCHLORIDE 10 MG/ML
INJECTION, SOLUTION INTRAMUSCULAR; INTRAVENOUS; SUBCUTANEOUS PRN
Status: DISCONTINUED | OUTPATIENT
Start: 2023-06-18 | End: 2023-06-18 | Stop reason: SURG

## 2023-06-18 RX ORDER — LIDOCAINE HYDROCHLORIDE 20 MG/ML
INJECTION, SOLUTION EPIDURAL; INFILTRATION; INTRACAUDAL; PERINEURAL PRN
Status: DISCONTINUED | OUTPATIENT
Start: 2023-06-18 | End: 2023-06-18 | Stop reason: SURG

## 2023-06-18 RX ORDER — AMLODIPINE BESYLATE 5 MG/1
5 TABLET ORAL ONCE
Status: DISCONTINUED | OUTPATIENT
Start: 2023-06-18 | End: 2023-06-18

## 2023-06-18 RX ORDER — AMLODIPINE BESYLATE 10 MG/1
10 TABLET ORAL
Status: DISCONTINUED | OUTPATIENT
Start: 2023-06-19 | End: 2023-06-28 | Stop reason: HOSPADM

## 2023-06-18 RX ORDER — HYDROMORPHONE HYDROCHLORIDE 1 MG/ML
0.4 INJECTION, SOLUTION INTRAMUSCULAR; INTRAVENOUS; SUBCUTANEOUS
Status: DISCONTINUED | OUTPATIENT
Start: 2023-06-18 | End: 2023-06-18 | Stop reason: HOSPADM

## 2023-06-18 RX ORDER — ONDANSETRON 2 MG/ML
4 INJECTION INTRAMUSCULAR; INTRAVENOUS
Status: DISCONTINUED | OUTPATIENT
Start: 2023-06-18 | End: 2023-06-18 | Stop reason: HOSPADM

## 2023-06-18 RX ORDER — OXYCODONE HCL 5 MG/5 ML
5 SOLUTION, ORAL ORAL
Status: COMPLETED | OUTPATIENT
Start: 2023-06-18 | End: 2023-06-18

## 2023-06-18 RX ORDER — DIPHENHYDRAMINE HYDROCHLORIDE 50 MG/ML
12.5 INJECTION INTRAMUSCULAR; INTRAVENOUS
Status: DISCONTINUED | OUTPATIENT
Start: 2023-06-18 | End: 2023-06-18 | Stop reason: HOSPADM

## 2023-06-18 RX ORDER — BUPIVACAINE HYDROCHLORIDE AND EPINEPHRINE 5; 5 MG/ML; UG/ML
INJECTION, SOLUTION EPIDURAL; INTRACAUDAL; PERINEURAL
Status: DISCONTINUED | OUTPATIENT
Start: 2023-06-18 | End: 2023-06-18 | Stop reason: HOSPADM

## 2023-06-18 RX ORDER — HYDRALAZINE HYDROCHLORIDE 20 MG/ML
5 INJECTION INTRAMUSCULAR; INTRAVENOUS
Status: DISCONTINUED | OUTPATIENT
Start: 2023-06-18 | End: 2023-06-18 | Stop reason: HOSPADM

## 2023-06-18 RX ORDER — HYDROMORPHONE HYDROCHLORIDE 1 MG/ML
0.2 INJECTION, SOLUTION INTRAMUSCULAR; INTRAVENOUS; SUBCUTANEOUS
Status: DISCONTINUED | OUTPATIENT
Start: 2023-06-18 | End: 2023-06-18 | Stop reason: HOSPADM

## 2023-06-18 RX ORDER — ROCURONIUM BROMIDE 10 MG/ML
INJECTION, SOLUTION INTRAVENOUS PRN
Status: DISCONTINUED | OUTPATIENT
Start: 2023-06-18 | End: 2023-06-18 | Stop reason: SURG

## 2023-06-18 RX ADMIN — PHENYLEPHRINE HYDROCHLORIDE 200 MCG: 10 INJECTION INTRAVENOUS at 10:51

## 2023-06-18 RX ADMIN — ALBUTEROL SULFATE 2 PUFF: 90 AEROSOL, METERED RESPIRATORY (INHALATION) at 05:10

## 2023-06-18 RX ADMIN — ROCURONIUM BROMIDE 70 MG: 50 INJECTION, SOLUTION INTRAVENOUS at 10:43

## 2023-06-18 RX ADMIN — TIOTROPIUM BROMIDE INHALATION SPRAY 5 MCG: 3.12 SPRAY, METERED RESPIRATORY (INHALATION) at 17:42

## 2023-06-18 RX ADMIN — LABETALOL HYDROCHLORIDE 5 MG: 5 INJECTION INTRAVENOUS at 12:36

## 2023-06-18 RX ADMIN — INSULIN HUMAN 2 UNITS: 100 INJECTION, SOLUTION PARENTERAL at 17:33

## 2023-06-18 RX ADMIN — PROPOFOL 200 MG: 10 INJECTION, EMULSION INTRAVENOUS at 10:43

## 2023-06-18 RX ADMIN — SODIUM CHLORIDE, POTASSIUM CHLORIDE, SODIUM LACTATE AND CALCIUM CHLORIDE: 600; 310; 30; 20 INJECTION, SOLUTION INTRAVENOUS at 06:31

## 2023-06-18 RX ADMIN — METOPROLOL TARTRATE 50 MG: 50 TABLET, FILM COATED ORAL at 05:07

## 2023-06-18 RX ADMIN — SODIUM CHLORIDE, POTASSIUM CHLORIDE, SODIUM LACTATE AND CALCIUM CHLORIDE: 600; 310; 30; 20 INJECTION, SOLUTION INTRAVENOUS at 21:16

## 2023-06-18 RX ADMIN — AMLODIPINE BESYLATE 5 MG: 5 TABLET ORAL at 05:07

## 2023-06-18 RX ADMIN — LIDOCAINE HYDROCHLORIDE 100 MG: 20 INJECTION, SOLUTION EPIDURAL; INFILTRATION; INTRACAUDAL at 10:43

## 2023-06-18 RX ADMIN — ALBUTEROL SULFATE 2 PUFF: 90 AEROSOL, METERED RESPIRATORY (INHALATION) at 17:42

## 2023-06-18 RX ADMIN — OXYCODONE HYDROCHLORIDE 5 MG: 5 TABLET ORAL at 15:47

## 2023-06-18 RX ADMIN — HYDRALAZINE HYDROCHLORIDE 20 MG: 20 INJECTION INTRAMUSCULAR; INTRAVENOUS at 23:07

## 2023-06-18 RX ADMIN — INSULIN HUMAN 3 UNITS: 100 INJECTION, SOLUTION PARENTERAL at 21:24

## 2023-06-18 RX ADMIN — FENTANYL CITRATE 50 MCG: 50 INJECTION, SOLUTION INTRAMUSCULAR; INTRAVENOUS at 12:19

## 2023-06-18 RX ADMIN — FEBUXOSTAT 40 MG: 40 TABLET, FILM COATED ORAL at 05:08

## 2023-06-18 RX ADMIN — ALBUTEROL SULFATE 2.5 MG: 2.5 SOLUTION RESPIRATORY (INHALATION) at 12:19

## 2023-06-18 RX ADMIN — CEFAZOLIN 2 G: 1 INJECTION, POWDER, FOR SOLUTION INTRAMUSCULAR; INTRAVENOUS at 10:45

## 2023-06-18 RX ADMIN — FENTANYL CITRATE 50 MCG: 50 INJECTION, SOLUTION INTRAMUSCULAR; INTRAVENOUS at 12:28

## 2023-06-18 RX ADMIN — LISINOPRIL 10 MG: 10 TABLET ORAL at 05:07

## 2023-06-18 RX ADMIN — INSULIN GLARGINE-YFGN 30 UNITS: 100 INJECTION, SOLUTION SUBCUTANEOUS at 17:31

## 2023-06-18 RX ADMIN — FENTANYL CITRATE 50 MCG: 50 INJECTION, SOLUTION INTRAMUSCULAR; INTRAVENOUS at 10:36

## 2023-06-18 RX ADMIN — METOPROLOL TARTRATE 50 MG: 50 TABLET, FILM COATED ORAL at 17:39

## 2023-06-18 RX ADMIN — CEFTRIAXONE SODIUM 1000 MG: 10 INJECTION, POWDER, FOR SOLUTION INTRAVENOUS at 05:08

## 2023-06-18 RX ADMIN — MORPHINE SULFATE 2 MG: 4 INJECTION, SOLUTION INTRAMUSCULAR; INTRAVENOUS at 22:41

## 2023-06-18 RX ADMIN — OMEPRAZOLE 20 MG: 20 CAPSULE, DELAYED RELEASE ORAL at 17:41

## 2023-06-18 RX ADMIN — DEXAMETHASONE SODIUM PHOSPHATE 4 MG: 4 INJECTION INTRA-ARTICULAR; INTRALESIONAL; INTRAMUSCULAR; INTRAVENOUS; SOFT TISSUE at 10:50

## 2023-06-18 RX ADMIN — PHENYLEPHRINE HYDROCHLORIDE 100 MCG: 10 INJECTION INTRAVENOUS at 10:43

## 2023-06-18 RX ADMIN — HYDRALAZINE HYDROCHLORIDE 20 MG: 20 INJECTION INTRAMUSCULAR; INTRAVENOUS at 08:26

## 2023-06-18 RX ADMIN — OXYCODONE HYDROCHLORIDE 5 MG: 5 TABLET ORAL at 21:13

## 2023-06-18 RX ADMIN — HYDRALAZINE HYDROCHLORIDE 20 MG: 20 INJECTION INTRAMUSCULAR; INTRAVENOUS at 17:09

## 2023-06-18 RX ADMIN — HYDROMORPHONE HYDROCHLORIDE 0.4 MG: 1 INJECTION, SOLUTION INTRAMUSCULAR; INTRAVENOUS; SUBCUTANEOUS at 12:38

## 2023-06-18 RX ADMIN — OXYCODONE HYDROCHLORIDE 10 MG: 5 SOLUTION ORAL at 12:19

## 2023-06-18 ASSESSMENT — ENCOUNTER SYMPTOMS
CARDIOVASCULAR NEGATIVE: 1
NEUROLOGICAL NEGATIVE: 1
EYES NEGATIVE: 1
ABDOMINAL PAIN: 1
RESPIRATORY NEGATIVE: 1
PSYCHIATRIC NEGATIVE: 1
MUSCULOSKELETAL NEGATIVE: 1
NAUSEA: 1

## 2023-06-18 ASSESSMENT — PAIN DESCRIPTION - PAIN TYPE
TYPE: ACUTE PAIN
TYPE: ACUTE PAIN;SURGICAL PAIN
TYPE: ACUTE PAIN
TYPE: ACUTE PAIN;SURGICAL PAIN
TYPE: ACUTE PAIN
TYPE: ACUTE PAIN;SURGICAL PAIN
TYPE: ACUTE PAIN
TYPE: ACUTE PAIN;SURGICAL PAIN
TYPE: ACUTE PAIN
TYPE: ACUTE PAIN

## 2023-06-18 ASSESSMENT — FIBROSIS 4 INDEX: FIB4 SCORE: 1.3

## 2023-06-18 NOTE — PROGRESS NOTES
Pt arrived to PACU stable. Pt remains A&Ox4, VSS, incisions are CDI, no belongings in PACU.  called and updated on pt status and plan of care. Pt currently resting in bed in no acute distress.  Pt being transported to room. O2 tank is 3/4 full. Pt on 10 L via oxymask during transport.

## 2023-06-18 NOTE — PROGRESS NOTES
"Hospital Medicine Daily Progress Note    Date of Service  6/18/2023    Chief Complaint  Siri Solis is a 77 y.o. female transferred from an outside hospital on 6/13/2023 where she presented with nausea and vomiting which started after eating Chinese food the day before.  She was noted to have lipase of greater than 4000, with elevated LFTs.  She had a fever of 101.3, leukocytosis of 13 K.      Abdominal ultrasound showed several calcified gallstones, no wall thickening, CBD was normal. MRCP reported \"Choledocholithiasis is suspected with mild common bile duct dilation\".    She has a history of CKD-4, emphysema with chronic respiratory failure (on 8.5 L/min nasal cannula oxygen at home), diastolic CHF, atrial fibrillation (on Eliquis),     Hospital Course  On admission, creatinine was 2.45 AST was 853, ALT was 650, total bilirubin was 2.5, alk phos was 264.  Saturating 95% on 10 L/min oxygen mask.  Chest x-ray from 6/13/2023 showed possible edema versus pleural effusion. She was afebrile and hemodynamically stable.    Ceftriaxone and Flagyl were empirically started. GI and General Surgery have been consulted.     Chest x-ray from 6/14/2023 shows mild pulmonary edema and/or infiltrates.Creatinine improved to 1.17. Dose of IV Lasix ordered. CT abdomen and pelvis showed cholelithiasis.    Echocardiogram showed LVEF ~ 60%, normal left and right ventricular function, normal regional wall motion. Metoprolol added.    Lipase improved to 74 on 6/17/2023.  Blood pressure and heart rate were elevated. Metoprolol was increased.    MRCP from 6/16/2023 showed cholelithiasis with gallbladder sludge, no gallbladder wall thickening or significant pericholecystic fluid. Dilation of the common bile duct and central intrahepatic biliary tree with probable sludge (versus stone)    Interval Problem Update  Afebrile, blood pressure continues to be high despite increasing metoprolol.  Heart rate is stable. Norvasc has been " increased    WBCs are 10.6 K,  AST is 31, ALT is 109, Total bili is 0.6. Lipase improved to 74.     Afebrile and hemodynamically stable. Saturating 95 % on 7 L/min OxyMask.  She has been tolerating clear liquid diet.  Scheduled for cholecystectomy today.    The plan of care has been clearly explained with the best of our knowledge to the patient and her  on a daily basis by myself as well as GI and surgical team. The patient's  has been requesting more precise information (e.g. exactly time of surgery  etc.) which we are unable to provide.    I have discussed this patient's plan of care and discharge plan at IDT rounds today with Case Management, Nursing, Nursing leadership, and other members of the IDT team.    Consultants/Specialty  general surgery and GI    Code Status  DNAR/DNI    Disposition  Medically Cleared  I have placed the appropriate orders for post-discharge needs.    Review of Systems  Review of Systems   Constitutional:  Positive for malaise/fatigue.   HENT: Negative.     Eyes: Negative.    Respiratory: Negative.     Cardiovascular: Negative.    Gastrointestinal:  Positive for abdominal pain and nausea.   Genitourinary: Negative.    Musculoskeletal: Negative.    Skin: Negative.    Neurological: Negative.    Endo/Heme/Allergies: Negative.    Psychiatric/Behavioral: Negative.          Physical Exam  Temp:  [36.3 °C (97.4 °F)-37.2 °C (98.9 °F)] 36.5 °C (97.7 °F)  Pulse:  [] 61  Resp:  [19-20] 19  BP: (110-179)/(76-88) 179/88  SpO2:  [90 %-95 %] 95 %    Physical Exam  Constitutional:       Appearance: She is obese. She is ill-appearing.   HENT:      Head: Normocephalic.      Nose: Nose normal.      Mouth/Throat:      Mouth: Mucous membranes are moist.   Eyes:      Pupils: Pupils are equal, round, and reactive to light.   Cardiovascular:      Rate and Rhythm: Normal rate.   Pulmonary:      Effort: Pulmonary effort is normal.   Abdominal:      Tenderness: There is abdominal tenderness.  There is guarding.   Musculoskeletal:      Cervical back: Normal range of motion.      Right lower leg: No edema.      Left lower leg: No edema.   Skin:     General: Skin is warm.   Neurological:      General: No focal deficit present.   Psychiatric:         Mood and Affect: Mood normal.         Fluids  No intake or output data in the 24 hours ending 06/18/23 0927      Laboratory  Recent Labs     06/16/23 0036 06/17/23 0237 06/18/23 0033   WBC 12.2* 11.0* 10.6   RBC 3.99* 4.09* 3.92*   HEMOGLOBIN 11.1* 11.3* 11.0*   HEMATOCRIT 37.2 37.8 36.2*   MCV 93.2 92.4 92.3   MCH 27.8 27.6 28.1   MCHC 29.8* 29.9* 30.4*   RDW 57.4* 56.7* 56.1*   PLATELETCT 154* 174 181   MPV 11.5 11.3 11.5     Recent Labs     06/16/23 0036 06/17/23 0237 06/18/23 0033   SODIUM 141 141 138   POTASSIUM 4.2 4.0 3.8   CHLORIDE 101 102 100   CO2 31 27 29   GLUCOSE 154* 166* 133*   BUN 24* 22 24*   CREATININE 1.14 1.02 1.00   CALCIUM 9.6 9.5 9.4                   Imaging  IR-US GUIDED PIV   Final Result    Ultrasound-guided PERIPHERAL IV INSERTION performed by    qualified nursing staff as above.      [id number]   Final Result      1.  There is cholelithiasis with gallbladder sludge but no gallbladder wall thickening or significant pericholecystic fluid.   2.  There is dilatation of the common bile duct and central intrahepatic biliary tree with probable tumefactive sludge in the distal duct favored over an obstructing stone.   3.  There is a trace of peripancreatic fluid and perirenal fluid possibly related to volume status. Recommend correlation with lipase level in regards to any potential pancreatitis.   4.  There are several cystic areas seen in the pancreatic head and uncinate process, possibly side branch IPMN's.   5.  Previously noted right adrenal gland mass not well characterized on this exam.   6.  Exam somewhat limited by breathing motion artifact.      EC-ECHOCARDIOGRAM COMPLETE W/O CONT   Final Result      CT-ABDOMEN-PELVIS W/O  "  Final Result         1.  Small hiatal hernia   2.  Cholelithiasis   3.  Atherosclerosis and atherosclerotic coronary artery disease      DX-CHEST-LIMITED (1 VIEW)   Final Result         1.  Mild pulmonary edema and/or infiltrates.   2.  Cardiomegaly   3.  Atherosclerosis      IR-US GUIDED PIV   Final Result    Ultrasound-guided PERIPHERAL IV INSERTION performed by    qualified nursing staff as above.      DX-CHEST-LIMITED (1 VIEW)   Final Result      New minor fissure thickening could be from edema favored over pleural fluid      Stable cardiac silhouette and hilar enlargement with right hemidiaphragm elevation           Assessment/Plan  * Abdominal pain- (present on admission)  Assessment & Plan  Likely secondary to gallstone pancreatitis. Pain and LFTs improved. Planned for cholecystectomy today. NPO.  IV fluids.    Elevated liver enzymes- (present on admission)  Assessment & Plan  GI consulted. (Patient has a history of elevated LFTs. AST and ALT have been elevated in the past few years, were 4020 and 1565 respectively in June 2021, Total bili was 3.1, and hepatitis panel in 7/2021 was non-reactive). No previous history of pancreatitis.  ERCP was postponed due to chest pain.  MRCP from 6/16/2023 showed cholelithiasis with gallbladder sludge, no gallbladder wall thickening or significant pericholecystic fluid. Dilation of the common bile duct and central intrahepatic biliary tree with probable sludge (versus stone).  Planned for cholecystectomy today    Other chest pain  Assessment & Plan  Patient had a brief episode of chest pain.  EKG showed sinus tachycardia.  Troponin was unremarkable (30).  Echocardiogram showed  LVEF ~ 60%, normal left and right ventricular function, normal regional wall motion. Metoprolol added.  She had no further episodes of chest pain    Choledocholithiasis  Assessment & Plan  MRCP from outside facility reported \"choledocholithiasis is suspected with mild common bile duct dilation\".  " LFTs, total bilirubin, and lipase were elevated.   Ceftriaxone and Flagyl were empirically started.  GI and general surgery were consulted. Total bilirubin has normalized.  AST and ALT have improved.  Lipase was normal on 6/17/2023.  Abdominal pain has improved.  Tolerating clear liquid diet.  Ursodiol was started. Planned for cholecystectomy today    Chronic obstructive pulmonary disease (HCC)- (present on admission)  Assessment & Plan  History of emphysema, on about 8.5 L/min nasal cannula oxygen at home. Continue RT per protocol, and home inhalers    Diabetic peripheral neuropathy associated with type 2 diabetes mellitus (HCC)- (present on admission)  Assessment & Plan  Continue insulin and sliding scale insulin    Acute pancreatitis without necrosis or infection, unspecified- (present on admission)  Assessment & Plan  Lipase is normal, abdominal pain has improved. Tolerating CLD. Planned for cholecystectomy today.     Chronic respiratory failure with hypoxia (HCC)- (present on admission)  Assessment & Plan  History of tobacco use, quit 20 years ago.  Diagnosed with emphysema.  On 8.5 L of oxygen at home. Saturating 95% on 7 L/min oxygen mask.    CHF (congestive heart failure) (HCC)- (present on admission)  Assessment & Plan  Diastolic CHF.  LVEF on 9/14/2023 showed LVEF of about 60-65%.  On IVFs for acute pancreatitis, monitor for fluid overload.    Anemia- (present on admission)  Assessment & Plan  Likely due to CKD-4. At baseline. Monitor.     Hypertension- (present on admission)  Assessment & Plan  Continue home lisinopril. Norvasc was added. Home Metoprolol resumed, dose increased.  PRN Labetalol added due to episodes of elevated blood pressure.    Atrial fibrillation (HCC)  Assessment & Plan  Continue metoprolol.  Eliquis was held on admission    Stage 4 chronic kidney disease (HCC)- (present on admission)  Assessment & Plan  Creatinine is at baseline. Avoid nephrotoxins.  Monitor         VTE prophylaxis:  SCDs/TEDs

## 2023-06-18 NOTE — ANESTHESIA PREPROCEDURE EVALUATION
Case: [id number] Date/Time: 06/18/23 0950    Procedure: CHOLECYSTECTOMY, ROBOT-ASSISTED, USING DA QUINN XI    Anesthesia type: General    Pre-op diagnosis: Choledocholithiasis    Location: [facility] OR 11 / SURGERY [facility]    Surgeons: Espinoza Ricks M.D.          Relevant Problems   PULMONARY   (positive) COPD (chronic obstructive pulmonary disease) (CMS-HCC)   (positive) Chronic obstructive pulmonary disease (HCC)   (positive) Dyspnea      CARDIAC   (positive) Atrial fibrillation (HCC)   (positive) CHF (congestive heart failure) (HCC)   (positive) Epistaxis, recurrent   (positive) Essential hypertension   (positive) Hypertension   (positive) Pulmonary hypertension (HCC)      GI   (positive) Gastroesophageal reflux disease         (positive) Acute kidney injury (HCC)   (positive) Acute renal failure (HCC)   (positive) Diabetic nephropathy (HCC)   (positive) Renal insufficiency   (positive) Renal insufficiency syndrome   (positive) Stage 4 chronic kidney disease (HCC)   (positive) Viral hepatitis A      ENDO   (positive) Type 2 diabetes mellitus with hyperglycemia, with long-term current use of insulin (HCC)   (positive) Type 2 diabetes mellitus without complications (HCC)       Physical Exam    Airway   Mallampati: III  TM distance: >3 FB  Neck ROM: full       Cardiovascular - normal exam  Rhythm: regular  Rate: normal  (-) murmur     Dental - normal exam           Pulmonary - normal exam  (+) decreased breath sounds, wheezes     Abdominal   (+) obese     Neurological - normal exam                 Anesthesia Plan    ASA 4   ASA physical status 4 criteria: other (comment)    Plan - general       Airway plan will be ETT    (8l Home O2 dependence, CHF)      Induction: intravenous    Postoperative Plan: Postoperative administration of opioids is intended.    Pertinent diagnostic labs and testing reviewed    Informed Consent:    Anesthetic plan and risks discussed with patient.    Use of blood products discussed  with: patient whom consented to blood products.

## 2023-06-18 NOTE — ANESTHESIA TIME REPORT
Anesthesia Start and Stop Event Times     Date Time Event    6/18/2023 1036 Anesthesia Start     1215 Anesthesia Stop        Responsible Staff  06/18/23    Name Role Begin End    Gus Del Toro D.O. Anesth 1036 1215        Overtime Reason:  no overtime (within assigned shift)    Comments:

## 2023-06-18 NOTE — OP REPORT
OP Note     PreOp Diagnosis:  1.  Biliary pancreatitis  2.  Choledocholithiasis  3.  Calculus of the gallbladder with chronic cholecystitis     PostOp Diagnosis:  1.  Biliary pancreatitis  2.  Choledocholithiasis  3.  Calculus of the gallbladder with chronic cholecystitis     Procedure(s):  1.  Robotic assisted laparoscopic cholecystectomy     Wound Class: Contaminated     Surgeon(s):  Espinoza Ricsk M.D.     Assistant:  None     Anesthesiologist/Type of Anesthesia:  Anesthesiologist: Gus Del Toro D.O./General     Surgical Staff:  Circulator: Elin Lino R.N.  Scrub Person: Rod Gonzales     Specimens removed if any:  ID Type Source Tests Collected by Time Destination   A : gallbladder Tissue Gallbladder PATHOLOGY SPECIMEN Espinoza Ricks M.D. 6/18/2023 11:54 AM           Estimated Blood Loss: 40 cc     Findings:   1.  Findings consistent with chronic inflammation in the pericholecystic tissues  2.  Significant intra-abdominal adhesions from prior laparotomy     Complications: None observed           Description:    The patient was met in the preoperative holding area where all of the potential risks and benefits of the procedure were discussed in detail with the patient. All of her questions were answered to her satisfaction and informed consent was signed. The patient was taken back to the operating room and placed supine on the operating room table. General endotracheal anesthesia was induced and all of the patients pressure points were padded appropriately. The patients abdomen was prepped and draped in the usual sterile fashion.  A timeout was performed which correctly identified the patient and the procedure being performed and preoperative antibiotics were given according to SCIP guidelines.      The operation was begun with infiltration of 0.5% marcaine with epinephrine into the left upper quadrant skin. A small incision was created and the verress needle placed through  the abdominal wall and into the abdominal cavity. Insufflation was carried out to 15mmHg and an 8mm robotic trochar  was introduced through this incision and into the abdominal cavity. A brief survey of the abdominal cavity demonstrated essentially no visibility as the entire greater omentum was adhesed against the anterior abdominal wall and prior laparotomy incision.  I was able to sneak the camera up to the right lateral abdominal wall and see that this was free of any adhesions.  3 robotic trocars were then placed in the right midabdomen.  I then was able to look into the left upper quadrant and find an area that was free of any adhesions and placed 1/5 port in this area.  The original port that was placed to the left of the umbilicus was then removed.  The gallbladder was located in its right upper quadrant position and retracted superiorly with the grasping retractor.  The contents of the hepatocystic triangle were dissected clearly free. Once the critical view of safety was obtained, the cystic duct was triply clipped on the common duct side and singly clipped on the gallbladder side and then transected. The cystic artery was dealt in a similar fashion. Electrocautery was used to remove the gallbladder from its attachments to the liver bed.      The right upper quadrant was copiously irrigated until clear. The clips were in place without biliary spillage at the end of the case. Hemostasis was meticulously achieved.  I then retrieved the specimen via an Endo Catch device. Monocryl was used to reapproximate skin edges. Dermabond was applied as a dressing.      The patient tolerated the procedure well and was extubated at the conclusion of the case without incident. All of the sponge, needle and instrument counts were correct at the conclusion of the case. After she was awoken from anesthesia she was taken to the recovery room in stable condition.

## 2023-06-18 NOTE — ANESTHESIA PROCEDURE NOTES
Airway    Date/Time: 6/18/2023 10:45 AM    Performed by: Gus Del Toro D.O.  Authorized by: Gus Del Toro D.O.    Location:  OR  Urgency:  Elective  Difficult Airway: No    Indications for Airway Management:  Anesthesia      Spontaneous Ventilation: absent    Sedation Level:  Deep  Preoxygenated: Yes    Patient Position:  Sniffing  Final Airway Type:  Endotracheal airway  Final Endotracheal Airway:  ETT  Cuffed: Yes    Technique Used for Successful ETT Placement:  Direct laryngoscopy    Insertion Site:  Oral  Blade Type:  Roland  Laryngoscope Blade/Videolaryngoscope Blade Size:  3  ETT Size (mm):  7.0  Measured from:  Teeth  ETT to Teeth (cm):  21  Placement Verified by: auscultation and capnometry    Cormack-Lehane Classification:  Grade I - full view of glottis  Number of Attempts at Approach:  1

## 2023-06-18 NOTE — CARE PLAN
The patient is Stable - Low risk of patient condition declining or worsening    Shift Goals  Clinical Goals: Surgery  Patient Goals: Surgery  Family Goals: NA    Progress made toward(s) clinical / shift goals:  Patient having surgery today.    Patient is not progressing towards the following goals:

## 2023-06-18 NOTE — CARE PLAN
The patient is Stable - Low risk of patient condition declining or worsening    Shift Goals  Clinical Goals: IV antibiotics, safety  Patient Goals: rest, comfort  Family Goals: NA    Progress made toward(s) clinical / shift goals:  Patient was eager to use the RR rather to se a commode chair or the Pure wick. After her trip she was exhausted, she needed a long time to catch up her O2 sat. Stated that she has not had been feed properly in the hospital due to procedures. Last BM was last Tuesday June 13    Patient is not progressing towards the following goals:  Problem: Knowledge Deficit - Standard  Goal: Patient and family/care givers will demonstrate understanding of plan of care, disease process/condition, diagnostic tests and medications  Description: Target End Date:  1-3 days or as soon as patient condition allows    Document in Patient Education    1.  Patient and family/caregiver oriented to unit, equipment, visitation policy and means for communicating concern  2.  Complete/review Learning Assessment  3.  Assess knowledge level of disease process/condition, treatment plan, diagnostic tests and medications  4.  Explain disease process/condition, treatment plan, diagnostic tests and medications  Outcome: Progressing     Problem: Pain - Standard  Goal: Alleviation of pain or a reduction in pain to the patient’s comfort goal  Description: Target End Date:  Prior to discharge or change in level of care    Document on Vitals flowsheet    1.  Document pain using the appropriate pain scale per order or unit policy  2.  Educate and implement non-pharmacologic comfort measures (i.e. relaxation, distraction, massage, cold/heat therapy, etc.)  3.  Pain management medications as ordered  4.  Reassess pain after pain med administration per policy  5.  If opiods administered assess patient's response to pain medication is appropriate per POSS sedation scale  6.  Follow pain management plan developed in collaboration with  patient and interdisciplinary team (including palliative care or pain specialists if applicable)  Outcome: Progressing     Problem: Fall Risk  Goal: Patient will remain free from falls  Description: Target End Date:  Prior to discharge or change in level of care    Document interventions on the Cheli Guillaume Fall Risk Assessment    1.  Assess for fall risk factors  2.  Implement fall precautions  Outcome: Progressing

## 2023-06-18 NOTE — PROGRESS NOTES
Surgical Progress Note        HPI:  77-year-old female admitted with choledocholithiasis admitted to the hospital on 2023 with choledocholithiasis and gallstone pancreatitis.  She was transferred here from [facility] for ERCP and subsequent cholecystectomy    Interval Events:  Patient had ERCP scheduled yesterday, canceled due to increased chest pain.  Cardiac echo done, EF 60. Repeat Mri shows sludges and stones in CBD, GI declines to attempt ERCP for third time, recommend trending labs. Pain unchanged, still on oxygen        ROS  Hemodynamics:  Temp (24hrs), Av.6 °C (97.8 °F), Min:36.1 °C (97 °F), Max:37.2 °C (98.9 °F)  Temperature: 36.1 °C (97 °F)  Pulse  Av.3  Min: 61  Max: 118   Blood Pressure : (!) 181/94     Respiratory:    Respiration: 19, Pulse Oximetry: 94 %        RUL Breath Sounds: Diminished, RML Breath Sounds: Diminished, RLL Breath Sounds: Diminished, ALEA Breath Sounds: Diminished, LLL Breath Sounds: Diminished  Neuro:  GCS       Fluids:    Intake/Output Summary (Last 24 hours) at 2023 1432  Last data filed at 6/15/2023 2300  Gross per 24 hour   Intake 30 ml   Output --   Net 30 ml     Weight: 112 kg (246 lb 14.6 oz)  Current Diet Order   Procedures    Diet Order Diet: Clear Liquid     Physical Exam  General: NAD, appears well  Neuro: AOx3, no focal defecits  HEENT: MMM, neck supple  Card: regular rate  Pulm: normal respiratory excursion and effort  Abdomen: Protuberant, mild tenderness in the epigastrium, no right upper quadrant pain  Ext: ROM grossly intact. 2+ distal pulses x4  Skin: pink, warm and dry        Labs:  Recent Results (from the past 24 hour(s))   POCT glucose device results    Collection Time: 23 11:14 AM   Result Value Ref Range    POC Glucose, Blood 200 (H) 65 - 99 mg/dL   POCT glucose device results    Collection Time: 23  4:47 PM   Result Value Ref Range    POC Glucose, Blood 204 (H) 65 - 99 mg/dL   POCT glucose device results    Collection  Time: 06/17/23  8:19 PM   Result Value Ref Range    POC Glucose, Blood 223 (H) 65 - 99 mg/dL   Comp Metabolic Panel    Collection Time: 06/18/23 12:33 AM   Result Value Ref Range    Sodium 138 135 - 145 mmol/L    Potassium 3.8 3.6 - 5.5 mmol/L    Chloride 100 96 - 112 mmol/L    Co2 29 20 - 33 mmol/L    Anion Gap 9.0 7.0 - 16.0    Glucose 133 (H) 65 - 99 mg/dL    Bun 24 (H) 8 - 22 mg/dL    Creatinine 1.00 0.50 - 1.40 mg/dL    Calcium 9.4 8.5 - 10.5 mg/dL    AST(SGOT) 31 12 - 45 U/L    ALT(SGPT) 109 (H) 2 - 50 U/L    Alkaline Phosphatase 152 (H) 30 - 99 U/L    Total Bilirubin 0.6 0.1 - 1.5 mg/dL    Albumin 3.6 3.2 - 4.9 g/dL    Total Protein 6.1 6.0 - 8.2 g/dL    Globulin 2.5 1.9 - 3.5 g/dL    A-G Ratio 1.4 g/dL   CBC WITH DIFFERENTIAL    Collection Time: 06/18/23 12:33 AM   Result Value Ref Range    WBC 10.6 4.8 - 10.8 K/uL    RBC 3.92 (L) 4.20 - 5.40 M/uL    Hemoglobin 11.0 (L) 12.0 - 16.0 g/dL    Hematocrit 36.2 (L) 37.0 - 47.0 %    MCV 92.3 81.4 - 97.8 fL    MCH 28.1 27.0 - 33.0 pg    MCHC 30.4 (L) 32.2 - 35.5 g/dL    RDW 56.1 (H) 35.9 - 50.0 fL    Platelet Count 181 164 - 446 K/uL    MPV 11.5 9.0 - 12.9 fL    Neutrophils-Polys 77.80 (H) 44.00 - 72.00 %    Lymphocytes 9.00 (L) 22.00 - 41.00 %    Monocytes 10.70 0.00 - 13.40 %    Eosinophils 1.40 0.00 - 6.90 %    Basophils 0.30 0.00 - 1.80 %    Immature Granulocytes 0.80 0.00 - 0.90 %    Nucleated RBC 0.00 0.00 - 0.20 /100 WBC    Neutrophils (Absolute) 8.26 (H) 1.82 - 7.42 K/uL    Lymphs (Absolute) 0.95 (L) 1.00 - 4.80 K/uL    Monos (Absolute) 1.14 (H) 0.00 - 0.85 K/uL    Eos (Absolute) 0.15 0.00 - 0.51 K/uL    Baso (Absolute) 0.03 0.00 - 0.12 K/uL    Immature Granulocytes (abs) 0.08 0.00 - 0.11 K/uL    NRBC (Absolute) 0.00 K/uL   CORRECTED CALCIUM    Collection Time: 06/18/23 12:33 AM   Result Value Ref Range    Correct Calcium 9.7 8.5 - 10.5 mg/dL   ESTIMATED GFR    Collection Time: 06/18/23 12:33 AM   Result Value Ref Range    GFR (CKD-EPI) 57 (A) >60  mL/min/1.73 m 2   POCT glucose device results    Collection Time: 06/18/23  8:44 AM   Result Value Ref Range    POC Glucose, Blood 150 (H) 65 - 99 mg/dL     Medical Decision Making, by Problem:  Active Hospital Problems    Diagnosis     Hypertension [I10]      Priority: High    CHF (congestive heart failure) (HCC) [I50.9]      Priority: Medium    Other chest pain [R07.89]     Choledocholithiasis [K80.50]     Atrial fibrillation (HCC) [I48.91]     Abdominal pain [R10.9]     Acute pancreatitis without necrosis or infection, unspecified [K85.90]     Elevated liver enzymes [R74.8]     Stage 4 chronic kidney disease (HCC) [N18.4]      Has diabetic kidney disease.  Her renal function has declined over last few years.  Currently has bene stable.  She is followed by Nephrology.  Has many comorbidities.  She is not interested in dialysis at this time.       Diabetic peripheral neuropathy associated with type 2 diabetes mellitus (HCC) [E11.42]     Chronic respiratory failure with hypoxia ([facility]) [J96.11]      Has needed Oxygen now for many years.  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   Has seen Pulmonary Medicine.  Gets short of breath with almost any activity.  Mostly is home bound now.       Chronic obstructive pulmonary disease (HCC) [J44.9]     Anemia [D64.9]      Plan:  77-year-old morbidly obese female CHF, diabetes, afib on home oxygen now with gallstone pancreatitis and choledocholithiasis, ERCP canceled last 2 consecutive days  1.  Appreciate medical care  2.  NPO  3.  DVT prophylaxis okay, hold full anticoagulation  4.  Empiric IV antibiotics  5.  Daily CBC, CMP  6.  Plan for cholecystectomy today  7.  We will follow along with you    Quality Measures:  Quality-Core Measures

## 2023-06-18 NOTE — PROGRESS NOTES
IV was placed this morning, replaced the one on the RAC because it was bothering patient when she bend the arm.

## 2023-06-18 NOTE — OR SURGEON
OP Note    PreOp Diagnosis:  1.  Biliary pancreatitis  2.  Choledocholithiasis  3.  Calculus of the gallbladder with chronic cholecystitis    PostOp Diagnosis:  1.  Biliary pancreatitis  2.  Choledocholithiasis  3.  Calculus of the gallbladder with chronic cholecystitis    Procedure(s):  1.  Robotic assisted laparoscopic cholecystectomy    Wound Class: Contaminated    Surgeon(s):  Espinoza Ricks M.D.    Assistant:  None    Anesthesiologist/Type of Anesthesia:  Anesthesiologist: Gus Del Toro D.O./General    Surgical Staff:  Circulator: Elin Lino R.N.  Scrub Person: Rod Gonzales    Specimens removed if any:  ID Type Source Tests Collected by Time Destination   A : gallbladder Tissue Gallbladder PATHOLOGY SPECIMEN Espinoza Ricks M.D. 6/18/2023 11:54 AM        Estimated Blood Loss: 40 cc    Findings:   1.  Findings consistent with chronic inflammation in the pericholecystic tissues  2.  Significant intra-abdominal adhesions from prior laparotomy    Complications: None observed        Description:    The patient was met in the preoperative holding area where all of the potential risks and benefits of the procedure were discussed in detail with the patient. All of her questions were answered to her satisfaction and informed consent was signed. The patient was taken back to the operating room and placed supine on the operating room table. General endotracheal anesthesia was induced and all of the patients pressure points were padded appropriately. The patients abdomen was prepped and draped in the usual sterile fashion.  A timeout was performed which correctly identified the patient and the procedure being performed and preoperative antibiotics were given according to SCIP guidelines.     The operation was begun with infiltration of 0.5% marcaine with epinephrine into the left upper quadrant skin. A small incision was created and the verress needle placed through the abdominal wall  and into the abdominal cavity. Insufflation was carried out to 15mmHg and an 8mm robotic trochar  was introduced through this incision and into the abdominal cavity. A brief survey of the abdominal cavity demonstrated essentially no visibility as the entire greater omentum was adhesed against the anterior abdominal wall and prior laparotomy incision.  I was able to sneak the camera up to the right lateral abdominal wall and see that this was free of any adhesions.  3 robotic trocars were then placed in the right midabdomen.  I then was able to look into the left upper quadrant and find an area that was free of any adhesions and placed 1/5 port in this area.  The original port that was placed to the left of the umbilicus was then removed.  The gallbladder was located in its right upper quadrant position and retracted superiorly with the grasping retractor.  The contents of the hepatocystic triangle were dissected clearly free. Once the critical view of safety was obtained, the cystic duct was triply clipped on the common duct side and singly clipped on the gallbladder side and then transected. The cystic artery was dealt in a similar fashion. Electrocautery was used to remove the gallbladder from its attachments to the liver bed.     The right upper quadrant was copiously irrigated until clear. The clips were in place without biliary spillage at the end of the case. Hemostasis was meticulously achieved.  I then retrieved the specimen via an Endo Catch device. Monocryl was used to reapproximate skin edges. Dermabond was applied as a dressing.     The patient tolerated the procedure well and was extubated at the conclusion of the case without incident. All of the sponge, needle and instrument counts were correct at the conclusion of the case. After she was awoken from anesthesia she was taken to the recovery room in stable condition.         6/18/2023 12:01 PM Espinoza Ricks M.D.

## 2023-06-18 NOTE — ANESTHESIA POSTPROCEDURE EVALUATION
Patient: Siri Solis    Procedure Summary     Date: 06/18/23 Room / Location: [place] [id number] / SURGERY [facility]    Anesthesia Start: 1036 Anesthesia Stop: 1215    Procedure: ROBOTIC ASSISTED LAPAROSCOPIC CHOLECYSTECTOMY (Abdomen) Diagnosis: (Biliary pancreatitis; Choledocholithiasis)    Surgeons: Espinoza Ricks M.D. Responsible Provider: Gus Del Toro D.O.    Anesthesia Type: general ASA Status: 4          Final Anesthesia Type: general  Last vitals  BP   Blood Pressure : (!) 142/64    Temp   35.9 °C (96.7 °F)    Pulse   76   Resp   (!) 21    SpO2   92 %      Anesthesia Post Evaluation    Patient location during evaluation: PACU  Patient participation: complete - patient participated  Level of consciousness: awake and alert    Airway patency: patent  Anesthetic complications: no  Cardiovascular status: hemodynamically stable  Respiratory status: acceptable  Hydration status: euvolemic    PONV: none          No notable events documented.     Nurse Pain Score: 2 (NPRS)

## 2023-06-18 NOTE — ASSESSMENT & PLAN NOTE
6/16 MRCP completed with sludge and gallbladder wall thickening.  6/18 Robotic assisted laparoscopic cholecystectomy.  [facility] team.

## 2023-06-19 ENCOUNTER — APPOINTMENT (OUTPATIENT)
Dept: RADIOLOGY | Facility: MEDICAL CENTER | Age: 78
DRG: 417 | End: 2023-06-19
Attending: NURSE PRACTITIONER
Payer: COMMERCIAL

## 2023-06-19 ENCOUNTER — APPOINTMENT (OUTPATIENT)
Dept: RADIOLOGY | Facility: MEDICAL CENTER | Age: 78
DRG: 417 | End: 2023-06-19
Attending: INTERNAL MEDICINE
Payer: COMMERCIAL

## 2023-06-19 LAB
ALBUMIN SERPL BCP-MCNC: 3.2 G/DL (ref 3.2–4.9)
ALBUMIN/GLOB SERPL: 1.2 G/DL
ALP SERPL-CCNC: 141 U/L (ref 30–99)
ALT SERPL-CCNC: 74 U/L (ref 2–50)
ANION GAP SERPL CALC-SCNC: 12 MMOL/L (ref 7–16)
AST SERPL-CCNC: 34 U/L (ref 12–45)
BASOPHILS # BLD AUTO: 0.3 % (ref 0–1.8)
BASOPHILS # BLD: 0.05 K/UL (ref 0–0.12)
BILIRUB SERPL-MCNC: 0.4 MG/DL (ref 0.1–1.5)
BUN SERPL-MCNC: 29 MG/DL (ref 8–22)
CALCIUM ALBUM COR SERPL-MCNC: 9.3 MG/DL (ref 8.5–10.5)
CALCIUM SERPL-MCNC: 8.7 MG/DL (ref 8.5–10.5)
CHLORIDE SERPL-SCNC: 99 MMOL/L (ref 96–112)
CO2 SERPL-SCNC: 26 MMOL/L (ref 20–33)
CREAT SERPL-MCNC: 1.25 MG/DL (ref 0.5–1.4)
EOSINOPHIL # BLD AUTO: 0 K/UL (ref 0–0.51)
EOSINOPHIL NFR BLD: 0 % (ref 0–6.9)
ERYTHROCYTE [DISTWIDTH] IN BLOOD BY AUTOMATED COUNT: 56.6 FL (ref 35.9–50)
GFR SERPLBLD CREATININE-BSD FMLA CKD-EPI: 44 ML/MIN/1.73 M 2
GLOBULIN SER CALC-MCNC: 2.7 G/DL (ref 1.9–3.5)
GLUCOSE BLD STRIP.AUTO-MCNC: 172 MG/DL (ref 65–99)
GLUCOSE BLD STRIP.AUTO-MCNC: 203 MG/DL (ref 65–99)
GLUCOSE BLD STRIP.AUTO-MCNC: 209 MG/DL (ref 65–99)
GLUCOSE BLD STRIP.AUTO-MCNC: 238 MG/DL (ref 65–99)
GLUCOSE SERPL-MCNC: 255 MG/DL (ref 65–99)
HCT VFR BLD AUTO: 38.2 % (ref 37–47)
HGB BLD-MCNC: 11.1 G/DL (ref 12–16)
IMM GRANULOCYTES # BLD AUTO: 0.18 K/UL (ref 0–0.11)
IMM GRANULOCYTES NFR BLD AUTO: 1 % (ref 0–0.9)
LYMPHOCYTES # BLD AUTO: 0.54 K/UL (ref 1–4.8)
LYMPHOCYTES NFR BLD: 3.1 % (ref 22–41)
MCH RBC QN AUTO: 27.3 PG (ref 27–33)
MCHC RBC AUTO-ENTMCNC: 29.1 G/DL (ref 32.2–35.5)
MCV RBC AUTO: 93.9 FL (ref 81.4–97.8)
MONOCYTES # BLD AUTO: 1.29 K/UL (ref 0–0.85)
MONOCYTES NFR BLD AUTO: 7.5 % (ref 0–13.4)
NEUTROPHILS # BLD AUTO: 15.13 K/UL (ref 1.82–7.42)
NEUTROPHILS NFR BLD: 88.1 % (ref 44–72)
NRBC # BLD AUTO: 0 K/UL
NRBC BLD-RTO: 0 /100 WBC (ref 0–0.2)
PATHOLOGY CONSULT NOTE: NORMAL
PLATELET # BLD AUTO: 228 K/UL (ref 164–446)
PMV BLD AUTO: 11.5 FL (ref 9–12.9)
POTASSIUM SERPL-SCNC: 4.3 MMOL/L (ref 3.6–5.5)
PROT SERPL-MCNC: 5.9 G/DL (ref 6–8.2)
RBC # BLD AUTO: 4.07 M/UL (ref 4.2–5.4)
SODIUM SERPL-SCNC: 137 MMOL/L (ref 135–145)
WBC # BLD AUTO: 17.2 K/UL (ref 4.8–10.8)

## 2023-06-19 PROCEDURE — 700111 HCHG RX REV CODE 636 W/ 250 OVERRIDE (IP)

## 2023-06-19 PROCEDURE — 700105 HCHG RX REV CODE 258: Performed by: INTERNAL MEDICINE

## 2023-06-19 PROCEDURE — 700102 HCHG RX REV CODE 250 W/ 637 OVERRIDE(OP)

## 2023-06-19 PROCEDURE — 71045 X-RAY EXAM CHEST 1 VIEW: CPT

## 2023-06-19 PROCEDURE — A9270 NON-COVERED ITEM OR SERVICE: HCPCS | Performed by: HOSPITALIST

## 2023-06-19 PROCEDURE — 82962 GLUCOSE BLOOD TEST: CPT | Mod: 91

## 2023-06-19 PROCEDURE — 80053 COMPREHEN METABOLIC PANEL: CPT

## 2023-06-19 PROCEDURE — 700102 HCHG RX REV CODE 250 W/ 637 OVERRIDE(OP): Performed by: INTERNAL MEDICINE

## 2023-06-19 PROCEDURE — 94760 N-INVAS EAR/PLS OXIMETRY 1: CPT

## 2023-06-19 PROCEDURE — 700102 HCHG RX REV CODE 250 W/ 637 OVERRIDE(OP): Performed by: HOSPITALIST

## 2023-06-19 PROCEDURE — A9270 NON-COVERED ITEM OR SERVICE: HCPCS

## 2023-06-19 PROCEDURE — A9270 NON-COVERED ITEM OR SERVICE: HCPCS | Performed by: INTERNAL MEDICINE

## 2023-06-19 PROCEDURE — 85025 COMPLETE CBC W/AUTO DIFF WBC: CPT

## 2023-06-19 PROCEDURE — 700111 HCHG RX REV CODE 636 W/ 250 OVERRIDE (IP): Performed by: INTERNAL MEDICINE

## 2023-06-19 PROCEDURE — 770006 HCHG ROOM/CARE - MED/SURG/GYN SEMI*

## 2023-06-19 PROCEDURE — 99233 SBSQ HOSP IP/OBS HIGH 50: CPT | Performed by: INTERNAL MEDICINE

## 2023-06-19 PROCEDURE — 36415 COLL VENOUS BLD VENIPUNCTURE: CPT

## 2023-06-19 RX ORDER — FUROSEMIDE 10 MG/ML
40 INJECTION INTRAMUSCULAR; INTRAVENOUS
Status: DISCONTINUED | OUTPATIENT
Start: 2023-06-20 | End: 2023-06-19

## 2023-06-19 RX ORDER — FUROSEMIDE 10 MG/ML
40 INJECTION INTRAMUSCULAR; INTRAVENOUS ONCE
Status: COMPLETED | OUTPATIENT
Start: 2023-06-19 | End: 2023-06-19

## 2023-06-19 RX ORDER — DIPHENHYDRAMINE HCL 25 MG
25 TABLET ORAL EVERY 6 HOURS PRN
Status: DISCONTINUED | OUTPATIENT
Start: 2023-06-19 | End: 2023-06-28 | Stop reason: HOSPADM

## 2023-06-19 RX ADMIN — OXYCODONE HYDROCHLORIDE 5 MG: 5 TABLET ORAL at 04:39

## 2023-06-19 RX ADMIN — DIPHENHYDRAMINE HYDROCHLORIDE 25 MG: 25 TABLET ORAL at 23:44

## 2023-06-19 RX ADMIN — OMEPRAZOLE 20 MG: 20 CAPSULE, DELAYED RELEASE ORAL at 17:20

## 2023-06-19 RX ADMIN — MORPHINE SULFATE 2 MG: 4 INJECTION, SOLUTION INTRAMUSCULAR; INTRAVENOUS at 17:07

## 2023-06-19 RX ADMIN — TIOTROPIUM BROMIDE INHALATION SPRAY 5 MCG: 3.12 SPRAY, METERED RESPIRATORY (INHALATION) at 17:24

## 2023-06-19 RX ADMIN — INSULIN HUMAN 2 UNITS: 100 INJECTION, SOLUTION PARENTERAL at 12:00

## 2023-06-19 RX ADMIN — METOPROLOL TARTRATE 50 MG: 50 TABLET, FILM COATED ORAL at 17:20

## 2023-06-19 RX ADMIN — ALBUTEROL SULFATE 2 PUFF: 90 AEROSOL, METERED RESPIRATORY (INHALATION) at 17:22

## 2023-06-19 RX ADMIN — AMLODIPINE BESYLATE 10 MG: 10 TABLET ORAL at 06:11

## 2023-06-19 RX ADMIN — SODIUM CHLORIDE, POTASSIUM CHLORIDE, SODIUM LACTATE AND CALCIUM CHLORIDE: 600; 310; 30; 20 INJECTION, SOLUTION INTRAVENOUS at 21:42

## 2023-06-19 RX ADMIN — SODIUM CHLORIDE, POTASSIUM CHLORIDE, SODIUM LACTATE AND CALCIUM CHLORIDE: 600; 310; 30; 20 INJECTION, SOLUTION INTRAVENOUS at 06:11

## 2023-06-19 RX ADMIN — ALBUTEROL SULFATE 2 PUFF: 90 AEROSOL, METERED RESPIRATORY (INHALATION) at 04:40

## 2023-06-19 RX ADMIN — METOPROLOL TARTRATE 50 MG: 50 TABLET, FILM COATED ORAL at 04:40

## 2023-06-19 RX ADMIN — LISINOPRIL 10 MG: 10 TABLET ORAL at 04:40

## 2023-06-19 RX ADMIN — SODIUM CHLORIDE, POTASSIUM CHLORIDE, SODIUM LACTATE AND CALCIUM CHLORIDE: 600; 310; 30; 20 INJECTION, SOLUTION INTRAVENOUS at 01:41

## 2023-06-19 RX ADMIN — FEBUXOSTAT 40 MG: 40 TABLET, FILM COATED ORAL at 04:40

## 2023-06-19 RX ADMIN — INSULIN GLARGINE-YFGN 30 UNITS: 100 INJECTION, SOLUTION SUBCUTANEOUS at 17:12

## 2023-06-19 RX ADMIN — INSULIN HUMAN 2 UNITS: 100 INJECTION, SOLUTION PARENTERAL at 21:35

## 2023-06-19 RX ADMIN — INSULIN HUMAN 2 UNITS: 100 INJECTION, SOLUTION PARENTERAL at 17:04

## 2023-06-19 RX ADMIN — OXYCODONE HYDROCHLORIDE 5 MG: 5 TABLET ORAL at 14:20

## 2023-06-19 RX ADMIN — FUROSEMIDE 40 MG: 10 INJECTION INTRAMUSCULAR; INTRAVENOUS at 23:44

## 2023-06-19 RX ADMIN — MORPHINE SULFATE 2 MG: 4 INJECTION, SOLUTION INTRAMUSCULAR; INTRAVENOUS at 08:44

## 2023-06-19 RX ADMIN — OXYCODONE HYDROCHLORIDE 5 MG: 5 TABLET ORAL at 21:31

## 2023-06-19 RX ADMIN — INSULIN HUMAN 1 UNITS: 100 INJECTION, SOLUTION PARENTERAL at 08:47

## 2023-06-19 ASSESSMENT — ENCOUNTER SYMPTOMS
CARDIOVASCULAR NEGATIVE: 1
ABDOMINAL PAIN: 1
MUSCULOSKELETAL NEGATIVE: 1
RESPIRATORY NEGATIVE: 1
PSYCHIATRIC NEGATIVE: 1
EYES NEGATIVE: 1
NEUROLOGICAL NEGATIVE: 1

## 2023-06-19 ASSESSMENT — PAIN DESCRIPTION - PAIN TYPE
TYPE: ACUTE PAIN

## 2023-06-19 NOTE — CARE PLAN
The patient is Stable - Low risk of patient condition declining or worsening    Shift Goals  Clinical Goals: Surgery  Patient Goals: Surgery  Family Goals: NA    Progress made toward(s) clinical / shift goals:  Patient had surgery.    Patient is not progressing towards the following goals:

## 2023-06-19 NOTE — DIETARY
Nutrition Services: Update   Day 6 of admit.  Siri Solis is a 77 y.o. female with admitting DX of Abdominal pain.     Pt is currently on a clear liquid diet. This is day 6 of NPO/CLD. Pt with % of most recent clear liquid tray and no N/V noted. However this is 6 days of inadequate nutrition. Pt is POD#1 robotic cholecystectomy. Denies N/V. If it is not medically feasible to advance past clear liquid diet within 48-72 hours, consider nutrition support to meet needs.       Malnutrition Risk: Pt at risk given inadequate nutrition x6 days.     Recommendations/Plan:  Diet advancements as medically feasible per MD   Consideration of nutrition support if unable to advance diet past clear liquids.    Monitor weight.  Obtain supplement order per RD as needed.    RD following

## 2023-06-19 NOTE — CARE PLAN
The patient is Stable - Low risk of patient condition declining or worsening    Instructed on plan of care, meds, tests.  Pain controlled with current pain regimen.  Fall risk protocol in place.    Problem: Knowledge Deficit - Standard  Goal: Patient and family/care givers will demonstrate understanding of plan of care, disease process/condition, diagnostic tests and medications  Outcome: Progressing     Problem: Pain - Standard  Goal: Alleviation of pain or a reduction in pain to the patient’s comfort goal  Outcome: Progressing     Problem: Fall Risk  Goal: Patient will remain free from falls  Outcome: Progressing     Shift Goals  Clinical Goals: Pain level will be 4 or less, IVF will infuse without complications.  Patient Goals: Rest/comfort  Family Goals: NA    Progress made toward(s) clinical / shift goals:  progressing    Patient is not progressing towards the following goals:

## 2023-06-19 NOTE — PROGRESS NOTES
"Hospital Medicine Daily Progress Note    Date of Service  6/19/2023    Chief Complaint  Siri Solis is a 77 y.o. female transferred from an outside hospital on 6/13/2023 where she presented with nausea and vomiting which started after eating Chinese food the day before.  She was noted to have lipase of greater than 4000, with elevated LFTs.  She had a fever of 101.3, leukocytosis of 13 K.      Abdominal ultrasound showed several calcified gallstones, no wall thickening, CBD was normal. MRCP reported \"Choledocholithiasis is suspected with mild common bile duct dilation\".    She has a history of CKD-4, emphysema with chronic respiratory failure (on 8.5 L/min nasal cannula oxygen at home), diastolic CHF, atrial fibrillation (on Eliquis),     Hospital Course  On admission, creatinine was 2.45 AST was 853, ALT was 650, total bilirubin was 2.5, alk phos was 264.  Saturating 95% on 10 L/min oxygen mask.  Chest x-ray from 6/13/2023 showed possible edema versus pleural effusion. She was afebrile and hemodynamically stable.    Ceftriaxone and Flagyl were empirically started. GI and General Surgery were consulted.     Chest x-ray from 6/14/2023 shows mild pulmonary edema and/or infiltrates.Creatinine improved to 1.17. Dose of IV Lasix ordered. CT abdomen and pelvis showed cholelithiasis.    Echocardiogram showed LVEF ~ 60%, normal left and right ventricular function, normal regional wall motion. Metoprolol added.    Lipase improved to 74 on 6/17/2023.  Blood pressure and heart rate were elevated. Metoprolol was increased.    MRCP from 6/16/2023 showed cholelithiasis with gallbladder sludge, no gallbladder wall thickening or significant pericholecystic fluid. Dilation of the common bile duct and central intrahepatic biliary tree with probable sludge (versus stone).    Norvasc dose was increased due to elevated blood pressure despite increasing metoprolol.    Interval Problem Update  Afebrile, and hemodynamically stable. " Saturating 86-90% on 9 L oxymask. Underwent laparoscopic cholecystectomy on 6/18/2023.  Complains of right upper quadrant pain.    WBCs are 17.2 K,  AST is 34, ALT is 74, Total bili is 0.4.     I have discussed this patient's plan of care and discharge plan at IDT rounds today with Case Management, Nursing, Nursing leadership, and other members of the IDT team.    Consultants/Specialty  general surgery and GI    Code Status  DNAR/DNI    Disposition  The patient is not medically cleared for discharge to home or a post-acute facility.  Anticipate discharge to: home with close outpatient follow-up    I have placed the appropriate orders for post-discharge needs.    Review of Systems  Review of Systems   Constitutional:  Positive for malaise/fatigue.   HENT: Negative.     Eyes: Negative.    Respiratory: Negative.     Cardiovascular: Negative.    Gastrointestinal:  Positive for abdominal pain.   Genitourinary: Negative.    Musculoskeletal: Negative.    Skin: Negative.    Neurological: Negative.    Endo/Heme/Allergies: Negative.    Psychiatric/Behavioral: Negative.          Physical Exam  Temp:  [36.4 °C (97.5 °F)-36.8 °C (98.3 °F)] 36.4 °C (97.5 °F)  Pulse:  [] 74  Resp:  [18-20] 20  BP: (135-173)/(70-94) 135/72  SpO2:  [86 %-95 %] 86 %    Physical Exam  Constitutional:       Appearance: She is obese. She is ill-appearing.   HENT:      Head: Normocephalic.      Nose: Nose normal.      Mouth/Throat:      Mouth: Mucous membranes are moist.   Eyes:      Pupils: Pupils are equal, round, and reactive to light.   Cardiovascular:      Rate and Rhythm: Normal rate.   Pulmonary:      Effort: Pulmonary effort is normal.   Abdominal:      Tenderness: There is abdominal tenderness.      Comments: Postsurgical pain   Musculoskeletal:      Cervical back: Normal range of motion.      Right lower leg: No edema.      Left lower leg: No edema.   Skin:     General: Skin is warm.   Neurological:      General: No focal deficit present.    Psychiatric:         Mood and Affect: Mood normal.     Improving,    Fluids    Intake/Output Summary (Last 24 hours) at 6/19/2023 1345  Last data filed at 6/19/2023 1100  Gross per 24 hour   Intake 720 ml   Output --   Net 720 ml         Laboratory  Recent Labs     06/17/23 0237 06/18/23  0033 06/19/23  0155   WBC 11.0* 10.6 17.2*   RBC 4.09* 3.92* 4.07*   HEMOGLOBIN 11.3* 11.0* 11.1*   HEMATOCRIT 37.8 36.2* 38.2   MCV 92.4 92.3 93.9   MCH 27.6 28.1 27.3   MCHC 29.9* 30.4* 29.1*   RDW 56.7* 56.1* 56.6*   PLATELETCT 174 181 228   MPV 11.3 11.5 11.5     Recent Labs     06/17/23 0237 06/18/23  0033 06/19/23  0155   SODIUM 141 138 137   POTASSIUM 4.0 3.8 4.3   CHLORIDE 102 100 99   CO2 27 29 26   GLUCOSE 166* 133* 255*   BUN 22 24* 29*   CREATININE 1.02 1.00 1.25   CALCIUM 9.5 9.4 8.7                   Imaging  IR-US GUIDED PIV   Final Result    Ultrasound-guided PERIPHERAL IV INSERTION performed by    qualified nursing staff as above.      [id number]/O   Final Result      1.  There is cholelithiasis with gallbladder sludge but no gallbladder wall thickening or significant pericholecystic fluid.   2.  There is dilatation of the common bile duct and central intrahepatic biliary tree with probable tumefactive sludge in the distal duct favored over an obstructing stone.   3.  There is a trace of peripancreatic fluid and perirenal fluid possibly related to volume status. Recommend correlation with lipase level in regards to any potential pancreatitis.   4.  There are several cystic areas seen in the pancreatic head and uncinate process, possibly side branch IPMN's.   5.  Previously noted right adrenal gland mass not well characterized on this exam.   6.  Exam somewhat limited by breathing motion artifact.      EC-ECHOCARDIOGRAM COMPLETE W/O CONT   Final Result      CT-ABDOMEN-PELVIS W/O   Final Result         1.  Small hiatal hernia   2.  Cholelithiasis   3.  Atherosclerosis and atherosclerotic coronary artery disease  "     DX-CHEST-LIMITED (1 VIEW)   Final Result         1.  Mild pulmonary edema and/or infiltrates.   2.  Cardiomegaly   3.  Atherosclerosis      IR-US GUIDED PIV   Final Result    Ultrasound-guided PERIPHERAL IV INSERTION performed by    qualified nursing staff as above.      DX-CHEST-LIMITED (1 VIEW)   Final Result      New minor fissure thickening could be from edema favored over pleural fluid      Stable cardiac silhouette and hilar enlargement with right hemidiaphragm elevation           Assessment/Plan  * Choledocholithiasis  Assessment & Plan  MRCP from outside facility reported \"choledocholithiasis is suspected with mild common bile duct dilation\".  LFTs, total bilirubin, and lipase were elevated.   Ceftriaxone and Flagyl were empirically started.  GI and general surgery were consulted. Underwent laparoscopic cholecystectomy on 6/18/2023.  General surgery signed off, and recommended follow up with Dr. Ricks or ACS clinic, low-fat diet for 3 weeks, ok to shower, keeping incisions as dry as possible, do not submerge.    Chronic respiratory failure with hypoxia (HCC)- (present on admission)  Assessment & Plan  History of tobacco use, quit 20 years ago.  Diagnosed with emphysema.  On 8.5 L of oxygen at home. Saturating 89-90% on 9 L/min oxygen mask.  Monitor closely    Elevated liver enzymes- (present on admission)  Assessment & Plan  LFTs are improving.  Total bilirubin is normal.    Other chest pain  Assessment & Plan  Patient had a brief episode of chest pain.  EKG showed sinus tachycardia.  Troponin was unremarkable (30).  Echocardiogram showed  LVEF ~ 60%, normal left and right ventricular function, normal regional wall motion. Metoprolol added.  She had no further episodes of chest pain    Abdominal pain- (present on admission)  Assessment & Plan  Likely secondary to gallstone pancreatitis.  Underwent laparoscopic cholecystectomy on 6/18/2023.  Complains of postop pain, continue pain management    Chronic " obstructive pulmonary disease (HCC)- (present on admission)  Assessment & Plan  History of emphysema, on about 8.5 L/min nasal cannula oxygen at home. Continue RT per protocol, and home inhalers    Diabetic peripheral neuropathy associated with type 2 diabetes mellitus (HCC)- (present on admission)  Assessment & Plan  Continue insulin and sliding scale insulin    Acute pancreatitis without necrosis or infection, unspecified- (present on admission)  Assessment & Plan  Lipase normalized.  Currently having post surgical pain.     CHF (congestive heart failure) (HCC)- (present on admission)  Assessment & Plan  Diastolic CHF.  LVEF on 9/14/2023 showed LVEF of about 60-65%.  On IVFs for acute pancreatitis, monitor for fluid overload.    Anemia- (present on admission)  Assessment & Plan  Likely due to CKD-4. At baseline. Monitor.     Hypertension- (present on admission)  Assessment & Plan  Continue home lisinopril. Norvasc was added, dose was increased. Home Metoprolol resumed, dose increased.  PRN Labetalol added due to episodes of elevated blood pressure.    Atrial fibrillation (HCC)  Assessment & Plan  Continue metoprolol.  Eliquis was held on admission    Stage 4 chronic kidney disease (HCC)- (present on admission)  Assessment & Plan  Creatinine is at baseline. Avoid nephrotoxins.  Monitor         VTE prophylaxis: SCDs/TEDs

## 2023-06-19 NOTE — CARE PLAN
The patient is Stable - Low risk of patient condition declining or worsening    Shift Goals  Clinical Goals: Rest  Patient Goals: Rest  Family Goals: NA    Progress made toward(s) clinical / shift goals:  Patient resting.    Patient is not progressing towards the following goals:

## 2023-06-19 NOTE — PROGRESS NOTES
Received patient resting in bed. Reports mild discomfort to abdomen using ice pack at this time. PIV to right forearm with dressing intact infusing LR @ 250 ml/hr. Bed in low position, wheels locked, side rails up x2 and call light within reach.

## 2023-06-19 NOTE — DISCHARGE INSTRUCTIONS
Post Operative Discharge Instructions:    1. DIET: Recommend Low fat diet x 3 weeks. Depending on how you are feeling and whether you have nausea or not, you may wish to stay with a bland diet for the first few days. However, you can advance this as quickly as you feel ready.    2. ACTIVITIES: After discharge from the hospital, you may resume full routine activities; however, there should be no heavy lifting (greater than 20 pounds or a bag of groceries) and no strenuous activities for at least 2 weeks. The duration may be longer, depending on your surgical procedure. Routine activities of daily living are acceptable. Activity level should be addressed at your post-op follow up appointment.    3. DRIVING: You may drive whenever you are off pain medications and are able to perform the activities needed to drive, i.e., turning, bending, twisting, etc.    4. BATHING: You may get the wound wet at any time after leaving the hospital. You may shower, but do not submerge in a bath for at least two weeks.  If you have wound dressings, they may come off after 48 hours.  If you have skin glue to the wound, this will fall off on its own, do not pick at it.  If you have Steri-Strips to the wound, these will fall off on their own, do not pick at them. You may trim the edges if needed.    5. BOWEL FUNCTION:   After surgery, it is not uncommon for patients to develop either frequent or loose stools after meals. This usually corrects itself after a few days, to a few weeks. If this occurs, do not worry; this will resolve on its own.  Constipation is much more common than loose stools. The cause is the combination of pain medication and decreased activity level and possibly the nature of the surgical procedure performed. If you feel this is occurring, you may use an over-the-counter treatment such as MiraLAX (or Milk of Magnesia, Ex-Lax, Senokot, etc.) until the problem has resolved. Drink plenty of water and try to wean off  narcotic pain medications as soon as is comfortable for you.    6. PAIN MEDICATION:   You will be given a prescription for pain medication at discharge. Please take these as directed. It is important to remember not to take medications on an empty stomach as this may cause nausea.  You may also take over the counter acetaminophen and/or NSAIDS (ibuprofen, Aleve, Advil, Motrin) per the package instructions.  You may also use ice to the wound to decrease pain and swelling. You may alternate 20 minutes on and 20 minutes off with the ice for the first 24-48 hours. Make sure you place a washcloth or towel between the ice pack and your skin.  Please note that narcotic pain medication cannot be refilled unless you are seen by a doctor. Make sure you call the office if you are running low on medication or if the dose you have been prescribed is not working well for you.    7.CALL THE [facility] AT [phone] IF YOU HAVE:  (1) Fevers to more than 101F, (2) Unusual chest or leg pain, (3) Drainage or fluid from incision that may be foul smelling, increased tenderness or soreness at the wound or the wound edges are no longer together, redness or swelling at the incision site. Do not hesitate to call with any other questions.    Discharge Instructions    Discharged to home by car with relative. Discharged via wheelchair, hospital escort: Yes.  Special equipment needed: Not Applicable    Be sure to schedule a follow-up appointment with your primary care doctor or any specialists as instructed.     Discharge Plan:   Diet Plan: Discussed  Activity Level: Discussed  Confirmed Follow up Appointment: Patient to Call and Schedule Appointment  Confirmed Symptoms Management: Discussed  Medication Reconciliation Updated: Yes    I understand that a diet low in cholesterol, fat, and sodium is recommended for good health. Unless I have been given specific instructions below for another diet, I accept this instruction as my  diet prescription.   Other diet: Per MD    Special Instructions: None    -Is this patient being discharged with medication to prevent blood clots?  No    Is patient discharged on Warfarin / Coumadin?   No

## 2023-06-19 NOTE — PROGRESS NOTES
DATE: 6/19/2023    Post Operative Day  1 .    INTERVAL EVENTS:  POD 1   Robotic Cholecystectomy  C/o RUQ pain  Ambulated to bathroom  Denies n/v    PHYSICAL EXAMINATION:  Vital Signs: /72   Pulse 74   Temp 36.4 °C (97.5 °F) (Temporal)   Resp 20   Wt 112 kg (246 lb 14.6 oz)   SpO2 90%     NAD  Abdomen soft, appropriately tender around incisions  Incisions c/d/i    LABORATORY VALUES:   Recent Labs     06/17/23 0237 06/18/23  0033 06/19/23  0155   WBC 11.0* 10.6 17.2*   RBC 4.09* 3.92* 4.07*   HEMOGLOBIN 11.3* 11.0* 11.1*   HEMATOCRIT 37.8 36.2* 38.2   MCV 92.4 92.3 93.9   MCH 27.6 28.1 27.3   MCHC 29.9* 30.4* 29.1*   RDW 56.7* 56.1* 56.6*   PLATELETCT 174 181 228   MPV 11.3 11.5 11.5     Recent Labs     06/17/23 0237 06/18/23  0033 06/19/23  0155   SODIUM 141 138 137   POTASSIUM 4.0 3.8 4.3   CHLORIDE 102 100 99   CO2 27 29 26   GLUCOSE 166* 133* 255*   BUN 22 24* 29*   CREATININE 1.02 1.00 1.25   CALCIUM 9.5 9.4 8.7     Recent Labs     06/17/23 0237 06/18/23  0033 06/19/23  0155   ASTSGOT 26 31 34   ALTSGPT 155* 109* 74*   TBILIRUBIN 0.6 0.6 0.4   ALKPHOSPHAT 173* 152* 141*   GLOBULIN 2.9 2.5 2.7            IMAGING:   IR-US GUIDED PIV   Final Result    Ultrasound-guided PERIPHERAL IV INSERTION performed by    qualified nursing staff as above.      OA-GGNSRHE-P/O   Final Result      1.  There is cholelithiasis with gallbladder sludge but no gallbladder wall thickening or significant pericholecystic fluid.   2.  There is dilatation of the common bile duct and central intrahepatic biliary tree with probable tumefactive sludge in the distal duct favored over an obstructing stone.   3.  There is a trace of peripancreatic fluid and perirenal fluid possibly related to volume status. Recommend correlation with lipase level in regards to any potential pancreatitis.   4.  There are several cystic areas seen in the pancreatic head and uncinate process, possibly side branch IPMN's.   5.  Previously noted right  adrenal gland mass not well characterized on this exam.   6.  Exam somewhat limited by breathing motion artifact.      EC-ECHOCARDIOGRAM COMPLETE W/O CONT   Final Result      CT-ABDOMEN-PELVIS W/O   Final Result         1.  Small hiatal hernia   2.  Cholelithiasis   3.  Atherosclerosis and atherosclerotic coronary artery disease      DX-CHEST-LIMITED (1 VIEW)   Final Result         1.  Mild pulmonary edema and/or infiltrates.   2.  Cardiomegaly   3.  Atherosclerosis      IR-US GUIDED PIV   Final Result    Ultrasound-guided PERIPHERAL IV INSERTION performed by    qualified nursing staff as above.      DX-CHEST-LIMITED (1 VIEW)   Final Result      New minor fissure thickening could be from edema favored over pleural fluid      Stable cardiac silhouette and hilar enlargement with right hemidiaphragm elevation          ASSESSMENT AND PLAN:   Choledocholithiasis  Assessment & Plan  6/16 MRCP completed with sludge and gallbladder wall thickening.  6/18 Robotic assisted laparoscopic cholecystectomy.  [facility] team.      LFTs continue to normalize, no indication for ERCP at this time  Surgery will sign off  Please have patient follow up with Dr. Ricks or ACS clinic    Recc. Low fat diet x 3 weeks  Ok To Shower, keep incisions as dry as possible, do not submerge.      Take over the counter stool softeners as needed for constipation    No strenuous activity of lifting >20 lbs for 3weeks.         ____________________________________     Savannah Perez M.D.    DD: 6/19/2023  11:22 AM

## 2023-06-20 PROBLEM — J96.21 ACUTE ON CHRONIC RESPIRATORY FAILURE WITH HYPOXIA (HCC): Status: ACTIVE | Noted: 2018-04-30

## 2023-06-20 LAB
ALBUMIN SERPL BCP-MCNC: 3 G/DL (ref 3.2–4.9)
ALBUMIN/GLOB SERPL: 1.1 G/DL
ALP SERPL-CCNC: 127 U/L (ref 30–99)
ALT SERPL-CCNC: 53 U/L (ref 2–50)
ANION GAP SERPL CALC-SCNC: 10 MMOL/L (ref 7–16)
AST SERPL-CCNC: 28 U/L (ref 12–45)
BASOPHILS # BLD AUTO: 0.2 % (ref 0–1.8)
BASOPHILS # BLD: 0.03 K/UL (ref 0–0.12)
BILIRUB SERPL-MCNC: 0.4 MG/DL (ref 0.1–1.5)
BUN SERPL-MCNC: 33 MG/DL (ref 8–22)
CALCIUM ALBUM COR SERPL-MCNC: 9.3 MG/DL (ref 8.5–10.5)
CALCIUM SERPL-MCNC: 8.5 MG/DL (ref 8.5–10.5)
CHLORIDE SERPL-SCNC: 100 MMOL/L (ref 96–112)
CO2 SERPL-SCNC: 28 MMOL/L (ref 20–33)
CREAT SERPL-MCNC: 1.41 MG/DL (ref 0.5–1.4)
EOSINOPHIL # BLD AUTO: 0.22 K/UL (ref 0–0.51)
EOSINOPHIL NFR BLD: 1.7 % (ref 0–6.9)
ERYTHROCYTE [DISTWIDTH] IN BLOOD BY AUTOMATED COUNT: 57.2 FL (ref 35.9–50)
GFR SERPLBLD CREATININE-BSD FMLA CKD-EPI: 38 ML/MIN/1.73 M 2
GLOBULIN SER CALC-MCNC: 2.7 G/DL (ref 1.9–3.5)
GLUCOSE BLD STRIP.AUTO-MCNC: 132 MG/DL (ref 65–99)
GLUCOSE BLD STRIP.AUTO-MCNC: 220 MG/DL (ref 65–99)
GLUCOSE BLD STRIP.AUTO-MCNC: 221 MG/DL (ref 65–99)
GLUCOSE BLD STRIP.AUTO-MCNC: 226 MG/DL (ref 65–99)
GLUCOSE SERPL-MCNC: 156 MG/DL (ref 65–99)
HCT VFR BLD AUTO: 35.7 % (ref 37–47)
HGB BLD-MCNC: 10.6 G/DL (ref 12–16)
IMM GRANULOCYTES # BLD AUTO: 0.12 K/UL (ref 0–0.11)
IMM GRANULOCYTES NFR BLD AUTO: 0.9 % (ref 0–0.9)
LYMPHOCYTES # BLD AUTO: 0.99 K/UL (ref 1–4.8)
LYMPHOCYTES NFR BLD: 7.4 % (ref 22–41)
MCH RBC QN AUTO: 28 PG (ref 27–33)
MCHC RBC AUTO-ENTMCNC: 29.7 G/DL (ref 32.2–35.5)
MCV RBC AUTO: 94.4 FL (ref 81.4–97.8)
MONOCYTES # BLD AUTO: 1.33 K/UL (ref 0–0.85)
MONOCYTES NFR BLD AUTO: 10 % (ref 0–13.4)
NEUTROPHILS # BLD AUTO: 10.63 K/UL (ref 1.82–7.42)
NEUTROPHILS NFR BLD: 79.8 % (ref 44–72)
NRBC # BLD AUTO: 0 K/UL
NRBC BLD-RTO: 0 /100 WBC (ref 0–0.2)
NT-PROBNP SERPL IA-MCNC: ABNORMAL PG/ML (ref 0–125)
PLATELET # BLD AUTO: 218 K/UL (ref 164–446)
PMV BLD AUTO: 11.3 FL (ref 9–12.9)
POTASSIUM SERPL-SCNC: 4 MMOL/L (ref 3.6–5.5)
PROCALCITONIN SERPL-MCNC: 0.26 NG/ML
PROT SERPL-MCNC: 5.7 G/DL (ref 6–8.2)
RBC # BLD AUTO: 3.78 M/UL (ref 4.2–5.4)
SODIUM SERPL-SCNC: 138 MMOL/L (ref 135–145)
WBC # BLD AUTO: 13.3 K/UL (ref 4.8–10.8)

## 2023-06-20 PROCEDURE — 84145 PROCALCITONIN (PCT): CPT

## 2023-06-20 PROCEDURE — 83880 ASSAY OF NATRIURETIC PEPTIDE: CPT

## 2023-06-20 PROCEDURE — 80053 COMPREHEN METABOLIC PANEL: CPT

## 2023-06-20 PROCEDURE — 99291 CRITICAL CARE FIRST HOUR: CPT | Performed by: STUDENT IN AN ORGANIZED HEALTH CARE EDUCATION/TRAINING PROGRAM

## 2023-06-20 PROCEDURE — 85025 COMPLETE CBC W/AUTO DIFF WBC: CPT

## 2023-06-20 PROCEDURE — 36415 COLL VENOUS BLD VENIPUNCTURE: CPT

## 2023-06-20 PROCEDURE — 770006 HCHG ROOM/CARE - MED/SURG/GYN SEMI*

## 2023-06-20 PROCEDURE — A9270 NON-COVERED ITEM OR SERVICE: HCPCS | Performed by: INTERNAL MEDICINE

## 2023-06-20 PROCEDURE — 700102 HCHG RX REV CODE 250 W/ 637 OVERRIDE(OP): Performed by: INTERNAL MEDICINE

## 2023-06-20 PROCEDURE — 82962 GLUCOSE BLOOD TEST: CPT | Mod: 91

## 2023-06-20 PROCEDURE — 700111 HCHG RX REV CODE 636 W/ 250 OVERRIDE (IP): Performed by: STUDENT IN AN ORGANIZED HEALTH CARE EDUCATION/TRAINING PROGRAM

## 2023-06-20 PROCEDURE — A9270 NON-COVERED ITEM OR SERVICE: HCPCS | Performed by: HOSPITALIST

## 2023-06-20 PROCEDURE — 700101 HCHG RX REV CODE 250

## 2023-06-20 PROCEDURE — 700102 HCHG RX REV CODE 250 W/ 637 OVERRIDE(OP): Performed by: HOSPITALIST

## 2023-06-20 RX ORDER — EZETIMIBE AND SIMVASTATIN 10; 40 MG/1; MG/1
TABLET ORAL
Qty: 90 TABLET | Refills: 3 | Status: SHIPPED | OUTPATIENT
Start: 2023-06-20 | End: 2023-10-19 | Stop reason: SDUPTHER

## 2023-06-20 RX ORDER — HEPARIN SODIUM 5000 [USP'U]/ML
5000 INJECTION, SOLUTION INTRAVENOUS; SUBCUTANEOUS EVERY 8 HOURS
Status: DISCONTINUED | OUTPATIENT
Start: 2023-06-20 | End: 2023-06-23

## 2023-06-20 RX ORDER — FUROSEMIDE 10 MG/ML
20 INJECTION INTRAMUSCULAR; INTRAVENOUS
Status: DISCONTINUED | OUTPATIENT
Start: 2023-06-20 | End: 2023-06-21

## 2023-06-20 RX ORDER — IPRATROPIUM BROMIDE AND ALBUTEROL SULFATE 2.5; .5 MG/3ML; MG/3ML
3 SOLUTION RESPIRATORY (INHALATION)
Status: DISCONTINUED | OUTPATIENT
Start: 2023-06-20 | End: 2023-06-28 | Stop reason: HOSPADM

## 2023-06-20 RX ADMIN — INSULIN GLARGINE-YFGN 30 UNITS: 100 INJECTION, SOLUTION SUBCUTANEOUS at 16:50

## 2023-06-20 RX ADMIN — FUROSEMIDE 20 MG: 10 INJECTION INTRAMUSCULAR; INTRAVENOUS at 09:49

## 2023-06-20 RX ADMIN — METOPROLOL TARTRATE 50 MG: 50 TABLET, FILM COATED ORAL at 05:49

## 2023-06-20 RX ADMIN — TIOTROPIUM BROMIDE INHALATION SPRAY 5 MCG: 3.12 SPRAY, METERED RESPIRATORY (INHALATION) at 16:47

## 2023-06-20 RX ADMIN — METOPROLOL TARTRATE 50 MG: 50 TABLET, FILM COATED ORAL at 16:46

## 2023-06-20 RX ADMIN — OXYCODONE HYDROCHLORIDE 5 MG: 5 TABLET ORAL at 13:49

## 2023-06-20 RX ADMIN — HEPARIN SODIUM 5000 UNITS: 5000 INJECTION, SOLUTION INTRAVENOUS; SUBCUTANEOUS at 21:16

## 2023-06-20 RX ADMIN — INSULIN HUMAN 2 UNITS: 100 INJECTION, SOLUTION PARENTERAL at 20:42

## 2023-06-20 RX ADMIN — INSULIN HUMAN 2 UNITS: 100 INJECTION, SOLUTION PARENTERAL at 16:48

## 2023-06-20 RX ADMIN — OXYCODONE HYDROCHLORIDE 5 MG: 5 TABLET ORAL at 21:16

## 2023-06-20 RX ADMIN — ALBUTEROL SULFATE 2 PUFF: 90 AEROSOL, METERED RESPIRATORY (INHALATION) at 16:47

## 2023-06-20 RX ADMIN — LISINOPRIL 10 MG: 10 TABLET ORAL at 05:49

## 2023-06-20 RX ADMIN — OMEPRAZOLE 20 MG: 20 CAPSULE, DELAYED RELEASE ORAL at 16:46

## 2023-06-20 RX ADMIN — INSULIN HUMAN 2 UNITS: 100 INJECTION, SOLUTION PARENTERAL at 11:53

## 2023-06-20 RX ADMIN — AMLODIPINE BESYLATE 10 MG: 10 TABLET ORAL at 05:48

## 2023-06-20 RX ADMIN — ALBUTEROL SULFATE 2 PUFF: 90 AEROSOL, METERED RESPIRATORY (INHALATION) at 05:49

## 2023-06-20 RX ADMIN — FEBUXOSTAT 40 MG: 40 TABLET, FILM COATED ORAL at 05:48

## 2023-06-20 RX ADMIN — IPRATROPIUM BROMIDE AND ALBUTEROL SULFATE 3 ML: 2.5; .5 SOLUTION RESPIRATORY (INHALATION) at 08:02

## 2023-06-20 ASSESSMENT — ENCOUNTER SYMPTOMS
HEADACHES: 0
MYALGIAS: 0
SORE THROAT: 0
SPUTUM PRODUCTION: 0
DOUBLE VISION: 0
BLURRED VISION: 0
NAUSEA: 0
DIZZINESS: 0
SHORTNESS OF BREATH: 0
NERVOUS/ANXIOUS: 0
ORTHOPNEA: 0
ABDOMINAL PAIN: 1
VOMITING: 0
COUGH: 0

## 2023-06-20 ASSESSMENT — PAIN DESCRIPTION - PAIN TYPE
TYPE: ACUTE PAIN

## 2023-06-20 ASSESSMENT — PAIN SCALES - WONG BAKER
WONGBAKER_NUMERICALRESPONSE: HURTS A LITTLE MORE
WONGBAKER_NUMERICALRESPONSE: HURTS EVEN MORE

## 2023-06-20 NOTE — PROGRESS NOTES
Patient noted with shortness of breath , contacted hospitalist to inform o2 sats decreased to 87 percent on 10 liters therefore increased oxygen to 15 liters per oxygen mask. LR stopped as patient noted to have crackles and reports feeling fullness to abdomen. Hospitalist ordered chest x ray.    2300 CXR completed. Informed hospitalist on call , Carolyn Mcguire , results of pulmonary edema.  LR discontinued, patient had to be placed on non rebreather to maintain o2 sats at 90%. Received order to admin 40 mg IV lasix. Patient reports she has leg cramps and itching from lasix.  Benadryl was ordered as well.    2330 25 mg PO Benadryl given and then 40mg IV lasix.    2345--Patient tolerated IV lasix without problems and continues on non rebreather with o2 sats at 90%.    0330 Informed hospitalist, Carolyn Mcguire, patient voided 400ml post IV lasix but continues with o2 sats 88-90% on 100% non rebreather, no new orders received.    0400--Contacted RT for evaluation and for neb treatment.  RT reported she would be here as soon as she can.    0630--Informed RT at bedside about evaluation due to desaturation and placement on non rebreather and neb tx.

## 2023-06-20 NOTE — DOCUMENTATION QUERY
[facility]                                                                       Query Response Note      PATIENT:               PEG XIONG  ACCT #:                  [id number]  MRN:                     [id number]  :                      1945  ADMIT DATE:       2023 7:46 PM  DISCH DATE:          RESPONDING  PROVIDER #:        [id number]           QUERY TEXT:    Patient diagnosed with emphysema, chronically on 8.5L oxygen at home.     During this admission, patient requiring up to 10L via oxymask. Per  Surgery progress note, ERCP was cancelled due to increased oxygen needs. Per  postoperative HM progress note, patient saturating  86-90% on 9 L oxymask.    Please clarify the clinical relevance for the clinical/diagnostic findings.      The patient's clinical indicators include:  78 yo F admitted with acute biliary pancreatitis/Choledocholithiasis s/p laparoscopic cholecystectomy    Clinical Indicators:  HM: On 8.5 L of oxygen at home. Saturating 95% on 10 L/min oxygen mask.   Surg Gen note: ERCP scheduled yesterday, canceled due to increased oxygen needs  :  Saturating 86-90% on 9 L oxymask.    Risk Factors: Emphysema, Chronic hypoxic respiratory failure, Advanced age, Afib. pain, s/p surgery on     Treatment: RT per protocol, home inhalers, Supplemental oxygen up to 10L/min for oxymask, monitor pulse oximetry, Imaging, Echo    Contact me with any questions.    Thank you for your time and attention,  Daisy Jean RN, [email or web address]  Connect via email, Voalte or messenger.  Options provided:   -- Acute on Chronic Hypoxemic respiratory failure   -- Chronic Hypoxemic respiratory failure only   -- Insignificant finding/no effect on patient stay and treatment   -- Other explanation, (please specify other explanation)      Query created by: Daisy Jean on 2023 1:50 PM    RESPONSE  TEXT:    Acute on Chronic Hypoxemic respiratory failure          Electronically signed by:  NEHA ANDREWS MD 6/19/2023 5:18 PM

## 2023-06-20 NOTE — CARE PLAN
The patient is Watcher - Medium risk of patient condition declining or worsening    Instructed on plan of care, meds. Pain controlled with current pain regimen. Fall risk protocol in place. Patient had episode of SOB wth desaturation and placed on non rebreather. CXR ordered and Lasix IV ordered .  Shift Goals  Clinical Goals: Pain management , o2 monitoring  Patient Goals: rest/comfort  Family Goals: NA    Progress made toward(s) clinical / shift goals:  progressing    Patient is not progressing towards the following goals:

## 2023-06-20 NOTE — PROGRESS NOTES
Received patient resting in bed on 10 liters of oxygen via oxygen mask.  Currently on LR at 250 ml/hr. PIV to ADRIA patent with dressing intact. Patient on continuous pulse ox due to shortness of breath with min exhertion. Bed in low position, wheels locked, side rails up x2 and call light within reach.

## 2023-06-21 ENCOUNTER — APPOINTMENT (OUTPATIENT)
Dept: RADIOLOGY | Facility: MEDICAL CENTER | Age: 78
DRG: 417 | End: 2023-06-21
Payer: COMMERCIAL

## 2023-06-21 LAB
ALBUMIN SERPL BCP-MCNC: 3 G/DL (ref 3.2–4.9)
ALBUMIN/GLOB SERPL: 1.1 G/DL
ALP SERPL-CCNC: 127 U/L (ref 30–99)
ALT SERPL-CCNC: 41 U/L (ref 2–50)
ANION GAP SERPL CALC-SCNC: 9 MMOL/L (ref 7–16)
ANISOCYTOSIS BLD QL SMEAR: ABNORMAL
AST SERPL-CCNC: 22 U/L (ref 12–45)
BASOPHILS # BLD AUTO: 0.2 % (ref 0–1.8)
BASOPHILS # BLD: 0.03 K/UL (ref 0–0.12)
BILIRUB SERPL-MCNC: 0.4 MG/DL (ref 0.1–1.5)
BUN SERPL-MCNC: 28 MG/DL (ref 8–22)
CALCIUM ALBUM COR SERPL-MCNC: 9.3 MG/DL (ref 8.5–10.5)
CALCIUM SERPL-MCNC: 8.5 MG/DL (ref 8.5–10.5)
CHLORIDE SERPL-SCNC: 102 MMOL/L (ref 96–112)
CO2 SERPL-SCNC: 29 MMOL/L (ref 20–33)
COMMENT 1642: NORMAL
CREAT SERPL-MCNC: 1.27 MG/DL (ref 0.5–1.4)
EOSINOPHIL # BLD AUTO: 0.26 K/UL (ref 0–0.51)
EOSINOPHIL NFR BLD: 2.1 % (ref 0–6.9)
ERYTHROCYTE [DISTWIDTH] IN BLOOD BY AUTOMATED COUNT: 55.3 FL (ref 35.9–50)
GFR SERPLBLD CREATININE-BSD FMLA CKD-EPI: 43 ML/MIN/1.73 M 2
GLOBULIN SER CALC-MCNC: 2.7 G/DL (ref 1.9–3.5)
GLUCOSE BLD STRIP.AUTO-MCNC: 174 MG/DL (ref 65–99)
GLUCOSE BLD STRIP.AUTO-MCNC: 196 MG/DL (ref 65–99)
GLUCOSE BLD STRIP.AUTO-MCNC: 257 MG/DL (ref 65–99)
GLUCOSE BLD STRIP.AUTO-MCNC: 276 MG/DL (ref 65–99)
GLUCOSE SERPL-MCNC: 212 MG/DL (ref 65–99)
HCT VFR BLD AUTO: 36.6 % (ref 37–47)
HGB BLD-MCNC: 10.7 G/DL (ref 12–16)
IMM GRANULOCYTES # BLD AUTO: 0.15 K/UL (ref 0–0.11)
IMM GRANULOCYTES NFR BLD AUTO: 1.2 % (ref 0–0.9)
LYMPHOCYTES # BLD AUTO: 0.92 K/UL (ref 1–4.8)
LYMPHOCYTES NFR BLD: 7.6 % (ref 22–41)
MAGNESIUM SERPL-MCNC: 1.9 MG/DL (ref 1.5–2.5)
MCH RBC QN AUTO: 27.2 PG (ref 27–33)
MCHC RBC AUTO-ENTMCNC: 29.2 G/DL (ref 32.2–35.5)
MCV RBC AUTO: 93.1 FL (ref 81.4–97.8)
MICROCYTES BLD QL SMEAR: ABNORMAL
MONOCYTES # BLD AUTO: 1.32 K/UL (ref 0–0.85)
MONOCYTES NFR BLD AUTO: 10.8 % (ref 0–13.4)
MORPHOLOGY BLD-IMP: NORMAL
NEUTROPHILS # BLD AUTO: 9.49 K/UL (ref 1.82–7.42)
NEUTROPHILS NFR BLD: 78.1 % (ref 44–72)
NRBC # BLD AUTO: 0 K/UL
NRBC BLD-RTO: 0 /100 WBC (ref 0–0.2)
NT-PROBNP SERPL IA-MCNC: ABNORMAL PG/ML (ref 0–125)
PLATELET # BLD AUTO: 223 K/UL (ref 164–446)
PLATELET BLD QL SMEAR: NORMAL
PMV BLD AUTO: 10.7 FL (ref 9–12.9)
POTASSIUM SERPL-SCNC: 4.2 MMOL/L (ref 3.6–5.5)
PROT SERPL-MCNC: 5.7 G/DL (ref 6–8.2)
RBC # BLD AUTO: 3.93 M/UL (ref 4.2–5.4)
RBC BLD AUTO: PRESENT
SODIUM SERPL-SCNC: 140 MMOL/L (ref 135–145)
WBC # BLD AUTO: 12.2 K/UL (ref 4.8–10.8)

## 2023-06-21 PROCEDURE — 770004 HCHG ROOM/CARE - ONCOLOGY PRIVATE *

## 2023-06-21 PROCEDURE — 700102 HCHG RX REV CODE 250 W/ 637 OVERRIDE(OP): Performed by: HOSPITALIST

## 2023-06-21 PROCEDURE — A9270 NON-COVERED ITEM OR SERVICE: HCPCS | Performed by: HOSPITALIST

## 2023-06-21 PROCEDURE — 82962 GLUCOSE BLOOD TEST: CPT | Mod: 91

## 2023-06-21 PROCEDURE — 700111 HCHG RX REV CODE 636 W/ 250 OVERRIDE (IP): Performed by: STUDENT IN AN ORGANIZED HEALTH CARE EDUCATION/TRAINING PROGRAM

## 2023-06-21 PROCEDURE — 99232 SBSQ HOSP IP/OBS MODERATE 35: CPT | Mod: GC | Performed by: FAMILY MEDICINE

## 2023-06-21 PROCEDURE — 71045 X-RAY EXAM CHEST 1 VIEW: CPT

## 2023-06-21 PROCEDURE — A9270 NON-COVERED ITEM OR SERVICE: HCPCS | Performed by: INTERNAL MEDICINE

## 2023-06-21 PROCEDURE — 700102 HCHG RX REV CODE 250 W/ 637 OVERRIDE(OP): Performed by: STUDENT IN AN ORGANIZED HEALTH CARE EDUCATION/TRAINING PROGRAM

## 2023-06-21 PROCEDURE — 83735 ASSAY OF MAGNESIUM: CPT

## 2023-06-21 PROCEDURE — 80053 COMPREHEN METABOLIC PANEL: CPT

## 2023-06-21 PROCEDURE — A9270 NON-COVERED ITEM OR SERVICE: HCPCS | Performed by: STUDENT IN AN ORGANIZED HEALTH CARE EDUCATION/TRAINING PROGRAM

## 2023-06-21 PROCEDURE — 94640 AIRWAY INHALATION TREATMENT: CPT

## 2023-06-21 PROCEDURE — 700111 HCHG RX REV CODE 636 W/ 250 OVERRIDE (IP)

## 2023-06-21 PROCEDURE — 700102 HCHG RX REV CODE 250 W/ 637 OVERRIDE(OP): Performed by: INTERNAL MEDICINE

## 2023-06-21 PROCEDURE — 83880 ASSAY OF NATRIURETIC PEPTIDE: CPT

## 2023-06-21 PROCEDURE — 36415 COLL VENOUS BLD VENIPUNCTURE: CPT

## 2023-06-21 PROCEDURE — 85025 COMPLETE CBC W/AUTO DIFF WBC: CPT

## 2023-06-21 RX ORDER — FUROSEMIDE 10 MG/ML
40 INJECTION INTRAMUSCULAR; INTRAVENOUS
Status: DISCONTINUED | OUTPATIENT
Start: 2023-06-22 | End: 2023-06-22

## 2023-06-21 RX ORDER — FUROSEMIDE 10 MG/ML
20 INJECTION INTRAMUSCULAR; INTRAVENOUS ONCE
Status: COMPLETED | OUTPATIENT
Start: 2023-06-21 | End: 2023-06-21

## 2023-06-21 RX ORDER — ACETAMINOPHEN 500 MG
1000 TABLET ORAL EVERY 6 HOURS PRN
Status: DISCONTINUED | OUTPATIENT
Start: 2023-06-21 | End: 2023-06-23

## 2023-06-21 RX ADMIN — METOPROLOL TARTRATE 50 MG: 50 TABLET, FILM COATED ORAL at 05:50

## 2023-06-21 RX ADMIN — AMLODIPINE BESYLATE 10 MG: 10 TABLET ORAL at 05:50

## 2023-06-21 RX ADMIN — FUROSEMIDE 20 MG: 10 INJECTION, SOLUTION INTRAVENOUS at 13:56

## 2023-06-21 RX ADMIN — INSULIN HUMAN 1 UNITS: 100 INJECTION, SOLUTION PARENTERAL at 12:08

## 2023-06-21 RX ADMIN — OXYCODONE HYDROCHLORIDE 5 MG: 5 TABLET ORAL at 14:44

## 2023-06-21 RX ADMIN — OXYCODONE HYDROCHLORIDE 2.5 MG: 5 TABLET ORAL at 07:58

## 2023-06-21 RX ADMIN — OMEPRAZOLE 20 MG: 20 CAPSULE, DELAYED RELEASE ORAL at 17:16

## 2023-06-21 RX ADMIN — INSULIN GLARGINE-YFGN 30 UNITS: 100 INJECTION, SOLUTION SUBCUTANEOUS at 17:13

## 2023-06-21 RX ADMIN — FEBUXOSTAT 40 MG: 40 TABLET, FILM COATED ORAL at 05:49

## 2023-06-21 RX ADMIN — HEPARIN SODIUM 5000 UNITS: 5000 INJECTION, SOLUTION INTRAVENOUS; SUBCUTANEOUS at 13:56

## 2023-06-21 RX ADMIN — TIOTROPIUM BROMIDE INHALATION SPRAY 5 MCG: 3.12 SPRAY, METERED RESPIRATORY (INHALATION) at 17:15

## 2023-06-21 RX ADMIN — INSULIN HUMAN 1 UNITS: 100 INJECTION, SOLUTION PARENTERAL at 09:18

## 2023-06-21 RX ADMIN — METOPROLOL TARTRATE 50 MG: 50 TABLET, FILM COATED ORAL at 17:16

## 2023-06-21 RX ADMIN — ALBUTEROL SULFATE 2 PUFF: 90 AEROSOL, METERED RESPIRATORY (INHALATION) at 05:48

## 2023-06-21 RX ADMIN — FUROSEMIDE 20 MG: 10 INJECTION INTRAMUSCULAR; INTRAVENOUS at 05:51

## 2023-06-21 RX ADMIN — INSULIN HUMAN 3 UNITS: 100 INJECTION, SOLUTION PARENTERAL at 20:12

## 2023-06-21 RX ADMIN — LISINOPRIL 10 MG: 10 TABLET ORAL at 05:54

## 2023-06-21 RX ADMIN — INSULIN HUMAN 3 UNITS: 100 INJECTION, SOLUTION PARENTERAL at 17:12

## 2023-06-21 RX ADMIN — HEPARIN SODIUM 5000 UNITS: 5000 INJECTION, SOLUTION INTRAVENOUS; SUBCUTANEOUS at 21:17

## 2023-06-21 RX ADMIN — ALBUTEROL SULFATE 2 PUFF: 90 AEROSOL, METERED RESPIRATORY (INHALATION) at 17:15

## 2023-06-21 RX ADMIN — HEPARIN SODIUM 5000 UNITS: 5000 INJECTION, SOLUTION INTRAVENOUS; SUBCUTANEOUS at 05:50

## 2023-06-21 RX ADMIN — ACETAMINOPHEN 1000 MG: 500 TABLET, FILM COATED ORAL at 23:04

## 2023-06-21 ASSESSMENT — PAIN DESCRIPTION - PAIN TYPE
TYPE: ACUTE PAIN

## 2023-06-21 NOTE — PROGRESS NOTES
"Hospital Medicine Daily Progress Note    Date of Service  6/20/2023    Chief Complaint  Siri Soils is a 77 y.o. female transferred from an outside hospital on 6/13/2023 where she presented with abdominal pain with nausea and vomiting     Hospital Course  Siri Solis is a 77 y.o. female with a complex PMHx of CKD-4, emphysema with chronic respiratory failure (on 8-9 L/min nasal cannula oxygen at home), diastolic CHF, atrial fibrillation (on Eliquis) who presented to outside hospital with abdominal pain found to have pancreatitis and acute choley, she was transferred to [facility] and admitted on 6/13/23.  On admission, creatinine was 2.45 AST was 853, ALT was 650, total bilirubin was 2.5, alk phos was 264.  Saturating 95% on 10 L/min oxygen mask.  Chest x-ray from 6/13/2023 showed possible edema versus pleural effusion. She was afebrile and hemodynamically stable. She was febrile at 101.3 and had elevated WBC of 13K prior to transfer.     Abdominal ultrasound showed several calcified gallstones, no wall thickening, CBD was normal. MRCP reported \"Choledocholithiasis is suspected with mild common bile duct dilation\".   Ceftriaxone and Flagyl were empirically started. GI and General Surgery were consulted.   CT abdomen and pelvis showed cholelithiasis.  MRCP from 6/16/2023 showed cholelithiasis with gallbladder sludge, no gallbladder wall thickening or significant pericholecystic fluid. Dilation of the common bile duct and central intrahepatic biliary tree with probable sludge (versus stone). She ultimately underwent Robotic assisted ap nathan on 6/18/2023.   Post operatively she has had increased O2 needs.     Interval Problem Update  Pt seen and examined, her abdominal pain is improved although . She denies feeling overtly more short of breath than her baseline, despite currently being on 15L oxymask and saturating 85-90%.   - I have reviewed her chest xray from 6/19 which does show low lung volume " and pulmonary vascular congestions. I have ordered daily IV lasix   - highly encourage IS and up to chair for meals and mobility as tolerated.   - she has continued to have hypoxia this afternoon, I have ordered High flow oxygen.   - her labs show improving WBC, stable renal function and improving liver function testing. Her BNP is elevate >34167 as is her pro-calcitonin. Will need to monitor for any signs of fever. Will continue to hold abx for now   - advance diet as tolerated to CHO      I have discussed this patient's plan of care and discharge plan at IDT rounds today with Case Management, Nursing, Nursing leadership, and other members of the IDT team.    Consultants/Specialty  general surgery and GI    Code Status  DNAR/DNI    Disposition  The patient is not medically cleared for discharge to home or a post-acute facility.      I have placed the appropriate orders for post-discharge needs.    Review of Systems  Review of Systems   Constitutional:  Positive for malaise/fatigue.   HENT:  Negative for congestion and sore throat.    Eyes:  Negative for blurred vision and double vision.   Respiratory:  Negative for cough, sputum production and shortness of breath.    Cardiovascular:  Positive for leg swelling. Negative for chest pain and orthopnea.   Gastrointestinal:  Positive for abdominal pain. Negative for nausea and vomiting.   Genitourinary:  Negative for dysuria and urgency.   Musculoskeletal:  Negative for myalgias.   Skin: Negative.    Neurological:  Negative for dizziness and headaches.   Endo/Heme/Allergies: Negative.    Psychiatric/Behavioral:  The patient is not nervous/anxious.         Physical Exam  Temp:  [36.1 °C (96.9 °F)-36.6 °C (97.8 °F)] 36.6 °C (97.8 °F)  Pulse:  [74-93] 93  Resp:  [17-20] 18  BP: (121-158)/(54-82) 158/81  SpO2:  [86 %-91 %] 86 %    Physical Exam  Constitutional:       Appearance: She is obese. She is ill-appearing. She is not toxic-appearing.   HENT:      Head: Normocephalic.       Mouth/Throat:      Mouth: Mucous membranes are moist.      Pharynx: Oropharynx is clear.   Eyes:      Extraocular Movements: Extraocular movements intact.      Conjunctiva/sclera: Conjunctivae normal.   Cardiovascular:      Rate and Rhythm: Normal rate and regular rhythm.      Heart sounds: Normal heart sounds.   Pulmonary:      Comments: Diminished breath sounds bilaterally, no significant rales or wheezing, exam limited by body habitus. On non-rebreather   Abdominal:      General: There is no distension.      Tenderness: There is abdominal tenderness. There is no guarding.      Comments: Postsurgical pain, lap sites appear well, abdomen is soft    Musculoskeletal:      Right lower leg: Edema present.      Left lower leg: Edema present.   Skin:     General: Skin is warm.   Neurological:      General: No focal deficit present.      Mental Status: She is alert. Mental status is at baseline.   Psychiatric:         Mood and Affect: Mood normal.         Behavior: Behavior normal.      Comments: Flat affect, answers questions but with limited response,  speaking for her for the most part      Improving,    Fluids    Intake/Output Summary (Last 24 hours) at 6/20/2023 1716  Last data filed at 6/20/2023 1300  Gross per 24 hour   Intake 328 ml   Output 800 ml   Net -472 ml           Laboratory  Recent Labs     06/18/23  0033 06/19/23  0155 06/20/23  0217   WBC 10.6 17.2* 13.3*   RBC 3.92* 4.07* 3.78*   HEMOGLOBIN 11.0* 11.1* 10.6*   HEMATOCRIT 36.2* 38.2 35.7*   MCV 92.3 93.9 94.4   MCH 28.1 27.3 28.0   MCHC 30.4* 29.1* 29.7*   RDW 56.1* 56.6* 57.2*   PLATELETCT 181 228 218   MPV 11.5 11.5 11.3       Recent Labs     06/18/23  0033 06/19/23  0155 06/20/23  0217   SODIUM 138 137 138   POTASSIUM 3.8 4.3 4.0   CHLORIDE 100 99 100   CO2 29 26 28   GLUCOSE 133* 255* 156*   BUN 24* 29* 33*   CREATININE 1.00 1.25 1.41*   CALCIUM 9.4 8.7 8.5                     Imaging  DX-CHEST-LIMITED (1 VIEW)   Final Result         1.   Pulmonary edema and/or infiltrates, appear somewhat increased since prior study.   2.  Cardiomegaly   3.  Atherosclerosis      IR-US GUIDED PIV   Final Result    Ultrasound-guided PERIPHERAL IV INSERTION performed by    qualified nursing staff as above.      IR-US GUIDED PIV   Final Result    Ultrasound-guided PERIPHERAL IV INSERTION performed by    qualified nursing staff as above.      [id number]-I/O   Final Result      1.  There is cholelithiasis with gallbladder sludge but no gallbladder wall thickening or significant pericholecystic fluid.   2.  There is dilatation of the common bile duct and central intrahepatic biliary tree with probable tumefactive sludge in the distal duct favored over an obstructing stone.   3.  There is a trace of peripancreatic fluid and perirenal fluid possibly related to volume status. Recommend correlation with lipase level in regards to any potential pancreatitis.   4.  There are several cystic areas seen in the pancreatic head and uncinate process, possibly side branch IPMN's.   5.  Previously noted right adrenal gland mass not well characterized on this exam.   6.  Exam somewhat limited by breathing motion artifact.      EC-ECHOCARDIOGRAM COMPLETE W/O CONT   Final Result      CT-ABDOMEN-PELVIS W/O   Final Result         1.  Small hiatal hernia   2.  Cholelithiasis   3.  Atherosclerosis and atherosclerotic coronary artery disease      DX-CHEST-LIMITED (1 VIEW)   Final Result         1.  Mild pulmonary edema and/or infiltrates.   2.  Cardiomegaly   3.  Atherosclerosis      IR-US GUIDED PIV   Final Result    Ultrasound-guided PERIPHERAL IV INSERTION performed by    qualified nursing staff as above.      DX-CHEST-LIMITED (1 VIEW)   Final Result      New minor fissure thickening could be from edema favored over pleural fluid      Stable cardiac silhouette and hilar enlargement with right hemidiaphragm elevation             Assessment/Plan  * Choledocholithiasis  Assessment & Plan  MRCP  "from outside facility reported \"choledocholithiasis is suspected with mild common bile duct dilation\".  LFTs, total bilirubin, and lipase were elevated.   Ceftriaxone and Flagyl were empirically started.  GI and general surgery were consulted. Underwent laparoscopic cholecystectomy on 6/18/2023.  General surgery signed off, and recommended follow up with Dr. Ricks or ACS clinic, low-fat diet for 3 weeks, ok to shower, keeping incisions as dry as possible, do not submerge.    Other chest pain  Assessment & Plan  Patient had a brief episode of chest pain.  EKG showed sinus tachycardia.  Troponin was unremarkable (30).  Echocardiogram showed  LVEF ~ 60%, normal left and right ventricular function, normal regional wall motion. Metoprolol added.  She had no further episodes of chest pain    Atrial fibrillation (HCC)  Assessment & Plan  Continue metoprolol.  Eliquis was held on admission    Abdominal pain- (present on admission)  Assessment & Plan  Likely secondary to gallstone pancreatitis.  Underwent laparoscopic cholecystectomy on 6/18/2023.  Complains of postop pain, continue pain management    Stage 4 chronic kidney disease (HCC)- (present on admission)  Assessment & Plan  Creatinine is at baseline. Avoid nephrotoxins.  Monitor    Chronic obstructive pulmonary disease (HCC)- (present on admission)  Assessment & Plan  History of emphysema, on about 8.5 L/min nasal cannula oxygen at home. Continue RT per protocol, and home inhalers    Elevated liver enzymes- (present on admission)  Assessment & Plan  LFTs are improving.  Total bilirubin is normal.    Diabetic peripheral neuropathy associated with type 2 diabetes mellitus (HCC)- (present on admission)  Assessment & Plan  Continue insulin and sliding scale insulin    Acute pancreatitis without necrosis or infection, unspecified- (present on admission)  Assessment & Plan  Lipase normalized.  Currently having post surgical pain.     Chronic respiratory failure with " hypoxia (HCC)- (present on admission)  Assessment & Plan  History of tobacco use, quit 20 years ago.  Diagnosed with emphysema.  On 8.5 L of oxygen at home. Saturating 89-90% on 9 L/min oxygen mask.  Monitor closely    CHF (congestive heart failure) (HCC)- (present on admission)  Assessment & Plan  Diastolic CHF.  LVEF on 9/14/2023 showed LVEF of about 60-65%.  On IVFs for acute pancreatitis, monitor for fluid overload.    Anemia- (present on admission)  Assessment & Plan  Likely due to CKD-4. At baseline. Monitor.     Hypertension- (present on admission)  Assessment & Plan  Continue home lisinopril. Norvasc was added, dose was increased. Home Metoprolol resumed, dose increased.  PRN Labetalol added due to episodes of elevated blood pressure.         VTE prophylaxis: SCDs/TEDs and heparin ppx    Patient is critically ill.   The patient continues to have: acute on chronic respiratory failure   The vital organ system that is affected is the: respiratory   If untreated there is a high chance of deterioration into: worsening respiratory failure and eventually death.   The critical care that I am providing today is: high flow oxygen, IV diuresis, close Respiratory monitoring   The critical that has been undertaken is medically complex.   There has been no overlap in critical care time.   Critical Care Time not including procedures: 52

## 2023-06-21 NOTE — PROGRESS NOTES
"Assumed care of this pt at 0700. Report received by night shift RN. Pt is AnOx4. C/o \"discomfort and pressure\" in lower abd. RT was paged for O2 sats 82% at rest on 15L oxy mask. Bed is in lowest position with call light within reach. Pt ambulated SBA to bathroom.    "

## 2023-06-21 NOTE — PROGRESS NOTES
[facility] MEDICINE PROGRESS NOTE     Attending: Armando Alvarado MD    Resident: González Burton MD    PATIENT: Siri Solis; [id number]; 1945    ID:   Siri Solis is a 77 y.o. female transferred from an outside hospital on 6/13/2023 where she presented with abdominal pain with nausea and vomiting     SUBJECTIVE: No acute events overnight, continued needing HFNC. Today feels well but hypoxic.    OBJECTIVE:  Temp:  [36.2 °C (97.1 °F)-36.6 °C (97.9 °F)] 36.4 °C (97.6 °F)  Pulse:  [79-93] 86  Resp:  [17-18] 17  BP: (134-158)/(70-81) 150/74  SpO2:  [86 %-90 %] 89 %      Intake/Output Summary (Last 24 hours) at 6/21/2023 0657  Last data filed at 6/20/2023 1400  Gross per 24 hour   Intake 358 ml   Output 400 ml   Net -42 ml       PE:   General: No acute distress, resting comfortably in bed.  HEENT: NC/AT. EOMI. MMM  Cardiovascular: RRR, no m/r/g, normal S1/S2  Respiratory: Symmetric inspiratory effort.  Bilateral crackles lower lung fields  Abdomen: BS+, soft, NT/ND   EXT:  REY, 2+ radial pulses, no lower extremity edema bilaterally  Neuro: Non focal, alert and oriented    LABS:  Recent Labs     06/19/23  0155 06/20/23  0217 06/21/23  0105   WBC 17.2* 13.3* 12.2*   RBC 4.07* 3.78* 3.93*   HEMOGLOBIN 11.1* 10.6* 10.7*   HEMATOCRIT 38.2 35.7* 36.6*   MCV 93.9 94.4 93.1   MCH 27.3 28.0 27.2   RDW 56.6* 57.2* 55.3*   PLATELETCT 228 218 223   MPV 11.5 11.3 10.7   NEUTSPOLYS 88.10* 79.80* 78.10*   LYMPHOCYTES 3.10* 7.40* 7.60*   MONOCYTES 7.50 10.00 10.80   EOSINOPHILS 0.00 1.70 2.10   BASOPHILS 0.30 0.20 0.20   RBCMORPHOLO  --   --  Present     Recent Labs     06/19/23 0155 06/20/23  0217 06/21/23  0105   SODIUM 137 138 140   POTASSIUM 4.3 4.0 4.2   CHLORIDE 99 100 102   CO2 26 28 29   BUN 29* 33* 28*   CREATININE 1.25 1.41* 1.27   CALCIUM 8.7 8.5 8.5   MAGNESIUM  --   --  1.9   ALBUMIN 3.2 3.0* 3.0*     Estimated GFR/CRCL = CrCl cannot be calculated (Unknown ideal weight.).  Recent Labs     06/19/23 0155  06/20/23  0217 06/21/23  0105   GLUCOSE 255* 156* 212*     Recent Labs     06/19/23  0155 06/20/23  0217 06/21/23  0105   ASTSGOT 34 28 22   ALTSGPT 74* 53* 41   TBILIRUBIN 0.4 0.4 0.4   ALKPHOSPHAT 141* 127* 127*   GLOBULIN 2.7 2.7 2.7             No results for input(s): INR, APTT, FIBRINOGEN in the last 72 hours.    Invalid input(s): DIMER    MICROBIOLOGY:  Results       ** No results found for the last 168 hours. **            IMAGING:  DX-CHEST-LIMITED (1 VIEW)   Final Result         1.  Pulmonary edema and/or infiltrates, appear somewhat increased since prior study.   2.  Cardiomegaly   3.  Atherosclerosis      IR-US GUIDED PIV   Final Result    Ultrasound-guided PERIPHERAL IV INSERTION performed by    qualified nursing staff as above.      IR-US GUIDED PIV   Final Result    Ultrasound-guided PERIPHERAL IV INSERTION performed by    qualified nursing staff as above.      [id number]   Final Result      1.  There is cholelithiasis with gallbladder sludge but no gallbladder wall thickening or significant pericholecystic fluid.   2.  There is dilatation of the common bile duct and central intrahepatic biliary tree with probable tumefactive sludge in the distal duct favored over an obstructing stone.   3.  There is a trace of peripancreatic fluid and perirenal fluid possibly related to volume status. Recommend correlation with lipase level in regards to any potential pancreatitis.   4.  There are several cystic areas seen in the pancreatic head and uncinate process, possibly side branch IPMN's.   5.  Previously noted right adrenal gland mass not well characterized on this exam.   6.  Exam somewhat limited by breathing motion artifact.      EC-ECHOCARDIOGRAM COMPLETE W/O CONT   Final Result      CT-ABDOMEN-PELVIS W/O   Final Result         1.  Small hiatal hernia   2.  Cholelithiasis   3.  Atherosclerosis and atherosclerotic coronary artery disease      DX-CHEST-LIMITED (1 VIEW)   Final Result         1.  Mild  pulmonary edema and/or infiltrates.   2.  Cardiomegaly   3.  Atherosclerosis      IR-US GUIDED PIV   Final Result    Ultrasound-guided PERIPHERAL IV INSERTION performed by    qualified nursing staff as above.      DX-CHEST-LIMITED (1 VIEW)   Final Result      New minor fissure thickening could be from edema favored over pleural fluid      Stable cardiac silhouette and hilar enlargement with right hemidiaphragm elevation          MEDS:  Current Facility-Administered Medications   Medication Last Admin    Respiratory Therapy Consult      ipratropium-albuterol (DUONEB) nebulizer solution 3 mL at 06/20/23 0802    furosemide (LASIX) injection 20 mg 20 mg at 06/21/23 0551    heparin injection 5,000 Units 5,000 Units at 06/21/23 0550    diphenhydrAMINE (BENADRYL) tablet/capsule 25 mg 25 mg at 06/19/23 2344    amLODIPine (NORVASC) tablet 10 mg 10 mg at 06/21/23 0550    metoprolol tartrate (LOPRESSOR) tablet 50 mg 50 mg at 06/21/23 0550    oxyCODONE immediate-release (ROXICODONE) tablet 2.5 mg 2.5 mg at 06/15/23 2310    Or    oxyCODONE immediate-release (ROXICODONE) tablet 5 mg 5 mg at 06/20/23 2116    Or    morphine 4 MG/ML injection 2 mg 2 mg at 06/19/23 1707    hydrALAZINE (APRESOLINE) injection 20 mg 20 mg at 06/18/23 2307    albuterol inhaler 2 Puff 2 Puff at 06/21/23 0548    febuxostat (ULORIC) tablet 40 mg 40 mg at 06/21/23 0549    insulin GLARGINE (Lantus,Semglee) injection 30 Units at 06/20/23 1650    lisinopril (PRINIVIL) tablet 10 mg 10 mg at 06/21/23 0554    omeprazole (PRILOSEC) capsule 20 mg 20 mg at 06/20/23 1646    ondansetron (ZOFRAN) syringe/vial injection 4 mg 4 mg at 06/14/23 1704    insulin regular (HumuLIN R,NovoLIN R) injection 2 Units at 06/20/23 2042    And    dextrose 10 % BOLUS 25 g      Pharmacy Consult Request ...Pain Management Review 1 Each      tiotropium (Spiriva Respimat) 2.5 mcg/Act inhalation spray 5 mcg 5 mcg at 06/20/23 9582       PROBLEM LIST:  No problems updated.    ASSESSMENT/PLAN:  "  Siri Solis is a 77 y.o. female with choledocolithiasis.    Abdominal pain  Likely secondary to gallstone pancreatitis.  Underwent laparoscopic cholecystectomy on 6/18/2023.  Complains of postop pain, continue pain management  Overall improving   Liver enzymes improving   Tolerating diet without issues, on regular diabetic diet with low fat    Acute pancreatitis without necrosis or infection, unspecified, resolved  Lipase normalized.  Currently having post surgical pain well controlled with medication    Anemia  Likely due to CKD. At baseline. Monitor.     CHF (congestive heart failure) (HCC)  Diastolic CHF.  LVEF on 9/14/2023 showed LVEF of about 60-65%.  On IVFs for acute pancreatitis, monitor for fluid overload, appears to be fluid overloaded today on exam.  Diuresing with 40mg lasix BID IV    Choledocholithiasis  MRCP from outside facility reported \"choledocholithiasis is suspected with mild common bile duct dilation\".  LFTs, total bilirubin, and lipase were elevated.   Ceftriaxone and Flagyl were empirically started.  GI and general surgery were consulted. Underwent laparoscopic cholecystectomy on 6/18/2023.  General surgery signed off, and recommended follow up with Dr. Ricks or ACS clinic, low-fat diet for 3 weeks, ok to shower, keeping incisions as dry as possible, do not submerge.    Chronic obstructive pulmonary disease (HCC)  History of emphysema, on about 8.5 L/min nasal cannula oxygen at home. Continue RT per protocol, and home inhalers    Acute on chronic respiratory failure with hypoxia (HCC)  History of tobacco use, quit 20 years ago.  Diagnosed with emphysema.  On 8-9 L of oxygen at home  Post operatively pt has had increased O2 needs, on 40L high flow NC today which improved after diuresis.  CXR from 6/19 reviewed, pt has low lung volumes, vascular congestion   Continue IV lasix, increased.  Encourage IS and mobility, Likely atelectasis component as well as HF  If no improvement with IS and " diuresis may need to evaluate further with CT    Diabetic peripheral neuropathy associated with type 2 diabetes mellitus (HCC)  Stable, Continue insulin and sliding scale insulin    Elevated liver enzymes  LFTs are improving s/p lap nathan.  Total bilirubin is normal.    Hypertension  Continue home lisinopril. Norvasc was added, dose was increased. Home Metoprolol resumed, dose increased.  PRN Labetalol added due to episodes of elevated blood pressure.  Still with some intermittent elevated BP but overall trend is improved   Continue current regiment and monitor     Stage 4 chronic kidney disease (HCC)  FLAVIA on admission with bump in Cr 1.5 x baseline   This is now improved and she is at baseline.   Avoid nephrotoxins  Monitor renal function while giving IV lasix with daily BMP   Repeat in AM     Atrial fibrillation (HCC)  Continue metoprolol.  Eliquis was held on admission    Other chest pain  Patient had a brief episode of chest pain.  EKG showed sinus tachycardia.  Troponin was unremarkable (30).  Echocardiogram showed  LVEF ~ 60%, normal left and right ventricular function, normal regional wall motion. Metoprolol was added.  She had no further episodes of chest pain    Choledocholithiasis  6/16 MRCP completed with sludge and gallbladder wall thickening.  6/18 Robotic assisted laparoscopic cholecystectomy.  ACS [facility] team.      #Disposition: Inpatient    Core Measures:  Fluids: none  Lines: PIV  Abx: none  Diet: CHO w low lipids  PPX: heparin    CODE STATUS: DNR/DNI    González Burton MD  PGY2  [facility]

## 2023-06-21 NOTE — CARE PLAN
The patient is Watcher - Medium risk of patient condition declining or worsening    Shift Goals  Clinical Goals: Monitor O2 status  Patient Goals: Rest  Family Goals: JOON    Progress made toward(s) clinical / shift goals:        Problem: Knowledge Deficit - Standard  Goal: Patient and family/care givers will demonstrate understanding of plan of care, disease process/condition, diagnostic tests and medications  Outcome: Progressing  Note: Discussed POC with pt and spouse, plan for respiratory therapist to initiate high flow O2.  in place to monitor. Pt understands and agrees.      Problem: Fall Risk  Goal: Patient will remain free from falls  Outcome: Progressing  Note: Pt is A&Ox4, x1 assist to bedside commode, calls appropriately for assistance.

## 2023-06-21 NOTE — CARE PLAN
The patient is Stable - Low risk of patient condition declining or worsening    Shift Goals  Clinical Goals: Pain management  Patient Goals: Rest  Family Goals: NA    Progress made toward(s) clinical / shift goals:  Patient resting.    Patient is not progressing towards the following goals:

## 2023-06-21 NOTE — CARE PLAN
The patient is Stable - Low risk of patient condition declining or worsening    Shift Goals  Clinical Goals: Pain control, breathing monitoring  Patient Goals: rest. comfort  Family Goals: NA    Progress made toward(s) clinical / shift goals:  Patient is alert and oriented, refused to have the non-rebreather mask and to wear to oxy-mask, Up to the restroom frequently to void. After returning to bed, she easily desaturated to 84-85% then she gets better after a while.    Patient is not progressing towards the following goals:    Problem: Knowledge Deficit - Standard  Goal: Patient and family/care givers will demonstrate understanding of plan of care, disease process/condition, diagnostic tests and medications  Description: Target End Date:  1-3 days or as soon as patient condition allows    Document in Patient Education    1.  Patient and family/caregiver oriented to unit, equipment, visitation policy and means for communicating concern  2.  Complete/review Learning Assessment  3.  Assess knowledge level of disease process/condition, treatment plan, diagnostic tests and medications  4.  Explain disease process/condition, treatment plan, diagnostic tests and medications  Outcome: Progressing     Problem: Pain - Standard  Goal: Alleviation of pain or a reduction in pain to the patient’s comfort goal  Description: Target End Date:  Prior to discharge or change in level of care    Document on Vitals flowsheet    1.  Document pain using the appropriate pain scale per order or unit policy  2.  Educate and implement non-pharmacologic comfort measures (i.e. relaxation, distraction, massage, cold/heat therapy, etc.)  3.  Pain management medications as ordered  4.  Reassess pain after pain med administration per policy  5.  If opiods administered assess patient's response to pain medication is appropriate per POSS sedation scale  6.  Follow pain management plan developed in collaboration with patient and interdisciplinary team  (including palliative care or pain specialists if applicable)  Outcome: Progressing     Problem: Fall Risk  Goal: Patient will remain free from falls  Description: Target End Date:  Prior to discharge or change in level of care    Document interventions on the Cheli Guillaume Fall Risk Assessment    1.  Assess for fall risk factors  2.  Implement fall precautions  Outcome: Progressing

## 2023-06-21 NOTE — DIETARY
Nutrition Services: Update   Day 8 of admit.  Siri Solis is a 77 y.o. female with admitting DX of Abdominal pain, Choledocholithiasis.    Pt followed by RD for diet advancement and adequacy of PO intake.    Diet advanced on 6/20 to Consistent CHO, Low Fat diet. Recorded PO intake today has been poor at 25-50%. Noted pt required high flow O2 today which may be affecting appetite.     Malnutrition Risk: At risk due to 7 days of inadequate nutrition intake.    Recommendations/Plan:   Encourage intake of meals >50%.  Consider addition of oral nutrition supplements if PO intake remains poor.  Document intake of all meals as % taken in ADL's to provide interdisciplinary communication across all shifts.   Monitor weight.  Nutrition rep will continue to see patient for ongoing meal and snack preferences.    RD following

## 2023-06-22 LAB
ALBUMIN SERPL BCP-MCNC: 2.8 G/DL (ref 3.2–4.9)
ALBUMIN/GLOB SERPL: 0.9 G/DL
ALP SERPL-CCNC: 120 U/L (ref 30–99)
ALT SERPL-CCNC: 32 U/L (ref 2–50)
ANION GAP SERPL CALC-SCNC: 11 MMOL/L (ref 7–16)
AST SERPL-CCNC: 23 U/L (ref 12–45)
BASOPHILS # BLD AUTO: 0.4 % (ref 0–1.8)
BASOPHILS # BLD: 0.04 K/UL (ref 0–0.12)
BILIRUB SERPL-MCNC: 0.4 MG/DL (ref 0.1–1.5)
BUN SERPL-MCNC: 19 MG/DL (ref 8–22)
CALCIUM ALBUM COR SERPL-MCNC: 9.5 MG/DL (ref 8.5–10.5)
CALCIUM SERPL-MCNC: 8.5 MG/DL (ref 8.5–10.5)
CHLORIDE SERPL-SCNC: 101 MMOL/L (ref 96–112)
CO2 SERPL-SCNC: 28 MMOL/L (ref 20–33)
CREAT SERPL-MCNC: 1.09 MG/DL (ref 0.5–1.4)
EKG IMPRESSION: NORMAL
EOSINOPHIL # BLD AUTO: 0.28 K/UL (ref 0–0.51)
EOSINOPHIL NFR BLD: 2.6 % (ref 0–6.9)
ERYTHROCYTE [DISTWIDTH] IN BLOOD BY AUTOMATED COUNT: 53 FL (ref 35.9–50)
GFR SERPLBLD CREATININE-BSD FMLA CKD-EPI: 52 ML/MIN/1.73 M 2
GLOBULIN SER CALC-MCNC: 3 G/DL (ref 1.9–3.5)
GLUCOSE BLD STRIP.AUTO-MCNC: 131 MG/DL (ref 65–99)
GLUCOSE BLD STRIP.AUTO-MCNC: 193 MG/DL (ref 65–99)
GLUCOSE BLD STRIP.AUTO-MCNC: 231 MG/DL (ref 65–99)
GLUCOSE BLD STRIP.AUTO-MCNC: 231 MG/DL (ref 65–99)
GLUCOSE SERPL-MCNC: 141 MG/DL (ref 65–99)
HCT VFR BLD AUTO: 33.9 % (ref 37–47)
HGB BLD-MCNC: 10.2 G/DL (ref 12–16)
IMM GRANULOCYTES # BLD AUTO: 0.12 K/UL (ref 0–0.11)
IMM GRANULOCYTES NFR BLD AUTO: 1.1 % (ref 0–0.9)
LYMPHOCYTES # BLD AUTO: 1.07 K/UL (ref 1–4.8)
LYMPHOCYTES NFR BLD: 9.9 % (ref 22–41)
MCH RBC QN AUTO: 27.5 PG (ref 27–33)
MCHC RBC AUTO-ENTMCNC: 30.1 G/DL (ref 32.2–35.5)
MCV RBC AUTO: 91.4 FL (ref 81.4–97.8)
MONOCYTES # BLD AUTO: 1.35 K/UL (ref 0–0.85)
MONOCYTES NFR BLD AUTO: 12.5 % (ref 0–13.4)
NEUTROPHILS # BLD AUTO: 7.95 K/UL (ref 1.82–7.42)
NEUTROPHILS NFR BLD: 73.5 % (ref 44–72)
NRBC # BLD AUTO: 0 K/UL
NRBC BLD-RTO: 0 /100 WBC (ref 0–0.2)
PLATELET # BLD AUTO: 244 K/UL (ref 164–446)
PMV BLD AUTO: 11.2 FL (ref 9–12.9)
POTASSIUM SERPL-SCNC: 3.7 MMOL/L (ref 3.6–5.5)
PROT SERPL-MCNC: 5.8 G/DL (ref 6–8.2)
RBC # BLD AUTO: 3.71 M/UL (ref 4.2–5.4)
SODIUM SERPL-SCNC: 140 MMOL/L (ref 135–145)
WBC # BLD AUTO: 10.8 K/UL (ref 4.8–10.8)

## 2023-06-22 PROCEDURE — 93005 ELECTROCARDIOGRAM TRACING: CPT

## 2023-06-22 PROCEDURE — 85025 COMPLETE CBC W/AUTO DIFF WBC: CPT

## 2023-06-22 PROCEDURE — 700111 HCHG RX REV CODE 636 W/ 250 OVERRIDE (IP): Performed by: STUDENT IN AN ORGANIZED HEALTH CARE EDUCATION/TRAINING PROGRAM

## 2023-06-22 PROCEDURE — 700102 HCHG RX REV CODE 250 W/ 637 OVERRIDE(OP)

## 2023-06-22 PROCEDURE — 80053 COMPREHEN METABOLIC PANEL: CPT

## 2023-06-22 PROCEDURE — 94640 AIRWAY INHALATION TREATMENT: CPT

## 2023-06-22 PROCEDURE — A9270 NON-COVERED ITEM OR SERVICE: HCPCS | Performed by: HOSPITALIST

## 2023-06-22 PROCEDURE — 36415 COLL VENOUS BLD VENIPUNCTURE: CPT

## 2023-06-22 PROCEDURE — A9270 NON-COVERED ITEM OR SERVICE: HCPCS | Performed by: INTERNAL MEDICINE

## 2023-06-22 PROCEDURE — A9270 NON-COVERED ITEM OR SERVICE: HCPCS

## 2023-06-22 PROCEDURE — 770004 HCHG ROOM/CARE - ONCOLOGY PRIVATE *

## 2023-06-22 PROCEDURE — 700111 HCHG RX REV CODE 636 W/ 250 OVERRIDE (IP)

## 2023-06-22 PROCEDURE — 99233 SBSQ HOSP IP/OBS HIGH 50: CPT | Mod: GC | Performed by: FAMILY MEDICINE

## 2023-06-22 PROCEDURE — 700102 HCHG RX REV CODE 250 W/ 637 OVERRIDE(OP): Performed by: INTERNAL MEDICINE

## 2023-06-22 PROCEDURE — 700102 HCHG RX REV CODE 250 W/ 637 OVERRIDE(OP): Performed by: HOSPITALIST

## 2023-06-22 PROCEDURE — 82962 GLUCOSE BLOOD TEST: CPT | Mod: 91

## 2023-06-22 PROCEDURE — 302118 SHAMPOO,NO RINSE: Performed by: FAMILY MEDICINE

## 2023-06-22 PROCEDURE — 93010 ELECTROCARDIOGRAM REPORT: CPT | Performed by: INTERNAL MEDICINE

## 2023-06-22 RX ORDER — POTASSIUM CHLORIDE 20 MEQ/1
20 TABLET, EXTENDED RELEASE ORAL DAILY
Status: DISCONTINUED | OUTPATIENT
Start: 2023-06-22 | End: 2023-06-28 | Stop reason: HOSPADM

## 2023-06-22 RX ORDER — FUROSEMIDE 10 MG/ML
80 INJECTION INTRAMUSCULAR; INTRAVENOUS
Status: DISCONTINUED | OUTPATIENT
Start: 2023-06-22 | End: 2023-06-27

## 2023-06-22 RX ORDER — FUROSEMIDE 10 MG/ML
80 INJECTION INTRAMUSCULAR; INTRAVENOUS ONCE
Status: COMPLETED | OUTPATIENT
Start: 2023-06-22 | End: 2023-06-22

## 2023-06-22 RX ADMIN — OMEPRAZOLE 20 MG: 20 CAPSULE, DELAYED RELEASE ORAL at 17:15

## 2023-06-22 RX ADMIN — HEPARIN SODIUM 5000 UNITS: 5000 INJECTION, SOLUTION INTRAVENOUS; SUBCUTANEOUS at 05:07

## 2023-06-22 RX ADMIN — TIOTROPIUM BROMIDE INHALATION SPRAY 5 MCG: 3.12 SPRAY, METERED RESPIRATORY (INHALATION) at 17:15

## 2023-06-22 RX ADMIN — DIPHENHYDRAMINE HYDROCHLORIDE 25 MG: 25 TABLET ORAL at 22:51

## 2023-06-22 RX ADMIN — METOPROLOL TARTRATE 50 MG: 50 TABLET, FILM COATED ORAL at 05:08

## 2023-06-22 RX ADMIN — LISINOPRIL 10 MG: 10 TABLET ORAL at 05:08

## 2023-06-22 RX ADMIN — FUROSEMIDE 80 MG: 10 INJECTION INTRAMUSCULAR; INTRAVENOUS at 21:11

## 2023-06-22 RX ADMIN — HEPARIN SODIUM 5000 UNITS: 5000 INJECTION, SOLUTION INTRAVENOUS; SUBCUTANEOUS at 14:05

## 2023-06-22 RX ADMIN — HEPARIN SODIUM 5000 UNITS: 5000 INJECTION, SOLUTION INTRAVENOUS; SUBCUTANEOUS at 21:11

## 2023-06-22 RX ADMIN — FEBUXOSTAT 40 MG: 40 TABLET, FILM COATED ORAL at 07:56

## 2023-06-22 RX ADMIN — ALBUTEROL SULFATE 2 PUFF: 90 AEROSOL, METERED RESPIRATORY (INHALATION) at 17:15

## 2023-06-22 RX ADMIN — FUROSEMIDE 40 MG: 10 INJECTION INTRAMUSCULAR; INTRAVENOUS at 05:08

## 2023-06-22 RX ADMIN — ALBUTEROL SULFATE 2 PUFF: 90 AEROSOL, METERED RESPIRATORY (INHALATION) at 06:00

## 2023-06-22 RX ADMIN — INSULIN HUMAN 2 UNITS: 100 INJECTION, SOLUTION PARENTERAL at 21:20

## 2023-06-22 RX ADMIN — OXYCODONE HYDROCHLORIDE 5 MG: 5 TABLET ORAL at 15:04

## 2023-06-22 RX ADMIN — INSULIN HUMAN 2 UNITS: 100 INJECTION, SOLUTION PARENTERAL at 17:12

## 2023-06-22 RX ADMIN — METOPROLOL TARTRATE 50 MG: 50 TABLET, FILM COATED ORAL at 17:15

## 2023-06-22 RX ADMIN — INSULIN GLARGINE-YFGN 30 UNITS: 100 INJECTION, SOLUTION SUBCUTANEOUS at 17:13

## 2023-06-22 RX ADMIN — FUROSEMIDE 80 MG: 10 INJECTION INTRAMUSCULAR; INTRAVENOUS at 14:05

## 2023-06-22 RX ADMIN — INSULIN HUMAN 1 UNITS: 100 INJECTION, SOLUTION PARENTERAL at 12:04

## 2023-06-22 RX ADMIN — AMLODIPINE BESYLATE 10 MG: 10 TABLET ORAL at 05:08

## 2023-06-22 RX ADMIN — POTASSIUM CHLORIDE 20 MEQ: 1500 TABLET, EXTENDED RELEASE ORAL at 06:19

## 2023-06-22 ASSESSMENT — PAIN DESCRIPTION - PAIN TYPE
TYPE: ACUTE PAIN
TYPE: ACUTE PAIN

## 2023-06-22 ASSESSMENT — COGNITIVE AND FUNCTIONAL STATUS - GENERAL
DRESSING REGULAR UPPER BODY CLOTHING: A LITTLE
MOVING FROM LYING ON BACK TO SITTING ON SIDE OF FLAT BED: A LOT
CLIMB 3 TO 5 STEPS WITH RAILING: A LOT
STANDING UP FROM CHAIR USING ARMS: A LOT
HELP NEEDED FOR BATHING: A LITTLE
DAILY ACTIVITIY SCORE: 17
WALKING IN HOSPITAL ROOM: A LITTLE
PERSONAL GROOMING: A LITTLE
MOBILITY SCORE: 14
SUGGESTED CMS G CODE MODIFIER DAILY ACTIVITY: CK
SUGGESTED CMS G CODE MODIFIER MOBILITY: CL
TOILETING: A LOT
TURNING FROM BACK TO SIDE WHILE IN FLAT BAD: A LITTLE
MOVING TO AND FROM BED TO CHAIR: A LOT
DRESSING REGULAR LOWER BODY CLOTHING: A LOT

## 2023-06-22 NOTE — CARE PLAN
The patient is Watcher - Medium risk of patient condition declining or worsening    Shift Goals  Clinical Goals: Monitor O2, pain  Patient Goals: Rest  Family Goals: JOON    Progress made toward(s) clinical / shift goals:        Problem: Knowledge Deficit - Standard  Goal: Patient and family/care givers will demonstrate understanding of plan of care, disease process/condition, diagnostic tests and medications  Description: Target End Date:  1-3 days or as soon as patient condition allows    Document in Patient Education    1.  Patient and family/caregiver oriented to unit, equipment, visitation policy and means for communicating concern  2.  Complete/review Learning Assessment  3.  Assess knowledge level of disease process/condition, treatment plan, diagnostic tests and medications  4.  Explain disease process/condition, treatment plan, diagnostic tests and medications  Note: Patient understands the need for continued monitoring of O2 sats.

## 2023-06-22 NOTE — PROGRESS NOTES
[facility] [place] FAMILY MEDICINE PROGRESS NOTE     Attending: Justo Hurtado MD    Resident: González Burton MD    PATIENT: Siri Solis; [id number]; 1945    ID:   Siri Solis is a 77 y.o. female transferred from an outside hospital on 6/13/2023 where she presented with abdominal pain with nausea and vomiting, now s/p lap nathan on 6/18 for choledocholithiasis.     SUBJECTIVE: No acute events overnight, continuing to require high flow O2 at 40L. She is somewhat uncomfortable but eating well and generally well appearing. Pain is well controlled.    OBJECTIVE:  Temp:  [35.9 °C (96.7 °F)-37.2 °C (98.9 °F)] 36.7 °C (98.1 °F)  Pulse:  [] 80  Resp:  [18-28] 18  BP: (113-158)/(69-87) 146/73  SpO2:  [83 %-95 %] 91 %      Intake/Output Summary (Last 24 hours) at 6/22/2023 0537  Last data filed at 6/21/2023 1440  Gross per 24 hour   Intake 220 ml   Output 1 ml   Net 219 ml       PE:   General: No acute distress, resting in bed.   HEENT: NC/AT. EOMI. MMM  Cardiovascular: RRR, no m/r/g, normal S1/S2  Respiratory: Somewhat increased work of breathing, crackles bilateral lower lung fields.  Abdomen: BS+, soft, NT/ND   EXT:  REY, 2+ radial pulses, 2+ pitting edema LE bilaterally  Neuro: Non focal, alert and oriented    LABS:  Recent Labs     06/20/23  0217 06/21/23  0105 06/22/23  0051   WBC 13.3* 12.2* 10.8   RBC 3.78* 3.93* 3.71*   HEMOGLOBIN 10.6* 10.7* 10.2*   HEMATOCRIT 35.7* 36.6* 33.9*   MCV 94.4 93.1 91.4   MCH 28.0 27.2 27.5   RDW 57.2* 55.3* 53.0*   PLATELETCT 218 223 244   MPV 11.3 10.7 11.2   NEUTSPOLYS 79.80* 78.10* 73.50*   LYMPHOCYTES 7.40* 7.60* 9.90*   MONOCYTES 10.00 10.80 12.50   EOSINOPHILS 1.70 2.10 2.60   BASOPHILS 0.20 0.20 0.40   RBCMORPHOLO  --  Present  --      Recent Labs     06/20/23  0217 06/21/23  0105 06/22/23  0051   SODIUM 138 140 140   POTASSIUM 4.0 4.2 3.7   CHLORIDE 100 102 101   CO2 28 29 28   BUN 33* 28* 19   CREATININE 1.41* 1.27 1.09   CALCIUM 8.5 8.5 8.5   MAGNESIUM  --  1.9  --    ALBUMIN  3.0* 3.0* 2.8*     Estimated GFR/CRCL = CrCl cannot be calculated (Unknown ideal weight.).  Recent Labs     06/20/23  0217 06/21/23  0105 06/22/23  0051   GLUCOSE 156* 212* 141*     Recent Labs     06/20/23  0217 06/21/23  0105 06/22/23  0051   ASTSGOT 28 22 23   ALTSGPT 53* 41 32   TBILIRUBIN 0.4 0.4 0.4   ALKPHOSPHAT 127* 127* 120*   GLOBULIN 2.7 2.7 3.0             No results for input(s): INR, APTT, FIBRINOGEN in the last 72 hours.    Invalid input(s): DIMER    MICROBIOLOGY:  Results       ** No results found for the last 168 hours. **            IMAGING:  DX-CHEST-PORTABLE (1 VIEW)   Final Result      1.  Stable cardiomegaly.   2.  Perihilar and interstitial opacities most consistent with pulmonary edema, stable since prior exam.   3.  Bibasilar subsegmental atelectatic changes.   4.  Ongoing elevation of the right hemidiaphragm.   5.  Overall, no significant change from 6/19/2023      DX-CHEST-LIMITED (1 VIEW)   Final Result         1.  Pulmonary edema and/or infiltrates, appear somewhat increased since prior study.   2.  Cardiomegaly   3.  Atherosclerosis      IR-US GUIDED PIV   Final Result    Ultrasound-guided PERIPHERAL IV INSERTION performed by    qualified nursing staff as above.      IR-US GUIDED PIV   Final Result    Ultrasound-guided PERIPHERAL IV INSERTION performed by    qualified nursing staff as above.      OF-XBRWIES-D/O   Final Result      1.  There is cholelithiasis with gallbladder sludge but no gallbladder wall thickening or significant pericholecystic fluid.   2.  There is dilatation of the common bile duct and central intrahepatic biliary tree with probable tumefactive sludge in the distal duct favored over an obstructing stone.   3.  There is a trace of peripancreatic fluid and perirenal fluid possibly related to volume status. Recommend correlation with lipase level in regards to any potential pancreatitis.   4.  There are several cystic areas seen in the pancreatic head and uncinate  process, possibly side branch IPMN's.   5.  Previously noted right adrenal gland mass not well characterized on this exam.   6.  Exam somewhat limited by breathing motion artifact.      EC-ECHOCARDIOGRAM COMPLETE W/O CONT   Final Result      CT-ABDOMEN-PELVIS W/O   Final Result         1.  Small hiatal hernia   2.  Cholelithiasis   3.  Atherosclerosis and atherosclerotic coronary artery disease      DX-CHEST-LIMITED (1 VIEW)   Final Result         1.  Mild pulmonary edema and/or infiltrates.   2.  Cardiomegaly   3.  Atherosclerosis      IR-US GUIDED PIV   Final Result    Ultrasound-guided PERIPHERAL IV INSERTION performed by    qualified nursing staff as above.      DX-CHEST-LIMITED (1 VIEW)   Final Result      New minor fissure thickening could be from edema favored over pleural fluid      Stable cardiac silhouette and hilar enlargement with right hemidiaphragm elevation          MEDS:  Current Facility-Administered Medications   Medication Last Admin    potassium chloride SA (Kdur) tablet 20 mEq      furosemide (LASIX) injection 40 mg 40 mg at 06/22/23 0508    acetaminophen (TYLENOL) tablet 1,000 mg 1,000 mg at 06/21/23 2304    Respiratory Therapy Consult      ipratropium-albuterol (DUONEB) nebulizer solution 3 mL at 06/20/23 0802    heparin injection 5,000 Units 5,000 Units at 06/22/23 0507    diphenhydrAMINE (BENADRYL) tablet/capsule 25 mg 25 mg at 06/19/23 2344    amLODIPine (NORVASC) tablet 10 mg 10 mg at 06/22/23 0508    metoprolol tartrate (LOPRESSOR) tablet 50 mg 50 mg at 06/22/23 0508    oxyCODONE immediate-release (ROXICODONE) tablet 2.5 mg 2.5 mg at 06/21/23 0758    Or    oxyCODONE immediate-release (ROXICODONE) tablet 5 mg 5 mg at 06/21/23 1444    Or    morphine 4 MG/ML injection 2 mg 2 mg at 06/19/23 1707    hydrALAZINE (APRESOLINE) injection 20 mg 20 mg at 06/18/23 2307    albuterol inhaler 2 Puff 2 Puff at 06/22/23 0600    febuxostat (ULORIC) tablet 40 mg 40 mg at 06/21/23 0549    insulin GLARGINE  (Lantus,Semglee) injection 30 Units at 06/21/23 1713    lisinopril (PRINIVIL) tablet 10 mg 10 mg at 06/22/23 0508    omeprazole (PRILOSEC) capsule 20 mg 20 mg at 06/21/23 1716    ondansetron (ZOFRAN) syringe/vial injection 4 mg 4 mg at 06/14/23 1704    insulin regular (HumuLIN R,NovoLIN R) injection 3 Units at 06/21/23 2012    And    dextrose 10 % BOLUS 25 g      Pharmacy Consult Request ...Pain Management Review 1 Each      tiotropium (Spiriva Respimat) 2.5 mcg/Act inhalation spray 5 mcg 5 mcg at 06/21/23 1715       PROBLEM LIST:  No problems updated.    ASSESSMENT/PLAN:   Siri Solis is a 77 y.o. female s/p lap nathan 6/18    #Acute on chronic respiratory failure with hypoxia (HCC)  History of tobacco use, quit 20 years ago.  Diagnosed with emphysema.  On 8-9 L of oxygen at home. Post operatively pt has had increased O2 needs, on 40L now. CXR 6/21 shows pulmonary edema and atelectasis. She has tolerated IV lasix without issues this hospitalization.  - Continue supplemental O2 via HFNC   - O2 requirement seemed to decrease with lasix (to 30L) but back up to 40L during the day today.  - Discussed with CC/Pulm attending, appreciate recs:   - increase lasix to 80 BID, with additional dose now   - sheets cath for accurate I/O's   - Will consider transfer to higher level of care if O2 requirements continue to increase    #HFpEF (congestive heart failure) ([facility])  Diastolic CHF.  LVEF on 9/14/2023 ~60-65%.  Pt received IVF for pancreatitis and appears fluid overloaded with pulmonary edema and high oxygen requirement. Pancreatitis has resolved, now diuresing.  - Lasix  as above  - Continue to monitor I/O's    #Chronic obstructive pulmonary disease (HCC)  History of emphysema, on about 8.5 L/min nasal cannula oxygen at home.  - Continue RT per protocol  - Continue home inhalers    #Atelectasis  Pt likely has post-op atelectasis as well as edema from CHF and fluid overload.  - Incentive Spirometry    #Atrial fibrillation  "(HCC)  Continue metoprolol.  Eliquis was held on admission, currently on heparin dvt ppx. CHADsVASc=6. S/p lap nathan 6/18.  - Previous EKG Sinus rhythm 6 days ago, will repeat.  - Consider resuming anticoagulation    #Abdominal pain  Likely secondary to gallstone pancreatitis.  Underwent laparoscopic cholecystectomy on 6/18/2023.  Reports improvement of pain. Well controlled with apap and roxicodone. Tolerating diet without issues.  - Continue pain management with tylenol/carmita    #Acute pancreatitis, improved  Likely related to choledocolithiasis. Lipase normalized.  Currently having post surgical pain.     #Anemia  Chronic, likely due to CKD-4. Hgb ~10, at baseline  - CTM    #Chronic Kidney Disease  Cr stable around 1.0-1.4 since 6/15, now at likely baseline. GFR now 40-50's. FLAVIA on admission has resolved.  - Avoid nephrotoxins  - Monitor renal function while giving IV lasix with daily chem    #Choledocholithiasis, improved  MRCP from outside facility reported \"choledocholithiasis is suspected with mild common bile duct dilation\".  LFTs, total bilirubin, and lipase were elevated.   Ceftriaxone and Flagyl were empirically started.  GI and general surgery were consulted. Underwent laparoscopic cholecystectomy on 6/18/2023.  General surgery signed off, and recommended follow up with Dr. Ricks or ACS clinic, low-fat diet for 3 weeks, ok to shower, keeping incisions as dry as possible, do not submerge.    #Diabetic peripheral neuropathy associated with type 2 diabetes mellitus (HCC)  Stable. A1c 7.7 on 2/2/23. -280. On lantus 30, 5-8 U of regular insulin daily.    #Elevated liver enzymes  LFTs are improving s/p lap nathan.  Total bilirubin is normal. AP is somewhat elevated, appears to be chronic    #Hypertension  Well controlled in the hospital with current regimen of amlodipine 10 (new hospital med), lisinopril 10, metoprolol tartrate 50 BID (increased from home dose).    #Other chest pain  Patient previously " had a brief episode of chest pain.  EKG showed sinus tachycardia.  Troponin was unremarkable (30).  Echocardiogram showed  LVEF ~ 60%, normal left and right ventricular function, normal regional wall motion. Metoprolol added.  She has had no further episodes of chest pain.    #Choledocholithiasis  6/16 MRCP completed with sludge and gallbladder wall thickening.  6/18 Robotic assisted laparoscopic cholecystectomy.  ACS [facility] team.    #Disposition: inpatient    Core Measures:  Fluids: none, diuresing  Lines: PIV  Abx: none  Diet: CHO+low fat regular  PPX: heparin ppx    CODE STATUS: DNR/DNI    González Burton MD  PGY2  [employer]

## 2023-06-22 NOTE — DISCHARGE PLANNING
Case Management Discharge Planning    Admission Date: 6/13/2023  GMLOS: 5  ALOS: 9    6-Clicks ADL Score: 21  6-Clicks Mobility Score: 19      Anticipated Discharge Dispo: Discharge Disposition: Discharged to home/self care (01)    DME Needed: No    Action(s) Taken: RNCM voalted resident González Burton to place referral for LTACH to proactively start DCP. Pending referral.     Escalations Completed: None    Medically Clear: No    Next Steps: f/u with referral    Barriers to Discharge: Medical clearance

## 2023-06-22 NOTE — CARE PLAN
The patient is Stable - Low risk of patient condition declining or worsening    Shift Goals  Clinical Goals: monitor o2  Patient Goals: rest  Family Goals: JOON    Progress made toward(s) clinical / shift goals:      Problem: Knowledge Deficit - Standard  Goal: Patient and family/care givers will demonstrate understanding of plan of care, disease process/condition, diagnostic tests and medications  Outcome: Progressing     Problem: Pain - Standard  Goal: Alleviation of pain or a reduction in pain to the patient’s comfort goal  Outcome: Progressing     Problem: Fall Risk  Goal: Patient will remain free from falls  Outcome: Progressing     Problem: Urinary - Renal Perfusion  Goal: Ability to achieve and maintain adequate renal perfusion and functioning will improve  Outcome: Progressing

## 2023-06-22 NOTE — PROGRESS NOTES
Sepsis warning alerting, MD Powell paged. New set of vitals as documented. At 2250. PRN tylenol requested for patients HA. No new additional orders

## 2023-06-23 LAB
ALBUMIN SERPL BCP-MCNC: 3.2 G/DL (ref 3.2–4.9)
ALBUMIN/GLOB SERPL: 1.1 G/DL
ALP SERPL-CCNC: 117 U/L (ref 30–99)
ALT SERPL-CCNC: 25 U/L (ref 2–50)
ANION GAP SERPL CALC-SCNC: 10 MMOL/L (ref 7–16)
AST SERPL-CCNC: 18 U/L (ref 12–45)
BASOPHILS # BLD AUTO: 0.3 % (ref 0–1.8)
BASOPHILS # BLD: 0.03 K/UL (ref 0–0.12)
BILIRUB SERPL-MCNC: 0.5 MG/DL (ref 0.1–1.5)
BUN SERPL-MCNC: 14 MG/DL (ref 8–22)
CALCIUM ALBUM COR SERPL-MCNC: 9.1 MG/DL (ref 8.5–10.5)
CALCIUM SERPL-MCNC: 8.5 MG/DL (ref 8.5–10.5)
CHLORIDE SERPL-SCNC: 95 MMOL/L (ref 96–112)
CO2 SERPL-SCNC: 34 MMOL/L (ref 20–33)
CREAT SERPL-MCNC: 1.17 MG/DL (ref 0.5–1.4)
EOSINOPHIL # BLD AUTO: 0.27 K/UL (ref 0–0.51)
EOSINOPHIL NFR BLD: 2.6 % (ref 0–6.9)
ERYTHROCYTE [DISTWIDTH] IN BLOOD BY AUTOMATED COUNT: 51.9 FL (ref 35.9–50)
GFR SERPLBLD CREATININE-BSD FMLA CKD-EPI: 47 ML/MIN/1.73 M 2
GLOBULIN SER CALC-MCNC: 2.9 G/DL (ref 1.9–3.5)
GLUCOSE BLD STRIP.AUTO-MCNC: 149 MG/DL (ref 65–99)
GLUCOSE BLD STRIP.AUTO-MCNC: 203 MG/DL (ref 65–99)
GLUCOSE BLD STRIP.AUTO-MCNC: 208 MG/DL (ref 65–99)
GLUCOSE BLD STRIP.AUTO-MCNC: 281 MG/DL (ref 65–99)
GLUCOSE SERPL-MCNC: 166 MG/DL (ref 65–99)
HCT VFR BLD AUTO: 35.5 % (ref 37–47)
HGB BLD-MCNC: 11.1 G/DL (ref 12–16)
IMM GRANULOCYTES # BLD AUTO: 0.14 K/UL (ref 0–0.11)
IMM GRANULOCYTES NFR BLD AUTO: 1.3 % (ref 0–0.9)
LYMPHOCYTES # BLD AUTO: 0.93 K/UL (ref 1–4.8)
LYMPHOCYTES NFR BLD: 8.8 % (ref 22–41)
MCH RBC QN AUTO: 27.9 PG (ref 27–33)
MCHC RBC AUTO-ENTMCNC: 31.3 G/DL (ref 32.2–35.5)
MCV RBC AUTO: 89.2 FL (ref 81.4–97.8)
MONOCYTES # BLD AUTO: 1.15 K/UL (ref 0–0.85)
MONOCYTES NFR BLD AUTO: 10.9 % (ref 0–13.4)
NEUTROPHILS # BLD AUTO: 7.99 K/UL (ref 1.82–7.42)
NEUTROPHILS NFR BLD: 76.1 % (ref 44–72)
NRBC # BLD AUTO: 0 K/UL
NRBC BLD-RTO: 0 /100 WBC (ref 0–0.2)
PLATELET # BLD AUTO: 268 K/UL (ref 164–446)
PMV BLD AUTO: 10.7 FL (ref 9–12.9)
POTASSIUM SERPL-SCNC: 3.6 MMOL/L (ref 3.6–5.5)
PROT SERPL-MCNC: 6.1 G/DL (ref 6–8.2)
RBC # BLD AUTO: 3.98 M/UL (ref 4.2–5.4)
SODIUM SERPL-SCNC: 139 MMOL/L (ref 135–145)
WBC # BLD AUTO: 10.5 K/UL (ref 4.8–10.8)

## 2023-06-23 PROCEDURE — 700102 HCHG RX REV CODE 250 W/ 637 OVERRIDE(OP): Performed by: INTERNAL MEDICINE

## 2023-06-23 PROCEDURE — 99232 SBSQ HOSP IP/OBS MODERATE 35: CPT | Mod: GC | Performed by: FAMILY MEDICINE

## 2023-06-23 PROCEDURE — 700111 HCHG RX REV CODE 636 W/ 250 OVERRIDE (IP)

## 2023-06-23 PROCEDURE — A9270 NON-COVERED ITEM OR SERVICE: HCPCS

## 2023-06-23 PROCEDURE — A9270 NON-COVERED ITEM OR SERVICE: HCPCS | Performed by: HOSPITALIST

## 2023-06-23 PROCEDURE — 82962 GLUCOSE BLOOD TEST: CPT | Mod: 91

## 2023-06-23 PROCEDURE — 700102 HCHG RX REV CODE 250 W/ 637 OVERRIDE(OP)

## 2023-06-23 PROCEDURE — 700111 HCHG RX REV CODE 636 W/ 250 OVERRIDE (IP): Performed by: STUDENT IN AN ORGANIZED HEALTH CARE EDUCATION/TRAINING PROGRAM

## 2023-06-23 PROCEDURE — 770004 HCHG ROOM/CARE - ONCOLOGY PRIVATE *

## 2023-06-23 PROCEDURE — 94640 AIRWAY INHALATION TREATMENT: CPT

## 2023-06-23 PROCEDURE — A9270 NON-COVERED ITEM OR SERVICE: HCPCS | Performed by: INTERNAL MEDICINE

## 2023-06-23 PROCEDURE — 94760 N-INVAS EAR/PLS OXIMETRY 1: CPT

## 2023-06-23 PROCEDURE — 700102 HCHG RX REV CODE 250 W/ 637 OVERRIDE(OP): Performed by: HOSPITALIST

## 2023-06-23 PROCEDURE — 85025 COMPLETE CBC W/AUTO DIFF WBC: CPT

## 2023-06-23 PROCEDURE — 36415 COLL VENOUS BLD VENIPUNCTURE: CPT

## 2023-06-23 PROCEDURE — 80053 COMPREHEN METABOLIC PANEL: CPT

## 2023-06-23 RX ORDER — PREDNISONE 20 MG/1
40 TABLET ORAL DAILY
Status: DISCONTINUED | OUTPATIENT
Start: 2023-06-23 | End: 2023-06-26

## 2023-06-23 RX ORDER — ECHINACEA PURPUREA EXTRACT 125 MG
2 TABLET ORAL
Status: DISCONTINUED | OUTPATIENT
Start: 2023-06-23 | End: 2023-06-28 | Stop reason: HOSPADM

## 2023-06-23 RX ORDER — ACETAMINOPHEN 500 MG
1000 TABLET ORAL EVERY 8 HOURS
Status: DISCONTINUED | OUTPATIENT
Start: 2023-06-23 | End: 2023-06-28 | Stop reason: HOSPADM

## 2023-06-23 RX ADMIN — ALBUTEROL SULFATE 2 PUFF: 90 AEROSOL, METERED RESPIRATORY (INHALATION) at 04:39

## 2023-06-23 RX ADMIN — OMEPRAZOLE 20 MG: 20 CAPSULE, DELAYED RELEASE ORAL at 16:44

## 2023-06-23 RX ADMIN — METOPROLOL TARTRATE 50 MG: 50 TABLET, FILM COATED ORAL at 04:40

## 2023-06-23 RX ADMIN — FUROSEMIDE 80 MG: 10 INJECTION INTRAMUSCULAR; INTRAVENOUS at 16:43

## 2023-06-23 RX ADMIN — DIPHENHYDRAMINE HYDROCHLORIDE 25 MG: 25 TABLET ORAL at 12:33

## 2023-06-23 RX ADMIN — PREDNISONE 40 MG: 20 TABLET ORAL at 16:43

## 2023-06-23 RX ADMIN — INSULIN HUMAN 2 UNITS: 100 INJECTION, SOLUTION PARENTERAL at 12:06

## 2023-06-23 RX ADMIN — AMLODIPINE BESYLATE 10 MG: 10 TABLET ORAL at 04:40

## 2023-06-23 RX ADMIN — DIPHENHYDRAMINE HYDROCHLORIDE 25 MG: 25 TABLET ORAL at 18:57

## 2023-06-23 RX ADMIN — LISINOPRIL 10 MG: 10 TABLET ORAL at 04:40

## 2023-06-23 RX ADMIN — HEPARIN SODIUM 5000 UNITS: 5000 INJECTION, SOLUTION INTRAVENOUS; SUBCUTANEOUS at 04:39

## 2023-06-23 RX ADMIN — FEBUXOSTAT 40 MG: 40 TABLET, FILM COATED ORAL at 04:39

## 2023-06-23 RX ADMIN — FUROSEMIDE 80 MG: 10 INJECTION INTRAMUSCULAR; INTRAVENOUS at 04:39

## 2023-06-23 RX ADMIN — APIXABAN 5 MG: 5 TABLET, FILM COATED ORAL at 18:56

## 2023-06-23 RX ADMIN — INSULIN GLARGINE-YFGN 30 UNITS: 100 INJECTION, SOLUTION SUBCUTANEOUS at 18:57

## 2023-06-23 RX ADMIN — INSULIN HUMAN 3 UNITS: 100 INJECTION, SOLUTION PARENTERAL at 20:52

## 2023-06-23 RX ADMIN — HEPARIN SODIUM 5000 UNITS: 5000 INJECTION, SOLUTION INTRAVENOUS; SUBCUTANEOUS at 14:11

## 2023-06-23 RX ADMIN — INSULIN HUMAN 2 UNITS: 100 INJECTION, SOLUTION PARENTERAL at 16:42

## 2023-06-23 RX ADMIN — METOPROLOL TARTRATE 50 MG: 50 TABLET, FILM COATED ORAL at 16:43

## 2023-06-23 RX ADMIN — POTASSIUM CHLORIDE 20 MEQ: 1500 TABLET, EXTENDED RELEASE ORAL at 04:40

## 2023-06-23 RX ADMIN — OXYCODONE HYDROCHLORIDE 5 MG: 5 TABLET ORAL at 10:00

## 2023-06-23 ASSESSMENT — CHA2DS2 SCORE
DIABETES: YES
CHA2DS2 VASC SCORE: 6
SEX: FEMALE
VASCULAR DISEASE: YES
AGE 75 OR GREATER: YES
AGE 65 TO 74: NO
HYPERTENSION: NO
CHF OR LEFT VENTRICULAR DYSFUNCTION: YES
PRIOR STROKE OR TIA OR THROMBOEMBOLISM: NO

## 2023-06-23 ASSESSMENT — PATIENT HEALTH QUESTIONNAIRE - PHQ9
SUM OF ALL RESPONSES TO PHQ9 QUESTIONS 1 AND 2: 0
1. LITTLE INTEREST OR PLEASURE IN DOING THINGS: NOT AT ALL
2. FEELING DOWN, DEPRESSED, IRRITABLE, OR HOPELESS: NOT AT ALL

## 2023-06-23 ASSESSMENT — PAIN DESCRIPTION - PAIN TYPE
TYPE: ACUTE PAIN
TYPE: ACUTE PAIN

## 2023-06-23 NOTE — DOCUMENTATION QUERY
"                                                                         [facility]                                                                       Query Response Note      PATIENT:               PEG XIONG  ACCT #:                  [id number]  MRN:                     [id number]  :                      1945  ADMIT DATE:       2023 7:46 PM  DISCH DATE:          RESPONDING  PROVIDER #:        [id number]           QUERY TEXT:    Patient with chronic diastolic CHF noted in medical record. CXR on  showed increased pulmonary edema, BNP  50569, and patient physical exam positive for leg swelling.      Per   progress note, patient appears fluid overloaded, has had increased O2 needs, which improved after diuresis    Can the acuity of the heart failure be further specified based on the clinical indicators and required treatments?    The patient's Clinical Indicators include:  76 yo F admitted with gallstone pancreatitis s/p lap nathan    Clinical Indicators:   CXR : Mild pulmonary edema and/or infiltrates.  ECHO 6/15: EF 60%; Diastolic function is difficult to assess.  CXR : Pulmonary edema and/or infiltrates, appear somewhat increased since prior study  NT proBNP  55793 -->  63030  CXR : Perihilar and interstitial opacities most consistent with pulmonary edema      progress note: \"Positive for leg swelling\"   progress note:  appears to be fluid overloaded today on exam; pt has had increased O2 needs, on 40L high flow NC today which improved after diuresis    Risk Factors: Advanced age, IV fluids for acute pancreatitis, chronic diastolic CHF, atelectasis, COPD/emphysema    Treatments: ECHO, labs, supplemental oxygen, IV Lasix 40mg BID, monitor for fluid overload    Contact me with any questions.    Thank you for your time and attention,  Daisy Jean RN, CDI  [email or web address]  Connect via email, Voalte or messenger.  Options provided:   -- Acute on " Chronic   -- Exacerbation or decompensation   -- Chronic   -- Other explanation, Please specify   -- Unable to determine      Query created by: Daisy Jean on 6/22/2023 9:34 AM    RESPONSE TEXT:    Acute on Chronic          Electronically signed by:  MAYRA HERNANDEZ MD 6/23/2023 9:59 AM

## 2023-06-23 NOTE — PROGRESS NOTES
Assumed care of pt at 1900H. Report received from low RN. Pt is A&O x 4, on HFNC 35 LPM 70% oxygen, no complains of SOB at this time breathing is even and unlabored, sats are 90-91%. Patient denies any pain at this time, comfort goal is to sleep comfortably.     Pt educated on how to use the call light, pt verbalized understanding. Bed in lowest locked position, call light within reach, hourly rounding in place. Labs reviewed, orders reviewed, communication board updated.     POC discussed with patient, no concerns questions were answered.     Pt declines any further needs at this time.

## 2023-06-23 NOTE — CARE PLAN
Problem: Humidified High Flow Nasal Cannula  Goal: Maintain adequate oxygenation dependent on patient condition  Description: Target End Date:  resolve prior to discharge or when underlying condition is resolved/stabilized    1.  Implement humidified high flow oxygen therapy  2.  Titrate high flow oxygen to maintain appropriate SpO2  Outcome: Progressing         HHFNC 40L/70%

## 2023-06-23 NOTE — PROGRESS NOTES
[facility] [place] FAM[facility] MEDICINE PROGRESS NOTE     Attending: Armando Alvarado MD    Resident: González Burton MD    PATIENT: Siri Solis; [id number]; 1945    ID:   Siri Solis is a 77 y.o. female transferred from an outside hospital on 6/13/2023 where she presented with abdominal pain with nausea and vomiting, now s/p lap nathan on 6/18 for choledocholithiasis.     SUBJECTIVE: No acute events overnight, continuing to require high flow O2 at 40L. She is somewhat uncomfortable but eating well and generally well appearing. Still some post-surgical pain.    OBJECTIVE:  Temp:  [36.5 °C (97.7 °F)-37.2 °C (99 °F)] 37.2 °C (99 °F)  Pulse:  [72-96] 96  Resp:  [17-26] 17  BP: (139-172)/(56-77) 172/56  SpO2:  [90 %-96 %] 90 %      Intake/Output Summary (Last 24 hours) at 6/23/2023 0528  Last data filed at 6/23/2023 0023  Gross per 24 hour   Intake --   Output 1875 ml   Net -1875 ml       PE:   General: No acute distress, resting in bed.   HEENT: NC/AT. EOMI. MMM  Cardiovascular: RRR, no m/r/g, normal S1/S2  Respiratory: Somewhat increased work of breathing, crackles bilateral lower lung fields.  Abdomen: BS+, soft, NT/ND   EXT:  REY, 2+ radial pulses, 2+ pitting edema LE bilaterally  Neuro: Non focal, alert and oriented    LABS:  Recent Labs     06/21/23  0105 06/22/23  0051   WBC 12.2* 10.8   RBC 3.93* 3.71*   HEMOGLOBIN 10.7* 10.2*   HEMATOCRIT 36.6* 33.9*   MCV 93.1 91.4   MCH 27.2 27.5   RDW 55.3* 53.0*   PLATELETCT 223 244   MPV 10.7 11.2   NEUTSPOLYS 78.10* 73.50*   LYMPHOCYTES 7.60* 9.90*   MONOCYTES 10.80 12.50   EOSINOPHILS 2.10 2.60   BASOPHILS 0.20 0.40   RBCMORPHOLO Present  --      Recent Labs     06/21/23  0105 06/22/23  0051   SODIUM 140 140   POTASSIUM 4.2 3.7   CHLORIDE 102 101   CO2 29 28   BUN 28* 19   CREATININE 1.27 1.09   CALCIUM 8.5 8.5   MAGNESIUM 1.9  --    ALBUMIN 3.0* 2.8*     Estimated GFR/CRCL = Estimated Creatinine Clearance: 54.9 mL/min (by C-G formula based on SCr of 1.09 mg/dL).  Recent Labs      06/21/23  0105 06/22/23  0051   GLUCOSE 212* 141*     Recent Labs     06/21/23  0105 06/22/23  0051   ASTSGOT 22 23   ALTSGPT 41 32   TBILIRUBIN 0.4 0.4   ALKPHOSPHAT 127* 120*   GLOBULIN 2.7 3.0             No results for input(s): INR, APTT, FIBRINOGEN in the last 72 hours.    Invalid input(s): DIMER    IMAGING:  DX-CHEST-PORTABLE (1 VIEW)   Final Result      1.  Stable cardiomegaly.   2.  Perihilar and interstitial opacities most consistent with pulmonary edema, stable since prior exam.   3.  Bibasilar subsegmental atelectatic changes.   4.  Ongoing elevation of the right hemidiaphragm.   5.  Overall, no significant change from 6/19/2023      DX-CHEST-LIMITED (1 VIEW)   Final Result         1.  Pulmonary edema and/or infiltrates, appear somewhat increased since prior study.   2.  Cardiomegaly   3.  Atherosclerosis      IR-US GUIDED PIV   Final Result    Ultrasound-guided PERIPHERAL IV INSERTION performed by    qualified nursing staff as above.      IR-US GUIDED PIV   Final Result    Ultrasound-guided PERIPHERAL IV INSERTION performed by    qualified nursing staff as above.      [id number]-A/[id number]   Final Result      1.  There is cholelithiasis with gallbladder sludge but no gallbladder wall thickening or significant pericholecystic fluid.   2.  There is dilatation of the common bile duct and central intrahepatic biliary tree with probable tumefactive sludge in the distal duct favored over an obstructing stone.   3.  There is a trace of peripancreatic fluid and perirenal fluid possibly related to volume status. Recommend correlation with lipase level in regards to any potential pancreatitis.   4.  There are several cystic areas seen in the pancreatic head and uncinate process, possibly side branch IPMN's.   5.  Previously noted right adrenal gland mass not well characterized on this exam.   6.  Exam somewhat limited by breathing motion artifact.      EC-ECHOCARDIOGRAM COMPLETE W/O CONT   Final Result       CT-ABDOMEN-PELVIS W/O   Final Result         1.  Small hiatal hernia   2.  Cholelithiasis   3.  Atherosclerosis and atherosclerotic coronary artery disease      DX-CHEST-LIMITED (1 VIEW)   Final Result         1.  Mild pulmonary edema and/or infiltrates.   2.  Cardiomegaly   3.  Atherosclerosis      IR-US GUIDED PIV   Final Result    Ultrasound-guided PERIPHERAL IV INSERTION performed by    qualified nursing staff as above.      DX-CHEST-LIMITED (1 VIEW)   Final Result      New minor fissure thickening could be from edema favored over pleural fluid      Stable cardiac silhouette and hilar enlargement with right hemidiaphragm elevation          MEDS:  Scheduled Medications   Medication Dose Frequency    potassium chloride SA  20 mEq DAILY    furosemide  80 mg BID DIURETIC    heparin  5,000 Units Q8HRS    amLODIPine  10 mg Q DAY    metoprolol tartrate  50 mg TWICE DAILY    albuterol  2 Puff BID    febuxostat  40 mg DAILY    insulin GLARGINE  30 Units Q EVENING    lisinopril  10 mg DAILY    omeprazole  20 mg Q EVENING    insulin regular  1-6 Units 4X/DAY ACHS    Pharmacy Consult Request  1 Each PHARMACY TO DOSE    tiotropium  5 mcg DAILY     PRN medications: acetaminophen, Respiratory Therapy Consult, ipratropium-albuterol, diphenhydrAMINE, oxyCODONE immediate-release **OR** oxyCODONE immediate-release **OR** morphine injection, hydrALAZINE, ondansetron, insulin regular **AND** POC blood glucose manual result **AND** NOTIFY MD and PharmD **AND** Administer 20 grams of glucose (approximately 8 ounces of fruit juice) every 15 minutes PRN FSBG less than 70 mg/dL **AND** dextrose bolus    ASSESSMENT/PLAN:   Siri Solis is a 77 y.o. female s/p lap nathan 6/18    #Acute on chronic respiratory failure with hypoxia (HCC)  Hx of COPD, on 8-9 L of oxygen at home. Post operatively pt has had increased O2 needs, on 40L now. CXR 6/21 shows pulmonary edema and atelectasis. She has tolerated IV lasix without issues this  hospitalization.  - Continue supplemental O2 via HFNC              - O2 requirement seemed to decrease with lasix (to 30L) but back up to 40L during the day today.  - Lasix 80 BID  - sheets cath for accurate I/O's, good urine production on diuretics  - Discussed with ICU attending 6/22. Aggressive diuresis, will consider transfer to higher level of care if O2 requirements continue to increase     #HFpEF (congestive heart failure) (HCC)  Diastolic CHF.  LVEF on 9/14/2023 ~60-65%.  Pt received IVF for pancreatitis and appears fluid overloaded with pulmonary edema and high oxygen requirement. Pancreatitis has resolved, now diuresing.  - Lasix  as above  - Continue to monitor I/O's    #Atrial fibrillation (HCC)  Continue metoprolol.  Eliquis was held on admission, currently on heparin dvt ppx. CHADsVASc=6. S/p lap nathan 6/18.  - Previous EKG Sinus rhythm 6 days ago, will repeat.  - Resuming anticoagulation    #Abdominal pain  Likely secondary to gallstone pancreatitis.  Underwent laparoscopic cholecystectomy on 6/18/2023.  Reports improvement of pain. Well controlled with apap and roxicodone. Tolerating diet without issues.  - Scheduled Tylenol  - Oxycodone 2.5/5 PRN    #Hypertension  Has been well controlled in the hospital but may be increasing due to recent pain.  - amlodipine 10 (started in the hospital)  - lisinopril 10  - metoprolol tartrate 50 BID (increased from home dose).     #Chronic obstructive pulmonary disease (HCC)  History of emphysema, on about 8.5 L/min nasal cannula oxygen at home. History of tobacco use, quit 20 years ago.   - Continue RT per protocol  - Continue home inhalers  - May consider oral steroids if pt does not improve     #Atelectasis  Pt likely has post-op atelectasis as well as edema from CHF and fluid overload.  - Incentive Spirometry    #Type 2 Diabetes Mellitus  A1c 7.7 in Feb 2023. At home, takes 30U lantus.  - Continue lantus daily  - Correctional insulin, using ~5-8U daily    #Acute  "pancreatitis, improved  Likely related to choledocolithiasis. Lipase normalized.  Currently having post surgical pain.      #Anemia  Chronic, likely due to CKD-4. Hgb ~10, at baseline  - CTM     #Chronic Kidney Disease  Cr stable around 1.0-1.4 since 6/15, now at likely baseline. GFR now 40-50's. FLAVIA on admission has resolved.  - Avoid nephrotoxins  - Monitor renal function while giving IV lasix with daily chem     #Choledocholithiasis, improved  MRCP from outside facility reported \"choledocholithiasis is suspected with mild common bile duct dilation\".  LFTs, total bilirubin, and lipase were elevated.   Ceftriaxone and Flagyl were empirically started.  GI and general surgery were consulted. Underwent laparoscopic cholecystectomy on 6/18/2023.  General surgery signed off, and recommended follow up with Dr. Ricks or ACS clinic, low-fat diet for 3 weeks, ok to shower, keeping incisions as dry as possible, do not submerge.    #Elevated liver enzymes  LFTs are improving s/p lap nathan.  Total bilirubin is normal. AP is somewhat elevated, appears to be chronic       #Other chest pain  Patient previously had a brief episode of chest pain.  EKG showed sinus tachycardia.  Troponin was unremarkable (30).  Echocardiogram showed  LVEF ~ 60%, normal left and right ventricular function, normal regional wall motion. Metoprolol added.  She has had no further episodes of chest pain.     #Choledocholithiasis  6/16 MRCP completed with sludge and gallbladder wall thickening.  6/18 Robotic assisted laparoscopic cholecystectomy.  ACS [facility] team.     #Disposition: inpatient     Core Measures:  Fluids: none  Lines: PIV  Abx: none  Diet: CHO low fat regular  PPX: anticoagulated    CODE STATUS: DNR/DNI    González Burton MD  PGY2  [employer] [facility]    "

## 2023-06-23 NOTE — CARE PLAN
Problem: Humidified High Flow Nasal Cannula  Goal: Maintain adequate oxygenation dependent on patient condition  Description: Target End Date:  resolve prior to discharge or when underlying condition is resolved/stabilized    1.  Implement humidified high flow oxygen therapy  2.  Titrate high flow oxygen to maintain appropriate SpO2  Outcome: Progressing  Flowsheets  Taken 6/22/2023 1350  O2 (LPM): 35  FiO2%: 70 %  Taken 6/21/2023 0816  Indication: COPD diagnosis: SpO2 less than 89% despite conventional supplemental oxygen devices  Outcome: Normoxia

## 2023-06-23 NOTE — CARE PLAN
The patient is Watcher - Medium risk of patient condition declining or worsening    Shift Goals  Clinical Goals: patient will maintain oxygen saturations >86% throughout the shift, patient will remain free of falls, patient will have adquate urine output  Patient Goals: sleep  Family Goals: JOON    Progress made toward(s) clinical / shift goals:    Problem: Knowledge Deficit - Standard  Goal: Patient and family/care givers will demonstrate understanding of plan of care, disease process/condition, diagnostic tests and medications  Outcome: Progressing   Patient A&Ox4, fatigue this shift. Patient updated on plan of care, agreeable to plan at this time. Patient demonstrates understanding. Q4 neuro checks in place, unchanged this shift.   Problem: Pain - Standard  Goal: Alleviation of pain or a reduction in pain to the patient’s comfort goal  Outcome: Progressing   Patient reports pain medicated per MAR. Patient reports relief with intervention. Patient declines use of nonpharmacologic intervention.   Problem: Fall Risk  Goal: Patient will remain free from falls  Outcome: Progressing   Patient educated on fall risk. Fall precautions in place. Patients room close to nurses station. Frequent toileting provided. Patient up SBA to BSC. Patient refusing use of bed alarm, calling for assistance appropriately. Call light and personal belongings within reach. Hourly rounding in place.   Problem: Respiratory  Goal: Patient will achieve/maintain optimum respiratory ventilation and gas exchange  Outcome: Progressing   Patient tolerating high flow, maintaining O2 saturations >88% this shift. Continuous pulse ox on and sounding. Dyspnea with exertion, increased work of breathing noted.     Patient is not progressing towards the following goals:

## 2023-06-23 NOTE — CARE PLAN
Problem: Humidified High Flow Nasal Cannula  Goal: Maintain adequate oxygenation dependent on patient condition  Description: Target End Date:  resolve prior to discharge or when underlying condition is resolved/stabilized    1.  Implement humidified high flow oxygen therapy  2.  Titrate high flow oxygen to maintain appropriate SpO2  Outcome: Progressing  Flowsheets  Taken 6/23/2023 1518  O2 (LPM): 40  FiO2%: 70 %  Taken 6/21/2023 0816  Indication: COPD diagnosis: SpO2 less than 89% despite conventional supplemental oxygen devices  Outcome: Normoxia

## 2023-06-23 NOTE — CARE PLAN
The patient is Watcher - Medium risk of patient condition declining or worsening    Shift Goals  Clinical Goals: Monitor oxygen requirements, diurese and monitor I&O  Patient Goals: rest, comfort  Family Goals: JOON    Progress made toward(s) clinical / shift goals:     Patient is now on 40 LPM 70% HFNC, urine output being monitored see I&O sheet. No complains of pain overnight, refused due tylenol.       Problem: Knowledge Deficit - Standard  Goal: Patient and family/care givers will demonstrate understanding of plan of care, disease process/condition, diagnostic tests and medications  Outcome: Progressing     Problem: Fall Risk  Goal: Patient will remain free from falls  Outcome: Progressing     Problem: Hemodynamics  Goal: Patient's hemodynamics, fluid balance and neurologic status will be stable or improve  Outcome: Progressing     Problem: Respiratory  Goal: Patient will achieve/maintain optimum respiratory ventilation and gas exchange  Outcome: Progressing     Problem: Pain - Standard  Goal: Alleviation of pain or a reduction in pain to the patient’s comfort goal  Outcome: Progressing

## 2023-06-23 NOTE — DIETARY
Nutrition Update:    Day 10 of admit.  Siri Solis is a 77 y.o. female with admitting DX of Abdominal pain [R10.9]  Choledocholithiasis [K80.50].  Patient being followed to optimize nutrition.    Current Diet: Consistent CHO and low fat; PO intake >% of all meals in the last 2 days    Problem: Nutritional:  Goal: Achieve adequate nutritional intake  Description: Patient will consume >50% of meals  Outcome: Met    RD monitoring per dept policy

## 2023-06-24 ENCOUNTER — APPOINTMENT (OUTPATIENT)
Dept: RADIOLOGY | Facility: MEDICAL CENTER | Age: 78
DRG: 417 | End: 2023-06-24
Payer: COMMERCIAL

## 2023-06-24 ENCOUNTER — APPOINTMENT (OUTPATIENT)
Dept: CARDIOLOGY | Facility: MEDICAL CENTER | Age: 78
DRG: 417 | End: 2023-06-24
Payer: COMMERCIAL

## 2023-06-24 LAB
ALBUMIN SERPL BCP-MCNC: 3.4 G/DL (ref 3.2–4.9)
ALBUMIN/GLOB SERPL: 0.9 G/DL
ALP SERPL-CCNC: 128 U/L (ref 30–99)
ALT SERPL-CCNC: 26 U/L (ref 2–50)
ANION GAP SERPL CALC-SCNC: 10 MMOL/L (ref 7–16)
AST SERPL-CCNC: 20 U/L (ref 12–45)
BASE EXCESS BLDV CALC-SCNC: 9 MMOL/L
BASOPHILS # BLD AUTO: 0.2 % (ref 0–1.8)
BASOPHILS # BLD: 0.02 K/UL (ref 0–0.12)
BILIRUB SERPL-MCNC: 0.5 MG/DL (ref 0.1–1.5)
BODY TEMPERATURE: 36.6 CENTIGRADE
BUN SERPL-MCNC: 24 MG/DL (ref 8–22)
CALCIUM ALBUM COR SERPL-MCNC: 9.4 MG/DL (ref 8.5–10.5)
CALCIUM SERPL-MCNC: 8.9 MG/DL (ref 8.5–10.5)
CHLORIDE SERPL-SCNC: 89 MMOL/L (ref 96–112)
CO2 SERPL-SCNC: 35 MMOL/L (ref 20–33)
CREAT SERPL-MCNC: 1.5 MG/DL (ref 0.5–1.4)
EOSINOPHIL # BLD AUTO: 0 K/UL (ref 0–0.51)
EOSINOPHIL NFR BLD: 0 % (ref 0–6.9)
ERYTHROCYTE [DISTWIDTH] IN BLOOD BY AUTOMATED COUNT: 52.2 FL (ref 35.9–50)
GFR SERPLBLD CREATININE-BSD FMLA CKD-EPI: 35 ML/MIN/1.73 M 2
GLOBULIN SER CALC-MCNC: 3.8 G/DL (ref 1.9–3.5)
GLUCOSE BLD STRIP.AUTO-MCNC: 286 MG/DL (ref 65–99)
GLUCOSE BLD STRIP.AUTO-MCNC: 435 MG/DL (ref 65–99)
GLUCOSE BLD STRIP.AUTO-MCNC: 435 MG/DL (ref 65–99)
GLUCOSE BLD STRIP.AUTO-MCNC: 457 MG/DL (ref 65–99)
GLUCOSE SERPL-MCNC: 338 MG/DL (ref 65–99)
HCO3 BLDV-SCNC: 34 MMOL/L (ref 24–28)
HCT VFR BLD AUTO: 38.9 % (ref 37–47)
HGB BLD-MCNC: 11.8 G/DL (ref 12–16)
IMM GRANULOCYTES # BLD AUTO: 0.22 K/UL (ref 0–0.11)
IMM GRANULOCYTES NFR BLD AUTO: 2.3 % (ref 0–0.9)
INHALED O2 FLOW RATE: 40 L/MIN
LYMPHOCYTES # BLD AUTO: 0.51 K/UL (ref 1–4.8)
LYMPHOCYTES NFR BLD: 5.2 % (ref 22–41)
MCH RBC QN AUTO: 27.6 PG (ref 27–33)
MCHC RBC AUTO-ENTMCNC: 30.3 G/DL (ref 32.2–35.5)
MCV RBC AUTO: 90.9 FL (ref 81.4–97.8)
MONOCYTES # BLD AUTO: 0.53 K/UL (ref 0–0.85)
MONOCYTES NFR BLD AUTO: 5.4 % (ref 0–13.4)
NEUTROPHILS # BLD AUTO: 8.45 K/UL (ref 1.82–7.42)
NEUTROPHILS NFR BLD: 86.9 % (ref 44–72)
NRBC # BLD AUTO: 0 K/UL
NRBC BLD-RTO: 0 /100 WBC (ref 0–0.2)
PCO2 BLDV: 46.1 MMHG (ref 41–51)
PCO2 TEMP ADJ BLDV: 45.3 MMHG (ref 41–51)
PH BLDV: 7.48 [PH] (ref 7.31–7.45)
PH TEMP ADJ BLDV: 7.49 [PH] (ref 7.31–7.45)
PLATELET # BLD AUTO: 302 K/UL (ref 164–446)
PMV BLD AUTO: 11.2 FL (ref 9–12.9)
PO2 BLDV: 34.6 MMHG (ref 25–40)
PO2 TEMP ADJ BLDV: 33.6 MMHG (ref 25–40)
POTASSIUM SERPL-SCNC: 4.3 MMOL/L (ref 3.6–5.5)
PROT SERPL-MCNC: 7.2 G/DL (ref 6–8.2)
RBC # BLD AUTO: 4.28 M/UL (ref 4.2–5.4)
SAO2 % BLDV: 67.7 %
SODIUM SERPL-SCNC: 134 MMOL/L (ref 135–145)
WBC # BLD AUTO: 9.7 K/UL (ref 4.8–10.8)

## 2023-06-24 PROCEDURE — 700105 HCHG RX REV CODE 258

## 2023-06-24 PROCEDURE — A9270 NON-COVERED ITEM OR SERVICE: HCPCS

## 2023-06-24 PROCEDURE — 82962 GLUCOSE BLOOD TEST: CPT | Mod: 91

## 2023-06-24 PROCEDURE — 80053 COMPREHEN METABOLIC PANEL: CPT

## 2023-06-24 PROCEDURE — A9270 NON-COVERED ITEM OR SERVICE: HCPCS | Performed by: INTERNAL MEDICINE

## 2023-06-24 PROCEDURE — 93325 DOPPLER ECHO COLOR FLOW MAPG: CPT

## 2023-06-24 PROCEDURE — 82803 BLOOD GASES ANY COMBINATION: CPT

## 2023-06-24 PROCEDURE — 700102 HCHG RX REV CODE 250 W/ 637 OVERRIDE(OP)

## 2023-06-24 PROCEDURE — A9270 NON-COVERED ITEM OR SERVICE: HCPCS | Performed by: STUDENT IN AN ORGANIZED HEALTH CARE EDUCATION/TRAINING PROGRAM

## 2023-06-24 PROCEDURE — 36415 COLL VENOUS BLD VENIPUNCTURE: CPT

## 2023-06-24 PROCEDURE — 700102 HCHG RX REV CODE 250 W/ 637 OVERRIDE(OP): Performed by: STUDENT IN AN ORGANIZED HEALTH CARE EDUCATION/TRAINING PROGRAM

## 2023-06-24 PROCEDURE — 85025 COMPLETE CBC W/AUTO DIFF WBC: CPT

## 2023-06-24 PROCEDURE — 302118 KIT,HAIR RINSE: Performed by: FAMILY MEDICINE

## 2023-06-24 PROCEDURE — 770004 HCHG ROOM/CARE - ONCOLOGY PRIVATE *

## 2023-06-24 PROCEDURE — 700111 HCHG RX REV CODE 636 W/ 250 OVERRIDE (IP)

## 2023-06-24 PROCEDURE — A9270 NON-COVERED ITEM OR SERVICE: HCPCS | Performed by: HOSPITALIST

## 2023-06-24 PROCEDURE — 94640 AIRWAY INHALATION TREATMENT: CPT

## 2023-06-24 PROCEDURE — 99233 SBSQ HOSP IP/OBS HIGH 50: CPT | Mod: GC | Performed by: FAMILY MEDICINE

## 2023-06-24 PROCEDURE — 94760 N-INVAS EAR/PLS OXIMETRY 1: CPT

## 2023-06-24 PROCEDURE — 700102 HCHG RX REV CODE 250 W/ 637 OVERRIDE(OP): Performed by: HOSPITALIST

## 2023-06-24 PROCEDURE — 99223 1ST HOSP IP/OBS HIGH 75: CPT | Performed by: INTERNAL MEDICINE

## 2023-06-24 PROCEDURE — 700102 HCHG RX REV CODE 250 W/ 637 OVERRIDE(OP): Performed by: INTERNAL MEDICINE

## 2023-06-24 RX ORDER — ENOXAPARIN SODIUM 100 MG/ML
40 INJECTION SUBCUTANEOUS DAILY
Status: DISCONTINUED | OUTPATIENT
Start: 2023-06-24 | End: 2023-06-24

## 2023-06-24 RX ORDER — SODIUM CHLORIDE 9 MG/ML
500 INJECTION, SOLUTION INTRAVENOUS ONCE
Status: COMPLETED | OUTPATIENT
Start: 2023-06-24 | End: 2023-06-24

## 2023-06-24 RX ORDER — BENZONATATE 100 MG/1
100 CAPSULE ORAL 3 TIMES DAILY PRN
Status: DISCONTINUED | OUTPATIENT
Start: 2023-06-24 | End: 2023-06-28 | Stop reason: HOSPADM

## 2023-06-24 RX ADMIN — FUROSEMIDE 80 MG: 10 INJECTION INTRAMUSCULAR; INTRAVENOUS at 17:12

## 2023-06-24 RX ADMIN — ALBUTEROL SULFATE 2 PUFF: 90 AEROSOL, METERED RESPIRATORY (INHALATION) at 05:12

## 2023-06-24 RX ADMIN — OMEPRAZOLE 20 MG: 20 CAPSULE, DELAYED RELEASE ORAL at 17:11

## 2023-06-24 RX ADMIN — SALINE NASAL SPRAY 2 SPRAY: 1.5 SOLUTION NASAL at 08:53

## 2023-06-24 RX ADMIN — BENZONATATE 100 MG: 100 CAPSULE ORAL at 21:51

## 2023-06-24 RX ADMIN — TIOTROPIUM BROMIDE INHALATION SPRAY 5 MCG: 3.12 SPRAY, METERED RESPIRATORY (INHALATION) at 17:11

## 2023-06-24 RX ADMIN — PREDNISONE 40 MG: 20 TABLET ORAL at 05:12

## 2023-06-24 RX ADMIN — DIPHENHYDRAMINE HYDROCHLORIDE 25 MG: 25 TABLET ORAL at 18:07

## 2023-06-24 RX ADMIN — INSULIN HUMAN 6 UNITS: 100 INJECTION, SOLUTION PARENTERAL at 17:10

## 2023-06-24 RX ADMIN — INSULIN HUMAN 3 UNITS: 100 INJECTION, SOLUTION PARENTERAL at 06:38

## 2023-06-24 RX ADMIN — INSULIN HUMAN 6 UNITS: 100 INJECTION, SOLUTION PARENTERAL at 21:32

## 2023-06-24 RX ADMIN — OXYCODONE HYDROCHLORIDE 2.5 MG: 5 TABLET ORAL at 21:50

## 2023-06-24 RX ADMIN — FEBUXOSTAT 40 MG: 40 TABLET, FILM COATED ORAL at 06:36

## 2023-06-24 RX ADMIN — ACETAMINOPHEN 1000 MG: 500 TABLET, FILM COATED ORAL at 21:34

## 2023-06-24 RX ADMIN — DIPHENHYDRAMINE HYDROCHLORIDE 25 MG: 25 TABLET ORAL at 05:11

## 2023-06-24 RX ADMIN — POTASSIUM CHLORIDE 20 MEQ: 1500 TABLET, EXTENDED RELEASE ORAL at 05:13

## 2023-06-24 RX ADMIN — DIPHENHYDRAMINE HYDROCHLORIDE 25 MG: 25 TABLET ORAL at 11:29

## 2023-06-24 RX ADMIN — BENZONATATE 100 MG: 100 CAPSULE ORAL at 06:36

## 2023-06-24 RX ADMIN — METOPROLOL TARTRATE 50 MG: 50 TABLET, FILM COATED ORAL at 17:12

## 2023-06-24 RX ADMIN — ACETAMINOPHEN 1000 MG: 500 TABLET, FILM COATED ORAL at 14:35

## 2023-06-24 RX ADMIN — FUROSEMIDE 80 MG: 10 INJECTION INTRAMUSCULAR; INTRAVENOUS at 05:04

## 2023-06-24 RX ADMIN — INSULIN HUMAN 6 UNITS: 100 INJECTION, SOLUTION PARENTERAL at 11:35

## 2023-06-24 RX ADMIN — METOPROLOL TARTRATE 50 MG: 50 TABLET, FILM COATED ORAL at 05:12

## 2023-06-24 RX ADMIN — SODIUM CHLORIDE 500 ML: 9 INJECTION, SOLUTION INTRAVENOUS at 11:55

## 2023-06-24 RX ADMIN — APIXABAN 5 MG: 5 TABLET, FILM COATED ORAL at 17:11

## 2023-06-24 RX ADMIN — APIXABAN 5 MG: 5 TABLET, FILM COATED ORAL at 05:12

## 2023-06-24 RX ADMIN — AMLODIPINE BESYLATE 10 MG: 10 TABLET ORAL at 05:12

## 2023-06-24 RX ADMIN — LISINOPRIL 10 MG: 10 TABLET ORAL at 05:12

## 2023-06-24 RX ADMIN — INSULIN GLARGINE-YFGN 30 UNITS: 100 INJECTION, SOLUTION SUBCUTANEOUS at 17:12

## 2023-06-24 RX ADMIN — ALBUTEROL SULFATE 2 PUFF: 90 AEROSOL, METERED RESPIRATORY (INHALATION) at 17:11

## 2023-06-24 ASSESSMENT — PAIN DESCRIPTION - PAIN TYPE
TYPE: ACUTE PAIN

## 2023-06-24 ASSESSMENT — FIBROSIS 4 INDEX: FIB4 SCORE: 1.03

## 2023-06-24 NOTE — CARE PLAN
The patient is Watcher - Medium risk of patient condition declining or worsening    Shift Goals  Clinical Goals: patient will maintain oxygen saturations >86% throughout the shift, patient will remain free of falls, patient will have adquate urine output  Patient Goals: sleep  Family Goals: JOON    Progress made toward(s) clinical / shift goals:    Patient remained in 40 LPM70% overnight. Sats would range 87-92%. Complained of congestion overnight, nasal spray and cough medicine started. Fall precautions in place, remained free from falls.     Problem: Knowledge Deficit - Standard  Goal: Patient and family/care givers will demonstrate understanding of plan of care, disease process/condition, diagnostic tests and medications  Outcome: Progressing     Problem: Fall Risk  Goal: Patient will remain free from falls  Outcome: Progressing     Problem: Hemodynamics  Goal: Patient's hemodynamics, fluid balance and neurologic status will be stable or improve  Outcome: Progressing     Problem: Respiratory  Goal: Patient will achieve/maintain optimum respiratory ventilation and gas exchange  Outcome: Progressing       Patient is not progressing towards the following goals:

## 2023-06-24 NOTE — PROGRESS NOTES
Assumed care of pt at 1900H. Report received from low RN. Pt is A&O x 4, on humidified HFNC 40 LPM 70% sats were 89-90%, patient stated that she feels congested reached out to hospitalist via voalte for a nasal spray, order were placed. Patient denies any pain. Pt's pain comfort goal is to sleep comfortably.    Pt educated on how to use the call light, pt verbalized understanding. Bed in lowest locked position, call light within reach, hourly rounding in place. Labs reviewed, orders reviewed, communication board updated.     POC discussed with patient, no concerns.     Pt declines any further needs at this time.

## 2023-06-24 NOTE — PROGRESS NOTES
Patient is refusing to wear wristband states she is allergic, offered to find an alternative to wristband is refusing. Verified name and  with phlebotomist, labs drawn.     1142- Notified resident MD Do Serna of [id number] 6 units of insulin given, no new orders    1651- Notified Resident MD González Burton of [id number] 6 units of insulin given per sliding scale, no new orders

## 2023-06-24 NOTE — PROGRESS NOTES
[facility] PROGRESS NOTE        Attending:   Dr. Armando Alvarado     Resident:   Do Serna M.D. PGY-1     PATIENT:   Siri Solis; [id number]; 1945    ID:   77 y.o. female admitted initially for lap nathan now with increases oxygen demand.     SUBJECTIVE:   Continued to require 40 L High flow. Patient is frustrated she still is requiring high flow. She feels like last night she had difficulty breathing but that has improved this morning. She denies chest pain, fevers, or chills.     OBJECTIVE:  Vitals:    06/23/23 2135 06/24/23 0050 06/24/23 0300 06/24/23 0439   BP:    132/58   Pulse: 89 90 89 91   Resp: 19 17 16 16   Temp:    35.9 °C (96.7 °F)   TempSrc:    Temporal   SpO2: 91% 93% 91% 91%   Weight:       Height:           Intake/Output Summary (Last 24 hours) at 6/24/2023 0604  Last data filed at 6/23/2023 1520  Gross per 24 hour   Intake 480 ml   Output 2600 ml   Net -2120 ml       PHYSICAL EXAM:  General: No acute distress, afebrile, resting comfortably  HEENT: NC/AT. EOMI.   Cardiovascular: RRR without murmurs. Normal capillary refill   Respiratory: mild increase work of breathing, crackles in bilateral lower lung fields   Abdomen: soft, nontender, nondistended, no masses  EXT:  REY, no edema  Skin: No erythema/lesions   Neuro: Non-focal    LABS:  Recent Labs     06/22/23  0051 06/23/23  0601   WBC 10.8 10.5   RBC 3.71* 3.98*   HEMOGLOBIN 10.2* 11.1*   HEMATOCRIT 33.9* 35.5*   MCV 91.4 89.2   MCH 27.5 27.9   RDW 53.0* 51.9*   PLATELETCT 244 268   MPV 11.2 10.7   NEUTSPOLYS 73.50* 76.10*   LYMPHOCYTES 9.90* 8.80*   MONOCYTES 12.50 10.90   EOSINOPHILS 2.60 2.60   BASOPHILS 0.40 0.30     Recent Labs     06/22/23  0051 06/23/23  0601   SODIUM 140 139   POTASSIUM 3.7 3.6   CHLORIDE 101 95*   CO2 28 34*   BUN 19 14   CREATININE 1.09 1.17   CALCIUM 8.5 8.5   ALBUMIN 2.8* 3.2     Estimated GFR/CRCL = Estimated Creatinine Clearance: 51.1 mL/min (by C-G formula based on SCr of 1.17 mg/dL).  Recent  Labs     06/22/23  0051 06/23/23  0601   GLUCOSE 141* 166*     Recent Labs     06/22/23  0051 06/23/23  0601   ASTSGOT 23 18   ALTSGPT 32 25   TBILIRUBIN 0.4 0.5   ALKPHOSPHAT 120* 117*   GLOBULIN 3.0 2.9       IMAGING:  DX-CHEST-PORTABLE (1 VIEW)   Final Result      1.  Stable cardiomegaly.   2.  Perihilar and interstitial opacities most consistent with pulmonary edema, stable since prior exam.   3.  Bibasilar subsegmental atelectatic changes.   4.  Ongoing elevation of the right hemidiaphragm.   5.  Overall, no significant change from 6/19/2023      DX-CHEST-LIMITED (1 VIEW)   Final Result         1.  Pulmonary edema and/or infiltrates, appear somewhat increased since prior study.   2.  Cardiomegaly   3.  Atherosclerosis      IR-US GUIDED PIV   Final Result    Ultrasound-guided PERIPHERAL IV INSERTION performed by    qualified nursing staff as above.      IR-US GUIDED PIV   Final Result    Ultrasound-guided PERIPHERAL IV INSERTION performed by    qualified nursing staff as above.      [id number]   Final Result      1.  There is cholelithiasis with gallbladder sludge but no gallbladder wall thickening or significant pericholecystic fluid.   2.  There is dilatation of the common bile duct and central intrahepatic biliary tree with probable tumefactive sludge in the distal duct favored over an obstructing stone.   3.  There is a trace of peripancreatic fluid and perirenal fluid possibly related to volume status. Recommend correlation with lipase level in regards to any potential pancreatitis.   4.  There are several cystic areas seen in the pancreatic head and uncinate process, possibly side branch IPMN's.   5.  Previously noted right adrenal gland mass not well characterized on this exam.   6.  Exam somewhat limited by breathing motion artifact.      EC-ECHOCARDIOGRAM COMPLETE W/O CONT   Final Result      CT-ABDOMEN-PELVIS W/O   Final Result         1.  Small hiatal hernia   2.  Cholelithiasis   3.   Atherosclerosis and atherosclerotic coronary artery disease      DX-CHEST-LIMITED (1 VIEW)   Final Result         1.  Mild pulmonary edema and/or infiltrates.   2.  Cardiomegaly   3.  Atherosclerosis      IR-US GUIDED PIV   Final Result    Ultrasound-guided PERIPHERAL IV INSERTION performed by    qualified nursing staff as above.      DX-CHEST-LIMITED (1 VIEW)   Final Result      New minor fissure thickening could be from edema favored over pleural fluid      Stable cardiac silhouette and hilar enlargement with right hemidiaphragm elevation          MEDS:  Current Facility-Administered Medications   Medication Last Admin    benzonatate (TESSALON) capsule 100 mg      acetaminophen (TYLENOL) tablet 1,000 mg      apixaban (ELIQUIS) tablet 5 mg 5 mg at 06/24/23 0512    predniSONE (DELTASONE) tablet 40 mg 40 mg at 06/24/23 0512    sodium chloride (OCEAN) 0.65 % nasal spray 2 Spray      potassium chloride SA (Kdur) tablet 20 mEq 20 mEq at 06/24/23 0513    furosemide (LASIX) injection 80 mg 80 mg at 06/24/23 0504    Respiratory Therapy Consult      ipratropium-albuterol (DUONEB) nebulizer solution 3 mL at 06/20/23 0802    diphenhydrAMINE (BENADRYL) tablet/capsule 25 mg 25 mg at 06/24/23 0511    amLODIPine (NORVASC) tablet 10 mg 10 mg at 06/24/23 0512    metoprolol tartrate (LOPRESSOR) tablet 50 mg 50 mg at 06/24/23 0512    oxyCODONE immediate-release (ROXICODONE) tablet 2.5 mg 2.5 mg at 06/21/23 0758    Or    oxyCODONE immediate-release (ROXICODONE) tablet 5 mg 5 mg at 06/23/23 1000    Or    morphine 4 MG/ML injection 2 mg 2 mg at 06/19/23 1707    hydrALAZINE (APRESOLINE) injection 20 mg 20 mg at 06/18/23 2307    albuterol inhaler 2 Puff 2 Puff at 06/24/23 0512    febuxostat (ULORIC) tablet 40 mg 40 mg at 06/23/23 0439    insulin GLARGINE (Lantus,Semglee) injection 30 Units at 06/23/23 1857    lisinopril (PRINIVIL) tablet 10 mg 10 mg at 06/24/23 0512    omeprazole (PRILOSEC) capsule 20 mg 20 mg at 06/23/23 6838     ondansetron (ZOFRAN) syringe/vial injection 4 mg 4 mg at 06/14/23 1704    insulin regular (HumuLIN R,NovoLIN R) injection 3 Units at 06/23/23 2052    And    dextrose 10 % BOLUS 25 g      Pharmacy Consult Request ...Pain Management Review 1 Each      tiotropium (Spiriva Respimat) 2.5 mcg/Act inhalation spray 5 mcg 5 mcg at 06/22/23 1715       ASSESSMENT/PLAN:Siri Solis is a 77 y.o. female s/p lap nathan 6/18 now with increasing oxygen demand, likely acute on chronic respiratory failure.      #Acute on chronic respiratory failure with hypoxia (HCC)  Hx of COPD, on 8-9 L of oxygen at home. Post operatively pt has had increased O2 needs, on 40L now. Likely volume overload. CXR 6/21 shows pulmonary edema and atelectasis. She has tolerated IV lasix without issues this hospitalization.  - Continue supplemental O2 via HFNC.  - Lasix 80 BID  - Jovel cath for accurate I/O's, good urine production on diuretics  - Pulmonology consulted, advised CTA and continue diuresis. Currently radiology is not taking pt due to oxygen requirement pending further recommendations.      #HFpEF (congestive heart failure) (HCC)  Diastolic CHF.  LVEF on 9/14/2023 ~60-65%.  Pt received IVF for pancreatitis and appears fluid overloaded with pulmonary edema and high oxygen requirement. Pancreatitis has resolved, now diuresing.  - Lasix as above  - Continue to monitor I/O's    #Chronic obstructive pulmonary disease (HCC)  History of emphysema, on about 8.5 L/min nasal cannula oxygen at home. History of tobacco use, quit 20 years ago.   - Continue RT per protocol  - Continue home inhalers  - Pt started on Prednisone 40 mg daily      #Atelectasis  Pt likely has post-op atelectasis as well as edema from CHF and fluid overload. Will continue to monitor for worsening and development of pneumonia   - Incentive Spirometry     #FLAVIA   #History of CKD   Patient baseline Cr of 1.0-1.4. Cr today of 1.50. Maybe due to increased lasix dosage. Will need to  monitor. Pending pulmonology recommendations, may need to lower lasix dose.     #Hyponatremia   Patient with sodium of 134, glucose is elevated at 338 that may be falsely lowering sodium. Will get better glucose control due to recent start of prednisone.       #Choledocholithiasis  #S/P Lap nathan  6/16 MRCP completed with sludge and gallbladder wall thickening.  6/18 Robotic assisted laparoscopic cholecystectomy.  General surgery signed off, and recommended follow up with Dr. Ricks or ACS clinic, low-fat diet for 3 weeks, ok to shower, keeping incisions as dry as possible, do not submerge.    #Elevated liver enzymes  LFTs are improving s/p lap nathan.  Total bilirubin is normal. AP is somewhat elevated, appears to be chronic     #Anemia  Chronic, likely due to CKD-4. Hgb ~10, at baseline  - CTM    #Atrial fibrillation (HCC)  Continue metoprolol.  Eliquis was held on admission, currently on heparin dvt ppx. CHADsVASc=6. S/p lap nathan 6/18.  - Previous EKG Sinus rhythm 6 days ago, will repeat.  - Resuming anticoagulation     #Hypertension  Has been well controlled in the hospital but may be increasing due to recent pain.  - amlodipine 10 (started in the hospital)  - lisinopril 10  - metoprolol tartrate 50 BID (increased from home dose).     #Type 2 Diabetes Mellitus  A1c 7.7 in Feb 2023. At home, takes 30U lantus.  - Continue lantus daily  - Correctional insulin, using ~5-8U daily. May need high sliding scale given pt is now on prednisone with elevated glucose     #Disposition: inpatient      Core Measures:  Fluids: none  Lines: PIV  Abx: none  Diet: CHO low fat regular  PPX: anticoagulated     CODE STATUS: DNR/DNI     Do Serna M.D. PGY-1  [facility]

## 2023-06-24 NOTE — CARE PLAN
The patient is Watcher - Medium risk of patient condition declining or worsening    Shift Goals  Clinical Goals: patient will maintain oxygen saturations >86%  Patient Goals: rest  Family Goals: JOON    Progress made toward(s) clinical / shift goals:    Problem: Knowledge Deficit - Standard  Goal: Patient and family/care givers will demonstrate understanding of plan of care, disease process/condition, diagnostic tests and medications  Note: Patient generalized pain managed with routine Tylenol, Echo and BLE ultrasound pending, Remains on Hi Flow       Patient is not progressing towards the following goals:

## 2023-06-24 NOTE — CONSULTS
"Pulmonary Consult    Date of Admission: 6/13/23  Date of Consult: 6/24/23  Consulting: Armando Alvarado MD   Reason for consult: Acute on chronic hypoxic respiratory failure, CHF, right hemidiaphragm elevation/paralysis, obesity, former smoker       HPI:   From Dr. Burton's note: \"Siri Solis is a 77 y.o. female transferred from an outside hospital on 6/13/2023 where she presented with abdominal pain with nausea and vomiting, now s/p lap nathan on 6/18 for choledocholithiasis.\"    Patient has been seen by pulmonary at [facility] in the past. Last OP visit: Dr. Pina 11/2020:  \"woman with advanced chronic obstructive pulmonary disease.  She requires significant oxygen supplementation.  At home she says she uses up to 8 L/min 24/7.  Her only inhaled medication is Spiriva.  With her high oxygen flow she has experienced nosebleeds at times.  She does use a mask rather than nasal cannula at night for sleeping to minimize nasal irritation.  She has not been able to tolerate CPAP in the past.  PFTs show mixed restrictive and obstructive changes with an FEV1 of 1.50 L 62% predicted, FEV1 FVC ratio of 75.  TLC 76% predicted, DLCO 58% predicted.  Consistent with COPD/emphysema and right hemidiaphragm elevation/paralysis.  She has no evidence of ILD on x-ray  She has a history of congestive heart failure and renal insufficiency.  Her most recent creatinine is 1.86 L.  She is followed by cardiology and nephrology.  Her most recent echocardiogram however shows normal left ventricular systolic function.  A prior echocardiogram estimated RVSP at 95 mmHg.  A right heart catheterization showed her pulmonary artery pressure to be 26 mmHg.\"    Recent imaging: CXR  she has prior PFTs which actually do NOT show COPD. She has a restrictive pattern from obesity and diaphragm elevation.     24h events: on 60% FIO2.  Pleasant and conversational.  Denies chest or leg pain.  Records personal history of DVT/PE.  Notable lab trends: Normal WBC. " "Creat 1.5.   VB.48/46/35  Tmax: afebrile  ProCal: n/a  Antibiotics: None  Steroids: Prednisone 40 mg ( - )  Diuretics: lasix 80 mg IV bid       Past Medical History:   Diagnosis Date    Anemia     Arthritis     Back pain     Breath shortness     O2  for paralysis of diaphram right    CATARACT     Congestive heart failure (HCC)     Diabetes     Fall     Hiatus hernia syndrome     Hyperkalemia 2018    Hyperlipidemia     Hypertension     Indigestion     Other specified disorder of intestines     occas diarrhea    Pain     Paralysis of diaphragm     right side    Renal insufficiency 2018    Snoring     Ulcer     \"bleeding stomach ulcer\"       Past Surgical History:   Procedure Laterality Date    CHOLECYSTECTOMY ROBOTIC XI N/A 2023    Procedure: ROBOTIC ASSISTED LAPAROSCOPIC CHOLECYSTECTOMY;  Surgeon: Espinoza Ricks M.D.;  Location: SURGERY [facility];  Service: Gastroenterology    RECOVERY  2013    Performed by [facility] at SURGERY SAME DAY [facility]    GYN SURGERY      hysterectomy    OTHER      cataract surgery     OTHER      carpal tunnel     OTHER ORTHOPEDIC SURGERY      left knee replacement       Social History     Socioeconomic History    Marital status:      Spouse name: Not on file    Number of children: Not on file    Years of education: Not on file    Highest education level: Not on file   Occupational History    Not on file   Tobacco Use    Smoking status: Former     Packs/day: 2.00     Years: 20.00     Pack years: 40.00     Types: Cigarettes     Quit date: 2007     Years since quittin.4    Smokeless tobacco: Never   Vaping Use    Vaping Use: Never used   Substance and Sexual Activity    Alcohol use: No    Drug use: No    Sexual activity: Not on file   Other Topics Concern    Not on file   Social History Narrative    Not on file     Social Determinants of Health     Financial Resource Strain: Not on file   Food Insecurity: Not on file "   Transportation Needs: Not on file   Physical Activity: Not on file   Stress: Not on file   Social Connections: Not on file   Intimate Partner Violence: Not on file   Housing Stability: Not on file          Family History   Problem Relation Age of Onset    Breast Cancer Mother     Cancer Father     Heart Failure Maternal Grandmother        No current facility-administered medications on file prior to encounter.     Current Outpatient Medications on File Prior to Encounter   Medication Sig Dispense Refill    temazepam (RESTORIL) 15 MG Cap Take 30 mg by mouth at bedtime as needed for Sleep.      metoprolol tartrate (LOPRESSOR) 25 MG Tab Take 25 mg by mouth 2 times a day.      acetaminophen (TYLENOL) 500 MG Tab Take 500-1,000 mg by mouth at bedtime as needed.      febuxostat (ULORIC) 40 MG Tab       tiotropium (SPIRIVA) 18 MCG Cap Place 1 Capsule into inhaler and inhale every evening. 90 Capsule 3    lisinopril (PRINIVIL) 10 MG Tab TAKE 1 TABLET BY MOUTH  DAILY (Patient taking differently: Not on current med list) 90 Tablet 3    omeprazole (PRILOSEC) 20 MG delayed-release capsule TAKE 1 CAPSULE BY MOUTH IN  THE EVENING 90 Capsule 3    torsemide (DEMADEX) 20 MG Tab TAKE 1 TABLET BY MOUTH  TWICE DAILY 180 Tablet 3    allopurinol (ZYLOPRIM) 100 MG Tab Take 100 mg by mouth every day. (Patient not taking: Reported on 11/4/2022)      Olopatadine HCl (PATADAY) 0.7 % Solution Administer  into affected eye(s). 1 drop each eye at night      diphenhydrAMINE HCl (BENADRYL ALLERGY PO) Take  by mouth.      Loperamide HCl (IMODIUM PO) Take  by mouth.      hydrocortisone 2.5 % Cream topical cream Apply 1 Application topically 2 times a day. 30 g 2    insulin glargine (LANTUS) 100 UNIT/ML Solution Pen-injector injection Inject 30 Units under the skin every evening. 2*15ml=30m total 30 mL 4    calcitRIOL (ROCALTROL) 0.25 MCG Cap Take 1 Capsule by mouth every day.      albuterol 108 (90 Base) MCG/ACT Aero Soln inhalation aerosol Inhale  "2 Puffs 2 times a day.      glucose blood strip ONETOUCH ULTRA BLUE STRP      B-D ULTRA-FINE 33 LANCETS Misc Use as instructed- 4 times a day. Please provide the brand covered by insurance -90 days      Insulin Pen Needle (B-D UF III MINI PEN NEEDLES) 31G X 5 MM Misc BD PEN NEEDLE MINI U/F 31G X 5 MM         Allergies: Byetta, Epinephrine, Escitalopram, Furosemide, Iodine, Latex, Levofloxacin, Lexapro, Penicillins, Sertraline, Zoloft, Amaryl, Amaryl [glimepiride], Amoxicillin, Epinephrine, Exenatide, Hctz, Metformin, and Spironolactone      ROS: A 12 point ROS was performed on intake and during my interview. ROS negative unless specifically noted in HPI.     Vitals:  /63   Pulse 97   Temp 36.8 °C (98.3 °F) (Oral)   Resp 18   Ht 1.727 m (5' 7.99\")   Wt 112 kg (246 lb 14.6 oz)   SpO2 91%     Physical Exam:  Physical Exam  Vitals reviewed. Exam conducted with a chaperone present.   Constitutional:       General: She is not in acute distress.     Appearance: She is obese. She is ill-appearing. She is not toxic-appearing or diaphoretic.   Eyes:      General:         Right eye: No discharge.         Left eye: No discharge.      Conjunctiva/sclera: Conjunctivae normal.      Pupils: Pupils are equal, round, and reactive to light.   Cardiovascular:      Rate and Rhythm: Normal rate.   Pulmonary:      Effort: Pulmonary effort is normal.      Breath sounds: Normal breath sounds.   Musculoskeletal:      Right lower leg: Edema present.      Left lower leg: Edema present.   Skin:     General: Skin is warm.      Capillary Refill: Capillary refill takes less than 2 seconds.      Coloration: Skin is not jaundiced.   Neurological:      General: No focal deficit present.      Mental Status: She is alert and oriented to person, place, and time.   Psychiatric:         Behavior: Behavior normal.         Laboratory Data: I personally reviewed labs including historical lab trends.       PFTs as reviewed by me personally show: " No obstruction. Restriction and atelectasis from obesity and diaphragm elevation.         More recent PFT in [place]:       Imaging as reviewed by me personally show:  R hemidiaphragm elevation, edema      Assessment/Plan:    Pulmonary problem list:  #Acute on chronic hypoxic respiratory failure secondary to combination of pulmonary edema as well as possible underlying pulmonary embolism.  #Obesity with atelectasis  #Chronic Right Hemidiaphragm paralysis    Discussion: Interestingly, she has been told in the past that she has emphysema/COPD.  This is not supported by her spirometry data.  She has a restrictive pattern on spirometry which is consistent with obesity and a hemidiaphragm that is noted to be paralyzed.  She does not have significant emphysema on her previous CT scans.  It is of course possible that she has asthma.  Regardless, her current hypoxic respiratory failure cannot be described by the spirometry data.  Her chest x-ray does appear to be edematous and she does have a significantly elevated right hemidiaphragm.  She does have signs of continued edema on exam.  Notably, she is tachycardic and recently had a surgery in addition to prolonged hospitalization.    Recommendations:  -Obtain CTA of the chest to evaluate for possible PE.  This will also give a better visualization of the lung parenchyma.  Patient reports a history of contrast allergy.  Primary team to pretreat with prednisone and Benadryl for the CT scan.  She may also need IV fluids precontrast since she has been getting diuresis for the last few days.  -Continue diuresis.    Total consult time: 81 minutes which included time spent on chart review, personally reviewing pertinent images and labs, time spent counseling and educating the patient and/or family members, and coordinating care with the healthcare team to include consultants.   __________  Len Harris, DO  Pulmonary and Critical Care Medicine  [facility]    Please note that  this dictation was created using voice recognition software. The accuracy of the dictation is limited to the abilities of the software. I have made every reasonable attempt to correct obvious errors, but I expect that there are errors of grammar and possibly content that I did not discover before finalizing the note.

## 2023-06-25 ENCOUNTER — APPOINTMENT (OUTPATIENT)
Dept: RADIOLOGY | Facility: MEDICAL CENTER | Age: 78
DRG: 417 | End: 2023-06-25
Payer: COMMERCIAL

## 2023-06-25 LAB
ALBUMIN SERPL BCP-MCNC: 3.2 G/DL (ref 3.2–4.9)
ALBUMIN/GLOB SERPL: 1 G/DL
ALP SERPL-CCNC: 107 U/L (ref 30–99)
ALT SERPL-CCNC: 22 U/L (ref 2–50)
ANION GAP SERPL CALC-SCNC: 10 MMOL/L (ref 7–16)
AST SERPL-CCNC: 18 U/L (ref 12–45)
BILIRUB SERPL-MCNC: 0.4 MG/DL (ref 0.1–1.5)
BUN SERPL-MCNC: 32 MG/DL (ref 8–22)
CALCIUM ALBUM COR SERPL-MCNC: 9.2 MG/DL (ref 8.5–10.5)
CALCIUM SERPL-MCNC: 8.6 MG/DL (ref 8.5–10.5)
CHLORIDE SERPL-SCNC: 89 MMOL/L (ref 96–112)
CO2 SERPL-SCNC: 32 MMOL/L (ref 20–33)
CREAT SERPL-MCNC: 1.99 MG/DL (ref 0.5–1.4)
GFR SERPLBLD CREATININE-BSD FMLA CKD-EPI: 25 ML/MIN/1.73 M 2
GLOBULIN SER CALC-MCNC: 3.1 G/DL (ref 1.9–3.5)
GLUCOSE BLD STRIP.AUTO-MCNC: 338 MG/DL (ref 65–99)
GLUCOSE BLD STRIP.AUTO-MCNC: 371 MG/DL (ref 65–99)
GLUCOSE BLD STRIP.AUTO-MCNC: 438 MG/DL (ref 65–99)
GLUCOSE BLD STRIP.AUTO-MCNC: 463 MG/DL (ref 65–99)
GLUCOSE SERPL-MCNC: 369 MG/DL (ref 65–99)
POTASSIUM SERPL-SCNC: 4 MMOL/L (ref 3.6–5.5)
PROT SERPL-MCNC: 6.3 G/DL (ref 6–8.2)
SODIUM SERPL-SCNC: 131 MMOL/L (ref 135–145)

## 2023-06-25 PROCEDURE — A9270 NON-COVERED ITEM OR SERVICE: HCPCS | Performed by: STUDENT IN AN ORGANIZED HEALTH CARE EDUCATION/TRAINING PROGRAM

## 2023-06-25 PROCEDURE — A9270 NON-COVERED ITEM OR SERVICE: HCPCS | Performed by: INTERNAL MEDICINE

## 2023-06-25 PROCEDURE — A9270 NON-COVERED ITEM OR SERVICE: HCPCS | Performed by: HOSPITALIST

## 2023-06-25 PROCEDURE — 99233 SBSQ HOSP IP/OBS HIGH 50: CPT | Mod: GC | Performed by: FAMILY MEDICINE

## 2023-06-25 PROCEDURE — A9270 NON-COVERED ITEM OR SERVICE: HCPCS

## 2023-06-25 PROCEDURE — 700111 HCHG RX REV CODE 636 W/ 250 OVERRIDE (IP)

## 2023-06-25 PROCEDURE — 700102 HCHG RX REV CODE 250 W/ 637 OVERRIDE(OP): Performed by: INTERNAL MEDICINE

## 2023-06-25 PROCEDURE — 82962 GLUCOSE BLOOD TEST: CPT | Mod: 91

## 2023-06-25 PROCEDURE — 94760 N-INVAS EAR/PLS OXIMETRY 1: CPT

## 2023-06-25 PROCEDURE — 700102 HCHG RX REV CODE 250 W/ 637 OVERRIDE(OP)

## 2023-06-25 PROCEDURE — 93308 TTE F-UP OR LMTD: CPT | Mod: 26 | Performed by: INTERNAL MEDICINE

## 2023-06-25 PROCEDURE — 93325 DOPPLER ECHO COLOR FLOW MAPG: CPT | Mod: 26 | Performed by: INTERNAL MEDICINE

## 2023-06-25 PROCEDURE — 700102 HCHG RX REV CODE 250 W/ 637 OVERRIDE(OP): Performed by: STUDENT IN AN ORGANIZED HEALTH CARE EDUCATION/TRAINING PROGRAM

## 2023-06-25 PROCEDURE — 80053 COMPREHEN METABOLIC PANEL: CPT

## 2023-06-25 PROCEDURE — 36415 COLL VENOUS BLD VENIPUNCTURE: CPT

## 2023-06-25 PROCEDURE — 770004 HCHG ROOM/CARE - ONCOLOGY PRIVATE *

## 2023-06-25 PROCEDURE — 94640 AIRWAY INHALATION TREATMENT: CPT

## 2023-06-25 PROCEDURE — 99232 SBSQ HOSP IP/OBS MODERATE 35: CPT | Performed by: INTERNAL MEDICINE

## 2023-06-25 PROCEDURE — 700102 HCHG RX REV CODE 250 W/ 637 OVERRIDE(OP): Performed by: HOSPITALIST

## 2023-06-25 RX ORDER — METHYLPREDNISOLONE SODIUM SUCCINATE 40 MG/ML
40 INJECTION, POWDER, LYOPHILIZED, FOR SOLUTION INTRAMUSCULAR; INTRAVENOUS
Status: ACTIVE | OUTPATIENT
Start: 2023-06-25 | End: 2023-06-25

## 2023-06-25 RX ORDER — DIPHENHYDRAMINE HYDROCHLORIDE 50 MG/ML
50 INJECTION INTRAMUSCULAR; INTRAVENOUS ONCE
Status: ACTIVE | OUTPATIENT
Start: 2023-06-25 | End: 2023-06-26

## 2023-06-25 RX ADMIN — APIXABAN 5 MG: 5 TABLET, FILM COATED ORAL at 16:58

## 2023-06-25 RX ADMIN — FUROSEMIDE 80 MG: 10 INJECTION INTRAMUSCULAR; INTRAVENOUS at 16:14

## 2023-06-25 RX ADMIN — POTASSIUM CHLORIDE 20 MEQ: 1500 TABLET, EXTENDED RELEASE ORAL at 04:55

## 2023-06-25 RX ADMIN — FEBUXOSTAT 40 MG: 40 TABLET, FILM COATED ORAL at 05:02

## 2023-06-25 RX ADMIN — OMEPRAZOLE 20 MG: 20 CAPSULE, DELAYED RELEASE ORAL at 16:59

## 2023-06-25 RX ADMIN — ALBUTEROL SULFATE 2 PUFF: 90 AEROSOL, METERED RESPIRATORY (INHALATION) at 17:00

## 2023-06-25 RX ADMIN — METOPROLOL TARTRATE 50 MG: 50 TABLET, FILM COATED ORAL at 16:58

## 2023-06-25 RX ADMIN — BENZONATATE 100 MG: 100 CAPSULE ORAL at 16:23

## 2023-06-25 RX ADMIN — LISINOPRIL 10 MG: 10 TABLET ORAL at 04:55

## 2023-06-25 RX ADMIN — AMLODIPINE BESYLATE 10 MG: 10 TABLET ORAL at 04:56

## 2023-06-25 RX ADMIN — METOPROLOL TARTRATE 50 MG: 50 TABLET, FILM COATED ORAL at 04:55

## 2023-06-25 RX ADMIN — DIPHENHYDRAMINE HYDROCHLORIDE 25 MG: 25 TABLET ORAL at 00:32

## 2023-06-25 RX ADMIN — ALBUTEROL SULFATE 2 PUFF: 90 AEROSOL, METERED RESPIRATORY (INHALATION) at 04:58

## 2023-06-25 RX ADMIN — TIOTROPIUM BROMIDE INHALATION SPRAY 5 MCG: 3.12 SPRAY, METERED RESPIRATORY (INHALATION) at 17:01

## 2023-06-25 RX ADMIN — APIXABAN 5 MG: 5 TABLET, FILM COATED ORAL at 04:55

## 2023-06-25 RX ADMIN — PREDNISONE 40 MG: 20 TABLET ORAL at 04:55

## 2023-06-25 RX ADMIN — ACETAMINOPHEN 1000 MG: 500 TABLET, FILM COATED ORAL at 13:42

## 2023-06-25 RX ADMIN — ACETAMINOPHEN 1000 MG: 500 TABLET, FILM COATED ORAL at 04:54

## 2023-06-25 RX ADMIN — ACETAMINOPHEN 1000 MG: 500 TABLET, FILM COATED ORAL at 21:03

## 2023-06-25 RX ADMIN — FUROSEMIDE 80 MG: 10 INJECTION INTRAMUSCULAR; INTRAVENOUS at 04:56

## 2023-06-25 RX ADMIN — DIPHENHYDRAMINE HYDROCHLORIDE 25 MG: 25 TABLET ORAL at 23:56

## 2023-06-25 RX ADMIN — DIPHENHYDRAMINE HYDROCHLORIDE 25 MG: 25 TABLET ORAL at 17:42

## 2023-06-25 ASSESSMENT — PAIN DESCRIPTION - PAIN TYPE: TYPE: ACUTE PAIN

## 2023-06-25 NOTE — CARE PLAN
Problem: Humidified High Flow Nasal Cannula  Goal: Maintain adequate oxygenation dependent on patient condition  Description: Target End Date:  resolve prior to discharge or when underlying condition is resolved/stabilized    1.  Implement humidified high flow oxygen therapy  2.  Titrate high flow oxygen to maintain appropriate SpO2  Flowsheets (Taken 6/25/2023 1448)  Outcome: Normoxia  Note: Pt placed on oxymask today

## 2023-06-25 NOTE — PROGRESS NOTES
Discussed CT to r/o PE and feels anxious about the test and would rather not have the test done, Dr. Serna made aware.

## 2023-06-25 NOTE — CARE PLAN
The patient is Watcher - Medium risk of patient condition declining or worsening    Shift Goals  Clinical Goals: mainatine oxygen saturation >86%  Patient Goals: pain contol  Family Goals: JOON    Progress made toward(s) clinical / shift goals:      A/OX4, Pt is able to understand plan of care at this time.     Problem: Pain - Standard  Goal: Alleviation of pain or a reduction in pain to the patient’s comfort goal  Outcome: Progressing     Problem: Respiratory  Goal: Patient will achieve/maintain optimum respiratory ventilation and gas exchange  6/25/2023 0203 by Angie Tyler R.N.  Outcome: Progressing  Note: Pt pulse oximetry remain 90 or greater.   6/25/2023 0158 by Angie Tyler R.N.  Outcome: Progressing  Note: Pt pulse oximetry remains in the 90's      Problem: Mechanical Ventilation  Goal: Safe management of artificial airway and ventilation  Outcome: Progressing  Note: PRN pain medication working effectively to promote comfort.        Patient is not progressing towards the following goals:

## 2023-06-25 NOTE — PROGRESS NOTES
Received in bed aaox4, on 40L 70% HFNC, denies any discomfort at this time, POC discussed, needs attended.

## 2023-06-25 NOTE — PROGRESS NOTES
"Pulmonary Consult    Date of Admission: 6/13/23  Date of Consult: 6/24/23  Consulting: Armando Alvarado MD   Reason for consult: Acute on chronic hypoxic respiratory failure, CHF, right hemidiaphragm elevation/paralysis, obesity, former smoker       HPI:   From Dr. Burton's note: \"Siri Solis is a 77 y.o. female transferred from an outside hospital on 6/13/2023 where she presented with abdominal pain with nausea and vomiting, now s/p lap nathan on 6/18 for choledocholithiasis.\"    Patient has been seen by pulmonary at [facility] in the past. Last OP visit: Dr. Pina 11/2020:  \"woman with advanced chronic obstructive pulmonary disease.  She requires significant oxygen supplementation.  At home she says she uses up to 8 L/min 24/7.  Her only inhaled medication is Spiriva.  With her high oxygen flow she has experienced nosebleeds at times.  She does use a mask rather than nasal cannula at night for sleeping to minimize nasal irritation.  She has not been able to tolerate CPAP in the past.  PFTs show mixed restrictive and obstructive changes with an FEV1 of 1.50 L 62% predicted, FEV1 FVC ratio of 75.  TLC 76% predicted, DLCO 58% predicted.  Consistent with COPD/emphysema and right hemidiaphragm elevation/paralysis.  She has no evidence of ILD on x-ray  She has a history of congestive heart failure and renal insufficiency.  Her most recent creatinine is 1.86 L.  She is followed by cardiology and nephrology.  Her most recent echocardiogram however shows normal left ventricular systolic function.  A prior echocardiogram estimated RVSP at 95 mmHg.  A right heart catheterization showed her pulmonary artery pressure to be 26 mmHg.\"    Recent imaging: CXR  she has prior PFTs which actually do NOT show COPD. She has a restrictive pattern from obesity and diaphragm elevation.     6/24: Pleasant and conversational.  Denies chest or leg pain.  Records personal history of DVT/PE. On 60%/40lpm high flow. Ordered CTA chest to evaluate " "for PE. Recommended empiric full dose Lovenox + duplex legs.     24h events: still on 60% FIO2 40 lpm.  Pleasant and conversational.  Denies chest or leg pain.  Records personal history of DVT/PE.  Notable lab trends: Normal WBC. Creat 1.5 -> 1.99 with diuresing.   VB.48/46/35 on   Tmax: afebrile  ProCal: n/a  Antibiotics: None  Steroids: Prednisone 40 mg ( - )  Diuretics: lasix 80 mg IV bid       Scheduled Medications   Medication Dose Frequency    insulin regular  2-10 Units 4X/DAY ACHS    insulin GLARGINE  32 Units Q EVENING    acetaminophen  1,000 mg Q8HRS    apixaban  5 mg BID    predniSONE  40 mg DAILY    potassium chloride SA  20 mEq DAILY    furosemide  80 mg BID DIURETIC    amLODIPine  10 mg Q DAY    metoprolol tartrate  50 mg TWICE DAILY    albuterol  2 Puff BID    febuxostat  40 mg DAILY    lisinopril  10 mg DAILY    omeprazole  20 mg Q EVENING    Pharmacy Consult Request  1 Each PHARMACY TO DOSE    tiotropium  5 mcg DAILY         Allergies: Byetta, Epinephrine, Escitalopram, Furosemide, Iodine, Latex, Levofloxacin, Lexapro, Penicillins, Sertraline, Zoloft, Amaryl, Amaryl [glimepiride], Amoxicillin, Epinephrine, Exenatide, Hctz, Metformin, and Spironolactone      ROS: A 12 point ROS was performed on intake and during my interview. ROS negative unless specifically noted in HPI.     Vitals:  /66   Pulse 90   Temp 37.1 °C (98.8 °F) (Temporal)   Resp 20   Ht 1.727 m (5' 7.99\")   Wt 112 kg (246 lb 4.1 oz)   SpO2 96%     Physical Exam:  Physical Exam  Vitals reviewed. Exam conducted with a chaperone present.   Constitutional:       General: She is not in acute distress.     Appearance: She is obese. She is ill-appearing. She is not toxic-appearing or diaphoretic.   Eyes:      General:         Right eye: No discharge.         Left eye: No discharge.      Conjunctiva/sclera: Conjunctivae normal.      Pupils: Pupils are equal, round, and reactive to light.   Cardiovascular:      Rate and " Rhythm: Normal rate.   Pulmonary:      Effort: Pulmonary effort is normal.      Breath sounds: Normal breath sounds.   Musculoskeletal:      Right lower leg: Edema present.      Left lower leg: Edema present.   Skin:     General: Skin is warm.      Capillary Refill: Capillary refill takes less than 2 seconds.      Coloration: Skin is not jaundiced.   Neurological:      General: No focal deficit present.      Mental Status: She is alert and oriented to person, place, and time.   Psychiatric:         Behavior: Behavior normal.         Laboratory Data: I personally reviewed labs including historical lab trends.       PFTs as reviewed by me personally show: No obstruction. Restriction and atelectasis from obesity and diaphragm elevation.         More recent PFT in [place]:       Imaging as reviewed by me personally show:  R hemidiaphragm elevation, edema      Assessment/Plan:    Pulmonary problem list:  #Acute on chronic hypoxic respiratory failure secondary to combination of pulmonary edema as well as possible underlying pulmonary embolism.  #Obesity with atelectasis  #Chronic Right Hemidiaphragm paralysis    Discussion: Interestingly, she has been told in the past that she has emphysema/COPD.  This is not supported by her spirometry data.  She has a restrictive pattern on spirometry which is consistent with obesity and a hemidiaphragm that is noted to be paralyzed.  She does not have significant emphysema on her previous CT scans.  It is of course possible that she has asthma.  Regardless, her current hypoxic respiratory failure cannot be described by the spirometry data.  Her chest x-ray does appear to be edematous and she does have a significantly elevated right hemidiaphragm.  She does have signs of continued edema on exam.  Notably, she is tachycardic and recently had a surgery in addition to prolonged hospitalization.    Recommendations:  -Obtain CTA of the chest to evaluate for possible PE.  This will also give a  better visualization of the lung parenchyma.  Patient reports a history of contrast allergy.  Primary team to pretreat with prednisone and Benadryl for the CT scan.  She may also need IV fluids precontrast since she has been getting diuresis for the last few days.  -Per radiology transport, oxygen requirement to high for CT/PE study   --see if she will tolerate NRB mask for CTA  --empirically treat for DVT/PE   --obtain Duplex US bilateral LE to evaluate for possible DVT  -Diurese as tolerated - increasing creat noted     Total consult time: 41 minutes which included time spent on chart review, personally reviewing pertinent images and labs, time spent counseling and educating the patient and/or family members, and coordinating care with the healthcare team to include consultants.     [id number] - 35 min    Len Harris DO  Staff Pulmonologist and Intensivist  Counts include 234 beds at the [facility]     Please note that this dictation was created using voice recognition software. The accuracy of the dictation is limited to the abilities of the software. I have made every reasonable attempt to correct obvious errors, but I expect that there are errors of grammar and possibly content that I did not discover before finalizing the note.

## 2023-06-25 NOTE — CARE PLAN
Problem: Knowledge Deficit - Standard  Goal: Patient and family/care givers will demonstrate understanding of plan of care, disease process/condition, diagnostic tests and medications  Outcome: Progressing  Note: Steroids, RT protocol     Problem: Respiratory  Goal: Patient will achieve/maintain optimum respiratory ventilation and gas exchange  Outcome: Progressing  Note: Monitor o2 needs,    The patient is Watcher - Medium risk of patient condition declining or worsening    Shift Goals  Clinical Goals: mainatine oxygen saturation >86%  Patient Goals: pain contol  Family Goals: JOON    Progress made toward(s) clinical / shift goals:  titrate o2 needs    Patient is not progressing towards the following goals:

## 2023-06-25 NOTE — PROGRESS NOTES
At 1615 went to patient's bedside to discuss CT as she is declining imaging. We review that the CT with contrast is the best test to evaluated for pulmonary embolism. We are concerned about pulmonary embolism due to increased oxygen demand. Patient and  are not concerned regarding her oxygen demand as she typically wears 8 L nasal cannula at home. Pt is currently requiring 10 L oxy mask after being on an almost equivalent of 40 L O2 with 70% FiO2 HFNC early today. Patient is worried as she previously had contrast leading to anaphylaxis. We also reviewed that the IV solumedrol and benadryl would help reduce that risk but she continued to decline further imaging with CT contrast and she understands the risk of this image not being performed.

## 2023-06-25 NOTE — PROGRESS NOTES
[facility]ESS [facility]  Attending:   Dr. Armando Alvarado     Resident:   Do Serna M.D.    PATIENT:   Siri Solis; [id number]; 1945    ID:   Siri Solis is a 77 y.o. female s/p lap nathan 6/18 now with increasing oxygen demand, likely acute on chronic respiratory failure.     SUBJECTIVE:   No acute events overnight, patient continued to require HFNC at 40 L. She is resting comfortably and reading a book at bedside. Denies chest pain or shortness of breath.     OBJECTIVE:  Vitals:    06/24/23 2100 06/25/23 0033 06/25/23 0405 06/25/23 0438   BP:   135/60    Pulse: 70 73 87 89   Resp: 18 18 18 18   Temp:   36.9 °C (98.5 °F)    TempSrc:   Temporal    SpO2: 93% 91% 96% 88%   Weight:       Height:           Intake/Output Summary (Last 24 hours) at 6/25/2023 0622  Last data filed at 6/25/2023 0405  Gross per 24 hour   Intake 260 ml   Output 2050 ml   Net -1790 ml       PHYSICAL EXAM:  General: No acute distress, afebrile, resting comfortably  HEENT: NC/AT. EOMI.   Cardiovascular: RRR without murmurs. Normal capillary refill   Respiratory: diminished breath sounds to lower lung fields   Abdomen: soft, nontender, nondistended, no masses  EXT:  REY, no edema  Skin: No erythema/lesions   Neuro: Non-focal    LABS:  Recent Labs     06/23/23  0601 06/24/23  0743   WBC 10.5 9.7   RBC 3.98* 4.28   HEMOGLOBIN 11.1* 11.8*   HEMATOCRIT 35.5* 38.9   MCV 89.2 90.9   MCH 27.9 27.6   RDW 51.9* 52.2*   PLATELETCT 268 302   MPV 10.7 11.2   NEUTSPOLYS 76.10* 86.90*   LYMPHOCYTES 8.80* 5.20*   MONOCYTES 10.90 5.40   EOSINOPHILS 2.60 0.00   BASOPHILS 0.30 0.20     Recent Labs     06/23/23  0601 06/24/23  0743 06/25/23  0215   SODIUM 139 134* 131*   POTASSIUM 3.6 4.3 4.0   CHLORIDE 95* 89* 89*   CO2 34* 35* 32   BUN 14 24* 32*   CREATININE 1.17 1.50* 1.99*   CALCIUM 8.5 8.9 8.6   ALBUMIN 3.2 3.4 3.2     Estimated GFR/CRCL = Estimated Creatinine Clearance: 30.1 mL/min (A) (by C-G formula based on SCr of 1.99 mg/dL  (H)).  Recent Labs     06/23/23  0601 06/24/23  0743 06/25/23  0215   GLUCOSE 166* 338* 369*     Recent Labs     06/23/23  0601 06/24/23  0743 06/25/23  0215   ASTSGOT 18 20 18   ALTSGPT 25 26 22   TBILIRUBIN 0.5 0.5 0.4   ALKPHOSPHAT 117* 128* 107*   GLOBULIN 2.9 3.8* 3.1         Recent Labs     06/24/23  0942   [id number] 40     No results for input(s): INR, APTT, FIBRINOGEN in the last 72 hours.    Invalid input(s): DIMER      IMAGING:  EC-ECHOCARDIOGRAM LTD W/O CONT   Final Result      DX-CHEST-PORTABLE (1 VIEW)   Final Result      1.  Stable cardiomegaly.   2.  Perihilar and interstitial opacities most consistent with pulmonary edema, stable since prior exam.   3.  Bibasilar subsegmental atelectatic changes.   4.  Ongoing elevation of the right hemidiaphragm.   5.  Overall, no significant change from 6/19/2023      DX-CHEST-LIMITED (1 VIEW)   Final Result         1.  Pulmonary edema and/or infiltrates, appear somewhat increased since prior study.   2.  Cardiomegaly   3.  Atherosclerosis      IR-US GUIDED PIV   Final Result    Ultrasound-guided PERIPHERAL IV INSERTION performed by    qualified nursing staff as above.      IR-US GUIDED PIV   Final Result    Ultrasound-guided PERIPHERAL IV INSERTION performed by    qualified nursing staff as above.      [id number]-L/O   Final Result      1.  There is cholelithiasis with gallbladder sludge but no gallbladder wall thickening or significant pericholecystic fluid.   2.  There is dilatation of the common bile duct and central intrahepatic biliary tree with probable tumefactive sludge in the distal duct favored over an obstructing stone.   3.  There is a trace of peripancreatic fluid and perirenal fluid possibly related to volume status. Recommend correlation with lipase level in regards to any potential pancreatitis.   4.  There are several cystic areas seen in the pancreatic head and uncinate process, possibly side branch IPMN's.   5.  Previously noted right adrenal  gland mass not well characterized on this exam.   6.  Exam somewhat limited by breathing motion artifact.      EC-ECHOCARDIOGRAM COMPLETE W/O CONT   Final Result      CT-ABDOMEN-PELVIS W/O   Final Result         1.  Small hiatal hernia   2.  Cholelithiasis   3.  Atherosclerosis and atherosclerotic coronary artery disease      DX-CHEST-LIMITED (1 VIEW)   Final Result         1.  Mild pulmonary edema and/or infiltrates.   2.  Cardiomegaly   3.  Atherosclerosis      IR-US GUIDED PIV   Final Result    Ultrasound-guided PERIPHERAL IV INSERTION performed by    qualified nursing staff as above.      DX-CHEST-LIMITED (1 VIEW)   Final Result      New minor fissure thickening could be from edema favored over pleural fluid      Stable cardiac silhouette and hilar enlargement with right hemidiaphragm elevation      CT-CTA CHEST PULMONARY ARTERY W/ RECONS    (Results Pending)   US-EXTREMITY VENOUS LOWER BILAT    (Results Pending)       MEDS:  Current Facility-Administered Medications   Medication Last Admin    benzonatate (TESSALON) capsule 100 mg 100 mg at 06/24/23 2151    acetaminophen (TYLENOL) tablet 1,000 mg 1,000 mg at 06/25/23 0454    apixaban (ELIQUIS) tablet 5 mg 5 mg at 06/25/23 0455    predniSONE (DELTASONE) tablet 40 mg 40 mg at 06/25/23 0455    sodium chloride (OCEAN) 0.65 % nasal spray 2 Spray 2 Spray at 06/24/23 0853    potassium chloride SA (Kdur) tablet 20 mEq 20 mEq at 06/25/23 0455    furosemide (LASIX) injection 80 mg 80 mg at 06/25/23 0456    Respiratory Therapy Consult      ipratropium-albuterol (DUONEB) nebulizer solution 3 mL at 06/20/23 0802    diphenhydrAMINE (BENADRYL) tablet/capsule 25 mg 25 mg at 06/25/23 0032    amLODIPine (NORVASC) tablet 10 mg 10 mg at 06/25/23 0456    metoprolol tartrate (LOPRESSOR) tablet 50 mg 50 mg at 06/25/23 0455    oxyCODONE immediate-release (ROXICODONE) tablet 2.5 mg 2.5 mg at 06/24/23 2150    Or    oxyCODONE immediate-release (ROXICODONE) tablet 5 mg 5 mg at 06/23/23  1000    Or    morphine 4 MG/ML injection 2 mg 2 mg at 06/19/23 1707    hydrALAZINE (APRESOLINE) injection 20 mg 20 mg at 06/18/23 2307    albuterol inhaler 2 Puff 2 Puff at 06/25/23 0458    febuxostat (ULORIC) tablet 40 mg 40 mg at 06/25/23 0502    insulin GLARGINE (Lantus,Semglee) injection 30 Units at 06/24/23 1712    lisinopril (PRINIVIL) tablet 10 mg 10 mg at 06/25/23 0455    omeprazole (PRILOSEC) capsule 20 mg 20 mg at 06/24/23 1711    ondansetron (ZOFRAN) syringe/vial injection 4 mg 4 mg at 06/14/23 1704    insulin regular (HumuLIN R,NovoLIN R) injection 6 Units at 06/24/23 2132    And    dextrose 10 % BOLUS 25 g      Pharmacy Consult Request ...Pain Management Review 1 Each      tiotropium (Spiriva Respimat) 2.5 mcg/Act inhalation spray 5 mcg 5 mcg at 06/24/23 1711       ASSESSMENT/PLAN:Siri Solis is a 77 y.o. female s/p lap nathan 6/18 now with increasing oxygen demand, likely acute on chronic respiratory failure.      #Acute on chronic respiratory failure with hypoxia (HCC)  Hx of COPD, on 8-9 L of oxygen at home. Post operatively pt has had increased O2 needs, on 40L now. Likely volume overload. CXR 6/21 shows pulmonary edema and atelectasis. She has tolerated IV lasix without issues this hospitalization.  - Continue supplemental O2 via HFNC.  - IV Lasix 80 BID  - Jovel cath for accurate I/O's, good urine production on diuretics  - Pulmonology consulted, advised CTA and continue diuresis. Currently radiology is not taking pt due to oxygen requirement on HFNC. Bilateral leg US was ordered and still pending, echo was performed indicated enlarged right ventricular cavity size with decreased systolic function similar to previous.   - RT will attempt NRB mask today to transition from HFNC inorder to obtain CTA     #HFpEF (congestive heart failure) (HCC)  Diastolic CHF.  LVEF on 9/14/2023 ~60-65%.  Pt received IVF for pancreatitis and appears fluid overloaded with pulmonary edema and high oxygen requirement.  Pancreatitis has resolved, now diuresing.  - Lasix as above  - Continue to monitor I/O's     #Chronic obstructive pulmonary disease (HCC)  History of emphysema, on about 8.5 L/min nasal cannula oxygen at home. History of tobacco use, quit 20 years ago.   - Continue RT per protocol  - Continue home inhalers  - Pt started on Prednisone 40 mg daily      #Atelectasis  Pt likely has post-op atelectasis as well as edema from CHF and fluid overload. Will continue to monitor for worsening and development of pneumonia   - Incentive Spirometry     #FLAVIA   #History of CKD   Patient baseline Cr of 1.0-1.4. Cr today of 1.99. Maybe due to increased lasix dosage. Will need to monitor. Pending pulmonology recommendations, may need to lower lasix dose.      #Hyponatremia   Patient with sodium of 134, glucose is elevated 300-400s that may be falsely lowering sodium. Will get better glucose control due to recent start of prednisone.       #Choledocholithiasis  #S/P Lap nathan  6/16 MRCP completed with sludge and gallbladder wall thickening.  6/18 Robotic assisted laparoscopic cholecystectomy.  General surgery signed off, and recommended follow up with Dr. Ricks or ACS clinic, low-fat diet for 3 weeks, ok to shower, keeping incisions as dry as possible, do not submerge.     #Elevated liver enzymes  LFTs are improving s/p lap nathan.  Total bilirubin is normal. AP is somewhat elevated, appears to be chronic     #Anemia  Chronic, likely due to CKD-4. Hgb ~10, at baseline  - CTM     #Atrial fibrillation (HCC)  Continue metoprolol.    - Continue home eliquis 5 mg BID      #Hypertension  Has been well controlled in the hospital but may be increasing due to recent pain.  - amlodipine 10 (started in the hospital)  - lisinopril 10  - metoprolol tartrate 50 BID (increased from home dose).     #Type 2 Diabetes Mellitus  A1c 7.7 in Feb 2023. At home, takes 30U lantus. With current prednisone patient has been hyperglycemic. Will increase to 35 U  with higher correctional.   - Continue lantus daily  - Correctional insulin     #Disposition: inpatient      Core Measures:  Fluids: none  Lines: PIV  Abx: none  Diet: CHO low fat regular  PPX: anticoagulated     CODE STATUS: DNR/DNI    Do Serna M.D. PGY-1  [facility]

## 2023-06-25 NOTE — CARE PLAN
Problem: Humidified High Flow Nasal Cannula  Goal: Maintain adequate oxygenation dependent on patient condition  Description: Target End Date:  resolve prior to discharge or when underlying condition is resolved/stabilized    1.  Implement humidified high flow oxygen therapy  2.  Titrate high flow oxygen to maintain appropriate SpO2  Outcome: Not Met     Hfnc 40L/70%

## 2023-06-26 ENCOUNTER — APPOINTMENT (OUTPATIENT)
Dept: RADIOLOGY | Facility: MEDICAL CENTER | Age: 78
DRG: 417 | End: 2023-06-26
Payer: COMMERCIAL

## 2023-06-26 LAB
ALBUMIN SERPL BCP-MCNC: 3.3 G/DL (ref 3.2–4.9)
ALBUMIN/GLOB SERPL: 1.1 G/DL
ALP SERPL-CCNC: 105 U/L (ref 30–99)
ALT SERPL-CCNC: 26 U/L (ref 2–50)
ANION GAP SERPL CALC-SCNC: 12 MMOL/L (ref 7–16)
AST SERPL-CCNC: 24 U/L (ref 12–45)
BASOPHILS # BLD AUTO: 0.2 % (ref 0–1.8)
BASOPHILS # BLD: 0.02 K/UL (ref 0–0.12)
BILIRUB SERPL-MCNC: 0.3 MG/DL (ref 0.1–1.5)
BUN SERPL-MCNC: 44 MG/DL (ref 8–22)
CALCIUM ALBUM COR SERPL-MCNC: 9.7 MG/DL (ref 8.5–10.5)
CALCIUM SERPL-MCNC: 9.1 MG/DL (ref 8.5–10.5)
CHLORIDE SERPL-SCNC: 89 MMOL/L (ref 96–112)
CO2 SERPL-SCNC: 32 MMOL/L (ref 20–33)
CREAT SERPL-MCNC: 1.62 MG/DL (ref 0.5–1.4)
EOSINOPHIL # BLD AUTO: 0.03 K/UL (ref 0–0.51)
EOSINOPHIL NFR BLD: 0.2 % (ref 0–6.9)
ERYTHROCYTE [DISTWIDTH] IN BLOOD BY AUTOMATED COUNT: 51.3 FL (ref 35.9–50)
GFR SERPLBLD CREATININE-BSD FMLA CKD-EPI: 32 ML/MIN/1.73 M 2
GLOBULIN SER CALC-MCNC: 3.1 G/DL (ref 1.9–3.5)
GLUCOSE BLD STRIP.AUTO-MCNC: 382 MG/DL (ref 65–99)
GLUCOSE BLD STRIP.AUTO-MCNC: 431 MG/DL (ref 65–99)
GLUCOSE BLD STRIP.AUTO-MCNC: 459 MG/DL (ref 65–99)
GLUCOSE BLD STRIP.AUTO-MCNC: 462 MG/DL (ref 65–99)
GLUCOSE BLD STRIP.AUTO-MCNC: 513 MG/DL (ref 65–99)
GLUCOSE SERPL-MCNC: 399 MG/DL (ref 65–99)
HCT VFR BLD AUTO: 36.7 % (ref 37–47)
HGB BLD-MCNC: 11.1 G/DL (ref 12–16)
IMM GRANULOCYTES # BLD AUTO: 0.21 K/UL (ref 0–0.11)
IMM GRANULOCYTES NFR BLD AUTO: 1.7 % (ref 0–0.9)
LYMPHOCYTES # BLD AUTO: 1.11 K/UL (ref 1–4.8)
LYMPHOCYTES NFR BLD: 9.2 % (ref 22–41)
MCH RBC QN AUTO: 27.3 PG (ref 27–33)
MCHC RBC AUTO-ENTMCNC: 30.2 G/DL (ref 32.2–35.5)
MCV RBC AUTO: 90.2 FL (ref 81.4–97.8)
MONOCYTES # BLD AUTO: 1.34 K/UL (ref 0–0.85)
MONOCYTES NFR BLD AUTO: 11.1 % (ref 0–13.4)
NEUTROPHILS # BLD AUTO: 9.36 K/UL (ref 1.82–7.42)
NEUTROPHILS NFR BLD: 77.6 % (ref 44–72)
NRBC # BLD AUTO: 0 K/UL
NRBC BLD-RTO: 0 /100 WBC (ref 0–0.2)
PLATELET # BLD AUTO: 289 K/UL (ref 164–446)
PMV BLD AUTO: 10.9 FL (ref 9–12.9)
POTASSIUM SERPL-SCNC: 4.5 MMOL/L (ref 3.6–5.5)
PROT SERPL-MCNC: 6.4 G/DL (ref 6–8.2)
RBC # BLD AUTO: 4.07 M/UL (ref 4.2–5.4)
SODIUM SERPL-SCNC: 133 MMOL/L (ref 135–145)
WBC # BLD AUTO: 12.1 K/UL (ref 4.8–10.8)

## 2023-06-26 PROCEDURE — 36415 COLL VENOUS BLD VENIPUNCTURE: CPT

## 2023-06-26 PROCEDURE — 700102 HCHG RX REV CODE 250 W/ 637 OVERRIDE(OP): Performed by: HOSPITALIST

## 2023-06-26 PROCEDURE — 80053 COMPREHEN METABOLIC PANEL: CPT

## 2023-06-26 PROCEDURE — 85025 COMPLETE CBC W/AUTO DIFF WBC: CPT

## 2023-06-26 PROCEDURE — 700102 HCHG RX REV CODE 250 W/ 637 OVERRIDE(OP): Performed by: STUDENT IN AN ORGANIZED HEALTH CARE EDUCATION/TRAINING PROGRAM

## 2023-06-26 PROCEDURE — 770004 HCHG ROOM/CARE - ONCOLOGY PRIVATE *

## 2023-06-26 PROCEDURE — 82962 GLUCOSE BLOOD TEST: CPT | Mod: 91

## 2023-06-26 PROCEDURE — A9270 NON-COVERED ITEM OR SERVICE: HCPCS | Performed by: INTERNAL MEDICINE

## 2023-06-26 PROCEDURE — 700102 HCHG RX REV CODE 250 W/ 637 OVERRIDE(OP): Performed by: INTERNAL MEDICINE

## 2023-06-26 PROCEDURE — 700111 HCHG RX REV CODE 636 W/ 250 OVERRIDE (IP)

## 2023-06-26 PROCEDURE — A9270 NON-COVERED ITEM OR SERVICE: HCPCS

## 2023-06-26 PROCEDURE — 99232 SBSQ HOSP IP/OBS MODERATE 35: CPT | Performed by: STUDENT IN AN ORGANIZED HEALTH CARE EDUCATION/TRAINING PROGRAM

## 2023-06-26 PROCEDURE — A9270 NON-COVERED ITEM OR SERVICE: HCPCS | Performed by: STUDENT IN AN ORGANIZED HEALTH CARE EDUCATION/TRAINING PROGRAM

## 2023-06-26 PROCEDURE — 700102 HCHG RX REV CODE 250 W/ 637 OVERRIDE(OP)

## 2023-06-26 PROCEDURE — 99232 SBSQ HOSP IP/OBS MODERATE 35: CPT | Mod: GC | Performed by: FAMILY MEDICINE

## 2023-06-26 PROCEDURE — A9270 NON-COVERED ITEM OR SERVICE: HCPCS | Performed by: HOSPITALIST

## 2023-06-26 RX ORDER — INSULIN LISPRO 100 [IU]/ML
10 INJECTION, SOLUTION INTRAVENOUS; SUBCUTANEOUS ONCE
Status: COMPLETED | OUTPATIENT
Start: 2023-06-26 | End: 2023-06-26

## 2023-06-26 RX ADMIN — LISINOPRIL 10 MG: 10 TABLET ORAL at 05:06

## 2023-06-26 RX ADMIN — ALBUTEROL SULFATE 2 PUFF: 90 AEROSOL, METERED RESPIRATORY (INHALATION) at 17:07

## 2023-06-26 RX ADMIN — METOPROLOL TARTRATE 50 MG: 50 TABLET, FILM COATED ORAL at 05:05

## 2023-06-26 RX ADMIN — METOPROLOL TARTRATE 50 MG: 50 TABLET, FILM COATED ORAL at 17:09

## 2023-06-26 RX ADMIN — DIPHENHYDRAMINE HYDROCHLORIDE 25 MG: 25 TABLET ORAL at 23:03

## 2023-06-26 RX ADMIN — FUROSEMIDE 80 MG: 10 INJECTION INTRAMUSCULAR; INTRAVENOUS at 15:26

## 2023-06-26 RX ADMIN — AMLODIPINE BESYLATE 10 MG: 10 TABLET ORAL at 05:06

## 2023-06-26 RX ADMIN — FUROSEMIDE 80 MG: 10 INJECTION INTRAMUSCULAR; INTRAVENOUS at 05:08

## 2023-06-26 RX ADMIN — APIXABAN 5 MG: 5 TABLET, FILM COATED ORAL at 17:08

## 2023-06-26 RX ADMIN — ACETAMINOPHEN 1000 MG: 500 TABLET, FILM COATED ORAL at 15:25

## 2023-06-26 RX ADMIN — ALBUTEROL SULFATE 2 PUFF: 90 AEROSOL, METERED RESPIRATORY (INHALATION) at 05:10

## 2023-06-26 RX ADMIN — ACETAMINOPHEN 1000 MG: 500 TABLET, FILM COATED ORAL at 21:16

## 2023-06-26 RX ADMIN — BENZONATATE 100 MG: 100 CAPSULE ORAL at 09:48

## 2023-06-26 RX ADMIN — PREDNISONE 40 MG: 20 TABLET ORAL at 05:06

## 2023-06-26 RX ADMIN — TIOTROPIUM BROMIDE INHALATION SPRAY 5 MCG: 3.12 SPRAY, METERED RESPIRATORY (INHALATION) at 17:08

## 2023-06-26 RX ADMIN — FEBUXOSTAT 40 MG: 40 TABLET, FILM COATED ORAL at 05:05

## 2023-06-26 RX ADMIN — INSULIN LISPRO 10 UNITS: 100 INJECTION, SOLUTION INTRAVENOUS; SUBCUTANEOUS at 23:19

## 2023-06-26 RX ADMIN — DIPHENHYDRAMINE HYDROCHLORIDE 25 MG: 25 TABLET ORAL at 10:44

## 2023-06-26 RX ADMIN — APIXABAN 5 MG: 5 TABLET, FILM COATED ORAL at 05:06

## 2023-06-26 RX ADMIN — POTASSIUM CHLORIDE 20 MEQ: 1500 TABLET, EXTENDED RELEASE ORAL at 05:06

## 2023-06-26 RX ADMIN — OMEPRAZOLE 20 MG: 20 CAPSULE, DELAYED RELEASE ORAL at 17:08

## 2023-06-26 RX ADMIN — ACETAMINOPHEN 1000 MG: 500 TABLET, FILM COATED ORAL at 05:05

## 2023-06-26 ASSESSMENT — PAIN DESCRIPTION - PAIN TYPE
TYPE: ACUTE PAIN
TYPE: ACUTE PAIN

## 2023-06-26 ASSESSMENT — ENCOUNTER SYMPTOMS
WHEEZING: 0
FOCAL WEAKNESS: 0
FALLS: 0
VOMITING: 0
FEVER: 0
EYE REDNESS: 0
SHORTNESS OF BREATH: 1

## 2023-06-26 NOTE — CARE PLAN
The patient is Watcher - Medium risk of patient condition declining or worsening    Shift Goals  Clinical Goals: monitor 02, moniotr labs  Patient Goals: rest  Family Goals: JOON    Progress made toward(s) clinical / shift goals:       Problem: Knowledge Deficit - Standard  Goal: Patient and family/care givers will demonstrate understanding of plan of care, disease process/condition, diagnostic tests and medications  Outcome: Progressing     Problem: Pain - Standard  Goal: Alleviation of pain or a reduction in pain to the patient’s comfort goal  Outcome: Progressing

## 2023-06-26 NOTE — PROGRESS NOTES
[facility] [place] FAMILY MEDICINE PROGRESS NOTE  Attending:   Dr. Tayler Isabel     Resident:   Do Serna M.D. PGY-1     PATIENT:   Siri Solis; [id number]; 1945    ID:   77 y.o. female admitted 6/13/22 s/p lap nathan 6/18 now with increasing oxygen demand, likely acute on chronic respiratory failure.     SUBJECTIVE:   No acute events overnight, patient continued to require oxymask 10 L. Has no complaints at this time. Does state she can't get up and walk to bathroom, especially with sheets in place. Typically can bath and clean herself.  cooks and cleans for her.     OBJECTIVE:  Vitals:    06/25/23 1340 06/25/23 1600 06/25/23 1906 06/26/23 0409   BP:  (!) 140/64 124/52 (!) 144/56   Pulse: 91 88 63 68   Resp: 20 18 18 18   Temp:   36.9 °C (98.4 °F) 36.5 °C (97.7 °F)   TempSrc:  Temporal Temporal Temporal   SpO2: 90% 91% 90% 90%   Weight:       Height:         Intake/Output Summary (Last 24 hours) at 6/26/2023 0632  Last data filed at 6/26/2023 0409  Gross per 24 hour   Intake 280 ml   Output 4950 ml   Net -4670 ml       PHYSICAL EXAM:  General: No acute distress, afebrile, resting comfortably  HEENT: NC/AT. EOMI.   Cardiovascular: RRR without murmurs. Normal capillary refill   Respiratory: CTAB  Abdomen: soft, nontender, nondistended, no masses  EXT:  REY, no edema  Skin: No erythema/lesions   Neuro: Non-focal    LABS:  Recent Labs     06/24/23  0743 06/26/23  0527   WBC 9.7 12.1*   RBC 4.28 4.07*   HEMOGLOBIN 11.8* 11.1*   HEMATOCRIT 38.9 36.7*   MCV 90.9 90.2   MCH 27.6 27.3   RDW 52.2* 51.3*   PLATELETCT 302 289   MPV 11.2 10.9   NEUTSPOLYS 86.90* 77.60*   LYMPHOCYTES 5.20* 9.20*   MONOCYTES 5.40 11.10   EOSINOPHILS 0.00 0.20   BASOPHILS 0.20 0.20     Recent Labs     06/24/23  0743 06/25/23  0215 06/26/23  0527   SODIUM 134* 131* 133*   POTASSIUM 4.3 4.0 4.5   CHLORIDE 89* 89* 89*   CO2 35* 32 32   BUN 24* 32* 44*   CREATININE 1.50* 1.99* 1.62*   CALCIUM 8.9 8.6 9.1   ALBUMIN 3.4 3.2 3.3     Estimated  GFR/CRCL = Estimated Creatinine Clearance: 36.9 mL/min (A) (by C-G formula based on SCr of 1.62 mg/dL (H)).  Recent Labs     06/24/23  0743 06/25/23  0215 06/26/23  0527   GLUCOSE 338* 369* 399*     Recent Labs     06/24/23  0743 06/25/23  0215 06/26/23  0527   ASTSGOT 20 18 24   ALTSGPT 26 22 26   TBILIRUBIN 0.5 0.4 0.3   ALKPHOSPHAT 128* 107* 105*   GLOBULIN 3.8* 3.1 3.1         Recent Labs     06/24/23  0942   [id number]     No results for input(s): INR, APTT, FIBRINOGEN in the last 72 hours.    Invalid input(s): DIMER      IMAGING:  EC-ECHOCARDIOGRAM LTD W/O CONT   Final Result      DX-CHEST-PORTABLE (1 VIEW)   Final Result      1.  Stable cardiomegaly.   2.  Perihilar and interstitial opacities most consistent with pulmonary edema, stable since prior exam.   3.  Bibasilar subsegmental atelectatic changes.   4.  Ongoing elevation of the right hemidiaphragm.   5.  Overall, no significant change from 6/19/2023      DX-CHEST-LIMITED (1 VIEW)   Final Result         1.  Pulmonary edema and/or infiltrates, appear somewhat increased since prior study.   2.  Cardiomegaly   3.  Atherosclerosis      IR-US GUIDED PIV   Final Result    Ultrasound-guided PERIPHERAL IV INSERTION performed by    qualified nursing staff as above.      IR-US GUIDED PIV   Final Result    Ultrasound-guided PERIPHERAL IV INSERTION performed by    qualified nursing staff as above.      [id number]   Final Result      1.  There is cholelithiasis with gallbladder sludge but no gallbladder wall thickening or significant pericholecystic fluid.   2.  There is dilatation of the common bile duct and central intrahepatic biliary tree with probable tumefactive sludge in the distal duct favored over an obstructing stone.   3.  There is a trace of peripancreatic fluid and perirenal fluid possibly related to volume status. Recommend correlation with lipase level in regards to any potential pancreatitis.   4.  There are several cystic areas seen in the  pancreatic head and uncinate process, possibly side branch IPMN's.   5.  Previously noted right adrenal gland mass not well characterized on this exam.   6.  Exam somewhat limited by breathing motion artifact.      EC-ECHOCARDIOGRAM COMPLETE W/O CONT   Final Result      CT-ABDOMEN-PELVIS W/O   Final Result         1.  Small hiatal hernia   2.  Cholelithiasis   3.  Atherosclerosis and atherosclerotic coronary artery disease      DX-CHEST-LIMITED (1 VIEW)   Final Result         1.  Mild pulmonary edema and/or infiltrates.   2.  Cardiomegaly   3.  Atherosclerosis      IR-US GUIDED PIV   Final Result    Ultrasound-guided PERIPHERAL IV INSERTION performed by    qualified nursing staff as above.      DX-CHEST-LIMITED (1 VIEW)   Final Result      New minor fissure thickening could be from edema favored over pleural fluid      Stable cardiac silhouette and hilar enlargement with right hemidiaphragm elevation      US-EXTREMITY VENOUS LOWER BILAT    (Results Pending)       MEDS:  Current Facility-Administered Medications   Medication Last Admin    insulin regular (HumuLIN R,NovoLIN R) injection 6 Units at 06/26/23 0515    And    dextrose 10 % BOLUS 25 g      insulin GLARGINE (Lantus,Semglee) injection 35 Units at 06/25/23 1656    diphenhydrAMINE (BENADRYL) injection 50 mg      benzonatate (TESSALON) capsule 100 mg 100 mg at 06/25/23 1623    acetaminophen (TYLENOL) tablet 1,000 mg 1,000 mg at 06/26/23 0505    apixaban (ELIQUIS) tablet 5 mg 5 mg at 06/26/23 0506    predniSONE (DELTASONE) tablet 40 mg 40 mg at 06/26/23 0506    sodium chloride (OCEAN) 0.65 % nasal spray 2 Spray 2 Spray at 06/24/23 0853    potassium chloride SA (Kdur) tablet 20 mEq 20 mEq at 06/26/23 0506    furosemide (LASIX) injection 80 mg 80 mg at 06/26/23 0508    Respiratory Therapy Consult      ipratropium-albuterol (DUONEB) nebulizer solution 3 mL at 06/20/23 0802    diphenhydrAMINE (BENADRYL) tablet/capsule 25 mg 25 mg at 06/25/23 2356    amLODIPine  (NORVASC) tablet 10 mg 10 mg at 06/26/23 0506    metoprolol tartrate (LOPRESSOR) tablet 50 mg 50 mg at 06/26/23 0505    oxyCODONE immediate-release (ROXICODONE) tablet 2.5 mg 2.5 mg at 06/24/23 2150    Or    oxyCODONE immediate-release (ROXICODONE) tablet 5 mg 5 mg at 06/23/23 1000    Or    morphine 4 MG/ML injection 2 mg 2 mg at 06/19/23 1707    hydrALAZINE (APRESOLINE) injection 20 mg 20 mg at 06/18/23 2307    albuterol inhaler 2 Puff 2 Puff at 06/26/23 0510    febuxostat (ULORIC) tablet 40 mg 40 mg at 06/26/23 0505    lisinopril (PRINIVIL) tablet 10 mg 10 mg at 06/26/23 0506    omeprazole (PRILOSEC) capsule 20 mg 20 mg at 06/25/23 1659    ondansetron (ZOFRAN) syringe/vial injection 4 mg 4 mg at 06/14/23 1704    Pharmacy Consult Request ...Pain Management Review 1 Each      tiotropium (Spiriva Respimat) 2.5 mcg/Act inhalation spray 5 mcg 5 mcg at 06/25/23 1701       ASSESSMENT/PLAN:Siri Solis is a 77 y.o. female s/p lap nathan 6/18 now with increasing oxygen demand, likely acute on chronic respiratory failure.      #Acute on chronic respiratory failure with hypoxia (HCC)  Hx of restrictive lung disease, on 8-9 L of oxygen at home. Post operatively pt has had increased O2 needs, was on HFNC 40 L now on oxymask. Likely volume overload. CXR 6/21 shows pulmonary edema and atelectasis.   - Continue supplemental O2 via 10 L oxymask.  - IV Lasix 80 BID  - Jovel cath for accurate I/O's, good urine production on diuretics  - Pulmonology consulted, advised CTA and continue diuresis. Patient declined CTA due to iodine contrast. Bilateral leg US was ordered and still pending, echo was performed indicated enlarged right ventricular cavity size with decreased systolic function similar to previous. Could attempt V/Q scan though not as accurate at CT with contrast.   - Will get PT/OT on board to encourage ambulation and help dictate discharge planning      #HFpEF (congestive heart failure) (HCC)  Diastolic CHF.  LVEF on  9/14/2023 ~60-65%.  Pt received IVF for pancreatitis and appears fluid overloaded with pulmonary edema and high oxygen requirement. Pancreatitis has resolved, now diuresing.  - Lasix as above  - Continue to monitor I/O's     #Restrictive lung disease (HCC)  Pt is on about 8.5 L/min nasal cannula oxygen at home. History of tobacco use, quit 20 years ago.   - Continue RT per protocol  - Continue home inhalers     #Atelectasis  Pt likely has post-op atelectasis as well as edema from CHF and fluid overload. Will continue to monitor for worsening and development of pneumonia   - Incentive Spirometry     #FLAVIA   #History of CKD   Patient baseline Cr of 1.0-1.4. Cr today of 1.99. Maybe due to increased lasix dosage. Will need to monitor. Pending pulmonology recommendations, may need to lower lasix dose.      #Hyponatremia   Patient with sodium of 134, glucose is elevated 300-400s that may be falsely lowering sodium. Will get better glucose control due to recent start of prednisone.       #Choledocholithiasis  #S/P Lap nathan  6/16 MRCP completed with sludge and gallbladder wall thickening.  6/18 Robotic assisted laparoscopic cholecystectomy.  General surgery signed off, and recommended follow up with Dr. Ricks or ACS clinic, low-fat diet for 3 weeks, ok to shower, keeping incisions as dry as possible, do not submerge.     #Elevated liver enzymes  LFTs are improving s/p lap nathan.  Total bilirubin is normal. AP is somewhat elevated, appears to be chronic     #Anemia  Chronic, likely due to CKD-4. Hgb ~10, at baseline  - CTM     #Atrial fibrillation (HCC)  Continue metoprolol.    - Continue home eliquis 5 mg BID      #Hypertension  Has been well controlled in the hospital but may be increasing due to recent pain.  - amlodipine 10 (started in the hospital)  - lisinopril 10  - metoprolol tartrate 50 BID (increased from home dose).     #Type 2 Diabetes Mellitus  A1c 7.7 in Feb 2023. At home, takes 30U lantus. With current  prednisone patient has been hyperglycemic. Will increase to 35 U with higher correctional.   - Continue lantus daily  - Correctional insulin     #Disposition: inpatient      Core Measures:  Fluids: none  Lines: PIV  Abx: none  Diet: CHO low fat regular  PPX: anticoagulated     CODE STATUS: DNR/DNI    Do Serna M.D. PGY-1  [facility]

## 2023-06-26 NOTE — DISCHARGE PLANNING
Care Transition Team Assessment    LMSW met with pt at bedside to complete assessment. Pt A&Ox4 and able to verify the information on the face sheet. Pt lives with her S/O in a single-story house that has one step to enter. Prior to this hospitalization pt was independent at home with ADLs and most IADLs. Pt has a 4WW and uses 8L O2 at baseline supplied by [employer]. Pt reported her family is good support for her. Pt is retired and receives SSI monthly deposits. Pt denies any SA or MH concerns. Pt does not have an AD but does have DNR/DNI code status.      Information Source  Orientation Level: Oriented X4  Information Given By: Patient  Who is responsible for making decisions for patient? : Patient    Readmission Evaluation  Is this a readmission?: No    Elopement Risk  Legal Hold: No  Ambulatory or Self Mobile in Wheelchair: Yes  Disoriented: No  Psychiatric Symptoms: None  History of Wandering: No  Elopement this Admit: No  Vocalizing Wanting to Leave: No  Displays Behaviors, Body Language Wanting to Leave: No-Not at Risk for Elopement  Elopement Risk: Not at Risk for Elopement    Interdisciplinary Discharge Planning  Lives with - Patient's Self Care Capacity: Spouse  Patient or legal guardian wants to designate a caregiver: No  Support Systems: Children, Spouse / Significant Other  Housing / Facility: 1 Story House  Durable Medical Equipment: Walker, Home Oxygen  DME Provider / Phone: Nick    Discharge Preparedness  What is your plan after discharge?: Home with help  What are your discharge supports?: Spouse  Prior Functional Level: Ambulatory  Difficulity with ADLs: None  Difficulity with IADLs: None    Functional Assesment  Prior Functional Level: Ambulatory    Finances  Financial Barriers to Discharge: No  Prescription Coverage: Yes    Vision / Hearing Impairment  Vision Impairment : Yes  Right Eye Vision: Impaired, Wears Glasses  Left Eye Vision: Impaired, Wears Glasses         Advance Directive  Advance  Directive?: POLST    Domestic Abuse  Have you ever been the victim of abuse or violence?: No  Physical Abuse or Sexual Abuse: No  Verbal Abuse or Emotional Abuse: No  Possible Abuse/Neglect Reported to:: Not Applicable    Psychological Assessment  History of Substance Abuse: None  History of Psychiatric Problems: No  Non-compliant with Treatment: No  Newly Diagnosed Illness: No    Discharge Risks or Barriers  Discharge risks or barriers?: No    Anticipated Discharge Information  Discharge Disposition: Discharged to home/self care (01)

## 2023-06-26 NOTE — CARE PLAN
The patient is Watcher - Medium risk of patient condition declining or worsening    Shift Goals  Clinical Goals: Monitor O2 Sats  Patient Goals: Rest  Family Goals: JOON    Progress made toward(s) clinical / shift goals:        Problem: Knowledge Deficit - Standard  Goal: Patient and family/care givers will demonstrate understanding of plan of care, disease process/condition, diagnostic tests and medications  Description: Target End Date:  1-3 days or as soon as patient condition allows    Document in Patient Education    1.  Patient and family/caregiver oriented to unit, equipment, visitation policy and means for communicating concern  2.  Complete/review Learning Assessment  3.  Assess knowledge level of disease process/condition, treatment plan, diagnostic tests and medications  4.  Explain disease process/condition, treatment plan, diagnostic tests and medications  Outcome: Progressing  Note: Patient understands the need for continued monitoring of O2 sats.

## 2023-06-26 NOTE — PROGRESS NOTES
Critical Care Progress Note    Date of admission  6/13/2023    Chief Complaint/Hospital Course  77 y.o. 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   Post op, she required HFNC and pulmonary was consulted for further recs. There was concern for PE so CTPA was ordered for further eval but patient refused stating she had a contrast allergy and didn't even want to try it with the pre-mediations so she is current being emperically treated with Lovenox while pending BLE US. She was also noted to be overloaded on imaging and clinically and with elevated BNP so diuresis was recommended as well.  In regards to patient's respiratory history - per chart review, she was thought to have advanced COPD and combined obstruction/restriction w/elevated diaphragm - she was treated with inhalers. Has never been evaluated further for this diaphragm elevation.    6/26: patient states she feels better today and is on 10L oxymask (on 8L at home)    Review of Systems  Review of Systems   Constitutional:  Positive for malaise/fatigue. Negative for fever.   Eyes:  Negative for redness.   Respiratory:  Positive for shortness of breath. Negative for wheezing.    Cardiovascular:  Negative for chest pain.   Gastrointestinal:  Negative for vomiting.   Musculoskeletal:  Negative for falls.   Skin:  Negative for rash.   Neurological:  Negative for focal weakness.        Vital Signs for last 24 hours   Temp:  [36.5 °C (97.7 °F)-36.9 °C (98.4 °F)] 36.6 °C (97.8 °F)  Pulse:  [62-91] 62  Resp:  [18-20] 19  BP: (124-144)/(52-64) 141/59  SpO2:  [90 %-92 %] 92 %    Hemodynamic parameters for last 24 hours       Respiratory Information for the last 24 hours       Physical Exam   Physical Exam  Vitals and nursing note reviewed.   Constitutional:        Appearance: She is obese.   HENT:      Head: Normocephalic.      Mouth/Throat:      Mouth: Mucous membranes are moist.   Eyes:      Conjunctiva/sclera: Conjunctivae normal.   Cardiovascular:      Rate and Rhythm: Normal rate and regular rhythm.   Pulmonary:      Effort: Pulmonary effort is normal. No respiratory distress.      Breath sounds: No wheezing.   Abdominal:      Palpations: Abdomen is soft.   Musculoskeletal:         General: No deformity.   Skin:     General: Skin is warm and dry.   Neurological:      General: No focal deficit present.      Mental Status: She is alert and oriented to person, place, and time.   Psychiatric:         Mood and Affect: Mood normal.         Medications  Current Facility-Administered Medications   Medication Dose Route Frequency Provider Last Rate Last Admin    insulin regular (HumuLIN R,NovoLIN R) injection  2-10 Units Subcutaneous 4X/DAY ACHS Do Serna M.D.   7 Units at 06/26/23 1134    And    dextrose 10 % BOLUS 25 g  25 g Intravenous Q15 MIN PRN Do Serna M.D.        insulin GLARGINE (Lantus,Semglee) injection  35 Units Subcutaneous Q EVENING Do Serna M.D.   35 Units at 06/25/23 1656    diphenhydrAMINE (BENADRYL) injection 50 mg  50 mg Intravenous Once Do Serna M.D.        benzonatate (TESSALON) capsule 100 mg  100 mg Oral TID PRN Loni Powell M.D.   100 mg at 06/26/23 0948    acetaminophen (TYLENOL) tablet 1,000 mg  1,000 mg Oral Q8HRS González Burton M.D.   1,000 mg at 06/26/23 0505    apixaban (ELIQUIS) tablet 5 mg  5 mg Oral BID González Burton M.D.   5 mg at 06/26/23 0506    predniSONE (DELTASONE) tablet 40 mg  40 mg Oral DAILY González Burton M.D.   40 mg at 06/26/23 0506    sodium chloride (OCEAN) 0.65 % nasal spray 2 Spray  2 Spray Nasal Q2HRS PRN Loni Powell M.D.   2 [place] at 06/24/23 0853    potassium chloride SA (Kdur) tablet 20 mEq  20 mEq Oral DAILY González Burton M.D.   20 mEq at 06/26/23 0506    furosemide (LASIX)  injection 80 mg  80 mg Intravenous BID DIURETIC González Burton M.D.   80 mg at 06/26/23 0508    Respiratory Therapy Consult   Nebulization Continuous RT RAY NewberryP.RMeganNMegan        ipratropium-albuterol (DUONEB) nebulizer solution  3 mL Nebulization Q4H PRN (RT) RAY NewberryP.R.N.   3 mL at 06/20/23 0802    diphenhydrAMINE (BENADRYL) tablet/capsule 25 mg  25 mg Oral Q6HRS PRN RAY NewberryP.R.N.   25 mg at 06/26/23 1044    amLODIPine (NORVASC) tablet 10 mg  10 mg Oral Q DAY Nasra Carranza M.D.   10 mg at 06/26/23 0506    metoprolol tartrate (LOPRESSOR) tablet 50 mg  50 mg Oral TWICE DAILY Nasra Carranza M.D.   50 mg at 06/26/23 0505    oxyCODONE immediate-release (ROXICODONE) tablet 2.5 mg  2.5 mg Oral Q3HRS PRN Nasra Carranza M.D.   2.5 mg at 06/24/23 2150    Or    oxyCODONE immediate-release (ROXICODONE) tablet 5 mg  5 mg Oral Q3HRS PRN Nasra Carranza M.D.   5 mg at 06/23/23 1000    Or    morphine 4 MG/ML injection 2 mg  2 mg Intravenous Q2HRS PRN Nasra Carranza M.D.   2 mg at 06/19/23 1707    hydrALAZINE (APRESOLINE) injection 20 mg  20 mg Intravenous Q6HRS PRN Nasra Carranza M.D.   20 mg at 06/18/23 2307    albuterol inhaler 2 Puff  2 Puff Inhalation BID Polo Hoang M.D.   2 Puff at 06/26/23 0510    febuxostat (ULORIC) tablet 40 mg  40 mg Oral DAILY Polo Hoang M.D.   40 mg at 06/26/23 0505    lisinopril (PRINIVIL) tablet 10 mg  10 mg Oral DAILY Polo Hoang M.D.   10 mg at 06/26/23 0506    omeprazole (PRILOSEC) capsule 20 mg  20 mg Oral Q EVENING Polo Hoang M.D.   20 mg at 06/25/23 1659    ondansetron (ZOFRAN) syringe/vial injection 4 mg  4 mg Intravenous Q4HRS PRN Polo Hoang M.D.   4 mg at 06/14/23 1704    Pharmacy Consult Request ...Pain Management Review 1 Each  1 Each Other PHARMACY TO DOSE Polo Hoang M.D.        tiotropium (Spiriva Respimat) 2.5 mcg/Act inhalation spray 5 mcg  5 mcg Inhalation DAILY Polo HOUSTON  DAVID Hoang.   5 mcg at 06/25/23 1701       Fluids    Intake/Output Summary (Last 24 hours) at 6/26/2023 1303  Last data filed at 6/26/2023 0409  Gross per 24 hour   Intake --   Output 3950 ml   Net -3950 ml       Laboratory  Recent Labs     06/24/23  0942   [id number] 40         Recent Labs     06/24/23  0743 06/25/23 0215 06/26/23  0527   SODIUM 134* 131* 133*   POTASSIUM 4.3 4.0 4.5   CHLORIDE 89* 89* 89*   CO2 35* 32 32   BUN 24* 32* 44*   CREATININE 1.50* 1.99* 1.62*   CALCIUM 8.9 8.6 9.1     Recent Labs     06/24/23  0743 06/25/23 0215 06/26/23  0527   ALTSGPT 26 22 26   ASTSGOT 20 18 24   ALKPHOSPHAT 128* 107* 105*   TBILIRUBIN 0.5 0.4 0.3   GLUCOSE 338* 369* 399*     Recent Labs     06/24/23 0743 06/25/23 0215 06/26/23  0527   WBC 9.7  --  12.1*   NEUTSPOLYS 86.90*  --  77.60*   LYMPHOCYTES 5.20*  --  9.20*   MONOCYTES 5.40  --  11.10   EOSINOPHILS 0.00  --  0.20   BASOPHILS 0.20  --  0.20   ASTSGOT 20 18 24   ALTSGPT 26 22 26   ALKPHOSPHAT 128* 107* 105*   TBILIRUBIN 0.5 0.4 0.3     Recent Labs     06/24/23 0743 06/26/23  0527   RBC 4.28 4.07*   HEMOGLOBIN 11.8* 11.1*   HEMATOCRIT 38.9 36.7*   PLATELETCT 302 289       Imaging  Reviewed     Assessment/Plan  Acute on chronic respiratory failure  HFpEF   Elevated R hemidiaphragm   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 She also potentially has a PE but refusing CTPA for further diagnosis.   Her last PFT shows restrictive lung disease likely 2/2 obesity and elevated hemidiaphragm. It also shows significantly reduced MVV disproportionate to the degree of reduction in FEV1 which can be seen in patients with diaphragm paralysis (no MIP/MEP testing performed).    - stop steroids, she doesn't have COPD  - rec getting BLE US, continue emperic lovenox therapy - if BLE US  unremarkable, then once she's optimally diuresed and her CXR looks improved, can get VQ scan to further assess though it is not as sensitive as CTPA  - continue diuresis, can trend BNP if needed  - patient does want to be evaluated for MUNA despite attempting to educate patient on the new types of masks available that would make it feel less claustrophobic to use it and the risks of untreated MUNA  - no ILD on old CT chest but would be beneficial to diurese her optimally and get CT chest to further evaluate. If this CT chest is unremarkable and she has been optimally diuresed - if she is still on a lot of oxygen, recommend TTE with bubble study (there's a TTE note from 2/6/21 from [place], that notes that her atrial septum is aneurysmal and a patent foramen ovale is suspected). If she has a PFO, would recommend discussion with cardiology regards risks vs benefits of closure vs conservative management of volume status and MUNA. If no PFO, then she would likely benefit from thoracic surgery evaluation of this elevated R hemidiaphragm since it's causing her symptomatic hypoxia (if all the above w/u is unremarkable)      Natalia Mendoza MD  Pulmonary and Critical Care Medicine  [facility]

## 2023-06-27 ENCOUNTER — APPOINTMENT (OUTPATIENT)
Dept: RADIOLOGY | Facility: MEDICAL CENTER | Age: 78
DRG: 417 | End: 2023-06-27
Payer: COMMERCIAL

## 2023-06-27 LAB
ALBUMIN SERPL BCP-MCNC: 3.7 G/DL (ref 3.2–4.9)
ALBUMIN/GLOB SERPL: 1.4 G/DL
ALP SERPL-CCNC: 105 U/L (ref 30–99)
ALT SERPL-CCNC: 26 U/L (ref 2–50)
ANION GAP SERPL CALC-SCNC: 10 MMOL/L (ref 7–16)
AST SERPL-CCNC: 26 U/L (ref 12–45)
BASOPHILS # BLD AUTO: 0.2 % (ref 0–1.8)
BASOPHILS # BLD: 0.02 K/UL (ref 0–0.12)
BILIRUB SERPL-MCNC: 0.3 MG/DL (ref 0.1–1.5)
BUN SERPL-MCNC: 53 MG/DL (ref 8–22)
CALCIUM ALBUM COR SERPL-MCNC: 9.5 MG/DL (ref 8.5–10.5)
CALCIUM SERPL-MCNC: 9.3 MG/DL (ref 8.5–10.5)
CHLORIDE SERPL-SCNC: 89 MMOL/L (ref 96–112)
CO2 SERPL-SCNC: 32 MMOL/L (ref 20–33)
CREAT SERPL-MCNC: 1.78 MG/DL (ref 0.5–1.4)
EOSINOPHIL # BLD AUTO: 0.02 K/UL (ref 0–0.51)
EOSINOPHIL NFR BLD: 0.2 % (ref 0–6.9)
ERYTHROCYTE [DISTWIDTH] IN BLOOD BY AUTOMATED COUNT: 49.1 FL (ref 35.9–50)
GFR SERPLBLD CREATININE-BSD FMLA CKD-EPI: 29 ML/MIN/1.73 M 2
GLOBULIN SER CALC-MCNC: 2.6 G/DL (ref 1.9–3.5)
GLUCOSE BLD STRIP.AUTO-MCNC: 149 MG/DL (ref 65–99)
GLUCOSE BLD STRIP.AUTO-MCNC: 226 MG/DL (ref 65–99)
GLUCOSE BLD STRIP.AUTO-MCNC: 297 MG/DL (ref 65–99)
GLUCOSE BLD STRIP.AUTO-MCNC: 342 MG/DL (ref 65–99)
GLUCOSE BLD STRIP.AUTO-MCNC: 393 MG/DL (ref 65–99)
GLUCOSE BLD STRIP.AUTO-MCNC: 406 MG/DL (ref 65–99)
GLUCOSE BLD STRIP.AUTO-MCNC: 433 MG/DL (ref 65–99)
GLUCOSE SERPL-MCNC: 419 MG/DL (ref 65–99)
HCT VFR BLD AUTO: 35.8 % (ref 37–47)
HGB BLD-MCNC: 11.6 G/DL (ref 12–16)
IMM GRANULOCYTES # BLD AUTO: 0.2 K/UL (ref 0–0.11)
IMM GRANULOCYTES NFR BLD AUTO: 1.7 % (ref 0–0.9)
LYMPHOCYTES # BLD AUTO: 1.04 K/UL (ref 1–4.8)
LYMPHOCYTES NFR BLD: 8.8 % (ref 22–41)
MCH RBC QN AUTO: 28.6 PG (ref 27–33)
MCHC RBC AUTO-ENTMCNC: 32.4 G/DL (ref 32.2–35.5)
MCV RBC AUTO: 88.2 FL (ref 81.4–97.8)
MONOCYTES # BLD AUTO: 1.18 K/UL (ref 0–0.85)
MONOCYTES NFR BLD AUTO: 10 % (ref 0–13.4)
NEUTROPHILS # BLD AUTO: 9.39 K/UL (ref 1.82–7.42)
NEUTROPHILS NFR BLD: 79.1 % (ref 44–72)
NRBC # BLD AUTO: 0 K/UL
NRBC BLD-RTO: 0 /100 WBC (ref 0–0.2)
PLATELET # BLD AUTO: 295 K/UL (ref 164–446)
PMV BLD AUTO: 11 FL (ref 9–12.9)
POTASSIUM SERPL-SCNC: 4.3 MMOL/L (ref 3.6–5.5)
PROT SERPL-MCNC: 6.3 G/DL (ref 6–8.2)
RBC # BLD AUTO: 4.06 M/UL (ref 4.2–5.4)
SODIUM SERPL-SCNC: 131 MMOL/L (ref 135–145)
WBC # BLD AUTO: 11.9 K/UL (ref 4.8–10.8)

## 2023-06-27 PROCEDURE — 97166 OT EVAL MOD COMPLEX 45 MIN: CPT

## 2023-06-27 PROCEDURE — 700102 HCHG RX REV CODE 250 W/ 637 OVERRIDE(OP)

## 2023-06-27 PROCEDURE — A9270 NON-COVERED ITEM OR SERVICE: HCPCS

## 2023-06-27 PROCEDURE — 700102 HCHG RX REV CODE 250 W/ 637 OVERRIDE(OP): Performed by: HOSPITALIST

## 2023-06-27 PROCEDURE — 85025 COMPLETE CBC W/AUTO DIFF WBC: CPT

## 2023-06-27 PROCEDURE — 99232 SBSQ HOSP IP/OBS MODERATE 35: CPT | Mod: GC | Performed by: FAMILY MEDICINE

## 2023-06-27 PROCEDURE — 97162 PT EVAL MOD COMPLEX 30 MIN: CPT

## 2023-06-27 PROCEDURE — 80053 COMPREHEN METABOLIC PANEL: CPT

## 2023-06-27 PROCEDURE — 82962 GLUCOSE BLOOD TEST: CPT | Mod: 91

## 2023-06-27 PROCEDURE — 93970 EXTREMITY STUDY: CPT

## 2023-06-27 PROCEDURE — 36415 COLL VENOUS BLD VENIPUNCTURE: CPT

## 2023-06-27 PROCEDURE — A9270 NON-COVERED ITEM OR SERVICE: HCPCS | Performed by: INTERNAL MEDICINE

## 2023-06-27 PROCEDURE — 700102 HCHG RX REV CODE 250 W/ 637 OVERRIDE(OP): Performed by: INTERNAL MEDICINE

## 2023-06-27 PROCEDURE — 700102 HCHG RX REV CODE 250 W/ 637 OVERRIDE(OP): Performed by: STUDENT IN AN ORGANIZED HEALTH CARE EDUCATION/TRAINING PROGRAM

## 2023-06-27 PROCEDURE — 770004 HCHG ROOM/CARE - ONCOLOGY PRIVATE *

## 2023-06-27 PROCEDURE — 700111 HCHG RX REV CODE 636 W/ 250 OVERRIDE (IP)

## 2023-06-27 PROCEDURE — 99232 SBSQ HOSP IP/OBS MODERATE 35: CPT | Performed by: STUDENT IN AN ORGANIZED HEALTH CARE EDUCATION/TRAINING PROGRAM

## 2023-06-27 PROCEDURE — A9270 NON-COVERED ITEM OR SERVICE: HCPCS | Performed by: HOSPITALIST

## 2023-06-27 RX ORDER — FUROSEMIDE 10 MG/ML
40 INJECTION INTRAMUSCULAR; INTRAVENOUS
Status: DISCONTINUED | OUTPATIENT
Start: 2023-06-27 | End: 2023-06-27

## 2023-06-27 RX ORDER — INSULIN LISPRO 100 [IU]/ML
10 INJECTION, SOLUTION INTRAVENOUS; SUBCUTANEOUS ONCE
Status: COMPLETED | OUTPATIENT
Start: 2023-06-27 | End: 2023-06-27

## 2023-06-27 RX ORDER — FUROSEMIDE 20 MG/1
20 TABLET ORAL
Status: DISCONTINUED | OUTPATIENT
Start: 2023-06-27 | End: 2023-06-28 | Stop reason: HOSPADM

## 2023-06-27 RX ORDER — INSULIN LISPRO 100 [IU]/ML
3-14 INJECTION, SOLUTION INTRAVENOUS; SUBCUTANEOUS EVERY 6 HOURS
Status: DISCONTINUED | OUTPATIENT
Start: 2023-06-27 | End: 2023-06-28 | Stop reason: HOSPADM

## 2023-06-27 RX ORDER — FUROSEMIDE 40 MG/1
40 TABLET ORAL
Status: DISCONTINUED | OUTPATIENT
Start: 2023-06-27 | End: 2023-06-27

## 2023-06-27 RX ADMIN — DIPHENHYDRAMINE HYDROCHLORIDE 25 MG: 25 TABLET ORAL at 21:38

## 2023-06-27 RX ADMIN — OMEPRAZOLE 20 MG: 20 CAPSULE, DELAYED RELEASE ORAL at 17:18

## 2023-06-27 RX ADMIN — INSULIN LISPRO 4 UNITS: 100 INJECTION, SOLUTION INTRAVENOUS; SUBCUTANEOUS at 23:24

## 2023-06-27 RX ADMIN — FUROSEMIDE 80 MG: 10 INJECTION INTRAMUSCULAR; INTRAVENOUS at 06:35

## 2023-06-27 RX ADMIN — METOPROLOL TARTRATE 50 MG: 50 TABLET, FILM COATED ORAL at 06:33

## 2023-06-27 RX ADMIN — ALBUTEROL SULFATE 2 PUFF: 90 AEROSOL, METERED RESPIRATORY (INHALATION) at 06:48

## 2023-06-27 RX ADMIN — ACETAMINOPHEN 1000 MG: 500 TABLET, FILM COATED ORAL at 06:00

## 2023-06-27 RX ADMIN — TIOTROPIUM BROMIDE INHALATION SPRAY 5 MCG: 3.12 SPRAY, METERED RESPIRATORY (INHALATION) at 17:19

## 2023-06-27 RX ADMIN — METOPROLOL TARTRATE 50 MG: 50 TABLET, FILM COATED ORAL at 17:18

## 2023-06-27 RX ADMIN — ACETAMINOPHEN 1000 MG: 500 TABLET, FILM COATED ORAL at 14:30

## 2023-06-27 RX ADMIN — POTASSIUM CHLORIDE 20 MEQ: 1500 TABLET, EXTENDED RELEASE ORAL at 06:34

## 2023-06-27 RX ADMIN — APIXABAN 5 MG: 5 TABLET, FILM COATED ORAL at 06:33

## 2023-06-27 RX ADMIN — AMLODIPINE BESYLATE 10 MG: 10 TABLET ORAL at 06:33

## 2023-06-27 RX ADMIN — INSULIN LISPRO 12 UNITS: 100 INJECTION, SOLUTION INTRAVENOUS; SUBCUTANEOUS at 01:03

## 2023-06-27 RX ADMIN — INSULIN LISPRO 10 UNITS: 100 INJECTION, SOLUTION INTRAVENOUS; SUBCUTANEOUS at 01:04

## 2023-06-27 RX ADMIN — FUROSEMIDE 20 MG: 20 TABLET ORAL at 14:30

## 2023-06-27 RX ADMIN — ACETAMINOPHEN 1000 MG: 500 TABLET, FILM COATED ORAL at 21:06

## 2023-06-27 RX ADMIN — FEBUXOSTAT 40 MG: 40 TABLET, FILM COATED ORAL at 06:46

## 2023-06-27 RX ADMIN — INSULIN LISPRO 10 UNITS: 100 INJECTION, SOLUTION INTRAVENOUS; SUBCUTANEOUS at 11:38

## 2023-06-27 RX ADMIN — APIXABAN 5 MG: 5 TABLET, FILM COATED ORAL at 17:18

## 2023-06-27 RX ADMIN — LISINOPRIL 10 MG: 10 TABLET ORAL at 06:34

## 2023-06-27 RX ADMIN — INSULIN LISPRO 14 UNITS: 100 INJECTION, SOLUTION INTRAVENOUS; SUBCUTANEOUS at 16:56

## 2023-06-27 RX ADMIN — ALBUTEROL SULFATE 2 PUFF: 90 AEROSOL, METERED RESPIRATORY (INHALATION) at 17:19

## 2023-06-27 ASSESSMENT — COGNITIVE AND FUNCTIONAL STATUS - GENERAL
DAILY ACTIVITIY SCORE: 16
MOVING FROM LYING ON BACK TO SITTING ON SIDE OF FLAT BED: A LOT
HELP NEEDED FOR BATHING: A LOT
DRESSING REGULAR LOWER BODY CLOTHING: A LOT
CLIMB 3 TO 5 STEPS WITH RAILING: A LOT
TURNING FROM BACK TO SIDE WHILE IN FLAT BAD: A LOT
PERSONAL GROOMING: A LITTLE
MOBILITY SCORE: 14
STANDING UP FROM CHAIR USING ARMS: A LITTLE
SUGGESTED CMS G CODE MODIFIER MOBILITY: CL
MOVING TO AND FROM BED TO CHAIR: A LOT
SUGGESTED CMS G CODE MODIFIER DAILY ACTIVITY: CK
WALKING IN HOSPITAL ROOM: A LITTLE
DRESSING REGULAR UPPER BODY CLOTHING: A LITTLE
TOILETING: A LOT

## 2023-06-27 ASSESSMENT — PAIN DESCRIPTION - PAIN TYPE
TYPE: ACUTE PAIN
TYPE: ACUTE PAIN

## 2023-06-27 ASSESSMENT — ENCOUNTER SYMPTOMS
VOMITING: 0
FOCAL WEAKNESS: 0
FALLS: 0
EYE REDNESS: 0
FEVER: 0
SHORTNESS OF BREATH: 1
WHEEZING: 0

## 2023-06-27 ASSESSMENT — ACTIVITIES OF DAILY LIVING (ADL): TOILETING: INDEPENDENT

## 2023-06-27 ASSESSMENT — GAIT ASSESSMENTS
DISTANCE (FEET): 4
DEVIATION: SHUFFLED GAIT
ASSISTIVE DEVICE: FRONT WHEEL WALKER
GAIT LEVEL OF ASSIST: CONTACT GUARD ASSIST

## 2023-06-27 NOTE — CARE PLAN
The patient is Watcher - Medium risk of patient condition declining or worsening    Shift Goals  Clinical Goals: Monitor O2 and glucose  Patient Goals: Rest  Family Goals: JOON    Progress made toward(s) clinical / shift goals:       Problem: Knowledge Deficit - Standard  Goal: Patient and family/care givers will demonstrate understanding of plan of care, disease process/condition, diagnostic tests and medications  Description: Target End Date:  1-3 days or as soon as patient condition allows    Document in Patient Education    1.  Patient and family/caregiver oriented to unit, equipment, visitation policy and means for communicating concern  2.  Complete/review Learning Assessment  3.  Assess knowledge level of disease process/condition, treatment plan, diagnostic tests and medications  4.  Explain disease process/condition, treatment plan, diagnostic tests and medications  Outcome: Progressing  Note: Patient understands the need to continue to work towards lowering the amount of O2 needed to maintain sats.

## 2023-06-27 NOTE — CARE PLAN
The patient is Watcher - Medium risk of patient condition declining or worsening    Shift Goals  Clinical Goals: monitor blood sugars and 02 stats  Patient Goals: rest,  Family Goals: JOON    Progress made toward(s) clinical / shift goals:        Problem: Knowledge Deficit - Standard  Goal: Patient and family/care givers will demonstrate understanding of plan of care, disease process/condition, diagnostic tests and medications  Outcome: Progressing     Problem: Pain - Standard  Goal: Alleviation of pain or a reduction in pain to the patient’s comfort goal  Outcome: Progressing

## 2023-06-27 NOTE — PROGRESS NOTES
Patients blood sugar was above 500 at 2100, [facility] MD Bryant was notified. MD put in new orders, 5 units of glargine once, sliding scale of required regular insulin dose as well as a one time dose of 10 units of lispro. Administered insulin per orders. At 23:19, recheck of blood sugar was 433, MD notified; received new orders for high dose sliding scale, no IV push ordered.   0245 update: patients FSBG is 297. MD notified, per MD continue to monitor and test blood sugar at 0600. Continuous monitoring in place. Pt educated on s/s of hyperglycemia and was told to notify RN of any changes.

## 2023-06-27 NOTE — THERAPY
"Occupational Therapy   Initial Evaluation     Patient Name: Siri Solis  Age:  77 y.o., Sex:  female  Medical Record #: [id number]  Today's Date: 6/27/2023     Precautions  Precautions: Fall Risk    Assessment  Patient is 77 y.o. female was transferred from an outside hospital on 6/13/2023 to [facility] where she presented with abdominal pain with nausea and vomiting, now at [facility] s/p lap choledocholithiasis. PMHx of HTN, CHF, obesity, diabetic peripheral neuropathy associated w/type 2 diabetes mellulitis, stage 4 kidney disease, elevated hemidiaphragm, hyperlipidemia, fatigue, dyspnea, and tachycardia. During OT eval, pt presented with poor activity tolerance, endurance, SOB, and fatigue while completing bed mobility and sit<>stand using FWW w/SBA. Pt declined to complete ADLs during evaluation. However, at this time, pt would benefit from skilled acute IP OT services to address deficits in energy conservation, activity tolerance, endurance, functional mobility, and dynamic standing balance during completion of ADLs.     Plan  Occupational Therapy Initial Treatment Plan   Treatment Interventions: (P) Self Care / Activities of Daily Living  Treatment Frequency: (P) 4 Times per Week  Duration: (P) Until Therapy Goals Met    DC Equipment Recommendations: (P) Unable to determine at this time  Discharge Recommendations: (P) Recommend post-acute placement for additional occupational therapy services prior to discharge home     Subjective  \"I'm not putting on those underwear. I am not leaving my room.\"     Objective   06/27/23 1420   Charge Group   OT Evaluation OT Evaluation Mod   Total Time Spent   OT Evaluation (Minutes) 8   Initial Contact Note    Initial Contact Note Order Received and Verified, Occupational Therapy Evaluation in Progress with Full Report to Follow.   Prior Living Situation   Prior Services Home-Independent   Housing / Facility 1 Story House   Steps Into Home 1   Bathroom Set up Bathtub / Shower " Combination   Equipment Owned Grab Bar(s) In Tub / Shower;Front-Wheel Walker;Oxygen;Tub / Shower Seat   Lives with - Patient's Self Care Capacity Spouse   Comments Pt stated that she uses a FWW at baseline. Pt explained that her , Anthony lives with her and could assist her if needed. Pt explained that she owns a shower chair but does not utilize it.   Prior Level of ADL Function   Self Feeding Independent   Grooming / Hygiene Independent   Bathing Independent   Dressing Independent   Toileting Independent   Comments Pt explained that she was able to complete all ADLs/IADLs with use of FWW.   History of Falls   History of Falls No   Date of Last Fall 03/18/18   Precautions   Precautions Fall Risk   Vitals   O2 (LPM) 11   O2 Delivery Device Oxymask   Pain 0 - 10 Group   Therapist Pain Assessment Post Activity Pain Same as Prior to Activity;0   Cognition    Cognition / Consciousness WDL   Level of Consciousness Alert   Active ROM Upper Body   Active ROM Upper Body  WDL   Strength Upper Body   Upper Body Strength  WDL   Neurological Concerns   Neurological Concerns No   Coordination Upper Body   Coordination WDL   Balance Assessment   Sitting Balance (Static) Fair -   Sitting Balance (Dynamic) Fair -   Standing Balance (Static) Fair -   Standing Balance (Dynamic) Fair -   Weight Shift Sitting Fair   Bed Mobility    Supine to Sit Standby Assist   Sit to Supine Standby Assist   Scooting Standby Assist   Comments Pt was able to txf from supine<>sit EOB w/SBA. To return to supine position, pt was able to scoot towards HOB w/SBA.   ADL Assessment   Comments Pt declined to complete any ADLs due to fatigue. However, based on this clinician's judgement pt would require maxA to complete UBD, LBD, and bathing due to pt's difficulty w/breathing and activity tolerance.   How much help from another person does the patient currently need...   Putting on and taking off regular lower body clothing? 2   Bathing (including washing,  rinsing, and drying)? 2   Toileting, which includes using a toilet, bedpan, or urinal? 2   Putting on and taking off regular upper body clothing? 3   Taking care of personal grooming such as brushing teeth? 3   Eating meals? 4   6 Clicks Daily Activity Score 16   Functional Mobility   Sit to Stand Standby Assist   Mobility Pt was able to stand at EOB using FWW w/SBA to ambulate at least 2 feet. However, pt reported feeling lightheaded and requested to remain seated for duration of evaluation. Pt declined txf to recliner chair.   Activity Tolerance   Comments Pt demonstrated poor activity tolerance and endurance as she was not able to stand using FWW w/SBA for more than 10 seconds before requesting to remain seated. Pt would benefit from energy conservation techniques.   Short Term Goals   Short Term Goal # 1 Pt will complete LBD using AE w/SUPV   Short Term Goal # 2 Pt will complete toilet txf using FWW w/SUPV   Short Term Goal # 3 Pt will tolerate completing grooming tasks while standing sinkside using FWW w/o demonstrating signs of fatigue.   Education Group   Education Provided Role of Occupational Therapist   Role of Occupational Therapist Patient Response Patient;Nonacceptance;Verbal Demonstration   Additional Comments Pt   Occupational Therapy Initial Treatment Plan    Treatment Interventions Self Care / Activities of Daily Living   Treatment Frequency 4 Times per Week   Duration Until Therapy Goals Met   Problem List   Problem List Decreased Active Daily Living Skills;Decreased Homemaking Skills;Decreased Functional Mobility;Decreased Activity Tolerance   Anticipated Discharge Equipment and Recommendations   DC Equipment Recommendations Unable to determine at this time   Discharge Recommendations Recommend post-acute placement for additional occupational therapy services prior to discharge home   Interdisciplinary Plan of Care Collaboration   IDT Collaboration with  Nursing;Physical Therapist   Patient  Position at End of Therapy In Bed;Call Light within Reach;Phone within Reach;Tray Table within Reach   Collaboration Comments RN updated   Session Information   Date / Session Number  6/27 #1 (1/4, 7/3)

## 2023-06-27 NOTE — PROGRESS NOTES
[facility] PROGRESS NOTE        Attending:   Dr. Nishi Montoya     Resident:   Do Serna M.D. PGY-1     PATIENT:   Siri Solis; [id number]; 1945    ID:   77 y.o. female admitted 6/13/22 s/p lap nathan 6/18 now with increasing oxygen demand, likely acute on chronic respiratory failure.     SUBJECTIVE:   No acute events overnight, continues to need 10L oxymask. Has no complaints at this time. She states she will not stand or attempt to walk until her sheets is taken out.     OBJECTIVE:  Vitals:    06/26/23 1240 06/26/23 1515 06/26/23 1909 06/27/23 0427   BP:  130/66 122/54 137/68   Pulse:  65 60 69   Resp:  18 17 17   Temp:  37.1 °C (98.7 °F) 36.6 °C (97.9 °F) 36.3 °C (97.4 °F)   TempSrc:  Temporal Temporal Temporal   SpO2: 92% 93% 94% 91%   Weight:       Height:           Intake/Output Summary (Last 24 hours) at 6/27/2023 0624  Last data filed at 6/27/2023 0600  Gross per 24 hour   Intake --   Output 3975 ml   Net -3975 ml       PHYSICAL EXAM:  General: No acute distress, afebrile, resting comfortably  HEENT: NC/AT. EOMI.   Cardiovascular: RRR without murmurs. Normal capillary refill   Respiratory: diminished breath sounds lower lobes   Abdomen: soft, nontender, nondistended, no masses  EXT:  REY, no edema  Skin: No erythema/lesions   Neuro: Non-focal    LABS:  Recent Labs     06/24/23  0743 06/26/23  0527 06/27/23  0057   WBC 9.7 12.1* 11.9*   RBC 4.28 4.07* 4.06*   HEMOGLOBIN 11.8* 11.1* 11.6*   HEMATOCRIT 38.9 36.7* 35.8*   MCV 90.9 90.2 88.2   MCH 27.6 27.3 28.6   RDW 52.2* 51.3* 49.1   PLATELETCT 302 289 295   MPV 11.2 10.9 11.0   NEUTSPOLYS 86.90* 77.60* 79.10*   LYMPHOCYTES 5.20* 9.20* 8.80*   MONOCYTES 5.40 11.10 10.00   EOSINOPHILS 0.00 0.20 0.20   BASOPHILS 0.20 0.20 0.20     Recent Labs     06/25/23  0215 06/26/23  0527 06/27/23  0057   SODIUM 131* 133* 131*   POTASSIUM 4.0 4.5 4.3   CHLORIDE 89* 89* 89*   CO2 32 32 32   BUN 32* 44* 53*   CREATININE 1.99* 1.62* 1.78*   CALCIUM 8.6  9.1 9.3   ALBUMIN 3.2 3.3 3.7     Estimated GFR/CRCL = Estimated Creatinine Clearance: 33.6 mL/min (A) (by C-G formula based on SCr of 1.78 mg/dL (H)).  Recent Labs     06/25/23  0215 06/26/23  0527 06/27/23  0057   GLUCOSE 369* 399* 419*     Recent Labs     06/25/23  0215 06/26/23  0527 06/27/23  0057   ASTSGOT 18 24 26   ALTSGPT 22 26 26   TBILIRUBIN 0.4 0.3 0.3   ALKPHOSPHAT 107* 105* 105*   GLOBULIN 3.1 3.1 2.6         Recent Labs     06/24/23  0942   [id number] 40     No results for input(s): INR, APTT, FIBRINOGEN in the last 72 hours.    Invalid input(s): DIMER      IMAGING:  EC-ECHOCARDIOGRAM LTD W/O CONT   Final Result      DX-CHEST-PORTABLE (1 VIEW)   Final Result      1.  Stable cardiomegaly.   2.  Perihilar and interstitial opacities most consistent with pulmonary edema, stable since prior exam.   3.  Bibasilar subsegmental atelectatic changes.   4.  Ongoing elevation of the right hemidiaphragm.   5.  Overall, no significant change from 6/19/2023      DX-CHEST-LIMITED (1 VIEW)   Final Result         1.  Pulmonary edema and/or infiltrates, appear somewhat increased since prior study.   2.  Cardiomegaly   3.  Atherosclerosis      IR-US GUIDED PIV   Final Result    Ultrasound-guided PERIPHERAL IV INSERTION performed by    qualified nursing staff as above.      IR-US GUIDED PIV   Final Result    Ultrasound-guided PERIPHERAL IV INSERTION performed by    qualified nursing staff as above.      [id number]   Final Result      1.  There is cholelithiasis with gallbladder sludge but no gallbladder wall thickening or significant pericholecystic fluid.   2.  There is dilatation of the common bile duct and central intrahepatic biliary tree with probable tumefactive sludge in the distal duct favored over an obstructing stone.   3.  There is a trace of peripancreatic fluid and perirenal fluid possibly related to volume status. Recommend correlation with lipase level in regards to any potential pancreatitis.   4.   There are several cystic areas seen in the pancreatic head and uncinate process, possibly side branch IPMN's.   5.  Previously noted right adrenal gland mass not well characterized on this exam.   6.  Exam somewhat limited by breathing motion artifact.      EC-ECHOCARDIOGRAM COMPLETE W/O CONT   Final Result      CT-ABDOMEN-PELVIS W/O   Final Result         1.  Small hiatal hernia   2.  Cholelithiasis   3.  Atherosclerosis and atherosclerotic coronary artery disease      DX-CHEST-LIMITED (1 VIEW)   Final Result         1.  Mild pulmonary edema and/or infiltrates.   2.  Cardiomegaly   3.  Atherosclerosis      IR-US GUIDED PIV   Final Result    Ultrasound-guided PERIPHERAL IV INSERTION performed by    qualified nursing staff as above.      DX-CHEST-LIMITED (1 VIEW)   Final Result      New minor fissure thickening could be from edema favored over pleural fluid      Stable cardiac silhouette and hilar enlargement with right hemidiaphragm elevation      US-EXTREMITY VENOUS LOWER BILAT    (Results Pending)       MEDS:  Current Facility-Administered Medications   Medication Last Admin    insulin lispro (HumaLOG,AdmeLOG) injection 12 Units at 06/27/23 0103    And    dextrose 10 % BOLUS 25 g      insulin GLARGINE (Lantus,Semglee) injection 35 Units at 06/26/23 1714    benzonatate (TESSALON) capsule 100 mg 100 mg at 06/26/23 0948    acetaminophen (TYLENOL) tablet 1,000 mg 1,000 mg at 06/26/23 2116    apixaban (ELIQUIS) tablet 5 mg 5 mg at 06/26/23 1708    sodium chloride (OCEAN) 0.65 % nasal spray 2 Spray 2 Spray at 06/24/23 0853    potassium chloride SA (Kdur) tablet 20 mEq 20 mEq at 06/26/23 0506    furosemide (LASIX) injection 80 mg 80 mg at 06/26/23 1526    Respiratory Therapy Consult      ipratropium-albuterol (DUONEB) nebulizer solution 3 mL at 06/20/23 0802    diphenhydrAMINE (BENADRYL) tablet/capsule 25 mg 25 mg at 06/26/23 2303    amLODIPine (NORVASC) tablet 10 mg 10 mg at 06/26/23 0506    metoprolol tartrate  (LOPRESSOR) tablet 50 mg 50 mg at 06/26/23 1709    oxyCODONE immediate-release (ROXICODONE) tablet 2.5 mg 2.5 mg at 06/24/23 2150    Or    oxyCODONE immediate-release (ROXICODONE) tablet 5 mg 5 mg at 06/23/23 1000    Or    morphine 4 MG/ML injection 2 mg 2 mg at 06/19/23 1707    hydrALAZINE (APRESOLINE) injection 20 mg 20 mg at 06/18/23 2307    albuterol inhaler 2 Puff 2 Puff at 06/26/23 1707    febuxostat (ULORIC) tablet 40 mg 40 mg at 06/26/23 0505    lisinopril (PRINIVIL) tablet 10 mg 10 mg at 06/26/23 0506    omeprazole (PRILOSEC) capsule 20 mg 20 mg at 06/26/23 1708    ondansetron (ZOFRAN) syringe/vial injection 4 mg 4 mg at 06/14/23 1704    Pharmacy Consult Request ...Pain Management Review 1 Each      tiotropium (Spiriva Respimat) 2.5 mcg/Act inhalation spray 5 mcg 5 mcg at 06/26/23 1708       ASSESSMENT/PLAN:Siri Solis is a 77 y.o. female s/p lap nathan 6/18 now with increasing oxygen demand, likely acute on chronic respiratory failure.      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 Post operatively from [facility] pt has had increased O2 needs, was on HFNC 40 L now on oxymask. Likely volume overload. CXR 6/21 shows pulmonary edema and atelectasis.  Echo was performed indicated enlarged right ventricular cavity size with decreased systolic function similar to previous.  - Continue supplemental O2 via 10 L oxymask.  - IV Lasix 80 BID will be decreased to home medication of turosemide 20 mg BID  - Jovel cath will be removed to encourage ambulation   - Pulmonology consulted, advised CTA and continue diuresis. Patient declined CTA due to iodine contrast. Bilateral leg US was ordered and still pending now ordered as stat.  Could attempt V/Q scan though not as accurate at CT with contrast.   - Will get PT/OT on board to encourage ambulation and help dictate discharge planning      #HFpEF (congestive  heart failure) ([facility])  Diastolic CHF.  LVEF on 9/14/2023 ~60-65%.  Pt received IVF for pancreatitis and appears fluid overloaded with pulmonary edema and high oxygen requirement. Pancreatitis has resolved, now diuresing.  - Turosemide as above   - Continue to monitor I/O's     #Restrictive lung disease (HCC)  Pt is on about 8.5 L/min nasal cannula oxygen at home. History of tobacco use, quit 20 years ago.   - Continue RT per protocol  - Continue home inhalers     #Atelectasis  Pt likely has post-op atelectasis as well as edema from CHF and fluid overload. Will continue to monitor for worsening and development of pneumonia   - Incentive Spirometry     #FLAVIA   #History of CKD   Patient baseline Cr of 1.0-1.4. With diuresis Cr is improving and now down trending.      #Hyponatremia   Patient with sodium of 134, glucose is elevated 300-400s that may be falsely lowering sodium.        #Choledocholithiasis  #S/P Lap nathan  6/16 MRCP completed with sludge and gallbladder wall thickening.  6/18 Robotic assisted laparoscopic cholecystectomy.  General surgery signed off, and recommended follow up with Dr. Ricks or ACS clinic, low-fat diet for 3 weeks, ok to shower, keeping incisions as dry as possible, do not submerge.     #Anemia  Chronic, likely due to CKD-4. Hgb ~10, at baseline  - CTM     #Atrial fibrillation ([facility])  Continue metoprolol.    - Continue home eliquis 5 mg BID      #Hypertension  Has been well controlled in the hospital but may be increasing due to recent pain.  - amlodipine 10 (started in the hospital)  - lisinopril 10  - metoprolol tartrate 50 BID (increased from home dose).     #Type 2 Diabetes Mellitus  A1c 7.7 in Feb 2023. At home, takes 30U lantus. After prednisone patient has been hyperglycemic. Increased to 35 U with higher correctional.   - Continue lantus daily  - Correctional insulin     #Disposition: inpatient      Core Measures:  Fluids: none  Lines: PIV  Abx: none  Diet: CHO low fat regular  PPX:  anticoagulated     CODE STATUS: DNR/DNI     Do Serna M.D. PGY-1  [facility]

## 2023-06-27 NOTE — THERAPY
Physical Therapy   Initial Evaluation     Patient Name: Siri Solis  Age:  77 y.o., Sex:  female  Medical Record #: [id number]  Today's Date: 6/27/2023     Precautions  Precautions: Fall Risk    Assessment  Patient is 77 y.o. female admitted with abdominal pain and N/V. Pt underwent a lap chol 6/18 for choledocholithiasis. PMH includes chronic hypoxic respiratory failure on 8L O2 and R diaphragm elevation/paralysis. Pt required HFNC post op but has since been placed on oxymask. Pt very self directed with mobility. CGA required for transfers and 4ft of gait with FWW. Encouraged pt to sit in recliner for meals and begin increasing mobility with RN staff. PT will cont while pt is in acute care setting to address strength, balance, activity tolerance, and compensatory strategies.     Plan    Physical Therapy Initial Treatment Plan   Treatment Plan : Bed Mobility, Gait Training, Neuro Re-Education / Balance, Self Care / Home Evaluation, Therapeutic Activities, Therapeutic Exercise  Treatment Frequency: 3 Times per Week  Duration: Until Therapy Goals Met    DC Equipment Recommendations: Unable to determine at this time  Discharge Recommendations: Recommend post-acute placement for additional physical therapy services prior to discharge home        06/27/23 1432   Vitals   O2 (LPM) 8   O2 Delivery Device Oxymask   Prior Living Situation   Prior Services Home-Independent   Housing / Facility 1 Story House   Steps Into Home 1   Steps In Home 0   Equipment Owned 4-Wheel Walker   Lives with - Patient's Self Care Capacity Spouse   Comments spouse can assist if needed   Prior Level of Functional Mobility   Bed Mobility Independent   Transfer Status Independent   Ambulation Independent   Ambulation Distance household   Assistive Devices Used 4-Wheel Walker   Comments pt reports being independent prior with mobility   Cognition    Level of Consciousness Alert   Comments self directed   Balance Assessment   Sitting Balance  (Static) Fair   Sitting Balance (Dynamic) Fair   Standing Balance (Static) Fair -   Standing Balance (Dynamic) Fair -   Weight Shift Sitting Good   Weight Shift Standing Fair   Comments FWW   Bed Mobility    Supine to Sit Contact Guard Assist   Sit to Supine Supervised   Scooting Supervised   Comments HOB raised for supine>sitting   Gait Analysis   Gait Level Of Assist Contact Guard Assist   Assistive Device Front Wheel Walker   Distance (Feet) 4   # of Times Distance was Traveled 1   Deviation Shuffled Gait   Weight Bearing Status no restrictions   Comments refused further   Functional Mobility   Sit to Stand Contact Guard Assist   Bed, Chair, Wheelchair Transfer Refused   Toilet Transfers Refused   Transfer Method Stand Step   Mobility in room with FWW   Short Term Goals    Short Term Goal # 1 pt will be able to complete supine<>sitting from flat bed with SPV in 6tx in order to return to prior level   Short Term Goal # 2 pt will be able to complete functional transfers with FWW and SPV in 6tx in order to return to prior level   Short Term Goal # 3 pt will be able to ambulate 50ft with FWW and SPV in 6tx in order to return home   Anticipated Discharge Equipment and Recommendations   DC Equipment Recommendations Unable to determine at this time   Discharge Recommendations Recommend post-acute placement for additional physical therapy services prior to discharge home

## 2023-06-27 NOTE — PROGRESS NOTES
Pulmonary Progress Note    Date of admission  6/13/2023    Chief Complaint/Hospital Course  77 y.o. 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   Post op, she required HFNC and pulmonary was consulted for further recs. There was concern for PE so CTPA was ordered for further eval but patient refused stating she had a contrast allergy and didn't even want to try it with the pre-mediations so she is current being emperically treated with Lovenox while pending BLE US. She was also noted to be overloaded on imaging and clinically and with elevated BNP so diuresis was recommended as well.  In regards to patient's respiratory history - per chart review, she was thought to have advanced COPD and combined obstruction/restriction w/elevated diaphragm - she was treated with inhalers. Has never been evaluated further for this diaphragm elevation.    6/26: patient states she feels better today and is on 10L oxymask (on 8L at home)  6/27: feels better today, wants to get up and move around.     Review of Systems  Review of Systems   Constitutional:  Positive for malaise/fatigue. Negative for fever.   Eyes:  Negative for redness.   Respiratory:  Positive for shortness of breath. Negative for wheezing.    Cardiovascular:  Negative for chest pain.   Gastrointestinal:  Negative for vomiting.   Musculoskeletal:  Negative for falls.   Skin:  Negative for rash.   Neurological:  Negative for focal weakness.        Vital Signs for last 24 hours   Temp:  [36.3 °C (97.4 °F)-37.1 °C (98.7 °F)] 37 °C (98.6 °F)  Pulse:  [60-77] 72  Resp:  [17-18] 18  BP: (111-137)/(54-69) 111/55  SpO2:  [91 %-94 %] 91 %    Hemodynamic parameters for last 24 hours       Respiratory Information for the last 24 hours       Physical Exam   Physical  Exam  Vitals and nursing note reviewed.   Constitutional:       Appearance: She is obese.   HENT:      Head: Normocephalic.      Mouth/Throat:      Mouth: Mucous membranes are moist.   Eyes:      Conjunctiva/sclera: Conjunctivae normal.   Cardiovascular:      Rate and Rhythm: Normal rate and regular rhythm.   Pulmonary:      Effort: Pulmonary effort is normal. No respiratory distress.      Breath sounds: No wheezing.   Abdominal:      Palpations: Abdomen is soft.   Musculoskeletal:         General: No deformity.   Skin:     General: Skin is warm and dry.   Neurological:      General: No focal deficit present.      Mental Status: She is alert and oriented to person, place, and time.   Psychiatric:         Mood and Affect: Mood normal.         Medications  Current Facility-Administered Medications   Medication Dose Route Frequency Provider Last Rate Last Admin    insulin lispro (HumaLOG,AdmeLOG) injection  3-14 Units Subcutaneous Q6HRS Yg Bryant M.D.   10 Units at 06/27/23 1138    And    dextrose 10 % BOLUS 25 g  25 g Intravenous Q15 MIN PRN Yg Bryant M.D.        insulin GLARGINE (Lantus,Semglee) injection  35 Units Subcutaneous Q EVENING Do Serna M.D.   35 Units at 06/26/23 1714    benzonatate (TESSALON) capsule 100 mg  100 mg Oral TID PRN Loni Powell M.D.   100 mg at 06/26/23 0948    acetaminophen (TYLENOL) tablet 1,000 mg  1,000 mg Oral Q8HRS González Burton M.D.   1,000 mg at 06/27/23 0600    apixaban (ELIQUIS) tablet 5 mg  5 mg Oral BID González Burton M.D.   5 mg at 06/27/23 0633    sodium chloride (OCEAN) 0.65 % nasal spray 2 Spray  2 Spray Nasal Q2HRS PRN Loni Powell M.D.   2 [place] at 06/24/23 0853    potassium chloride SA (Kdur) tablet 20 mEq  20 mEq Oral DAILY González Burton M.D.   20 mEq at 06/27/23 0634    furosemide (LASIX) injection 80 mg  80 mg Intravenous BID DIURETIC González Burton M.D.   80 mg at 06/27/23 0635    Respiratory Therapy Consult   Nebulization  Continuous RT RAY NewberryPMeganRMeganN.        ipratropium-albuterol (DUONEB) nebulizer solution  3 mL Nebulization Q4H PRN (RT) MUSHTAQ NewberryRMeganNMegan   3 mL at 06/20/23 0802    diphenhydrAMINE (BENADRYL) tablet/capsule 25 mg  25 mg Oral Q6HRS PRN RAY NewberryP.RMeganN.   25 mg at 06/26/23 2303    amLODIPine (NORVASC) tablet 10 mg  10 mg Oral Q DAY Nasra Carranza M.D.   10 mg at 06/27/23 0633    metoprolol tartrate (LOPRESSOR) tablet 50 mg  50 mg Oral TWICE DAILY Nasra Carranza M.D.   50 mg at 06/27/23 0633    oxyCODONE immediate-release (ROXICODONE) tablet 2.5 mg  2.5 mg Oral Q3HRS PRN Nasra Carranza M.D.   2.5 mg at 06/24/23 2150    Or    oxyCODONE immediate-release (ROXICODONE) tablet 5 mg  5 mg Oral Q3HRS PRN JOSE HaqDMegan   5 mg at 06/23/23 1000    Or    morphine 4 MG/ML injection 2 mg  2 mg Intravenous Q2HRS PRN JOSE HaqDMegan   2 mg at 06/19/23 1707    hydrALAZINE (APRESOLINE) injection 20 mg  20 mg Intravenous Q6HRS PRN Nasra Carranza M.D.   20 mg at 06/18/23 2307    albuterol inhaler 2 Puff  2 Puff Inhalation BID Polo Hoang M.D.   2 Puff at 06/27/23 0648    febuxostat (ULORIC) tablet 40 mg  40 mg Oral DAILY Polo Hoang M.D.   40 mg at 06/27/23 0646    lisinopril (PRINIVIL) tablet 10 mg  10 mg Oral DAILY Polo Hoang M.D.   10 mg at 06/27/23 0634    omeprazole (PRILOSEC) capsule 20 mg  20 mg Oral Q EVENING Polo Hoang M.D.   20 mg at 06/26/23 1708    ondansetron (ZOFRAN) syringe/vial injection 4 mg  4 mg Intravenous Q4HRS PRN Polo Hoang M.D.   4 mg at 06/14/23 1704    Pharmacy Consult Request ...Pain Management Review 1 Each  1 Each Other PHARMACY TO DOSE Polo Hoang M.D.        tiotropium (Spiriva Respimat) 2.5 mcg/Act inhalation spray 5 mcg  5 mcg Inhalation DAILY Polo Hoang M.D.   5 mcg at 06/26/23 1708       Fluids    Intake/Output Summary (Last 24 hours) at 6/27/2023 1245  Last data filed at 6/27/2023  0600  Gross per 24 hour   Intake --   Output 3975 ml   Net -3975 ml         Laboratory            Recent Labs     06/25/23 0215 06/26/23 0527 06/27/23 0057   SODIUM 131* 133* 131*   POTASSIUM 4.0 4.5 4.3   CHLORIDE 89* 89* 89*   CO2 32 32 32   BUN 32* 44* 53*   CREATININE 1.99* 1.62* 1.78*   CALCIUM 8.6 9.1 9.3       Recent Labs     06/25/23 0215 06/26/23 0527 06/27/23 0057   ALTSGPT 22 26 26   ASTSGOT 18 24 26   ALKPHOSPHAT 107* 105* 105*   TBILIRUBIN 0.4 0.3 0.3   GLUCOSE 369* 399* 419*       Recent Labs     06/25/23 0215 06/26/23 0527 06/27/23 0057   WBC  --  12.1* 11.9*   NEUTSPOLYS  --  77.60* 79.10*   LYMPHOCYTES  --  9.20* 8.80*   MONOCYTES  --  11.10 10.00   EOSINOPHILS  --  0.20 0.20   BASOPHILS  --  0.20 0.20   ASTSGOT 18 24 26   ALTSGPT 22 26 26   ALKPHOSPHAT 107* 105* 105*   TBILIRUBIN 0.4 0.3 0.3       Recent Labs     06/26/23 0527 06/27/23 0057   RBC 4.07* 4.06*   HEMOGLOBIN 11.1* 11.6*   HEMATOCRIT 36.7* 35.8*   PLATELETCT 289 295         Imaging  Reviewed     Assessment/Plan  Acute on chronic respiratory failure  HFpEF   Elevated R hemidiaphragm   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 She also potentially has a PE but refusing CTPA for further diagnosis.   Her last PFT shows restrictive lung disease likely 2/2 obesity and elevated hemidiaphragm. It also shows significantly reduced MVV disproportionate to the degree of reduction in FEV1 which can be seen in patients with diaphragm paralysis (no MIP/MEP testing performed).    - stop steroids, she doesn't have COPD  - rec getting BLE US, continue emperic lovenox therapy - if BLE US unremarkable, then once she's optimally diuresed and her CXR looks improved, can get VQ scan to further assess though it is not as sensitive as CTPA  - continue diuresis, can trend BNP if  needed - recommend switching to oral diuretics (or atleast decreasing dose of IV diuretics) since she's quite net negative in these last 2 days and we can see how she tolerates PO diuretic regimen  - patient does want to be evaluated for MUNA despite attempting to educate patient on the new types of masks available that would make it feel less claustrophobic to use it and the risks of untreated MUNA  - no ILD on old CT chest but would be beneficial to diurese her optimally and get CT chest to further evaluate. If this CT chest is unremarkable and she has been optimally diuresed - if she is still on a lot of oxygen, recommend TTE with bubble study (there's a TTE note from 2/6/21 from [place], that notes that her atrial septum is aneurysmal and a patent foramen ovale is suspected). If she has a PFO, would recommend discussion with cardiology regards risks vs benefits of closure vs conservative management of volume status and MUNA. If no PFO, then she would likely benefit from thoracic surgery evaluation of this elevated R hemidiaphragm since it's causing her symptomatic hypoxia (if all the above w/u is unremarkable)      Natalia Mendoza MD  Pulmonary and Critical Care Medicine  [facility]

## 2023-06-28 ENCOUNTER — PHARMACY VISIT (OUTPATIENT)
Dept: PHARMACY | Facility: MEDICAL CENTER | Age: 78
End: 2023-06-28
Payer: COMMERCIAL

## 2023-06-28 VITALS
TEMPERATURE: 98 F | OXYGEN SATURATION: 90 % | SYSTOLIC BLOOD PRESSURE: 127 MMHG | RESPIRATION RATE: 19 BRPM | DIASTOLIC BLOOD PRESSURE: 62 MMHG | HEART RATE: 67 BPM | WEIGHT: 246.25 LBS | BODY MASS INDEX: 37.32 KG/M2 | HEIGHT: 68 IN

## 2023-06-28 PROBLEM — K85.90 ACUTE PANCREATITIS WITHOUT NECROSIS OR INFECTION, UNSPECIFIED: Status: RESOLVED | Noted: 2021-07-02 | Resolved: 2023-06-28

## 2023-06-28 PROBLEM — R07.89 OTHER CHEST PAIN: Status: RESOLVED | Noted: 2023-06-16 | Resolved: 2023-06-28

## 2023-06-28 LAB
ANION GAP SERPL CALC-SCNC: 10 MMOL/L (ref 7–16)
BUN SERPL-MCNC: 52 MG/DL (ref 8–22)
CALCIUM SERPL-MCNC: 8.9 MG/DL (ref 8.5–10.5)
CHLORIDE SERPL-SCNC: 93 MMOL/L (ref 96–112)
CO2 SERPL-SCNC: 30 MMOL/L (ref 20–33)
CREAT SERPL-MCNC: 1.78 MG/DL (ref 0.5–1.4)
GFR SERPLBLD CREATININE-BSD FMLA CKD-EPI: 29 ML/MIN/1.73 M 2
GLUCOSE BLD STRIP.AUTO-MCNC: 133 MG/DL (ref 65–99)
GLUCOSE BLD STRIP.AUTO-MCNC: 290 MG/DL (ref 65–99)
GLUCOSE SERPL-MCNC: 273 MG/DL (ref 65–99)
NT-PROBNP SERPL IA-MCNC: 980 PG/ML (ref 0–125)
POTASSIUM SERPL-SCNC: 4.6 MMOL/L (ref 3.6–5.5)
SODIUM SERPL-SCNC: 133 MMOL/L (ref 135–145)

## 2023-06-28 PROCEDURE — 80048 BASIC METABOLIC PNL TOTAL CA: CPT

## 2023-06-28 PROCEDURE — A9270 NON-COVERED ITEM OR SERVICE: HCPCS

## 2023-06-28 PROCEDURE — 83880 ASSAY OF NATRIURETIC PEPTIDE: CPT

## 2023-06-28 PROCEDURE — 36415 COLL VENOUS BLD VENIPUNCTURE: CPT

## 2023-06-28 PROCEDURE — 700102 HCHG RX REV CODE 250 W/ 637 OVERRIDE(OP)

## 2023-06-28 PROCEDURE — 700102 HCHG RX REV CODE 250 W/ 637 OVERRIDE(OP): Performed by: HOSPITALIST

## 2023-06-28 PROCEDURE — 99238 HOSP IP/OBS DSCHRG MGMT 30/<: CPT | Mod: GC | Performed by: FAMILY MEDICINE

## 2023-06-28 PROCEDURE — 700102 HCHG RX REV CODE 250 W/ 637 OVERRIDE(OP): Performed by: INTERNAL MEDICINE

## 2023-06-28 PROCEDURE — A9270 NON-COVERED ITEM OR SERVICE: HCPCS | Performed by: HOSPITALIST

## 2023-06-28 PROCEDURE — RXMED WILLOW AMBULATORY MEDICATION CHARGE

## 2023-06-28 PROCEDURE — 82962 GLUCOSE BLOOD TEST: CPT | Mod: 91

## 2023-06-28 PROCEDURE — A9270 NON-COVERED ITEM OR SERVICE: HCPCS | Performed by: INTERNAL MEDICINE

## 2023-06-28 RX ORDER — AMLODIPINE BESYLATE 10 MG/1
10 TABLET ORAL DAILY
Qty: 30 TABLET | Refills: 0 | Status: SHIPPED | OUTPATIENT
Start: 2023-06-29 | End: 2023-09-19

## 2023-06-28 RX ADMIN — FUROSEMIDE 20 MG: 20 TABLET ORAL at 05:04

## 2023-06-28 RX ADMIN — ACETAMINOPHEN 1000 MG: 500 TABLET, FILM COATED ORAL at 05:04

## 2023-06-28 RX ADMIN — AMLODIPINE BESYLATE 10 MG: 10 TABLET ORAL at 05:03

## 2023-06-28 RX ADMIN — LISINOPRIL 10 MG: 10 TABLET ORAL at 05:03

## 2023-06-28 RX ADMIN — FEBUXOSTAT 40 MG: 40 TABLET, FILM COATED ORAL at 05:03

## 2023-06-28 RX ADMIN — INSULIN LISPRO 7 UNITS: 100 INJECTION, SOLUTION INTRAVENOUS; SUBCUTANEOUS at 12:05

## 2023-06-28 RX ADMIN — ALBUTEROL SULFATE 2 PUFF: 90 AEROSOL, METERED RESPIRATORY (INHALATION) at 05:04

## 2023-06-28 RX ADMIN — METOPROLOL TARTRATE 50 MG: 50 TABLET, FILM COATED ORAL at 05:04

## 2023-06-28 RX ADMIN — APIXABAN 5 MG: 5 TABLET, FILM COATED ORAL at 05:04

## 2023-06-28 RX ADMIN — POTASSIUM CHLORIDE 20 MEQ: 1500 TABLET, EXTENDED RELEASE ORAL at 05:03

## 2023-06-28 NOTE — CARE PLAN
The patient is Watcher - Medium risk of patient condition declining or worsening    Shift Goals  Clinical Goals: Patient will ahve controlled blood sugars  Patient Goals: Patient will have adequate rest  Family Goals: JOON    Progress made toward(s) clinical / shift goals:    Problem: Knowledge Deficit - Standard  Goal: Patient and family/care givers will demonstrate understanding of plan of care, disease process/condition, diagnostic tests and medications  Outcome: Progressing  Note: Patient updated on plan of care for the night. Patient educated on importance of controlling blood sugars. Patient ambulated to commode and had a bowel movement. Patient is A&Ox4 and uses call light appropriately. Pain is controlled with current regimen. Patient denies any other questions at this time.       Patient is not progressing towards the following goals:

## 2023-06-28 NOTE — CARE PLAN
The patient is Stable - Low risk of patient condition declining or worsening    Shift Goals  Clinical Goals: Monitor blood sugars; Blood sugar will remain stable; Remove sheets  Patient Goals: Remove sheets; Discharge  Family Goals: Discharge    Progress made toward(s) clinical / shift goals:    Problem: Knowledge Deficit - Standard  Goal: Patient and family/care givers will demonstrate understanding of plan of care, disease process/condition, diagnostic tests and medications  Outcome: Met     Problem: Pain - Standard  Goal: Alleviation of pain or a reduction in pain to the patient’s comfort goal  Outcome: Met     Problem: Fall Risk  Goal: Patient will remain free from falls  Outcome: Met     Problem: Hemodynamics  Goal: Patient's hemodynamics, fluid balance and neurologic status will be stable or improve  Outcome: Met     Problem: Fluid Volume  Goal: Fluid volume balance will be maintained  Outcome: Met     Problem: Urinary - Renal Perfusion  Goal: Ability to achieve and maintain adequate renal perfusion and functioning will improve  Outcome: Met     Problem: Respiratory  Goal: Patient will achieve/maintain optimum respiratory ventilation and gas exchange  Outcome: Met     Problem: Mechanical Ventilation  Goal: Safe management of artificial airway and ventilation  Outcome: Met  Goal: Successful weaning off mechanical ventilator, spontaneously maintains adequate gas exchange  Outcome: Met  Goal: Patient will be able to express needs and understand communication  Outcome: Met     Problem: Physical Regulation  Goal: Diagnostic test results will improve  Outcome: Met  Goal: Signs and symptoms of infection will decrease  Outcome: Met       Patient is not progressing towards the following goals:

## 2023-06-28 NOTE — DISCHARGE PLANNING
Case Management Discharge Planning    Admission Date: 6/13/2023  GMLOS: 5  ALOS: 15    6-Clicks ADL Score: 16  6-Clicks Mobility Score: 14  PT and/or OT Eval ordered: Yes  Post-acute Referrals Ordered: Yes  Post-acute Choice Obtained: Yes  Has referral(s) been sent to post-acute provider:  Yes      Anticipated Discharge Dispo: Discharge Disposition: Discharged to home/self care (01)    DME Needed: No    Action(s) Taken: Completed chart review, PT/OT recommending post acute placement, discussed with pt options for post acute placement, she declined she stated she wants to go home. Educated pt on option of HH, she declined stating her dog would help more than this program. Pt declined post acute placement and HH, no other needs at this time.     Escalations Completed: None    Medically Clear: No    Next Steps: Pending medical clearance.     Barriers to Discharge: Medical clearance    Is the patient up for discharge tomorrow: No

## 2023-06-28 NOTE — DISCHARGE SUMMARY
[facility] Discharge Summary    Attending: Dr. Jan TOWNSEND  Senior Resident: Dr. Micheal TOWNSEND  Intern:  Dr. Daphne TOWNSEND  Contact Number: [phone]    CHIEF COMPLAINT ON ADMISSION  No chief complaint on file.    Reason for Admission  Choledocholithiasis     Admission Date  6/13/2023    CODE STATUS  DNAR/DNI    HPI & HOSPITAL COURSE  This is a 77 y.o. female here initially admitted for choledocholithiasis.  She has a history that is concerning for HFpEF, type 2 diabetes, chronic hypoxia (on 8-9 L at home ) due to likely combination of right diaphragm elevation/paralysis with interstitial lung disease vs MUNA vs obesity hypoventilation syndrome.  Prior to admission, patient was found to have COPD but this was not consistent with PFTs.    Patient presented on 6/13 with abdominal pain, found to have choledocholithiasis and underwent lap nathan on 6/18.  After surgery, patient was found to have pancreatitis, and was given IV fluids and pain control.  Pancreatitis resolved and the patient weaned down on pain control, fluids were stopped, and diet was advanced.    Patient's pain continued to improve, however she then became increasingly hypoxic, initially requiring 15 L supplemental oxygen via nasal cannula.  She was transferred from medical to CNU in order to receive high flow oxygen via HFNC.  Her oxygen requirement went up to 45 L, at which point she saturated >90%.  On exam, patient was found to be fluid overloaded.  Chest x-ray was concerning for atelectasis, pulmonary edema, and elevated right hemidiaphram. Pt was started on lasix 80 BID, oxygenation did not improve despite increased urination. Pulmonology was consulted, recommended workup for PE with CTPE but patient refused citing contrast allergy. As she was already anticoagulated for Afib, echocardiogram was ordered to eval for increased right heart strain, but echo did not show significant change from previous. US bilat LE did not note DVT. Jovel cath was  placed for more accurate I/O's. Pt was prescribed incentive spirometry and successfully diuresed with IV Lasix and her respiratory status improved over the course of days.  Patient was encouraged to get out of bed and sit in chair, but declined this intervention.  Per chart review, patient has been diagnosed with COPD before hospitalization.  We are therefore concerned that there may have been a COPD exacerbation component.  Patient was started on prednisone with this in mind.  Prednisone did exacerbate her type 2 diabetes slightly and she required higher insulin regimen.  However after discussing with pulmonology, PFTs did not support the diagnosis of COPD and steroids were discontinued.Although PE cannot be conclusively ruled out based on previous information, it is unlikely to be the cause of patient's increased hypoxia.  Echo ruled out massive PE that would have led to increased right heart strain, ultrasound failed to find DVTs in her legs.  Her hypoxia was more likely due to to volume overload, possibly related to treatment for pancreatitis as well as previous HFpEF.  It is likely that atelectasis and deconditioning played a role.  Additionally, patient had little pulmonary reserve due to elevated right hemidiaphragm and obesity hypoventilation syndrome. Pt's O2 requirement decreased on lasix to eventually reach her previous home dose of O2.     During these events, patient did recover very well from surgery and from pancreatitis, reporting decreasing pain and ability to handle regular diet.  There was no sign of infection.  She likely had a heart failure exacerbation due to fluid overload, with BNP 16,000.  After diuresis and patient returned to home O2 levels, baseline BNP was established at 980.    PT/OT were consulted and recommended post-acute placement. Patient refused, and was discharged to home with home health/PT on her previous medications and her previous O2 level.    Therefore, she is discharged in  good and stable condition to home with organized home healthcare and close outpatient follow-up.    The patient met 2-midnight criteria for an inpatient stay at the time of discharge.    Discharge Date  6/28/23    Physical Exam on Day of Discharge  Physical Exam    FOLLOW UP ITEMS POST DISCHARGE  -Physical therapy  -Occupational Therapy  -Primary care  -Pulmonology    DISCHARGE DIAGNOSES  Principal Problem:    Choledocholithiasis (POA: Unknown)  Active Problems:    Hypertension (POA: Yes)    Anemia (POA: Yes)    CHF (congestive heart failure) (HCC) (POA: Yes)    Acute on chronic respiratory failure with hypoxia (HCC) (POA: Yes)      Overview: Has needed Oxygen now for many years.  Has partial paralysis of her right       hemidiaphragm.   Has seen Pulmonary Medicine.  Gets short of breath with       almost any activity.  Mostly is home bound now.     Diabetic peripheral neuropathy associated with type 2 diabetes mellitus (HCC) (POA: Yes)    Elevated liver enzymes (POA: Yes)    Chronic obstructive pulmonary disease (HCC) (POA: Yes)    Stage 4 chronic kidney disease (HCC) (POA: Yes)      Overview: Has diabetic kidney disease.  Her renal function has declined over last       few years.  Currently has bene stable.  She is followed by Nephrology.        Has many comorbidities.  She is not interested in dialysis at this time.     Abdominal pain (POA: Yes)    Atrial fibrillation (HCC) (POA: Unknown)  Resolved Problems:    Acute pancreatitis without necrosis or infection, unspecified (POA: Yes)    Other chest pain (POA: Unknown)      FOLLOW UP  No future appointments.  [facility]  [id number]  [place] [id number]  [phone]    Follow up on 6/27/2023  Please follow up in the ACS clinic in 1-2 weeks for wound recheck.      MEDICATIONS ON DISCHARGE     Medication List        START taking these medications        Instructions   amLODIPine 10 MG Tabs  Start taking on: June 29, 2023  Commonly known as: NORVASC    Take 1 Tablet by mouth every day.  Dose: 10 mg     Eliquis 5mg Tabs  Generic drug: apixaban   Take 1 Tablet by mouth 2 times a day. Indications: Thromboembolism secondary to Atrial Fibrillation  Dose: 5 mg            CHANGE how you take these medications        Instructions   lisinopril 10 MG Tabs  What changed:   how much to take  how to take this  when to take this  additional instructions  Commonly known as: PRINIVIL   Doctor's comments: Requesting 1 year supply  TAKE 1 TABLET BY MOUTH  DAILY            CONTINUE taking these medications        Instructions   acetaminophen 500 MG Tabs  Commonly known as: TYLENOL   Take 500-1,000 mg by mouth at bedtime as needed.  Dose: 500-1,000 mg     albuterol 108 (90 Base) MCG/ACT Aers inhalation aerosol   Inhale 2 Puffs 2 times a day.  Dose: 2 Puff     B-D UF III MINI PEN NEEDLES 31G X 5 MM Misc  Generic drug: Insulin Pen Needle   BD PEN NEEDLE MINI U/F 31G X 5 MM     B-D ULTRA-FINE 33 LANCETS Misc   Use as instructed- 4 times a day. Please provide the brand covered by insurance -90 days     BENADRYL ALLERGY PO   Take  by mouth.     calcitRIOL 0.25 MCG Caps  Commonly known as: ROCALTROL   Take 1 Capsule by mouth every day.  Dose: 1 Capsule     ezetimibe-simvastatin 10-40 MG per tablet  Commonly known as: VYTORIN   Doctor's comments: Requesting 1 year supply  TAKE 1 TABLET BY MOUTH  DAILY     febuxostat 40 MG Tabs  Commonly known as: ULORIC      glucose blood strip   ONETOUCH ULTRA BLUE STRP     hydrocortisone 2.5 % Crea topical cream   Apply 1 Application topically 2 times a day.  Dose: 1 Application      IMODIUM PO   Take  by mouth.     insulin glargine 100 UNIT/ML Sopn injection  Commonly known as: Lantus   Doctor's comments: 90 days supply and prn refills for one year please!  Inject 30 Units under the skin every evening. 2*15ml=30m total  Dose: 30 Units     metoprolol tartrate 25 MG Tabs  Commonly known as: LOPRESSOR   Take 25 mg by mouth 2 times a day.  Dose: 25 mg      omeprazole 20 MG delayed-release capsule  Commonly known as: PRILOSEC   Doctor's comments: Requesting 1 year supply  TAKE 1 CAPSULE BY MOUTH IN  THE EVENING     Pataday 0.7 % Soln  Generic drug: Olopatadine HCl   Administer  into affected eye(s). 1 drop each eye at night     temazepam 15 MG Caps  Commonly known as: Restoril   Take 30 mg by mouth at bedtime as needed for Sleep.  Dose: 30 mg     tiotropium 18 MCG Caps  Commonly known as: SPIRIVA   Place 1 Capsule into inhaler and inhale every evening.  Dose: 18 mcg     torsemide 20 MG Tabs  Commonly known as: DEMADEX   TAKE 1 TABLET BY MOUTH  TWICE DAILY            STOP taking these medications      allopurinol 100 MG Tabs  Commonly known as: ZYLOPRIM              Allergies  Allergies   Allergen Reactions    Byetta Rash     rash    Epinephrine     Escitalopram Anaphylaxis     Leg cramping    Furosemide Unspecified     Leg cramps, stopped urinary output  Leg cramps, stopped urinary output      Iodine Anaphylaxis and Rash     contrast  contrast      Latex Anaphylaxis and Rash    Levofloxacin Itching    Lexapro      Leg cramping    Penicillins Rash     Rash    Rash  Rash      Sertraline Diarrhea    Zoloft Diarrhea    Amaryl     Amaryl [Glimepiride] Shortness of Breath and Rash     Rash    Amoxicillin Rash     Rash      Epinephrine Shortness of Breath     Panic attack, Can't breath    Exenatide Rash     rash    Hctz      Stopped urinary output    Metformin Hives, Rash and Itching     Rash     Spironolactone Unspecified       DIET  Orders Placed This Encounter   Procedures    Diet Order Diet: Consistent CHO (Diabetic) (low fat); Nutrient modifications: (optional): Low Fat     Standing Status:   Standing     Number of Occurrences:   1     Order Specific Question:   Diet:     Answer:   Consistent CHO (Diabetic) [4]     Comments:   low fat     Order Specific Question:   Nutrient modifications: (optional)     Answer:   Low Fat [5]       ACTIVITY  As tolerated.  Weight  bearing as tolerated    CONSULTATIONS  Pulmonary -Dr. Ceron DO  Gastroenterology -Dr. Kale TOWNSEND  General surgery -Dr. Dean TOWNSEND    PROCEDURES  6/18/23 Robotic assisted laparoscopic cholecystectomy    LABORATORY  Lab Results   Component Value Date    SODIUM 133 (L) 06/28/2023    POTASSIUM 4.6 06/28/2023    CHLORIDE 93 (L) 06/28/2023    CO2 30 06/28/2023    GLUCOSE 273 (H) 06/28/2023    BUN 52 (H) 06/28/2023    CREATININE 1.78 (H) 06/28/2023    CREATININE 0.7 10/03/2006    GLOMRATE 19 (L) 06/13/2023        Lab Results   Component Value Date    WBC 11.9 (H) 06/27/2023    HEMOGLOBIN 11.6 (L) 06/27/2023    HEMATOCRIT 35.8 (L) 06/27/2023    PLATELETCT 295 06/27/2023

## 2023-06-29 DIAGNOSIS — M51.36 DEGENERATION OF INTERVERTEBRAL DISC OF LUMBAR REGION: ICD-10-CM

## 2023-06-29 DIAGNOSIS — M51.36 DEGENERATIVE DISC DISEASE, LUMBAR: ICD-10-CM

## 2023-06-29 RX ORDER — HYDROCODONE BITARTRATE AND ACETAMINOPHEN 7.5; 325 MG/1; MG/1
1 TABLET ORAL EVERY 6 HOURS PRN
Qty: 120 TABLET | Refills: 0 | Status: SHIPPED | OUTPATIENT
Start: 2023-06-29 | End: 2023-08-21 | Stop reason: SDUPTHER

## 2023-07-18 DIAGNOSIS — F51.04 CHRONIC INSOMNIA: ICD-10-CM

## 2023-07-18 RX ORDER — TEMAZEPAM 30 MG/1
CAPSULE ORAL
Qty: 60 CAPSULE | Refills: 2 | Status: SHIPPED | OUTPATIENT
Start: 2023-07-18 | End: 2023-10-15

## 2023-08-10 DIAGNOSIS — I10 PRIMARY HYPERTENSION: ICD-10-CM

## 2023-08-10 DIAGNOSIS — I50.23 ACUTE ON CHRONIC SYSTOLIC CONGESTIVE HEART FAILURE (HCC): ICD-10-CM

## 2023-08-10 RX ORDER — TORSEMIDE 20 MG/1
TABLET ORAL
Qty: 180 TABLET | Refills: 3 | Status: SHIPPED | OUTPATIENT
Start: 2023-08-10 | End: 2023-10-19 | Stop reason: SDUPTHER

## 2023-08-21 DIAGNOSIS — M51.36 DEGENERATIVE DISC DISEASE, LUMBAR: ICD-10-CM

## 2023-08-21 DIAGNOSIS — M51.36 DEGENERATION OF INTERVERTEBRAL DISC OF LUMBAR REGION: ICD-10-CM

## 2023-08-21 RX ORDER — HYDROCODONE BITARTRATE AND ACETAMINOPHEN 7.5; 325 MG/1; MG/1
1 TABLET ORAL EVERY 6 HOURS PRN
Qty: 120 TABLET | Refills: 0 | Status: SHIPPED | OUTPATIENT
Start: 2023-08-21 | End: 2023-08-24 | Stop reason: SDUPTHER

## 2023-08-24 DIAGNOSIS — M51.36 DEGENERATIVE DISC DISEASE, LUMBAR: ICD-10-CM

## 2023-08-24 DIAGNOSIS — M51.36 DEGENERATION OF INTERVERTEBRAL DISC OF LUMBAR REGION: ICD-10-CM

## 2023-08-24 RX ORDER — HYDROCODONE BITARTRATE AND ACETAMINOPHEN 7.5; 325 MG/1; MG/1
1 TABLET ORAL EVERY 6 HOURS PRN
Qty: 120 TABLET | Refills: 0 | Status: SHIPPED | OUTPATIENT
Start: 2023-08-24 | End: 2023-09-22 | Stop reason: SDUPTHER

## 2023-09-19 PROBLEM — R11.2 NAUSEA AND VOMITING: Status: RESOLVED | Noted: 2019-05-01 | Resolved: 2023-09-19

## 2023-09-19 PROBLEM — R78.81 BACTEREMIA: Status: RESOLVED | Noted: 2021-06-23 | Resolved: 2023-09-19

## 2023-09-19 PROBLEM — I50.9 HEART FAILURE, UNSPECIFIED (HCC): Status: RESOLVED | Noted: 2018-04-05 | Resolved: 2023-09-19

## 2023-09-19 PROBLEM — N17.9 ACUTE KIDNEY INJURY (HCC): Status: RESOLVED | Noted: 2019-05-01 | Resolved: 2023-09-19

## 2023-10-19 PROBLEM — E66.9 OBESITY WITH BODY MASS INDEX 30 OR GREATER: Status: RESOLVED | Noted: 2020-02-18 | Resolved: 2023-10-19

## 2023-10-19 PROBLEM — R04.0 EPISTAXIS, RECURRENT: Status: RESOLVED | Noted: 2020-11-13 | Resolved: 2023-10-19

## 2023-10-19 PROBLEM — Z79.899 ENCOUNTER FOR LONG-TERM CURRENT USE OF MEDICATION: Status: RESOLVED | Noted: 2021-05-13 | Resolved: 2023-10-19

## 2023-10-19 PROBLEM — E66.9 OBESITY (BMI 30-39.9): Status: RESOLVED | Noted: 2019-09-17 | Resolved: 2023-10-19

## 2023-10-19 PROBLEM — J30.2 SEASONAL ALLERGIES: Status: RESOLVED | Noted: 2019-03-26 | Resolved: 2023-10-19

## 2023-10-19 PROBLEM — D64.9 NORMOCYTIC ANEMIA: Status: ACTIVE | Noted: 2023-10-19

## 2023-10-19 PROBLEM — N28.9 RENAL INSUFFICIENCY: Status: RESOLVED | Noted: 2018-03-23 | Resolved: 2023-10-19

## 2023-10-19 PROBLEM — R10.9 ABDOMINAL PAIN: Status: RESOLVED | Noted: 2023-06-13 | Resolved: 2023-10-19

## 2023-10-19 PROBLEM — E78.2 MIXED HYPERLIPIDEMIA: Status: RESOLVED | Noted: 2021-05-13 | Resolved: 2023-10-19

## 2023-10-19 PROBLEM — E83.42 HYPOMAGNESEMIA: Status: RESOLVED | Noted: 2017-11-13 | Resolved: 2023-10-19

## 2023-10-19 PROBLEM — Z00.00 PREVENTATIVE HEALTH CARE: Status: RESOLVED | Noted: 2018-01-08 | Resolved: 2023-10-19

## 2023-10-19 PROBLEM — Z79.4 LONG TERM (CURRENT) USE OF INSULIN (HCC): Status: RESOLVED | Noted: 2017-04-11 | Resolved: 2023-10-19

## 2023-10-19 PROBLEM — K80.50 CHOLEDOCHOLITHIASIS: Status: RESOLVED | Noted: 2023-06-14 | Resolved: 2023-10-19

## 2023-10-19 PROBLEM — Z79.4 ENCOUNTER FOR LONG-TERM (CURRENT) USE OF INSULIN (HCC): Status: RESOLVED | Noted: 2019-01-23 | Resolved: 2023-10-19

## 2023-10-19 PROBLEM — Z13.31 ENCOUNTER FOR SCREENING FOR DEPRESSION: Status: RESOLVED | Noted: 2020-02-18 | Resolved: 2023-10-19

## 2023-10-19 PROBLEM — N17.9 ACUTE RENAL FAILURE (HCC): Status: RESOLVED | Noted: 2021-02-04 | Resolved: 2023-10-19

## 2023-10-19 PROBLEM — R74.8 ELEVATED LIVER ENZYMES: Status: RESOLVED | Noted: 2021-06-16 | Resolved: 2023-10-19

## 2023-10-19 PROBLEM — E11.9 TYPE 2 DIABETES MELLITUS WITHOUT COMPLICATIONS (HCC): Status: RESOLVED | Noted: 2017-07-12 | Resolved: 2023-10-19

## 2023-10-19 PROBLEM — B15.9 VIRAL HEPATITIS A: Status: RESOLVED | Noted: 2021-07-16 | Resolved: 2023-10-19

## 2024-01-19 PROBLEM — J96.21 ACUTE ON CHRONIC RESPIRATORY FAILURE WITH HYPOXIA (HCC): Status: RESOLVED | Noted: 2018-04-30 | Resolved: 2024-01-19

## 2025-02-11 NOTE — MR AVS SNAPSHOT
"        Siri Solis   2017 12:40 PM   Office Visit   MRN: [id number]    Department:  [facility]   Dept Phone:  [phone]    Description:  Female : 1945   Provider:  Josue Kumar PA-C           Reason for Visit     Low Back Pain Pt complains of ongoing back pain. Pt states that it has been really bad for the past week.      Allergies as of 2017     Allergen Noted Reactions    Zoloft 2011   Diarrhea    Metformin 2011   Hives, Rash, Itching    Amaryl [Amaryl] 2009       Amaryl [Glimepiride] 10/31/2013   Shortness of Breath, Rash    Amoxicillin 2009   Rash    Byetta 2009   Rash    Epinephrine 2009   Shortness of Breath    Panic attack, Can't breath    Hctz 10/28/2012       Stopped urinary output    Iodine 2009   Anaphylaxis    contrast    Lasix [Furosemide] 10/28/2012       Leg cramps, stopped urinary output    Latex 2017       Lexapro 10/28/2012       Leg cramping    Penicillins 10/31/2013   Rash    Spironolactone 2017         You were diagnosed with     Lumbar strain, initial encounter   [984836]       Spasm   [807650]         Vital Signs     Blood Pressure Pulse Temperature Respirations Height Weight    122/52 mmHg 78 36.7 °C (98.1 °F) 20 1.753 m (5' 9\") 120.203 kg (265 lb)    Body Mass Index Oxygen Saturation Breastfeeding? Smoking Status          39.12 kg/m2 58% No Former Smoker        Basic Information     Date Of Birth Sex Race Ethnicity Preferred Language    1945 Female White Non- English      Problem List              ICD-10-CM Priority Class Noted - Resolved    Palpitations R00.2 High  3/21/2012 - Present    Tachycardia R00.0 High  3/21/2012 - Present    Fatigue R53.83 High  3/21/2012 - Present    Dyspnea R06.00 High  3/21/2012 - Present    Tricuspid regurgitation I07.1 High  3/21/2012 - Present    HTN (hypertension) I10 High  3/21/2012 - Present    HLD (hyperlipidemia) E78.5 High  3/21/2012 - Present   " "2/11/2025      Sanket Sepulveda  [address] [place] [address]      Dear Colleague,    Thank you for referring your patient, Sanket Sepulveda, to the [facility]. Please see a copy of my visit note below.    Subjective:    Established patient.     Sanket Sepulveda is a 65 year old female who presents for thyroid nodules. Chart fully reviewed for thyroid nodules but not osteopenia.     Carotid US 2/10/2022 ([facility]) led to an incidental finding of thyroid nodules.     No overt dysphagia but she has a pressure sensation with swallowing. No SOB. Intermittent hoarseness. ENT performed laryngoscopy 11/6/2020 at [facility] and per their note the vocal cord motion was normal. Since her laryngoscopy her hoarseness has not changed or worsened.     No FH of thyroid cancer, a distant cousin had a thyroidectomy - indication not known. No prior H/N radiation.      OSH thyroid US ([facility]) 3/28/2022: this is the first thyroid US she has had  -Per my message 10/31/2022 \"I have reviewed the ultrasound images in detail. None of the right lobe nodules require biopsy. The right lobe nodule labeled \"3.3\" actually refers to mm not cm, so it's a very small nodule. Regarding the left thyroid lobe nodules, I do recommend FNA biopsy of the 1.6 cm nodule in the lower left lobe\"    Right lobe of the thyroid measures 4.4 x 1.7 x 1.8 cm.     Nodule 1:   Size: 1.6 x 1.6 x 0.9 cm   Location: [facility]   TR-3    Nodule 2   Size: 0.4 x 0.4 x 0.3 cm   Location: [facility]   TR-4    Nodule 3   Size: 3.3 (see my note above, this is mm not cm) x 0.3 x 0.5 cm   TR-1    Left lobe of the thyroid measures 5.1 x 1.9 x 1.5 cm.     Nodule 1   Size: 1.1 x 1.6 x 0.9 cm cm   Location: Inferior   TR-4    Nodule 2   Size: 1.2 x 1.1 x 1.2 cm   TR-2    TSH always normal previously, 1/28/2025: TSH 2.18. 4/2022 with normal: TSH, total T3, free T3, free T4. Tg Ab undetectable in 2020. TPO detectable but in the normal range in 2020. She has " Obesity E66.9 High  3/21/2012 - Present    COPD (chronic obstructive pulmonary disease) (CMS-HCC) J44.9 High  3/21/2012 - Present    Abnormal ECG R94.31 High  3/21/2012 - Present    Edema R60.9 High  3/21/2012 - Present    Anemia D64.9   12/22/2012 - Present    Pulmonary HTN (CMS-HCC) I27.2   12/23/2012 - Present    Microcytic anemia D50.9   12/23/2012 - Present    DM (diabetes mellitus) (CMS-HCC) E11.9   12/23/2012 - Present    Iron deficiency anemia D50.9   12/25/2012 - Present    Vitamin B12 deficiency (dietary) anemia D51.8   12/25/2012 - Present    Hypokalemia E87.6   12/25/2012 - Present    Adrenal mass (CMS-HCC) E27.9   11/6/2013 - Present      Health Maintenance        Date Due Completion Dates    DIABETES MONOFILAMENT / LE EXAM [birth year]/17/1946 ---    RETINAL SCREENING 12/17/1961 ---    IMM DTaP/Tdap/Td Vaccine (1 - Tdap) 12/17/1962 ---    PAP SMEAR 12/17/1964 ---    MAMMOGRAM 12/17/1983 ---    COLONOSCOPY 12/17/1993 ---    IMM ZOSTER VACCINE 12/17/2003 ---    BONE DENSITY 12/17/2008 ---    IMM PNEUMOCOCCAL 65+ (ADULT) LOW/MEDIUM RISK SERIES (1 of 2 - PCV13) 12/17/2008 ---    IMM INFLUENZA (1) 9/1/2016 ---    A1C SCREENING 6/1/2017 12/1/2016, 6/30/2016, 6/4/2015, 1/9/2015, 11/25/2014, 4/3/2014, 10/31/2013, 6/3/2013, 3/5/2012, 8/29/2011    FASTING LIPID PROFILE 12/1/2017 12/1/2016, 11/25/2014, 12/23/2012, 3/5/2012, 8/29/2011    URINE ACR / MICROALBUMIN 12/1/2017 12/1/2016, 1/9/2015, 4/3/2014, 12/21/2012, 3/5/2012    SERUM CREATININE 12/1/2017 12/1/2016, 6/30/2016, 12/2/2015, 6/4/2015, 1/9/2015, 11/26/2014, 11/25/2014, 4/3/2014, 10/31/2013, 6/3/2013, 12/27/2012, 12/25/2012, 12/24/2012, 12/23/2012, 12/22/2012, 12/21/2012, 3/5/2012, 10/6/2011, 8/29/2011, 8/28/2011, 8/11/2009, 10/3/2006, 9/26/2006            Current Immunizations     No immunizations on file.      Below and/or attached are the medications your provider expects you to take. Review all of your home medications and newly ordered medications with your  never been on thyroid hormone.    Thyroid FNA 11/15/2022:  -Thyroid left inferior 1.1 cm nodule - benign     [facility] Thyroid US 12/19/2023 - images not available but per report:     Right lobe of the thyroid measures 5.1 x 2.0 x 1.9 cm. Parenchyma is homogeneous with normal blood flow.     Nodule 1   Size: 0.6 x 0.6 x 0.2 cm   Location: [facility]   TR-4    Nodule 2   Size: 0.5 x 0.4 x 0.2 cm   Location: Mid   TR-1    Nodule 3   Size: 1.1 x 1.5 x 0.9 cm   Location: Middle   TR-4    Left lobe of the thyroid measures 5.2 x 1.7 x 1.9 cm. Parenchyma is homogeneous with normal blood flow.     Nodule 1   Size: 0.7 x 0.8 x 0.3 cm   Location: [facility]   TR-4    Nodule 2   Size: 1.3 x 1.3 x 1.2 cm   Location: Middle   TR-3    Nodule 3   Size: 0.3 x 0.5 x 0.3 cm   Location: Middle   TR-4    Nodule 4   Size: 1.2 x 1.3 x 0.9 cm   Location: Inferior   TR-4    Thyroid US 1/23/2025: I reviewed the images in detail   RIGHT lobe: 5.9 x 2.1 x 1.9 cm. Homogeneous echotexture.  Isthmus: 4 mm.  LEFT lobe: 6.1 x 1.9 x 2.0 cm. Homogeneous echotexture.     NECK: No cervical lymphadenopathy.     NODULES:     Nodule 1: Right mid gland posteriorly measuring 19 x 14 x 10 mm; this had measured 11 x 15 x 9 mm previously. I agree it's TR-4    Nodule 2: Left mid gland measuring 13 x 13 x 12 mm; this is unchanged in size and appearance since the prior study. I agree it's TR-3    Nodule 3: Left inferior gland measuring 15 x 14 x 9 mm; this had measured 13 x 12 x 9 mm previously. I agree it's TR-4 (previously FNA'd and benign)     # Osteopenia    [facility]H DEXA 12/18/2024 - images not available but per report her lowest overall T-score is -2.1 at the left femoral neck and FRAX score is 10.8%/1.9%.     No fractures.    Follow-up with her PCP regarding this.     # Weight management    She works with a weight management clinic and is being treated with metformin.     Objective:    BMI 38 kg/m2, /96 (she has white coat hypertension and at home BP is typically  130s/80s). No thyroid eye disease. Thyroid examined and there is bilateral nodularity, right > left, with an ~2 cm right lobe nodule. No cervical LAD.     9/2022: Appears well. No thyroid eye disease. Thyroid examined and there is bilateral nodularity, right> left, with an ~2 cm mobile and non tender right lobe nodule. Radial pulse with a regular rate and rhythm.    Assessment/Plan:    # Thyroid nodules    Reviewed that the 1.9 cm right lobe nodule meets criteria for FNA, but Sanket has a preference for observation. Repeat US and TSH in 1 year with return visit. She'll let me know in the interim if she perceives nodule growth or develops cervical lymphadenopathy.     # Weight management    She is looking for a new weight management clinic, referral placed.     30 minutes spent on the date of the encounter doing chart review, history and exam, documentation and further activities as noted above.     The longitudinal plan of care for the diagnosis(es)/condition(s) as documented were addressed during this visit. Due to the added complexity in care, I will continue to support Sanket in the subsequent management and with ongoing continuity of care.      Again, thank you for allowing me to participate in the care of your patient.        Sincerely,        Adam Stubbs MD    Electronically signed provider and/or pharmacist. Follow medication instructions as directed by your provider and/or pharmacist. Please keep your medication list with you and share with your provider. Update the information when medications are discontinued, doses are changed, or new medications (including over-the-counter products) are added; and carry medication information at all times in the event of emergency situations     Allergies:  ZOLOFT - Diarrhea     METFORMIN - Hives,Rash,Itching     AMARYL - (reactions not documented)     AMARYL - Shortness of Breath,Rash     AMOXICILLIN - Rash     BYETTA - Rash     EPINEPHRINE - Shortness of Breath     HCTZ - (reactions not documented)     IODINE - Anaphylaxis     LASIX - (reactions not documented)     LATEX - (reactions not documented)     LEXAPRO - (reactions not documented)     PENICILLINS - Rash     SPIRONOLACTONE - (reactions not documented)               Medications  Valid as of: February 28, 2017 -  1:57 PM    Generic Name Brand Name Tablet Size Instructions for use    Aspirin (Tab)  MG Take 650 mg by mouth as needed.        Atenolol (Tab) TENORMIN 100 MG Take 1 Tab by mouth every day.        Calcium-Magnesium-Vitamin D   Take  by mouth 2 Times a Day.        DiphenhydrAMINE HCl   Take  by mouth as needed.        Ezetimibe (Tab) ZETIA 10 MG Take 10 mg by mouth every day.          Ezetimibe-Simvastatin (Tab) VYTORIN 10-40 MG Take 1 Tab by mouth every evening.        Hydrocodone-Acetaminophen (Tab) NORCO 7.5-325 MG Take 1 Tab by mouth every four hours as needed. Severe pain        Insulin Glargine (Solution) LANTUS 100 UNIT/ML Inject  as instructed every evening.        Lisinopril (Tab) PRINIVIL 10 MG Take 10 mg by mouth every evening.        Loperamide HCl   Take  by mouth as needed.        Methocarbamol (Tab) ROBAXIN 500 MG Take 2 Tabs by mouth 3 times a day.        Mometasone Furoate (Suspension) NASONEX 50 MCG/ACT Spray 2 Sprays in nose every evening. Each nostril        Non  Formulary Request Non Formulary Request  Unknown pain pill        non-formulary med non-formulary med  25 Each. Iron 25mg with B12        Omeprazole (Tablet Delayed Response) PRILOSEC 20 MG Take 20 mg by mouth 2 Times a Day. bid        Pioglitazone HCl-Metformin HCl (Tab) ACTOPLUS MET  MG Take 1 Tab by mouth 2 times a day, with meals.        Polyethylene Glycol 3350   Take  by mouth.        Spironolactone (Tab) ALDACTONE 50 MG Take 1 Tab by mouth every day.        Sucralfate (Tab) CARAFATE 1 GM Take 1 g by mouth 4 Times a Day,Before Meals and at Bedtime.        Temazepam (Cap) RESTORIL 30 MG Take 30 mg by mouth at bedtime as needed for Sleep.        Tiotropium Bromide Monohydrate (Cap) SPIRIVA 18 MCG Inhale 18 mcg by mouth every evening.        Zolpidem Tartrate (Tab) AMBIEN 10 MG Take 10 mg by mouth at bedtime as needed. daily        .                 Medicines prescribed today were sent to:     [id number] - [place] - [address] [place] [address]    Phone: [phone] Fax: [phone]    Open 24 Hours?: No      Medication refill instructions:       If your prescription bottle indicates you have medication refills left, it is not necessary to call your provider’s office. Please contact your pharmacy and they will refill your medication.    If your prescription bottle indicates you do not have any refills left, you may request refills at any time through one of the following ways: The online [employer] system (except Urgent Care), by calling your provider’s office, or by asking your pharmacy to contact your provider’s office with a refill request. Medication refills are processed only during regular business hours and may not be available until the next business day. Your provider may request additional information or to have a follow-up visit with you prior to refilling your medication.   *Please Note: Medication refills are assigned a new Rx number when refilled electronically. Your  pharmacy may indicate that no refills were authorized even though a new prescription for the same medication is available at the pharmacy. Please request the medicine by name with the pharmacy before contacting your provider for a refill.        Referral     A referral request has been sent to our patient care coordination department. Please allow 3-5 business days for us to process this request and contact you either by phone or mail. If you do not hear from us by the 5th business day, please call us at [phone].           PiniOn Access Code: [email or web address]  Expires: 3/30/2017  1:57 PM    Your email address is not on file at [facility].  Email Addresses are required for you to sign up for PiniOn, please contact [phone] to verify your personal information and to provide your email address prior to attempting to register for PiniOn.    Siri Solis  [address] [place] [address]    PiniOn  A secure, online tool to manage your health information     [facility]’s PiniOn® is a secure, online tool that connects you to your personalized health information from the privacy of your home -- day or night - making it very easy for you to manage your healthcare. Once the activation process is completed, you can even access your medical information using the PiniOn maryann, which is available for free in the Apple Maryann store or Google Play store.     To learn more about PiniOn, visit [email or web address]    There are two levels of access available (as shown below):   My Chart Features  [facility] Primary Care Doctor [facility]  Specialists [facility]  Urgent  Care Non-[facility] Primary Care Doctor   Email your healthcare team securely and privately 24/7 X X X    Manage appointments: schedule your next appointment; view details of past/upcoming appointments X      Request prescription refills. X      View recent personal medical records, including lab and immunizations X X X X   View health record, including health  history, allergies, medications X X X X   Read reports about your outpatient visits, procedures, consult and ER notes X X X X   See your discharge summary, which is a recap of your hospital and/or ER visit that includes your diagnosis, lab results, and care plan X X  X     How to register for [employer]:  Once your e-mail address has been verified, follow the following steps to sign up for [employer].     1. Go to  [email or web address]  2. Click on the Sign Up Now box, which takes you to the New Member Sign Up page. You will need to provide the following information:  a. Enter your [employer] Access Code exactly as it appears at the top of this page. (You will not need to use this code after you’ve completed the sign-up process. If you do not sign up before the expiration date, you must request a new code.)   b. Enter your date of birth.   c. Enter your home email address.   d. Click Submit, and follow the next screen’s instructions.  3. Create a [employer] ID. This will be your [employer] login ID and cannot be changed, so think of one that is secure and easy to remember.  4. Create a [employer] password. You can change your password at any time.  5. Enter your Password Reset Question and Answer. This can be used at a later time if you forget your password.   6. Enter your e-mail address. This allows you to receive e-mail notifications when new information is available in [employer].  7. Click Sign Up. You can now view your health information.    For assistance activating your [employer] account, call [phone]

## (undated) DEVICE — SLEEVE, VASO, THIGH, MED

## (undated) DEVICE — COVER LIGHT HANDLE ALC PLUS DISP (18EA/BX)

## (undated) DEVICE — CANISTER SUCTION RIGID RED 1500CC (40EA/CA)

## (undated) DEVICE — WATER IRRIGATION STERILE 1000ML (12EA/CA)

## (undated) DEVICE — GLOVE SZ 7 BIOGEL PI MICRO - PF LF (50PR/BX 4BX/CA)

## (undated) DEVICE — SUTURE 4-0 MONOCRYL PLUS PS-1 - 27 INCH (36/BX)

## (undated) DEVICE — ELECTRODE DUAL RETURN W/ CORD - (50/PK)

## (undated) DEVICE — GLOVE BIOGEL INDICATOR SZ 7SURGICAL PF LTX - (50/BX 4BX/CA)

## (undated) DEVICE — GOWN WARMING STANDARD FLEX - (30/CA)

## (undated) DEVICE — SLEEVE VASO CALF MED - (10PR/CA)

## (undated) DEVICE — BIPOLAR FORCE DA VINCI 12X'S REISABLE

## (undated) DEVICE — NEEDLE INSFL 120MM 14GA VRRS - (20/BX)

## (undated) DEVICE — DRAPE COLUMN  BOX OF 20

## (undated) DEVICE — DRAPE ARM  BOX OF 20

## (undated) DEVICE — SET EXTENSION WITH 2 PORTS (48EA/CA) ***PART #2C8610 IS A SUBSTITUTE*****

## (undated) DEVICE — LACTATED RINGERS INJ 1000 ML - (14EA/CA 60CA/PF)

## (undated) DEVICE — MASK OXYGEN VNYL ADLT MED CONC WITH 7 FOOT TUBING  - (50EA/CA)

## (undated) DEVICE — GLOVE SZ 6.5 BIOGEL PI MICRO - PF LF (50PR/BX)

## (undated) DEVICE — SUCTION INSTRUMENT YANKAUER BULBOUS TIP W/O VENT (50EA/CA)

## (undated) DEVICE — HOOK PERMANENT CAUTERY DA VINCI 10X'S REUSABLE

## (undated) DEVICE — SENSOR OXIMETER ADULT SPO2 RD SET (20EA/BX)

## (undated) DEVICE — SEAL 5MM-8MM UNIVERSAL  BOX OF 10

## (undated) DEVICE — SUTURE 0 VICRYL PLUS UR-6 - 27 INCH (36/BX)

## (undated) DEVICE — IRRIGATOR SUCTION ENDOWRIST DISPOSABLE OD8 MM (6EA/BX)

## (undated) DEVICE — CHLORAPREP 26 ML APPLICATOR - ORANGE TINT(25/CA)

## (undated) DEVICE — OBTURATOR BLADELESS STANDARD 8MM (6EA/BX)

## (undated) DEVICE — PACK LAP CHOLE OR - (2EA/CA)

## (undated) DEVICE — KIT  I.V. START (100EA/CA)

## (undated) DEVICE — ROBOTIC SURGERY SERVICES

## (undated) DEVICE — CONTAINER, SPECIMEN, STERILE

## (undated) DEVICE — GRASPER SMALL DA VINCI 10X'S REUSABLE

## (undated) DEVICE — CLIP APPLIER LARGE DA VINCI 100X'S REUSABLE

## (undated) DEVICE — BAG RETRIEVAL 5MM (10EA/BX)

## (undated) DEVICE — DERMABOND ADVANCED - (12EA/BX)

## (undated) DEVICE — TOWEL STOP TIMEOUT SAFETY FLAG (40EA/CA)

## (undated) DEVICE — TUBE CONNECTING SUCTION - CLEAR PLASTIC STERILE 72 IN (50EA/CA)

## (undated) DEVICE — TUBING CLEARLINK DUO-VENT - C-FLO (48EA/CA)

## (undated) DEVICE — SODIUM CHL. INJ. 0.9% 500ML (24EA/CA 50CA/PF)

## (undated) DEVICE — SET LEADWIRE 5 LEAD BEDSIDE DISPOSABLE ECG (1SET OF 5/EA)

## (undated) DEVICE — SUTURE GENERAL

## (undated) DEVICE — SODIUM CHL IRRIGATION 0.9% 1000ML (12EA/CA)

## (undated) DEVICE — SYSTEM CLEARIFY VISUALIZATION (10EA/PK)

## (undated) DEVICE — CLIP HEMOLOCK PURPLE - (14/BX)

## (undated) DEVICE — CANISTER SUCTION 3000ML MECHANICAL FILTER AUTO SHUTOFF MEDI-VAC NONSTERILE LF DISP  (40EA/CA)

## (undated) DEVICE — GLOVE BIOGEL PI ORTHO SZ 7 PF LF (40PR/BX)

## (undated) DEVICE — KIT CUSTOM PROCEDURE SINGLE FOR ENDO  (15/CA)

## (undated) DEVICE — CANNULA O2 COMFORT SOFT EAR ADULT 7 FT TUBING (50/CA)